# Patient Record
Sex: MALE | Race: WHITE | NOT HISPANIC OR LATINO | Employment: FULL TIME | URBAN - METROPOLITAN AREA
[De-identification: names, ages, dates, MRNs, and addresses within clinical notes are randomized per-mention and may not be internally consistent; named-entity substitution may affect disease eponyms.]

---

## 2017-07-12 ENCOUNTER — HOSPITAL ENCOUNTER (EMERGENCY)
Facility: HOSPITAL | Age: 42
Discharge: HOME | End: 2017-07-12
Attending: EMERGENCY MEDICINE
Payer: COMMERCIAL

## 2017-07-12 VITALS
BODY MASS INDEX: 31.83 KG/M2 | WEIGHT: 210 LBS | HEART RATE: 64 BPM | RESPIRATION RATE: 18 BRPM | TEMPERATURE: 98 F | HEIGHT: 68 IN | OXYGEN SATURATION: 99 % | SYSTOLIC BLOOD PRESSURE: 130 MMHG | DIASTOLIC BLOOD PRESSURE: 78 MMHG

## 2017-07-12 DIAGNOSIS — R07.9 CHEST PAIN: ICD-10-CM

## 2017-07-12 LAB
ALBUMIN SERPL BCP-MCNC: 4.4 G/DL
ALP SERPL-CCNC: 42 U/L
ALT SERPL W/O P-5'-P-CCNC: 44 U/L
ANION GAP SERPL CALC-SCNC: 9 MMOL/L
AST SERPL-CCNC: 36 U/L
BASOPHILS # BLD AUTO: 0.03 K/UL
BASOPHILS NFR BLD: 0.2 %
BILIRUB SERPL-MCNC: 1.1 MG/DL
BNP SERPL-MCNC: <10 PG/ML
BUN SERPL-MCNC: 13 MG/DL
CALCIUM SERPL-MCNC: 10.1 MG/DL
CHLORIDE SERPL-SCNC: 104 MMOL/L
CO2 SERPL-SCNC: 25 MMOL/L
CREAT SERPL-MCNC: 0.8 MG/DL
DIASTOLIC DYSFUNCTION: NO
DIFFERENTIAL METHOD: ABNORMAL
EOSINOPHIL # BLD AUTO: 0.2 K/UL
EOSINOPHIL NFR BLD: 1.2 %
ERYTHROCYTE [DISTWIDTH] IN BLOOD BY AUTOMATED COUNT: 12 %
EST. GFR  (AFRICAN AMERICAN): >60 ML/MIN/1.73 M^2
EST. GFR  (NON AFRICAN AMERICAN): >60 ML/MIN/1.73 M^2
GLUCOSE SERPL-MCNC: 101 MG/DL
HCT VFR BLD AUTO: 45 %
HGB BLD-MCNC: 16.1 G/DL
LYMPHOCYTES # BLD AUTO: 2 K/UL
LYMPHOCYTES NFR BLD: 14.2 %
MCH RBC QN AUTO: 34.8 PG
MCHC RBC AUTO-ENTMCNC: 35.8 %
MCV RBC AUTO: 97 FL
MONOCYTES # BLD AUTO: 1 K/UL
MONOCYTES NFR BLD: 7 %
NEUTROPHILS # BLD AUTO: 10.8 K/UL
NEUTROPHILS NFR BLD: 77.2 %
PLATELET # BLD AUTO: 223 K/UL
PMV BLD AUTO: 11.6 FL
POTASSIUM SERPL-SCNC: 3.7 MMOL/L
PROT SERPL-MCNC: 7.8 G/DL
RBC # BLD AUTO: 4.63 M/UL
RETIRED EF AND QEF - SEE NOTES: 60 (ref 55–65)
SODIUM SERPL-SCNC: 138 MMOL/L
TROPONIN I SERPL DL<=0.01 NG/ML-MCNC: 0.01 NG/ML
TROPONIN I SERPL DL<=0.01 NG/ML-MCNC: 0.02 NG/ML
WBC # BLD AUTO: 13.97 K/UL

## 2017-07-12 PROCEDURE — 85025 COMPLETE CBC W/AUTO DIFF WBC: CPT

## 2017-07-12 PROCEDURE — 93010 ELECTROCARDIOGRAM REPORT: CPT | Mod: ,,, | Performed by: INTERNAL MEDICINE

## 2017-07-12 PROCEDURE — 93005 ELECTROCARDIOGRAM TRACING: CPT

## 2017-07-12 PROCEDURE — 25000003 PHARM REV CODE 250: Performed by: NURSE PRACTITIONER

## 2017-07-12 PROCEDURE — 83880 ASSAY OF NATRIURETIC PEPTIDE: CPT

## 2017-07-12 PROCEDURE — 93351 STRESS TTE COMPLETE: CPT

## 2017-07-12 PROCEDURE — 99284 EMERGENCY DEPT VISIT MOD MDM: CPT

## 2017-07-12 PROCEDURE — 84484 ASSAY OF TROPONIN QUANT: CPT

## 2017-07-12 PROCEDURE — 80053 COMPREHEN METABOLIC PANEL: CPT

## 2017-07-12 RX ORDER — NITROGLYCERIN 0.4 MG/1
0.4 TABLET SUBLINGUAL
Status: COMPLETED | OUTPATIENT
Start: 2017-07-12 | End: 2017-07-12

## 2017-07-12 RX ORDER — ASPIRIN 325 MG
325 TABLET ORAL
Status: COMPLETED | OUTPATIENT
Start: 2017-07-12 | End: 2017-07-12

## 2017-07-12 RX ADMIN — NITROGLYCERIN 0.4 MG: 0.4 TABLET SUBLINGUAL at 12:07

## 2017-07-12 RX ADMIN — ASPIRIN 325 MG ORAL TABLET 325 MG: 325 PILL ORAL at 12:07

## 2017-07-12 NOTE — ED NOTES
APPEARANCE: Alert, oriented and in no acute distress.    PERIPHERAL VASCULAR: peripheral pulses present. Normal cap refill. No edema. Warm to touch.      GASTRO: soft, bowel sounds normal, no tenderness, no abdominal distention.  MUSC: Full ROM. No bony tenderness or soft tissue tenderness. No obvious deformity.  SKIN: Skin is warm and dry, normal skin turgor, mucous membranes moist.  NEURO: 5/5 strength major flexors/extensors bilaterally. Sensory intact to light touch bilaterally. [place] coma scale: eyes open spontaneously-4, oriented & converses-5, obeys commands-6. No neurological abnormalities.   MENTAL STATUS: awake, alert and aware of environment.  EYE: PERRL, both eyes: pupils brisk and reactive to light. Normal size.  ENT: EARS: no obvious drainage. NOSE: no active bleeding.   BREAST: symmetrical. No masses. No tenderness.  GENITALIA: Normal external genitalia.

## 2017-07-12 NOTE — ED PROVIDER NOTES
Encounter Date: 7/12/2017       History     Chief Complaint   Patient presents with    Chest Pain     began today, SOB, dizziness     40yo male presents to the ED for evaluation of CP.  Pt states the pain started while he was driving.  He reports that pain is substernal, and is sharp.  He reports that nothing makes the pain worse or better.  He denies fever/chills, cough, shortness of breath, palpitations, hemoptysis, abdominal pain, vomiting and diarrhea, and rash.  He does report that he felt dizziness, nausea, and diaphoresis initially, but those symptoms have resolved.  He is a smoker.  He denies family history of cardiac disease.  He reports that he had dizziness and sudden onset of nausea and diaphoresis last month for which he was seen at [place].  They diagnosed him with an anxiety attack.  He denies history of anxiety.      The history is provided by the patient.   Chest Pain   The current episode started 1 to 2 hours ago. Chest pain occurs constantly. The chest pain is improving. Associated with: unknown. At its most intense, the chest pain is at 6/10. The chest pain is currently at 4/10. The quality of the pain is described as sharp. The pain does not radiate. Primary symptoms include nausea and dizziness. Pertinent negatives for primary symptoms include no fever, no fatigue, no syncope, no shortness of breath, no cough, no wheezing, no palpitations, no abdominal pain and no vomiting.   He describes the dizziness as lightheadedness. The dizziness began today. The dizziness has been rapidly improving since its onset. Dizziness is a new problem. Dizziness also occurs with nausea and diaphoresis. Dizziness does not occur with vomiting or weakness.   Associated symptoms include diaphoresis.   Pertinent negatives for associated symptoms include no numbness and no weakness. He tried nothing for the symptoms. Risk factors include male gender and smoking/tobacco exposure.   Pertinent negatives for  past medical history include no CAD, no diabetes, no hyperlipidemia, no hypertension and no PE.   Pertinent negatives for family medical history include: no CAD and no hypertension.     Review of patient's allergies indicates:  No Known Allergies  History reviewed. No pertinent past medical history.  Past Surgical History:   Procedure Laterality Date    NOSE SURGERY       History reviewed. No pertinent family history.  Social History   Substance Use Topics    Smoking status: Light Tobacco Smoker    Smokeless tobacco: Never Used    Alcohol use Yes     Review of Systems   Constitutional: Positive for diaphoresis. Negative for appetite change, chills, fatigue and fever.   HENT: Negative for congestion and sore throat.    Respiratory: Negative for cough, shortness of breath and wheezing.    Cardiovascular: Positive for chest pain. Negative for palpitations and syncope.   Gastrointestinal: Positive for nausea. Negative for abdominal pain and vomiting.   Musculoskeletal: Negative for neck pain.   Skin: Negative for rash and wound.   Neurological: Positive for dizziness. Negative for weakness and numbness.   Hematological: Does not bruise/bleed easily.   Psychiatric/Behavioral: Negative for confusion.       Physical Exam     Initial Vitals [07/12/17 1151]   BP Pulse Resp Temp SpO2   137/77 100 20 97.9 °F (36.6 °C) 99 %      MAP       97         Physical Exam    Nursing note and vitals reviewed.  Constitutional: Vital signs are normal. He appears well-developed and well-nourished. He is active and cooperative. He is easily aroused.  Non-toxic appearance. He does not have a sickly appearance. He does not appear ill. No distress.   HENT:   Head: Normocephalic and atraumatic.   Eyes: Conjunctivae are normal.   Neck: Normal range of motion.   Cardiovascular: Normal rate, regular rhythm and normal heart sounds.   Pulses:       Radial pulses are 2+ on the right side, and 2+ on the left side.   Pulmonary/Chest: Effort normal  and breath sounds normal. He exhibits tenderness. He exhibits no mass, no bony tenderness, no crepitus, no edema and no swelling.       Abdominal: Soft. Normal appearance and bowel sounds are normal. He exhibits no distension. There is no tenderness. There is no rigidity, no rebound, no guarding and no CVA tenderness.   Neurological: He is alert, oriented to person, place, and time and easily aroused. GCS eye subscore is 4. GCS verbal subscore is 5. GCS motor subscore is 6.   Skin: Skin is warm, dry and intact. No bruising and no rash noted. No erythema.   Psychiatric: He has a normal mood and affect. His speech is normal and behavior is normal. Judgment and thought content normal. Cognition and memory are normal.         ED Course   Procedures  Labs Reviewed   COMPREHENSIVE METABOLIC PANEL - Abnormal; Notable for the following:        Result Value    Total Bilirubin 1.1 (*)     Alkaline Phosphatase 42 (*)     All other components within normal limits   CBC W/ AUTO DIFFERENTIAL - Abnormal; Notable for the following:     WBC 13.97 (*)     MCH 34.8 (*)     Gran # 10.8 (*)     Gran% 77.2 (*)     Lymph% 14.2 (*)     All other components within normal limits   TROPONIN I   B-TYPE NATRIURETIC PEPTIDE   TROPONIN I     EKG Readings: (Independently Interpreted)   Initial Reading: No STEMI. Rhythm: Normal Sinus Rhythm. Heart Rate: 88. Ectopy: No Ectopy. Conduction: Normal. ST Segments: Normal ST Segments. T Waves: Normal. Clinical Impression: Normal Sinus Rhythm      Imaging Results          X-Ray Chest 1 View (Final result)  Result time 07/12/17 12:40:04    Final result by Dori Irby MD (07/12/17 12:40:04)                 Impression:      No acute abnormality.      Electronically signed by: DORI IRBY  Date:     07/12/17  Time:    12:40              Narrative:    CLINICAL HISTORY:  Chest pain    TECHNIQUE: Single view portable frontal radiograph of the chest    COMPARISON: N/A      FINDINGS:  Support devices:  None    Chest: Cardiac and mediastinal silhouettes are normal.  Lungs are well aerated and clear.  No pleural effusion or pneumothorax.    Upper Abdomen: Normal.    Other: N/A.                                   Medical Decision Making:   Initial Assessment:   41-year-old male with chest pain which started 1-2 hours ago while driving.  No history of cardiac disease.  No family history of cardiac disease per patient.  The patient is a smoker.  Patient appears well, nontoxic.  Vital stable.  Reproducible anterior chest pain to palpation.  Heart rate regular rate and rhythm.  Lungs clear to auscultation.  Radial pulses 2+ bilaterally.  Differential Diagnosis:   STEMI, non-STEMI, arrhythmia, pneumothorax, pneumonia, chest wall pain, anxiety, electrolyte derangement  Clinical Tests:   Lab Tests: Ordered and Reviewed  Radiological Study: Ordered and Reviewed  Medical Tests: Ordered and Reviewed  ED Management:  Labs, EKG, chest x-ray, by mouth aspirin, sublingual nitroglycerin  Pt reports immediate relief of chest pain immediately after nitroglycerin.  The patient will be taken for a stress echo today.      Stress echo negative for ischemic changes.  Second troponin negative.  Pt will be discharged to follow up with [facility].  I reviewed strict return precautions.  Pt verbalized understanding, compliance, and agreement with the treatment plan.      Additional MDM:     ERNESTO Score:   Age over 65:                                    0.00   > or = to 3 CAD risk factors:           0.00  Established CAD:                            0.00  > or = to 2 anginal events in the past 24 hours: 0.00  Use of ASA in past 7 days:              0.00  Elevated Enzymes:                         0.00  ST Depression > or = to 0.05 mV:  0.00  ERNESTO score: 0                ED Course     Clinical Impression:   The encounter diagnosis was Chest pain.                           NOAM Dasilva  07/12/17 2050

## 2017-09-06 ENCOUNTER — CLINICAL SUPPORT (OUTPATIENT)
Dept: URGENT CARE | Facility: CLINIC | Age: 42
End: 2017-09-06

## 2017-09-06 DIAGNOSIS — Z00.00 PHYSICAL EXAM: Primary | ICD-10-CM

## 2017-09-06 PROCEDURE — 99499 UNLISTED E&M SERVICE: CPT | Mod: S$GLB,,, | Performed by: PREVENTIVE MEDICINE

## 2017-09-19 ENCOUNTER — PATIENT MESSAGE (OUTPATIENT)
Dept: INTERNAL MEDICINE | Facility: CLINIC | Age: 42
End: 2017-09-19

## 2017-09-19 ENCOUNTER — OFFICE VISIT (OUTPATIENT)
Dept: INTERNAL MEDICINE | Facility: CLINIC | Age: 42
End: 2017-09-19
Payer: COMMERCIAL

## 2017-09-19 ENCOUNTER — LAB VISIT (OUTPATIENT)
Dept: LAB | Facility: HOSPITAL | Age: 42
End: 2017-09-19
Attending: INTERNAL MEDICINE
Payer: COMMERCIAL

## 2017-09-19 VITALS
OXYGEN SATURATION: 96 % | HEART RATE: 96 BPM | BODY MASS INDEX: 33.62 KG/M2 | SYSTOLIC BLOOD PRESSURE: 136 MMHG | WEIGHT: 221.81 LBS | HEIGHT: 68 IN | DIASTOLIC BLOOD PRESSURE: 90 MMHG

## 2017-09-19 DIAGNOSIS — Z91.89 AT RISK FOR FERTILITY PROBLEMS: Primary | ICD-10-CM

## 2017-09-19 DIAGNOSIS — Z51.81 MEDICATION MONITORING ENCOUNTER: ICD-10-CM

## 2017-09-19 DIAGNOSIS — Z13.220 ENCOUNTER FOR SCREENING FOR LIPID DISORDER: ICD-10-CM

## 2017-09-19 DIAGNOSIS — Z00.00 ANNUAL PHYSICAL EXAM: Primary | ICD-10-CM

## 2017-09-19 DIAGNOSIS — F41.0 PANIC ATTACKS: ICD-10-CM

## 2017-09-19 DIAGNOSIS — F41.9 ANXIETY: ICD-10-CM

## 2017-09-19 LAB
ALBUMIN SERPL BCP-MCNC: 4.2 G/DL
ALP SERPL-CCNC: 48 U/L
ALT SERPL W/O P-5'-P-CCNC: 40 U/L
AMPHET+METHAMPHET UR QL: NEGATIVE
ANION GAP SERPL CALC-SCNC: 10 MMOL/L
AST SERPL-CCNC: 33 U/L
BARBITURATES UR QL SCN>200 NG/ML: NEGATIVE
BENZODIAZ UR QL SCN>200 NG/ML: NEGATIVE
BILIRUB SERPL-MCNC: 1 MG/DL
BUN SERPL-MCNC: 11 MG/DL
BZE UR QL SCN: NEGATIVE
CALCIUM SERPL-MCNC: 9.7 MG/DL
CANNABINOIDS UR QL SCN: NEGATIVE
CHLORIDE SERPL-SCNC: 104 MMOL/L
CHOLEST SERPL-MCNC: 238 MG/DL
CO2 SERPL-SCNC: 26 MMOL/L
CREAT SERPL-MCNC: 0.9 MG/DL
CREAT UR-MCNC: 68 MG/DL
EST. GFR  (AFRICAN AMERICAN): >60 ML/MIN/1.73 M^2
EST. GFR  (NON AFRICAN AMERICAN): >60 ML/MIN/1.73 M^2
ETHANOL UR-MCNC: <10 MG/DL
GLUCOSE SERPL-MCNC: 102 MG/DL
HDLC SERPL-MCNC: 57 MG/DL
METHADONE UR QL SCN>300 NG/ML: NEGATIVE
OPIATES UR QL SCN: NEGATIVE
PCP UR QL SCN>25 NG/ML: NEGATIVE
POTASSIUM SERPL-SCNC: 4 MMOL/L
PROT SERPL-MCNC: 7.8 G/DL
SODIUM SERPL-SCNC: 140 MMOL/L
TOXICOLOGY INFORMATION: NORMAL
TSH SERPL DL<=0.005 MIU/L-ACNC: 1.05 UIU/ML

## 2017-09-19 PROCEDURE — 80307 DRUG TEST PRSMV CHEM ANLYZR: CPT

## 2017-09-19 PROCEDURE — 99999 PR PBB SHADOW E&M-EST. PATIENT-LVL III: CPT | Mod: PBBFAC,,, | Performed by: INTERNAL MEDICINE

## 2017-09-19 PROCEDURE — 36415 COLL VENOUS BLD VENIPUNCTURE: CPT | Mod: PO

## 2017-09-19 PROCEDURE — 99386 PREV VISIT NEW AGE 40-64: CPT | Mod: S$GLB,,, | Performed by: INTERNAL MEDICINE

## 2017-09-19 PROCEDURE — 84443 ASSAY THYROID STIM HORMONE: CPT

## 2017-09-19 PROCEDURE — 80053 COMPREHEN METABOLIC PANEL: CPT

## 2017-09-19 PROCEDURE — 82465 ASSAY BLD/SERUM CHOLESTEROL: CPT

## 2017-09-19 PROCEDURE — 83718 ASSAY OF LIPOPROTEIN: CPT

## 2017-09-19 RX ORDER — TRAZODONE HYDROCHLORIDE 100 MG/1
100 TABLET ORAL NIGHTLY PRN
Qty: 30 TABLET | Refills: 1 | Status: SHIPPED | OUTPATIENT
Start: 2017-09-19 | End: 2017-11-14 | Stop reason: SDUPTHER

## 2017-09-19 RX ORDER — TRAZODONE HYDROCHLORIDE 50 MG/1
50 TABLET ORAL NIGHTLY PRN
Qty: 30 TABLET | Refills: 3 | Status: SHIPPED | OUTPATIENT
Start: 2017-09-19 | End: 2017-09-19 | Stop reason: SDUPTHER

## 2017-09-19 NOTE — PROGRESS NOTES
Portions of this note are generated with voice recognition software. Typographical errors may exist.       Patient Name:GABRIELA PASTOR  Patient MRN:   [id number]    History of Present Illness   ================================================================  GABRIELA PASTOR is a 42 y.o. male here for primary care visit for  Chief Complaint   Patient presents with    Establish Care       History reviewed. No pertinent past medical history.    Past Surgical History:   Procedure Laterality Date    NOSE SURGERY         Review of patient's allergies indicates:  No Known Allergies    No current outpatient prescriptions on file prior to visit.     No current facility-administered medications on file prior to visit.        Family History   Problem Relation Age of Onset    Diabetes Mellitus Neg Hx        Social History     Social History    Marital status:      Spouse name: N/A    Number of children: N/A    Years of education: N/A     Occupational History    Not on file.     Social History Main Topics    Smoking status: Light Tobacco Smoker    Smokeless tobacco: Never Used    Alcohol use Yes    Drug use: No    Sexual activity: Not on file     Other Topics Concern    Not on file     Social History Narrative    No narrative on file       History   Sexual Activity    Sexual activity: Not on file         SUBJECTIVE:    Patient states that over the course of the last 5 months he has gone to the emergency room twice for panic attack.  States that he has had comprehensive cardiovascular evaluation the first started at [facility] and then concluded at [facility].  States that he has not had problems with panic attacks prior to this current situation.  Has never been on pharmacotherapy for anxiety or depression.  Psychosocial conditions do not seem to be out of the ordinary in the patient's opinion.  He has some psychosocial stress but no recent major life events.  States that he in the last  2 weeks has had recurrence of severe panic attack symptoms.  Inadequate coping skills.  He denies alcohol misuse or abuse.  Denies abuse of caffeine.  No family history of thyroidal illness.    Occasional orthopedic problems with his right shoulder.  No major injuries or surgeries in the past.    Patient would like to stop smoking but would like to get control of anxiety first.          Medications Reviewed and Updated    Past medical, family, and social histories were reviewed and updated.    Review of Systems negative unless otherwise noted in history of present illness-  Review of Systems   HENT: Negative for hearing loss.    Eyes: Negative for discharge.   Respiratory: Negative for wheezing.    Cardiovascular: Negative for chest pain and palpitations.   Gastrointestinal: Negative for blood in stool, constipation, diarrhea and vomiting.   Genitourinary: Negative for hematuria and urgency.   Musculoskeletal: Negative for neck pain.   Neurological: Negative for weakness and headaches.   Endo/Heme/Allergies: Negative for polydipsia.       Allergic:  Review of patient's allergies indicates:  No Known Allergies    OBJECTIVE:  BP: (!) 136/90 Pulse: 96    Wt Readings from Last 3 Encounters:   09/19/17 100.6 kg (221 lb 12.5 oz)   07/12/17 95.3 kg (210 lb)    Body mass index is 33.72 kg/m².  Previous Blood Pressure Readings :   BP Readings from Last 3 Encounters:   09/19/17 (!) 136/90   07/12/17 130/78       Physical Exam    GEN: healthy appearing  HEENT: sclera non-icteric, conjunctiva clear  CV: no peripheral edema, RRR, no r/m/g  PULM: breathing non-labored, no w/r/r  ABD: obese , no hepatomegally   PSYCH: appropriate affect  MSK: able to rise from chair without assistance  SKIN: normal skin turgor      Pertinent Labs Reviewed           ASSESSMENT/PLAN:    Annual physical exam    Encounter for screening for lipid disorder  -     Cholesterol, total; Future; Expected date: 09/19/2017  -     HDL CHOLESTEROL; Future;  Expected date: 09/19/2017    Anxiety.Condition not optimally controlled. Detailed counseling on self care measures. Plan to monitor clinically in addition to plan below.   -     Comprehensive metabolic panel; Future; Expected date: 09/19/2017  -     TSH; Future; Expected date: 09/19/2017  -     Discontinue: trazodone (DESYREL) 50 MG tablet; Take 1 tablet (50 mg total) by mouth nightly as needed for Insomnia.  Dispense: 30 tablet; Refill: 3  -     TOXICOLOGY SCREEN, URINE, RANDOM (COMPLIANCE)  -     trazodone (DESYREL) 100 MG tablet; Take 1 tablet (100 mg total) by mouth nightly as needed for Insomnia.  Dispense: 30 tablet; Refill: 1    Medication monitoring encounter  -     TOXICOLOGY SCREEN, URINE, RANDOM (COMPLIANCE)          Future Appointments  Date Time Provider Department Center   10/26/2017 3:20 PM Troy Holt MD [facility] [place]       Troy Holt  9/19/2017  6:26 PM

## 2017-09-19 NOTE — PATIENT INSTRUCTIONS
AFTER CARE INSTRUCTIONS   · The name of your medicine is trazodone.     · You do not become addicted to this medicine.  However your body does become dependent on it after taking it more than 21 days at dose 150 mg or more.  Therefore do not stop taking it abruptly once you have been taking it more than 21 days at dose 150 mg or more.  If you need to address discontinuing the medicine call the office so we can give you special instructions.    · You will start on a low-dose of this medication.  This is to avoid any unwanted side effects or to let yourbody get use to mild side effects until they go away.     · Depression\anxiety symptoms do not typically get better until you have been on the medication for at least 3-4 weeks.  Therefore we will likely need to communicate with you after 3-4 weeks of therapy to determine if you need increased dosage of medication.    · This medicine may give you some stomach upset . Give this some time. If it is minor it should go away on it's own.     · If it does not go away or it bothers you, please call our office. Do NOT stop taking it all of a sudden. Stopping this medicine suddenly can cause your mood symptoms to come back in an unpleasant way.    · This is not generally a life threatening problem but if your mood symptoms worsen and you have persistent thoughts of hurting yourself or others, please call [phone] or [phone]      Never let this medicine bottle . You don't want to risk losing your supply of medicine.           CONTROLLED SUBSTANCE USE AGREEMENT    I understand that I have pain that has not been adequately controlled with other medications and that my function is limited by my pain. I understand that the intent of the medication is to increase my ability to do more, though the medication is unlikely to eliminate the pain.      Patient Responsibilities  I will take the medication only as prescribed.     I will not take any illicit substances,  sedatives, alcohol or other pain medications without the prior approval of my doctor.    I understand that the medication will be prescribed only by Dr. Troy Holt and only according to the agreed-upon schedule.     Prescriptions will be provided only during regularly scheduled appointments. Refills will never be provided by telephone.    I will not seek or accept any medications for pain other than those prescribed by my doctor. Medications for pain includes prescriptions from other doctors, medications borrowed or accepted from family or friends, and any illicit or street drugs.    Medication refills will be provided as electronic prescriptions only to a specific known pharmacy. No refills will be given prior to the next scheduled appointment date. If I do not keep my appointment, I will not receive a refill.     Two (2) appointment cancellations with less than one working days notice or two (2) no-show appointments may constitute grounds for immediate termination of this agreement.    I understand that my doctor is under no obligation to provide these medications to me, and that she or he reserves the right to discontinue these medications at any time.   At my doctors discretion, I agree to cooperate with random drug testing, which may be requested at any time. If I refuse, I understand the medication will be stopped.    I understand that lost or stolen medications will not be refilled under any circumstances. It is my responsibility to protect and secure any medications. This includes keeping the medication out of reach of children. A copy of a police report will be required for any lost or stolen narcotics or narcotic prescriptions.    I understand the my health care provider and his/her staff will treat me with respect. I also understand that it is my responsibility to treat my provider and his/her staff with respect and that if frequent verbal altercations occur that this may result in suspension  of my prescription(s).     I understand that my doctor may require specialist evaluation of my treatment, and I agree to keep appointments when my physician refers me. My doctor will send a report of my care and a copy of this agreement when a referral is made.    In addition to the above agreements, I accept the right of my doctors medical staff to terminate this agreement for any of the following reasons:  · I seek or obtain any pain medication from a source other than my doctor.  · I give, sell or in any way distribute prescribed medications to any other person(s).  · I in any way attempt to forge or alter a prescription.  · My medical condition declines to the point at which, in the judgment of my doctor, continued therapy with this medication presents a danger to my well-being or safety.  · There is evidence that I am no longer receiving a reasonable therapeutic benefit from the medication, or my doctor determines that I am no longer a good candidate to continue the medication.    Urine Testing     Controlled substances have a potential for abuse. Even though the vast majority of patients do not abuse their controlled substance(s), nationally there is a epidemic of prescription drug abuse and so your doctor must periodically order urine drug testing to ensure that he/she is meeting [place] regulations.     Urine drug screening will be requested randomly. You will be asked either in the clinic or be called in from home to complete testing.     Testing requested in clinic must be completed in clinic. Extra time will be given for you to complete the urine specimen before going home. You should not leave the building before completing the urine test. You may also be given additional instructions by staff on how to provide a proper urine specimen. If you are unable to provide a specimen in clinic or follow staff instructions on how to submit a specimen, this may be considered a failed specimen and result in suspending  your controlled substance(s).     Testing requested while you are at home must be completed within 48 hrs of received the telephone call to complete the test. If you anticipate not being able to complete testing in that timeframe you must contact the clinic to discuss reasons for the delay. If you do not contact the clinic this may be considered as a failed specimen and result in suspending your controlled substance.         Collateral Information     I agree to allow my physician to contact family and friends who are in regular contact with me to discuss my use or possible misuse of my narcotic prescription and behaviors that may deviate from this narcotic contract.     I understand that my provider will not share details of my ongoing medical care with such individuals and that the nature of these conversations, if they occur, will be for my provider to verify that the prescribed narcotic is not causing harm and that no details on my personal medical care will be disclosed.       Choice of Pharmacy    I agree to fill my prescriptions only at the pharmacy I list below. If I change pharmacies, I will contact my doctors office and provide them with the name, address and phone number of the new pharmacy. Under no circumstances will I obtain medications from more than one pharmacy at a time. In order to verify appropriate medication use, my doctors office will provide my chosen pharmacy with a copy of this agreement.  I understand that any alteration in my medication prescriptions will require a new written agreement.      By accepting a controlled substance from my provider I understand that I am agreeing to abide by the rules reviewed above and that failure to abide by these agreements will result in the suspending or termination of medication prescriptions and possibly the termination of services from my doctor and his or her practice.

## 2017-09-20 ENCOUNTER — PATIENT MESSAGE (OUTPATIENT)
Dept: INTERNAL MEDICINE | Facility: CLINIC | Age: 42
End: 2017-09-20

## 2017-09-20 RX ORDER — ALPRAZOLAM 0.25 MG/1
TABLET ORAL
Qty: 48 TABLET | Refills: 0 | Status: SHIPPED | OUTPATIENT
Start: 2017-09-20 | End: 2020-02-04

## 2017-09-25 ENCOUNTER — TELEPHONE (OUTPATIENT)
Dept: INTERNAL MEDICINE | Facility: CLINIC | Age: 42
End: 2017-09-25

## 2017-09-25 NOTE — TELEPHONE ENCOUNTER
----- Message from Indiana Chavez sent at 9/25/2017  2:51 PM CDT -----  No. [phone]   Patient returned your call.

## 2017-09-25 NOTE — TELEPHONE ENCOUNTER
Spoke with pt to schedule labs and a  time for a semen collection test. Pt will come in on 9/27/2017 to get labs and  collection materials. Understanding voiced.

## 2017-09-27 ENCOUNTER — LAB VISIT (OUTPATIENT)
Dept: LAB | Facility: HOSPITAL | Age: 42
End: 2017-09-27
Attending: INTERNAL MEDICINE
Payer: COMMERCIAL

## 2017-09-27 DIAGNOSIS — Z91.89 AT RISK FOR FERTILITY PROBLEMS: ICD-10-CM

## 2017-09-27 LAB — TESTOST SERPL-MCNC: 653 NG/DL

## 2017-09-27 PROCEDURE — 36415 COLL VENOUS BLD VENIPUNCTURE: CPT | Mod: PO

## 2017-09-27 PROCEDURE — 84402 ASSAY OF FREE TESTOSTERONE: CPT

## 2017-09-27 PROCEDURE — 84403 ASSAY OF TOTAL TESTOSTERONE: CPT

## 2017-09-29 LAB — TESTOST FREE SERPL-MCNC: 16 PG/ML

## 2017-10-01 ENCOUNTER — HOSPITAL ENCOUNTER (EMERGENCY)
Facility: HOSPITAL | Age: 42
Discharge: HOME | End: 2017-10-01
Attending: EMERGENCY MEDICINE
Payer: COMMERCIAL

## 2017-10-01 VITALS
DIASTOLIC BLOOD PRESSURE: 79 MMHG | TEMPERATURE: 98 F | BODY MASS INDEX: 31.83 KG/M2 | SYSTOLIC BLOOD PRESSURE: 139 MMHG | OXYGEN SATURATION: 96 % | HEART RATE: 87 BPM | WEIGHT: 210 LBS | HEIGHT: 68 IN | RESPIRATION RATE: 18 BRPM

## 2017-10-01 DIAGNOSIS — R06.02 SHORTNESS OF BREATH: ICD-10-CM

## 2017-10-01 DIAGNOSIS — F41.9 ANXIETY: ICD-10-CM

## 2017-10-01 DIAGNOSIS — R07.9 CHEST PAIN, UNSPECIFIED TYPE: Primary | ICD-10-CM

## 2017-10-01 LAB
ALBUMIN SERPL BCP-MCNC: 4.4 G/DL
ALP SERPL-CCNC: 49 U/L
ALT SERPL W/O P-5'-P-CCNC: 54 U/L
ANION GAP SERPL CALC-SCNC: 17 MMOL/L
AST SERPL-CCNC: 48 U/L
BASOPHILS NFR BLD: 0 %
BILIRUB SERPL-MCNC: 0.5 MG/DL
BUN SERPL-MCNC: 11 MG/DL
CALCIUM SERPL-MCNC: 9.6 MG/DL
CHLORIDE SERPL-SCNC: 103 MMOL/L
CO2 SERPL-SCNC: 22 MMOL/L
CREAT SERPL-MCNC: 0.9 MG/DL
D DIMER PPP IA.FEU-MCNC: 0.27 MG/L FEU
DIFFERENTIAL METHOD: ABNORMAL
EOSINOPHIL NFR BLD: 2 %
ERYTHROCYTE [DISTWIDTH] IN BLOOD BY AUTOMATED COUNT: 12.2 %
EST. GFR  (AFRICAN AMERICAN): >60 ML/MIN/1.73 M^2
EST. GFR  (NON AFRICAN AMERICAN): >60 ML/MIN/1.73 M^2
GLUCOSE SERPL-MCNC: 107 MG/DL
HCT VFR BLD AUTO: 47.3 %
HGB BLD-MCNC: 16.7 G/DL
LYMPHOCYTES NFR BLD: 45 %
MCH RBC QN AUTO: 35.2 PG
MCHC RBC AUTO-ENTMCNC: 35.3 G/DL
MCV RBC AUTO: 100 FL
MONOCYTES NFR BLD: 6 %
NEUTROPHILS NFR BLD: 47 %
PLATELET # BLD AUTO: 239 K/UL
PMV BLD AUTO: 11.4 FL
POTASSIUM SERPL-SCNC: 3.5 MMOL/L
PROT SERPL-MCNC: 8.3 G/DL
RBC # BLD AUTO: 4.74 M/UL
SODIUM SERPL-SCNC: 142 MMOL/L
TROPONIN I SERPL DL<=0.01 NG/ML-MCNC: <0.006 NG/ML
WBC # BLD AUTO: 10.73 K/UL

## 2017-10-01 PROCEDURE — 85379 FIBRIN DEGRADATION QUANT: CPT

## 2017-10-01 PROCEDURE — 93005 ELECTROCARDIOGRAM TRACING: CPT

## 2017-10-01 PROCEDURE — 63600175 PHARM REV CODE 636 W HCPCS: Performed by: EMERGENCY MEDICINE

## 2017-10-01 PROCEDURE — 84484 ASSAY OF TROPONIN QUANT: CPT

## 2017-10-01 PROCEDURE — 85007 BL SMEAR W/DIFF WBC COUNT: CPT

## 2017-10-01 PROCEDURE — 93010 ELECTROCARDIOGRAM REPORT: CPT | Mod: ,,, | Performed by: INTERNAL MEDICINE

## 2017-10-01 PROCEDURE — 99284 EMERGENCY DEPT VISIT MOD MDM: CPT | Mod: 25

## 2017-10-01 PROCEDURE — 80053 COMPREHEN METABOLIC PANEL: CPT

## 2017-10-01 PROCEDURE — 96374 THER/PROPH/DIAG INJ IV PUSH: CPT

## 2017-10-01 PROCEDURE — 85027 COMPLETE CBC AUTOMATED: CPT

## 2017-10-01 RX ADMIN — LORAZEPAM 1 MG: 2 INJECTION INTRAMUSCULAR; INTRAVENOUS at 10:10

## 2017-10-02 NOTE — ED NOTES
APPEARANCE: Alert, oriented and in no acute distress.  CARDIAC: Normal rate and rhythm, pt reports midsternal chest heaviness  PERIPHERAL VASCULAR: peripheral pulses present. Normal cap refill. No edema. Warm to touch.  RESPIRATORY: pt reports SOB, tachypnea noted, lung sounds clear and equal bilaterally.  GASTRO: soft, bowel sounds normal, no tenderness, no abdominal distention.  MUSC: Full ROM. No bony tenderness or soft tissue tenderness. No obvious deformity.  SKIN: Skin is warm and dry, normal skin turgor, mucous membranes moist.  NEURO: 5/5 strength major flexors/extensors bilaterally. Sensory intact to light touch bilaterally. [place] coma scale: eyes open spontaneously-4, oriented & converses-5, obeys commands-6. No neurological abnormalities.   MENTAL STATUS: awake, alert and aware of environment, pt reports feeling anxious, +ETOH earlier today  EYE: PERRLA. Bilateral pupil equal, reaction brisk, normal size.   ENT: EARS: no obvious drainage. NOSE: no active bleeding.

## 2017-10-02 NOTE — ED PROVIDER NOTES
Encounter Date: 10/1/2017    SCRIBE #1 NOTE: I, Erica Tejadajanes, am scribing for, and in the presence of, Dr. Hardin.       History     Chief Complaint   Patient presents with    Shortness of Breath     reports SOB that started 30 mins ago while lyiny down, pt reports he had had hx of anxiety and this has happened before     Anxiety     Time seen by provider: 10:06 PM    This is a 42 y.o. male who is a smoker with a PMHx of anxiety who presents with complaint of shortness of breath and chest pain.  The patient reports about 45 minute PTA he began to have constant and worsening shortness of breath. Shortly after this onset, he began to have chest heaviness. The patient has been evaluated for similar symptoms before and diagnosed with anxiety. He states he wanted to avoid taking the Xanax prescription he was given as he has never taken it before and he had been drinking this morning for the [employer] game and was concerned there would be an interaction. He had associated nausea but that is now resolved. He had a stress test in July that was completely normal.       The history is provided by the patient.     Review of patient's allergies indicates:  No Known Allergies  Past Medical History:   Diagnosis Date    Anxiety      Past Surgical History:   Procedure Laterality Date    NOSE SURGERY       Family History   Problem Relation Age of Onset    Diabetes Mellitus Neg Hx      Social History   Substance Use Topics    Smoking status: Heavy Tobacco Smoker     Packs/day: 1.00    Smokeless tobacco: Never Used    Alcohol use Yes     Review of Systems   Constitutional: Negative for chills and fever.   HENT: Negative for facial swelling and nosebleeds.    Eyes: Negative for visual disturbance.   Respiratory: Positive for shortness of breath. Negative for cough.    Cardiovascular: Positive for chest pain. Negative for palpitations.   Gastrointestinal: Positive for nausea. Negative for abdominal distention, abdominal pain,  diarrhea and vomiting.   Genitourinary: Negative for difficulty urinating, dysuria, frequency and hematuria.   Musculoskeletal: Negative for neck pain and neck stiffness.   Skin: Negative for rash.   Neurological: Negative for seizures, syncope and speech difficulty.       Physical Exam     Initial Vitals [10/01/17 2149]   BP Pulse Resp Temp SpO2   139/88 (!) 116 18 98.7 °F (37.1 °C) 98 %      MAP       105         Physical Exam    Nursing note and vitals reviewed.  Constitutional: He appears well-developed and well-nourished. He is not diaphoretic. No distress.   HENT:   Head: Normocephalic and atraumatic.   Eyes: Conjunctivae and EOM are normal. Pupils are equal, round, and reactive to light. No scleral icterus.   Neck: Normal range of motion. Neck supple.   Cardiovascular: Normal rate, regular rhythm and normal heart sounds.   No murmur heard.  Pulmonary/Chest: Breath sounds normal. No respiratory distress. He has no wheezes. He has no rhonchi. He has no rales.   Abdominal: Soft. Bowel sounds are normal. He exhibits no distension. There is no tenderness.   Musculoskeletal: Normal range of motion. He exhibits no edema or tenderness.   No calf tenderness or lower extremity edema.    Neurological: He is alert and oriented to person, place, and time. He has normal strength. No cranial nerve deficit.   Skin: Skin is warm and dry. No rash noted. No erythema.         ED Course   Procedures  Labs Reviewed   CBC W/ AUTO DIFFERENTIAL - Abnormal; Notable for the following:        Result Value     (*)     MCH 35.2 (*)     All other components within normal limits   COMPREHENSIVE METABOLIC PANEL - Abnormal; Notable for the following:     CO2 22 (*)     Alkaline Phosphatase 49 (*)     AST 48 (*)     ALT 54 (*)     Anion Gap 17 (*)     All other components within normal limits   TROPONIN I   D DIMER, QUANTITATIVE     EKG Readings: (Independently Interpreted)   Heart Rate: 96.   Normal sinus rhythm. Normal EKG.        X-Rays:   Independently Interpreted Readings:   Chest X-Ray: No acute process.       Medical Decision Making:   History:   Old Medical Records: I decided to obtain old medical records.  Independently Interpreted Test(s):   I have ordered and independently interpreted X-rays - see prior notes.  I have ordered and independently interpreted EKG Reading(s) - see prior notes  Clinical Tests:   Lab Tests: Ordered and Reviewed  Radiological Study: Ordered and Reviewed  Medical Tests: Ordered and Reviewed  ED Management:  42-year-old male who experienced an episode of chest pain and shortness of breath tonight.  He has a normal EKG.  Troponin and d-dimer are both normal.  In reviewing the patient's chart I see that he had a stress test on just a few months ago which showed no evidence of cardiac ischemia.  He was given 1 mg of Ativan intravenously here in the ED with resolution of his symptoms.  I feel the most likely etiology for his symptoms is anxiety.  He is discharged in stable condition and will follow-up with his primary physician as soon as able.            Scribe Attestation:   Scribe #1: I performed the above scribed service and the documentation accurately describes the services I performed. I attest to the accuracy of the note.    Attending Attestation:           Physician Attestation for Scribe:  Physician Attestation Statement for Scribe #1: I, Dr. Hardin, reviewed documentation, as scribed by Erica Brock  in my presence, and it is both accurate and complete.                 ED Course      Clinical Impression:   The primary encounter diagnosis was Chest pain, unspecified type. Diagnoses of Shortness of breath and Anxiety were also pertinent to this visit.       I, Dr. Campos Hardin, personally performed the services described in this documentation. All medical record entries made by the scribe were at my direction and in my presence. I have reviewed the chart and agree that the record reflects my  personal performance and is accurate and complete. Campos Pizano MD.  11:46 PM 10/01/2017                      Campos Pizano MD  10/01/17 [id number]

## 2017-10-09 ENCOUNTER — TELEPHONE (OUTPATIENT)
Dept: FAMILY MEDICINE | Facility: CLINIC | Age: 42
End: 2017-10-09

## 2017-10-09 DIAGNOSIS — N46.9 MALE FERTILITY PROBLEM: Primary | ICD-10-CM

## 2017-10-10 NOTE — TELEPHONE ENCOUNTER
----- Message from Vladimir Babin sent at 10/9/2017  6:53 PM CDT -----  Contact: Self  Pt calling in returning call about lab work    [phone]

## 2017-10-26 ENCOUNTER — OFFICE VISIT (OUTPATIENT)
Dept: INTERNAL MEDICINE | Facility: CLINIC | Age: 42
End: 2017-10-26
Payer: COMMERCIAL

## 2017-10-26 VITALS
SYSTOLIC BLOOD PRESSURE: 142 MMHG | WEIGHT: 221.13 LBS | DIASTOLIC BLOOD PRESSURE: 80 MMHG | BODY MASS INDEX: 33.51 KG/M2 | HEIGHT: 68 IN | HEART RATE: 96 BPM

## 2017-10-26 DIAGNOSIS — F41.0 PANIC ATTACKS: ICD-10-CM

## 2017-10-26 DIAGNOSIS — F41.9 ANXIETY: Primary | ICD-10-CM

## 2017-10-26 DIAGNOSIS — N46.9 MALE FERTILITY PROBLEM: ICD-10-CM

## 2017-10-26 PROCEDURE — 99214 OFFICE O/P EST MOD 30 MIN: CPT | Mod: S$GLB,,, | Performed by: INTERNAL MEDICINE

## 2017-10-26 PROCEDURE — 99999 PR PBB SHADOW E&M-EST. PATIENT-LVL III: CPT | Mod: PBBFAC,,, | Performed by: INTERNAL MEDICINE

## 2017-10-26 RX ORDER — TRAZODONE HYDROCHLORIDE 50 MG/1
TABLET ORAL
COMMUNITY
Start: 2017-10-16 | End: 2017-10-26

## 2017-10-26 NOTE — PATIENT INSTRUCTIONS
Recommendations for today    We highly recommend that you establish with a counselor to help overcome problems with anxiety.  If you have difficulty scheduling with counselor please contact my office for additional support.    Continue using trazodone. Xanax should only be for emergencies.    Reduce caffeine consumption.    Avoid drinking alcoholic beverages more than 4 standard drinks in one setting.  Exceeding this amount can result in worsening anxiety.

## 2017-10-27 ENCOUNTER — LAB VISIT (OUTPATIENT)
Dept: LAB | Facility: HOSPITAL | Age: 42
End: 2017-10-27
Attending: INTERNAL MEDICINE
Payer: COMMERCIAL

## 2017-10-27 DIAGNOSIS — N46.9 MALE FERTILITY PROBLEM: ICD-10-CM

## 2017-10-27 PROCEDURE — 89320 SEMEN ANAL VOL/COUNT/MOT: CPT

## 2017-10-30 NOTE — PROGRESS NOTES
Portions of this note are generated with voice recognition software. Typographical errors may exist.     SUBJECTIVE:    This is a/an 42 y.o. male here for primary care visit for  Chief Complaint   Patient presents with    Follow-up     Patient states he is tolerating trazodone .  Has used alprazolam about 4 times.  Does not cause excessive sedation.  Denies misuse or abuse of alprazolam or alcohol.  Patient clearly understands that caffeine worsens anxiety and has tried to reduce caffeine consumption.  Patient has never been evaluated by a counselor.  He is amenable to seeking supportive counseling to develop better coping skills against anxiety    states that the medication trazodone helps with sleep but he hasn't noticed remarkable improvement in anxiety symptoms.          Answers for HPI/ROS submitted by the patient on 10/24/2017   activity change: No  unexpected weight change: No  rhinorrhea: No  trouble swallowing: No  visual disturbance: No  chest tightness: No  polyuria: No  difficulty urinating: No  joint swelling: No  arthralgias: No  confusion: No  dysphoric mood: No      Medications Reviewed and Updated    Past medical, family, and social histories were reviewed and updated.    Review of Systems negative unless otherwise noted in history of present illness-  Review of Systems   HENT: Negative for hearing loss.    Eyes: Negative for discharge.   Respiratory: Negative for wheezing.    Cardiovascular: Negative for chest pain and palpitations.   Gastrointestinal: Negative for blood in stool, constipation, diarrhea and vomiting.   Genitourinary: Negative for hematuria and urgency.   Musculoskeletal: Negative for neck pain.   Neurological: Negative for weakness and headaches.   Endo/Heme/Allergies: Negative for polydipsia.         Allergic:  Review of patient's allergies indicates:  No Known Allergies    OBJECTIVE:  BP: (!) 142/80 Pulse: 96    Wt Readings from Last 3 Encounters:   10/26/17 100.3 kg (221 lb 1.9  oz)   10/01/17 95.3 kg (210 lb)   09/19/17 100.6 kg (221 lb 12.5 oz)    Body mass index is 33.62 kg/m².  Previous Blood Pressure Readings :   BP Readings from Last 3 Encounters:   10/26/17 (!) 142/80   10/01/17 139/79   09/19/17 (!) 136/90       Physical Exam    GEN: No apparent distress  HEENT: sclera non-icteric, conjunctiva clear  CV: no peripheral edema  PULM: breathing non-labored  ABD: Obese, protuberant abdomen.  PSYCH: appropriate affect  MSK: able to rise from chair without assistance  SKIN: normal skin turgor    Pertinent Labs Reviewed       ASSESSMENT/PLAN:    Anxiety.Condition not optimally controlled. Detailed counseling on self care measures. Plan to monitor clinically in addition to plan below.   - see AVS     Male fertility problem  -     Semen analysis; Future; Expected date: 11/09/2017    Panic attacks.Condition not optimally controlled. Detailed counseling on self care measures. Plan to monitor clinically in addition to plan below.   - see AVS     Future Appointments  Date Time Provider Department Center   12/28/2017 3:20 PM Troy Holt MD [facility]  10/31/2017  7:59 PM

## 2017-11-10 ENCOUNTER — PATIENT MESSAGE (OUTPATIENT)
Dept: INTERNAL MEDICINE | Facility: CLINIC | Age: 42
End: 2017-11-10

## 2017-11-10 DIAGNOSIS — F41.0 PANIC ATTACKS: ICD-10-CM

## 2017-11-10 LAB
GERM CELLS, SEMEN: ABNORMAL
PH SMN: 8.3 [PH]
PMN'S/100 SPERMATAZOA: 0 %
SPECIMEN VOL SMN: 2 ML
SPERM # SMN: 44 M/ML
SPERM # SMN: 88 M
SPERM MOTILE NFR SMN: 41 %
SPERM NORM MOTILE # SMN: 2.9 M (ref 50–?)
SPERM NORM NFR SMN: 8 %
SPERM PROG NFR SMN: 31 %
SPERM SMN: ABNORMAL
VISC SMN QL: NORMAL
WBC # SMN: 0 M/ML

## 2017-11-10 RX ORDER — ALPRAZOLAM 0.25 MG/1
TABLET ORAL
Qty: 48 TABLET | Refills: 0 | OUTPATIENT
Start: 2017-11-10 | End: 2018-02-08

## 2017-11-14 DIAGNOSIS — F41.9 ANXIETY: ICD-10-CM

## 2017-11-15 RX ORDER — TRAZODONE HYDROCHLORIDE 100 MG/1
100 TABLET ORAL NIGHTLY PRN
Qty: 30 TABLET | Refills: 1 | Status: SHIPPED | OUTPATIENT
Start: 2017-11-15 | End: 2020-02-04

## 2017-12-28 ENCOUNTER — OFFICE VISIT (OUTPATIENT)
Dept: INTERNAL MEDICINE | Facility: CLINIC | Age: 42
End: 2017-12-28
Payer: COMMERCIAL

## 2017-12-28 VITALS
WEIGHT: 217.81 LBS | OXYGEN SATURATION: 98 % | SYSTOLIC BLOOD PRESSURE: 138 MMHG | HEART RATE: 88 BPM | BODY MASS INDEX: 33.01 KG/M2 | HEIGHT: 68 IN | DIASTOLIC BLOOD PRESSURE: 82 MMHG

## 2017-12-28 DIAGNOSIS — J32.9 CHRONIC SINUSITIS, UNSPECIFIED LOCATION: ICD-10-CM

## 2017-12-28 DIAGNOSIS — F41.9 ANXIETY: Primary | ICD-10-CM

## 2017-12-28 DIAGNOSIS — Z31.41 FERTILITY TESTING: ICD-10-CM

## 2017-12-28 DIAGNOSIS — F17.200 SEVERE TOBACCO DEPENDENCE: ICD-10-CM

## 2017-12-28 DIAGNOSIS — F41.0 PANIC ATTACKS: ICD-10-CM

## 2017-12-28 PROCEDURE — 99999 PR PBB SHADOW E&M-EST. PATIENT-LVL IV: CPT | Mod: PBBFAC,,, | Performed by: INTERNAL MEDICINE

## 2017-12-28 PROCEDURE — 99214 OFFICE O/P EST MOD 30 MIN: CPT | Mod: S$GLB,,, | Performed by: INTERNAL MEDICINE

## 2017-12-28 NOTE — PATIENT INSTRUCTIONS
Recommendations for today    We highly recommend that you enroll in the smoking cessation program.  Chantix might not be the best first choice because it can make anxiety worse.  Recommend that you try smoking counseling in addition to nicotine replacement therapy as the first component of your smoking cessation plan.  If this is not going well contact my office and we can consider medication such as Chantix or Wellbutrin.    Avoid taking vitamins and supplements with more than the daily recommended value of vitamins D, K, A, and E    Nicotine replacement therapy    Nicotine replacement therapy is a treatment to help people stop smoking. It uses products that supply low doses of nicotine. These products do not contain many of the toxins found in smoke. The goal of therapy is to cut down on cravings for nicotine and ease the symptoms of nicotine withdrawal.     Facts about using nicotine replacement therapy:    The more cigarettes you smoke, the higher the dose you may need to start.    Adding a counseling program will make you more likely to quit.    LIMIT smoking while using nicotine replacement as much as possible. Failing to do so can cause nicotine to build up to toxic levels.    Nicotine replacement helps prevent weight gain while you are using it. You may still gain weight when you stop all nicotine use.    The dose of nicotine should be slowly decreased when choosing to quit to reduce this possibility      TYPES OF NICOTINE REPLACEMENT THERAPY    Nicotine supplements come in many forms:    · Gum  · Inhalers  · Lozenges  · Nasal spray  · Skin patch    All of these work well if they are used correctly. People are more likely to use the gum and patches correctly than other forms.    Nicotine Patch    · All nicotine patches are placed and used in similar ways:  · A single patch is worn each day. It is replaced after 24 hours.  · Place the patch on different areas above the waist and below the neck each  "day.  · Put the patch on a hairless spot.  · People who wear the patches for 24 hours will have fewer withdrawal symptoms.  · If wearing the patch at night causes odd dreams, try sleeping without the patch.  · People who smoke fewer than 10 cigarettes per day should start with a lower dose patch (for example, 14 mg).    Nicotine Gum or Lozenge    You can buy nicotine gum or lozenges without a prescription. Some people prefer lozenges to the patch, because they can control the nicotine dose.    Tips for using the gum:    If you are just starting to quit, chew 1 to 2 pieces each hour. DO NOT chew more than 20 pieces a day.     Chew the gum slowly until it develops a peppery taste. Then, tuck it between the gum and cheek and store it there. This lets the nicotine be absorbed.    The goal is to stop using the gum by 6 months. Using nicotine gum longterm may be safer than smoking, but research is needed to confirm this.    Wait at least 15 minutes after drinking coffee, tea, soft drinks, and acidic beverages before chewing a piece of gum.    People who smoke more than 25 cigarettes per day have better results with the 4 mg dose than with the 2 mg dose.      Nicotine Inhaler    The nicotine inhaler looks like a plastic cigarette delaney. It requires a prescription in the [place].     Insert nicotine cartridges into the inhaler and "puff" for about 20 minutes. Do this up to 16 times a day.     The inhaler is quickacting. It takes about the same time as the gum to act. It is faster than the 2 to 4 hours it takes for the patch to work.    The inhaler satisfies oral urges.     Most of the nicotine vapor does not go into the airways of the lung. Some people have mouth or throat irritation and cough with the inhaler.    It can help to use the inhaler and patch together when quitting.      Nicotine Nasal Spray    The nasal spray provides a quick dose of nicotine to satisfy a craving you are unable to ignore. Levels of " nicotine peak within 5 to 10 minutes after using the spray.    It may be used along with the patch. The spray can irritate the nose, eyes, and throat.     These side effects often go away in a few days.      SIDE EFFECTS AND RISKS    · All nicotine products may cause side effects. Symptoms are more likely when you use very high doses.   · Reducing the dose can prevent these symptoms. Side effects include:  · Headaches  · Nausea and other digestive problems  · Problems getting to sleep in the first few days, most often with the patch. This problem usually passes.      SPECIAL CONCERNS    Nicotine patches are okay for use by most people with stable heart or blood circulation problems. But, the unhealthy cholesterol levels (lower HDL level) caused by smoking do not get better until the nicotine patch is stopped.    Nicotine replacement may not be completely safe in pregnant women. The unborn children of women who use the patch may have a faster heart rate.    Keep all nicotine products away from children.    The concern is greater for small children. Nicotine is a poison.    Call the doctor or a poison control center right away if a child has been exposed to a nicotine replacement product, even for a short time.

## 2017-12-29 ENCOUNTER — PATIENT MESSAGE (OUTPATIENT)
Dept: INTERNAL MEDICINE | Facility: CLINIC | Age: 42
End: 2017-12-29

## 2017-12-31 NOTE — PROGRESS NOTES
Portions of this note are generated with voice recognition software. Typographical errors may exist.     SUBJECTIVE:    This is a/an 42 y.o. male here for primary care visit for  Chief Complaint   Patient presents with    Anxiety     follow up     Patient states that anxiety symptoms are much improved.  States that he has developed better coping skills.  Patient uses positive relationships and distraction by socializing with trusted individuals.  States that panic attacks have subsided.  Less frequent.  Last benzodiazepine tablet utilized was 2 weeks ago.  Patient denies alcohol misuse.  States that he has been ambivalent about establishing with a therapist because of perceived barriers to establishing care and continuing to prioritize work responsibilities over personal health.  After further discussion understanding the patient is now more motivated to establish with a therapist understanding that it will be easier to seek care in times of crisis if he is already established with a counselor.    Patient voices interest to minimize the need for benzodiazepine therapy in the future.  Continues trazodone therapy.  Voices no significant side effects at current dosage.    Patient continues to voice concern about fertility.  Continues to attempt pregnancy but has not been successful with his current female partner.  Patient wanting to pursue repeat fertility testing but does not ready for specialist consultation at this time.    Answers for HPI/ROS submitted by the patient on 12/26/2017   activity change: No  unexpected weight change: No  rhinorrhea: Yes  trouble swallowing: No  visual disturbance: No  chest tightness: No  polyuria: No  difficulty urinating: No  joint swelling: No  arthralgias: No  confusion: No  dysphoric mood: No        Medications Reviewed and Updated    Past medical, family, and social histories were reviewed and updated.    Review of Systems negative unless otherwise noted in history of present  illness-  Review of Systems   HENT: Negative for hearing loss.    Eyes: Negative for discharge.   Respiratory: Negative for wheezing.    Cardiovascular: Negative for chest pain and palpitations.   Gastrointestinal: Negative for blood in stool, constipation, diarrhea and vomiting.   Genitourinary: Negative for hematuria and urgency.   Musculoskeletal: Negative for neck pain.   Neurological: Negative for weakness and headaches.   Endo/Heme/Allergies: Negative for polydipsia.         Allergic:  Review of patient's allergies indicates:  No Known Allergies    OBJECTIVE:  BP: 138/82 Pulse: 88    Wt Readings from Last 3 Encounters:   12/28/17 98.8 kg (217 lb 13 oz)   10/26/17 100.3 kg (221 lb 1.9 oz)   10/01/17 95.3 kg (210 lb)    Body mass index is 33.12 kg/m².  Previous Blood Pressure Readings :   BP Readings from Last 3 Encounters:   12/28/17 138/82   10/26/17 (!) 142/80   10/01/17 139/79       Physical Exam    GEN: No apparent distress  HEENT: sclera non-icteric, conjunctiva clear  CV: no peripheral edema  PULM: breathing non-labored  ABD: Obese, protuberant abdomen.  PSYCH: appropriate affect  MSK: able to rise from chair without assistance  SKIN: normal skin turgor    Pertinent Labs Reviewed       ASSESSMENT/PLAN:    Anxiety. .Condition improving.  Counseling on self-care measures today.  Continue with current plan.   -     Ambulatory Referral to Psychology    Severe tobacco dependence.Condition not optimally controlled. Detailed counseling on self care measures. Plan to monitor clinically in addition to plan below.   -     Ambulatory referral to Smoking Cessation Program    Panic attacks.Condition improving.  Counseling on self-care measures today.  Continue with current plan.   -     Ambulatory Referral to Psychology    Chronic sinusitis, unspecified location.Condition not optimally controlled.  Patient declines definitive treatment plan.  Plan to readdress at every clinic visit.    Fertility testing.Further  evaluation warranted.  Recommendations as below.  -     Semen analysis; Future; Expected date: 12/28/2017          Future Appointments  Date Time Provider Department Center   1/4/2018 4:00 PM Eliazar Givens LCSW [facility]CL [facility]   1/9/2018 3:30 PM Maryjo Bagley, [facility] S[facility] [place]   3/1/2018 3:20 PM Troy Holt MD [facility]  12/31/2017  12:50 PM

## 2018-01-09 ENCOUNTER — CLINICAL SUPPORT (OUTPATIENT)
Dept: SMOKING CESSATION | Facility: CLINIC | Age: 43
End: 2018-01-09
Payer: COMMERCIAL

## 2018-01-09 DIAGNOSIS — F17.210 HEAVY CIGARETTE SMOKER (20-39 PER DAY): Primary | ICD-10-CM

## 2018-01-09 PROCEDURE — 99999 PR PBB SHADOW E&M-EST. PATIENT-LVL I: CPT | Mod: PBBFAC,,,

## 2018-01-09 PROCEDURE — 99404 PREV MED CNSL INDIV APPRX 60: CPT | Mod: S$GLB,,,

## 2018-01-09 RX ORDER — DM/P-EPHED/ACETAMINOPH/DOXYLAM 30-7.5/3
2 LIQUID (ML) ORAL
Qty: 108 LOZENGE | Refills: 0 | Status: SHIPPED | OUTPATIENT
Start: 2018-01-09 | End: 2018-06-27

## 2018-01-09 RX ORDER — IBUPROFEN 200 MG
1 TABLET ORAL DAILY
Qty: 14 PATCH | Refills: 0 | Status: SHIPPED | OUTPATIENT
Start: 2018-01-09 | End: 2018-06-27 | Stop reason: SDUPTHER

## 2018-01-18 ENCOUNTER — TELEPHONE (OUTPATIENT)
Dept: SMOKING CESSATION | Facility: CLINIC | Age: 43
End: 2018-01-18

## 2018-01-25 ENCOUNTER — TELEPHONE (OUTPATIENT)
Dept: SMOKING CESSATION | Facility: CLINIC | Age: 43
End: 2018-01-25

## 2018-02-06 ENCOUNTER — CLINICAL SUPPORT (OUTPATIENT)
Dept: SMOKING CESSATION | Facility: CLINIC | Age: 43
End: 2018-02-06
Payer: COMMERCIAL

## 2018-02-06 DIAGNOSIS — F17.210 MODERATE CIGARETTE SMOKER (10-19 PER DAY): Primary | ICD-10-CM

## 2018-02-06 PROCEDURE — 99402 PREV MED CNSL INDIV APPRX 30: CPT | Mod: S$GLB,,,

## 2018-02-06 PROCEDURE — 99999 PR PBB SHADOW E&M-EST. PATIENT-LVL I: CPT | Mod: PBBFAC,,,

## 2018-02-06 NOTE — PROGRESS NOTES
Individual Follow-Up Form    2/6/2018    Quit Date:     Clinical Status of Patient: Outpatient    Length of Service: 30 minutes    Continuing Medication: none  Other Medications: none     Target Symptoms: Withdrawal and medication side effects. The following were  rated moderate (3) to severe (4) on TCRS:  · Moderate (3): none  · Severe (4): none    Comments: Patient is smoking 10-12 cpd down from 20 cpd.  Patient will attempt a decrease between now and next Tuesday.  Patient wants to wait until after Mardi [place] to attempt tobacco cessation and use nicotine patches.  Patient will apply tobacco elimination strategies after next Tuesday as well.  The patient denies any abnormal behavioral or mental changes at this time. The patient will continue with individual therapy sessions and medication monitoring by CTTS. Prescribed medication management will be by physician. Completion of TCRS (Tobacco Cessation Rating Scale) learned addiction model, cues/triggers, personal reasons for quitting, medications, goals, quit date        Diagnosis: F17.210    Next Visit: 1 week

## 2018-02-06 NOTE — Clinical Note
Patient is smoking 10-12 cpd down from 20 cpd.  Patient will attempt a decrease between now and next Tuesday.  Patient wants to wait until after [place] to attempt tobacco cessation and use nicotine patches.  Patient will apply tobacco elimination strategies after next Tuesday as well.  The patient denies any abnormal behavioral or mental changes at this time. The patient will continue with individual therapy sessions and medication monitoring by CTTS.

## 2018-02-21 ENCOUNTER — TELEPHONE (OUTPATIENT)
Dept: SMOKING CESSATION | Facility: CLINIC | Age: 43
End: 2018-02-21

## 2018-03-14 ENCOUNTER — TELEPHONE (OUTPATIENT)
Dept: SMOKING CESSATION | Facility: CLINIC | Age: 43
End: 2018-03-14

## 2018-04-11 ENCOUNTER — OFFICE VISIT (OUTPATIENT)
Dept: INTERNAL MEDICINE | Facility: CLINIC | Age: 43
End: 2018-04-11
Payer: COMMERCIAL

## 2018-04-11 ENCOUNTER — LAB VISIT (OUTPATIENT)
Dept: LAB | Facility: HOSPITAL | Age: 43
End: 2018-04-11
Attending: INTERNAL MEDICINE
Payer: COMMERCIAL

## 2018-04-11 VITALS
WEIGHT: 221.81 LBS | SYSTOLIC BLOOD PRESSURE: 126 MMHG | OXYGEN SATURATION: 97 % | HEART RATE: 90 BPM | BODY MASS INDEX: 33.62 KG/M2 | DIASTOLIC BLOOD PRESSURE: 80 MMHG | HEIGHT: 68 IN

## 2018-04-11 DIAGNOSIS — R74.01 TRANSAMINITIS: ICD-10-CM

## 2018-04-11 DIAGNOSIS — F41.0 PANIC ATTACKS: Primary | ICD-10-CM

## 2018-04-11 DIAGNOSIS — F17.200 TOBACCO DEPENDENCE: ICD-10-CM

## 2018-04-11 LAB
ALBUMIN SERPL BCP-MCNC: 4.7 G/DL
ALP SERPL-CCNC: 43 U/L
ALT SERPL W/O P-5'-P-CCNC: 53 U/L
ANION GAP SERPL CALC-SCNC: 9 MMOL/L
AST SERPL-CCNC: 34 U/L
BASOPHILS # BLD AUTO: 0.05 K/UL
BASOPHILS NFR BLD: 0.5 %
BILIRUB SERPL-MCNC: 1 MG/DL
BUN SERPL-MCNC: 11 MG/DL
CALCIUM SERPL-MCNC: 10.1 MG/DL
CHLORIDE SERPL-SCNC: 102 MMOL/L
CO2 SERPL-SCNC: 27 MMOL/L
CREAT SERPL-MCNC: 0.9 MG/DL
DIFFERENTIAL METHOD: ABNORMAL
EOSINOPHIL # BLD AUTO: 0.2 K/UL
EOSINOPHIL NFR BLD: 2 %
ERYTHROCYTE [DISTWIDTH] IN BLOOD BY AUTOMATED COUNT: 11.3 %
EST. GFR  (AFRICAN AMERICAN): >60 ML/MIN/1.73 M^2
EST. GFR  (NON AFRICAN AMERICAN): >60 ML/MIN/1.73 M^2
FERRITIN SERPL-MCNC: 271 NG/ML
GLUCOSE SERPL-MCNC: 91 MG/DL
HCT VFR BLD AUTO: 46.7 %
HGB BLD-MCNC: 16.3 G/DL
IMM GRANULOCYTES # BLD AUTO: 0.03 K/UL
IMM GRANULOCYTES NFR BLD AUTO: 0.3 %
LYMPHOCYTES # BLD AUTO: 2.9 K/UL
LYMPHOCYTES NFR BLD: 27.8 %
MCH RBC QN AUTO: 34.2 PG
MCHC RBC AUTO-ENTMCNC: 34.9 G/DL
MCV RBC AUTO: 98 FL
MONOCYTES # BLD AUTO: 0.9 K/UL
MONOCYTES NFR BLD: 9.1 %
NEUTROPHILS # BLD AUTO: 6.3 K/UL
NEUTROPHILS NFR BLD: 60.3 %
NRBC BLD-RTO: 0 /100 WBC
PLATELET # BLD AUTO: 227 K/UL
PMV BLD AUTO: 12.1 FL
POTASSIUM SERPL-SCNC: 4 MMOL/L
PROT SERPL-MCNC: 8.2 G/DL
RBC # BLD AUTO: 4.76 M/UL
SODIUM SERPL-SCNC: 138 MMOL/L
WBC # BLD AUTO: 10.37 K/UL

## 2018-04-11 PROCEDURE — 99999 PR PBB SHADOW E&M-EST. PATIENT-LVL III: CPT | Mod: PBBFAC,,, | Performed by: INTERNAL MEDICINE

## 2018-04-11 PROCEDURE — 80053 COMPREHEN METABOLIC PANEL: CPT

## 2018-04-11 PROCEDURE — 82728 ASSAY OF FERRITIN: CPT

## 2018-04-11 PROCEDURE — 99214 OFFICE O/P EST MOD 30 MIN: CPT | Mod: S$GLB,,, | Performed by: INTERNAL MEDICINE

## 2018-04-11 PROCEDURE — 36415 COLL VENOUS BLD VENIPUNCTURE: CPT | Mod: PO

## 2018-04-11 PROCEDURE — 85025 COMPLETE CBC W/AUTO DIFF WBC: CPT

## 2018-04-11 NOTE — PROGRESS NOTES
Portions of this note are generated with voice recognition software. Typographical errors may exist.     SUBJECTIVE:    This is a/an 42 y.o. male here for primary care visit for  Chief Complaint   Patient presents with    Anxiety     follow up     Neck Pain     x1 week      Patient states that anxiety levels have been very manageable.  He continues to have specific phobias and for this he takes remaining supply of benzodiazepine medication.  Specific phobias include flying.  Patient took half tablet of the benzodiazepine both ways for his most recent flight.  Denies alcohol misuse or abuse.  States alcohol use is rare in fact.    States that he does not take trazodone more than a few occasions per month.    Patient states that he has started nicotine replacement therapy.  Since starting 21 mg patch she has successfully reduced smoking consumption and is requesting further assistance to taper the dosage of nicotine patch.    Patient states that he has never been evaluated for fatty liver.  Patient is interested to follow-up on transaminitis noted on blood work in October.    Answers for HPI/ROS submitted by the patient on 4/11/2018   activity change: No  unexpected weight change: No  rhinorrhea: No  trouble swallowing: No  visual disturbance: No  chest tightness: No  polyuria: No  difficulty urinating: No  joint swelling: No  arthralgias: No  confusion: No  dysphoric mood: No        Medications Reviewed and Updated    Past medical, family, and social histories were reviewed and updated.    Review of Systems negative unless otherwise noted in history of present illness-  ROS    General ROS: negative  Psychological ROS: negative  Endocrine ROS: Negative  Allergy and Immunology ROS: negative  Cardiovascular ROS: negative  Pulmonary ROS: Negative  Gastrointestinal ROS: negative  Genito-Urinary ROS: negative  Musculoskeletal ROS: negative      Allergic:  Review of patient's allergies indicates:  No Known  Allergies    OBJECTIVE:  BP: 126/80 Pulse: 90    Wt Readings from Last 3 Encounters:   04/11/18 100.6 kg (221 lb 12.5 oz)   12/28/17 98.8 kg (217 lb 13 oz)   10/26/17 100.3 kg (221 lb 1.9 oz)    Body mass index is 33.72 kg/m².  Previous Blood Pressure Readings :   BP Readings from Last 3 Encounters:   04/11/18 126/80   12/28/17 138/82   10/26/17 (!) 142/80       Physical Exam    GEN: No apparent distress  HEENT: sclera non-icteric, conjunctiva clear  CV: no peripheral edema  PULM: breathing non-labored  ABD: Obese, protuberant abdomen.  No hepatomegaly noted.  Supple  PSYCH: appropriate affect  MSK: able to rise from chair without assistance  SKIN: normal skin turgor    Pertinent Labs Reviewed       ASSESSMENT/PLAN:    Panic attacks.Condition stable.  Counseling on self-care measures. Plan to monitor clinically. Continue current medical plan.     Transaminitis.Further evaluation warranted.  Recommendations as below.  -     CBC auto differential; Future; Expected date: 04/11/2018  -     Comprehensive metabolic panel; Future; Expected date: 04/11/2018  -     Ferritin; Future; Expected date: 04/11/2018  -     Hepatitis B surface antigen; Future; Expected date: 04/13/2018  -     Hepatitis C antibody; Future; Expected date: 04/13/2018    Tobacco dependence.Condition improving.  Counseling on self-care measures today.  Continue with current plan.         Future Appointments  Date Time Provider Department Center   7/10/2018 3:00 PM Troy Holt MD [facility]       Troy Holt  4/13/2018  3:13 PM

## 2018-04-11 NOTE — PATIENT INSTRUCTIONS
Recommendations for today    Drinking in moderation is important to help reduce the frequency and severity of panic attacks.  We recommend reducing the strength of nicotine patch to help reduce the effect of nicotine on your mood.

## 2018-04-13 ENCOUNTER — PATIENT MESSAGE (OUTPATIENT)
Dept: INTERNAL MEDICINE | Facility: CLINIC | Age: 43
End: 2018-04-13

## 2018-04-24 ENCOUNTER — TELEPHONE (OUTPATIENT)
Dept: SMOKING CESSATION | Facility: CLINIC | Age: 43
End: 2018-04-24

## 2018-05-01 ENCOUNTER — TELEPHONE (OUTPATIENT)
Dept: SMOKING CESSATION | Facility: CLINIC | Age: 43
End: 2018-05-01

## 2018-06-18 ENCOUNTER — TELEPHONE (OUTPATIENT)
Dept: SMOKING CESSATION | Facility: CLINIC | Age: 43
End: 2018-06-18

## 2018-06-18 NOTE — TELEPHONE ENCOUNTER
3rd attempt left message regarding smoking cessation quit 1 episode will fill in smart form with no answer for 3/6 month phone follow up..

## 2018-06-20 ENCOUNTER — CLINICAL SUPPORT (OUTPATIENT)
Dept: SMOKING CESSATION | Facility: CLINIC | Age: 43
End: 2018-06-20
Payer: COMMERCIAL

## 2018-06-20 DIAGNOSIS — F17.200 NICOTINE DEPENDENCE: Primary | ICD-10-CM

## 2018-06-20 PROCEDURE — 99407 BEHAV CHNG SMOKING > 10 MIN: CPT | Mod: S$GLB,,,

## 2018-06-20 NOTE — PROGRESS NOTES
Spoke with patient today in regard to smoking cessation progress for 3/6 month follow up,  states not tobacco free at this time. Patient has scheduled an appointment for Quit #2. Informed patient of benefit period, future follow ups, and contact information. Will and complete smart form for 3/6 month for Quit attempt #1.

## 2018-06-27 ENCOUNTER — CLINICAL SUPPORT (OUTPATIENT)
Dept: SMOKING CESSATION | Facility: CLINIC | Age: 43
End: 2018-06-27
Payer: COMMERCIAL

## 2018-06-27 DIAGNOSIS — F17.210 MODERATE CIGARETTE SMOKER (10-19 PER DAY): Primary | ICD-10-CM

## 2018-06-27 PROCEDURE — 99404 PREV MED CNSL INDIV APPRX 60: CPT | Mod: S$GLB,,,

## 2018-06-27 PROCEDURE — 99999 PR PBB SHADOW E&M-EST. PATIENT-LVL I: CPT | Mod: PBBFAC,,,

## 2018-06-27 RX ORDER — MICONAZOLE NITRATE 2 %
2 CREAM (GRAM) TOPICAL
Qty: 100 EACH | Refills: 0 | Status: SHIPPED | OUTPATIENT
Start: 2018-06-27 | End: 2020-02-04

## 2018-06-27 RX ORDER — IBUPROFEN 200 MG
1 TABLET ORAL DAILY
Qty: 14 PATCH | Refills: 0 | Status: SHIPPED | OUTPATIENT
Start: 2018-06-27 | End: 2018-07-12 | Stop reason: SDUPTHER

## 2018-06-27 NOTE — Clinical Note
Patient was seen for tobacco cessation intake.  He will begin on 21 mg nicotine patch and 2 mg nicotine gum.  The patient will continue with group therapy sessions and medication monitoring by CTTS. Prescribed medication management will be by physician.

## 2018-07-12 ENCOUNTER — CLINICAL SUPPORT (OUTPATIENT)
Dept: SMOKING CESSATION | Facility: CLINIC | Age: 43
End: 2018-07-12
Payer: COMMERCIAL

## 2018-07-12 DIAGNOSIS — F17.210 LIGHT CIGARETTE SMOKER (1-9 CIGS/DAY): Primary | ICD-10-CM

## 2018-07-12 PROCEDURE — 99403 PREV MED CNSL INDIV APPRX 45: CPT | Mod: S$GLB,,,

## 2018-07-12 PROCEDURE — 99999 PR PBB SHADOW E&M-EST. PATIENT-LVL I: CPT | Mod: PBBFAC,,,

## 2018-07-12 RX ORDER — IBUPROFEN 200 MG
1 TABLET ORAL DAILY
Qty: 14 PATCH | Refills: 0 | Status: SHIPPED | OUTPATIENT
Start: 2018-07-12 | End: 2020-02-04

## 2018-07-12 NOTE — Clinical Note
Patient have several days when he doesn't smoke any tobacco and on his worse days smoke  2-3 cpd. He will attempt complete tobacco cessation this week.  Patient remains on prescribed tobacco cessation medication regimen of 21 mg patch and 2 mg nicotine gum without any negative side effects at this time.

## 2018-07-12 NOTE — PROGRESS NOTES
Individual Follow-Up Form    7/12/2018    Quit Date:     Clinical Status of Patient: Outpatient    Length of Service: 45 min    Continuing Medication: yes  Patches    Other Medications: nicotine gum      Target Symptoms: Withdrawal and medication side effects. The following were  rated moderate (3) to severe (4) on TCRS:  · Moderate (3): Insomnia/Explained as withdrawal/ slight skin irritation   · Severe (4): none    Comments: Patient have several days when he doesn't smoke any tobacco and on his worse days smoke  2-3 cpd. He will attempt complete tobacco cessation this week.  Patient remains on prescribed tobacco cessation medication regimen of 21 mg patch and 2 mg nicotine gum without any negative side effects at this time. completion of TCRS (Tobacco Cessation Rating Scale) reviewed strategies, cues, and triggers. Introduced the negative impact of tobacco on health, the health advantages of discontinuing the use of tobacco, time line improved health changes after a quit, withdrawal issues to expect from nicotine and habit, and ways to achieve the goal of a quit. The patient denies any abnormal behavioral or mental changes at this time. The patient will continue with group therapy sessions and medication monitoring by CTTS. Prescribed medication management will be by physician.     Diagnosis: F17.210    Next Visit: 1 week

## 2018-07-26 ENCOUNTER — CLINICAL SUPPORT (OUTPATIENT)
Dept: SMOKING CESSATION | Facility: CLINIC | Age: 43
End: 2018-07-26
Payer: COMMERCIAL

## 2018-07-26 DIAGNOSIS — F17.210 LIGHT SMOKER: Primary | ICD-10-CM

## 2018-07-26 PROCEDURE — 99402 PREV MED CNSL INDIV APPRX 30: CPT | Mod: S$GLB,,,

## 2018-07-26 PROCEDURE — 99999 PR PBB SHADOW E&M-EST. PATIENT-LVL I: CPT | Mod: PBBFAC,,,

## 2018-07-26 NOTE — PROGRESS NOTES
Individual Follow-Up Form    7/26/2018    Quit Date:     Clinical Status of Patient: Outpatient    Length of Service: 30 minutes    Continuing Medication: yes  Patches    Other Medications: 2 mg nicotine gum     Target Symptoms: Withdrawal and medication side effects. The following were  rated moderate (3) to severe (4) on TCRS:  · Moderate (3): none  · Severe (4): none    Comments: Patient smoked 3 cigarettes this week.  Patient will attempt complete tobacco cessation again this week.  Patient reports not using patches daily in efforts to start weaning from nicotine.  Explained to patient the importance of daily use of 21 mg nicotine patch until tobacco cessation is achieved.  The patient denies any abnormal behavioral or mental changes at this time.   The patient will continue with group therapy sessions and medication monitoring by CTTS. Prescribed medication management will be by physician. completion of TCRS (Tobacco Cessation Rating Scale) reviewed strategies, controlling environment, cues, triggers, new goals set. Introduced high risk situations with preparation interventions, caffeine similarities with withdrawal issues of habit and nicotine, Alcohol, Understanding urges, cravings, stress and relaxation. Open discussion with intervention discussion.        Diagnosis: F17.210    Next Visit: 1 week

## 2018-07-26 NOTE — Clinical Note
Patient smoked 3 cigarettes this week.  Patient will attempt complete tobacco cessation again this week.  Patient reports not using patches daily in efforts to start weaning from nicotine.  Explained to patient the importance of daily use of 21 mg nicotine patch until tobacco cessation is achieved.  The patient denies any abnormal behavioral or mental changes at this time.

## 2019-01-15 ENCOUNTER — TELEPHONE (OUTPATIENT)
Dept: SMOKING CESSATION | Facility: CLINIC | Age: 44
End: 2019-01-15

## 2019-01-31 ENCOUNTER — TELEPHONE (OUTPATIENT)
Dept: SMOKING CESSATION | Facility: CLINIC | Age: 44
End: 2019-01-31

## 2019-06-19 ENCOUNTER — TELEPHONE (OUTPATIENT)
Dept: SMOKING CESSATION | Facility: CLINIC | Age: 44
End: 2019-06-19

## 2019-07-10 ENCOUNTER — TELEPHONE (OUTPATIENT)
Dept: SMOKING CESSATION | Facility: CLINIC | Age: 44
End: 2019-07-10

## 2019-07-17 ENCOUNTER — CLINICAL SUPPORT (OUTPATIENT)
Dept: SMOKING CESSATION | Facility: CLINIC | Age: 44
End: 2019-07-17
Payer: COMMERCIAL

## 2019-07-17 DIAGNOSIS — F17.200 NICOTINE DEPENDENCE: Primary | ICD-10-CM

## 2019-07-17 PROCEDURE — 99407 PR TOBACCO USE CESSATION INTENSIVE >10 MINUTES: ICD-10-PCS | Mod: S$GLB,,,

## 2019-07-17 PROCEDURE — 99407 BEHAV CHNG SMOKING > 10 MIN: CPT | Mod: S$GLB,,,

## 2019-07-17 NOTE — PROGRESS NOTES
Called pt to f/u on his 12 month smoking cessation quit status. Pt stated he is still smoking. Informed him he has benefits available and is able to rejoin. Pt not ready to make appointment, asked to be called back in 2 hrs because he was in a meeting. Will call back. Called pt back, and unable to reach. Left message with name and number.  Informed him of benefit period, phone follow ups, and contact information. Will complete smart form and resolve quit #2 episode.

## 2020-01-29 ENCOUNTER — HOSPITAL ENCOUNTER (EMERGENCY)
Facility: HOSPITAL | Age: 45
Discharge: HOME | End: 2020-01-29
Attending: EMERGENCY MEDICINE
Payer: COMMERCIAL

## 2020-01-29 VITALS
OXYGEN SATURATION: 97 % | HEIGHT: 68 IN | RESPIRATION RATE: 20 BRPM | WEIGHT: 220 LBS | DIASTOLIC BLOOD PRESSURE: 95 MMHG | TEMPERATURE: 98 F | SYSTOLIC BLOOD PRESSURE: 140 MMHG | BODY MASS INDEX: 33.34 KG/M2 | HEART RATE: 109 BPM

## 2020-01-29 DIAGNOSIS — R00.0 TACHYCARDIA: ICD-10-CM

## 2020-01-29 DIAGNOSIS — D72.829 LEUKOCYTOSIS, UNSPECIFIED TYPE: ICD-10-CM

## 2020-01-29 DIAGNOSIS — I47.10 SVT (SUPRAVENTRICULAR TACHYCARDIA): Primary | ICD-10-CM

## 2020-01-29 LAB
ALBUMIN SERPL BCP-MCNC: 4.5 G/DL (ref 3.5–5.2)
ALP SERPL-CCNC: 50 U/L (ref 55–135)
ALT SERPL W/O P-5'-P-CCNC: 43 U/L (ref 10–44)
AMPHET+METHAMPHET UR QL: NEGATIVE
ANION GAP SERPL CALC-SCNC: 13 MMOL/L (ref 8–16)
AST SERPL-CCNC: 31 U/L (ref 10–40)
BACTERIA #/AREA URNS HPF: NORMAL /HPF
BARBITURATES UR QL SCN>200 NG/ML: NEGATIVE
BASOPHILS # BLD AUTO: 0.06 K/UL (ref 0–0.2)
BASOPHILS NFR BLD: 0.3 % (ref 0–1.9)
BENZODIAZ UR QL SCN>200 NG/ML: NEGATIVE
BILIRUB SERPL-MCNC: 0.7 MG/DL (ref 0.1–1)
BILIRUB UR QL STRIP: NEGATIVE
BUN SERPL-MCNC: 10 MG/DL (ref 6–20)
BZE UR QL SCN: NEGATIVE
CALCIUM SERPL-MCNC: 9.4 MG/DL (ref 8.7–10.5)
CANNABINOIDS UR QL SCN: NEGATIVE
CHLORIDE SERPL-SCNC: 102 MMOL/L (ref 95–110)
CLARITY UR: CLEAR
CO2 SERPL-SCNC: 23 MMOL/L (ref 23–29)
COLOR UR: YELLOW
CREAT SERPL-MCNC: 1 MG/DL (ref 0.5–1.4)
CREAT UR-MCNC: 65.9 MG/DL (ref 23–375)
DIFFERENTIAL METHOD: ABNORMAL
EOSINOPHIL # BLD AUTO: 0.7 K/UL (ref 0–0.5)
EOSINOPHIL NFR BLD: 3 % (ref 0–8)
ERYTHROCYTE [DISTWIDTH] IN BLOOD BY AUTOMATED COUNT: 12 % (ref 11.5–14.5)
EST. GFR  (AFRICAN AMERICAN): >60 ML/MIN/1.73 M^2
EST. GFR  (NON AFRICAN AMERICAN): >60 ML/MIN/1.73 M^2
GLUCOSE SERPL-MCNC: 127 MG/DL (ref 70–110)
GLUCOSE UR QL STRIP: NEGATIVE
HCT VFR BLD AUTO: 48.2 % (ref 40–54)
HGB BLD-MCNC: 16.4 G/DL (ref 14–18)
HGB UR QL STRIP: ABNORMAL
INFLUENZA A, MOLECULAR: NEGATIVE
INFLUENZA B, MOLECULAR: NEGATIVE
KETONES UR QL STRIP: NEGATIVE
LACTATE SERPL-SCNC: 2 MMOL/L (ref 0.5–2.2)
LEUKOCYTE ESTERASE UR QL STRIP: ABNORMAL
LYMPHOCYTES # BLD AUTO: 4.3 K/UL (ref 1–4.8)
LYMPHOCYTES NFR BLD: 18.9 % (ref 18–48)
MAGNESIUM SERPL-MCNC: 1.6 MG/DL (ref 1.6–2.6)
MCH RBC QN AUTO: 33.9 PG (ref 27–31)
MCHC RBC AUTO-ENTMCNC: 34 G/DL (ref 32–36)
MCV RBC AUTO: 100 FL (ref 82–98)
METHADONE UR QL SCN>300 NG/ML: NEGATIVE
MICROSCOPIC COMMENT: NORMAL
MONOCYTES # BLD AUTO: 2 K/UL (ref 0.3–1)
MONOCYTES NFR BLD: 8.5 % (ref 4–15)
NEUTROPHILS # BLD AUTO: 15.8 K/UL (ref 1.8–7.7)
NEUTROPHILS NFR BLD: 69.3 % (ref 38–73)
NITRITE UR QL STRIP: NEGATIVE
OPIATES UR QL SCN: NEGATIVE
PCP UR QL SCN>25 NG/ML: NEGATIVE
PH UR STRIP: 8 [PH] (ref 5–8)
PLATELET # BLD AUTO: 264 K/UL (ref 150–350)
PMV BLD AUTO: 11.7 FL (ref 9.2–12.9)
POTASSIUM SERPL-SCNC: 3.3 MMOL/L (ref 3.5–5.1)
PROT SERPL-MCNC: 8 G/DL (ref 6–8.4)
PROT UR QL STRIP: NEGATIVE
RBC # BLD AUTO: 4.84 M/UL (ref 4.6–6.2)
RBC #/AREA URNS HPF: 1 /HPF (ref 0–4)
SODIUM SERPL-SCNC: 138 MMOL/L (ref 136–145)
SP GR UR STRIP: 1.01 (ref 1–1.03)
SPECIMEN SOURCE: NORMAL
SQUAMOUS #/AREA URNS HPF: 0 /HPF
TOXICOLOGY INFORMATION: NORMAL
TROPONIN I SERPL DL<=0.01 NG/ML-MCNC: <0.006 NG/ML (ref 0–0.03)
TSH SERPL DL<=0.005 MIU/L-ACNC: 0.98 UIU/ML (ref 0.4–4)
URN SPEC COLLECT METH UR: ABNORMAL
UROBILINOGEN UR STRIP-ACNC: NEGATIVE EU/DL
WBC # BLD AUTO: 22.87 K/UL (ref 3.9–12.7)
WBC #/AREA URNS HPF: 0 /HPF (ref 0–5)

## 2020-01-29 PROCEDURE — 25000003 PHARM REV CODE 250: Performed by: EMERGENCY MEDICINE

## 2020-01-29 PROCEDURE — 84484 ASSAY OF TROPONIN QUANT: CPT

## 2020-01-29 PROCEDURE — 99291 CRITICAL CARE FIRST HOUR: CPT | Mod: 25

## 2020-01-29 PROCEDURE — 87502 INFLUENZA DNA AMP PROBE: CPT

## 2020-01-29 PROCEDURE — 80307 DRUG TEST PRSMV CHEM ANLYZR: CPT

## 2020-01-29 PROCEDURE — 85025 COMPLETE CBC W/AUTO DIFF WBC: CPT

## 2020-01-29 PROCEDURE — 81003 URINALYSIS AUTO W/O SCOPE: CPT | Mod: 59

## 2020-01-29 PROCEDURE — 81000 URINALYSIS NONAUTO W/SCOPE: CPT | Mod: 59

## 2020-01-29 PROCEDURE — 83735 ASSAY OF MAGNESIUM: CPT

## 2020-01-29 PROCEDURE — 63600175 PHARM REV CODE 636 W HCPCS: Performed by: EMERGENCY MEDICINE

## 2020-01-29 PROCEDURE — 96374 THER/PROPH/DIAG INJ IV PUSH: CPT

## 2020-01-29 PROCEDURE — 80053 COMPREHEN METABOLIC PANEL: CPT

## 2020-01-29 PROCEDURE — 84443 ASSAY THYROID STIM HORMONE: CPT

## 2020-01-29 PROCEDURE — 96375 TX/PRO/DX INJ NEW DRUG ADDON: CPT

## 2020-01-29 PROCEDURE — 83605 ASSAY OF LACTIC ACID: CPT

## 2020-01-29 PROCEDURE — 93010 EKG 12-LEAD: ICD-10-PCS | Mod: ,,, | Performed by: STUDENT IN AN ORGANIZED HEALTH CARE EDUCATION/TRAINING PROGRAM

## 2020-01-29 PROCEDURE — 93010 ELECTROCARDIOGRAM REPORT: CPT | Mod: ,,, | Performed by: STUDENT IN AN ORGANIZED HEALTH CARE EDUCATION/TRAINING PROGRAM

## 2020-01-29 PROCEDURE — 93005 ELECTROCARDIOGRAM TRACING: CPT

## 2020-01-29 RX ORDER — ADENOSINE 3 MG/ML
12 INJECTION, SOLUTION INTRAVENOUS
Status: COMPLETED | OUTPATIENT
Start: 2020-01-29 | End: 2020-01-29

## 2020-01-29 RX ORDER — POTASSIUM CHLORIDE 20 MEQ/1
40 TABLET, EXTENDED RELEASE ORAL
Status: COMPLETED | OUTPATIENT
Start: 2020-01-29 | End: 2020-01-29

## 2020-01-29 RX ORDER — ADENOSINE 3 MG/ML
6 INJECTION, SOLUTION INTRAVENOUS
Status: COMPLETED | OUTPATIENT
Start: 2020-01-29 | End: 2020-01-29

## 2020-01-29 RX ADMIN — LORAZEPAM 2 MG: 2 INJECTION INTRAMUSCULAR; INTRAVENOUS at 09:01

## 2020-01-29 RX ADMIN — ADENOSINE 12 MG: 3 INJECTION, SOLUTION INTRAVENOUS at 09:01

## 2020-01-29 RX ADMIN — ADENOSINE 6 MG: 3 INJECTION, SOLUTION INTRAVENOUS at 09:01

## 2020-01-29 RX ADMIN — SODIUM CHLORIDE 1000 ML: 0.9 INJECTION, SOLUTION INTRAVENOUS at 10:01

## 2020-01-29 RX ADMIN — POTASSIUM CHLORIDE 40 MEQ: 1500 TABLET, EXTENDED RELEASE ORAL at 12:01

## 2020-01-29 NOTE — ED NOTES
Wife at bedside, plan of care updated. Patient resting comfortably no distress noted at present time. Call light within reach.

## 2020-01-29 NOTE — DISCHARGE INSTRUCTIONS
Please follow up with your primary doctor to have repeat labs in 1-2 days.  Today your WBC count was elevated.  If you develop any infectious symptoms such as fever, cough, nausea, vomiting, diarrhea, urinary symptoms please return to ED.  Please follow up with a cardiologist for supraventricular tachycardia.

## 2020-01-29 NOTE — ED NOTES
Patient drove himself to ED for cc of SOB with palpitations. Patient stated that he had one cup of coffee this am and one multivitamin. Patient stated that he does not take any other medications and he feels like he is having a panic attack. Connected patient to monitor, Heart rate was 174 on tele box. MD notified and at bedside. Attempted vagal maneuver with holding breath a bearing down x 3 attempts and patient did not convert. Placed pads on chest and MD ordered adenosine.

## 2020-01-29 NOTE — ED PROVIDER NOTES
Encounter Date: 2020    SCRIBE #1 NOTE: I, Lakisha Catie, am scribing for, and in the presence of,  Twyla Shirley MD. I have scribed the entire note.       History     Chief Complaint   Patient presents with    Shortness of Breath     44 year old male presents to ed cc of shortness of breath patient states sob began 10 mins pta patient states hx of anxiety no new stressors patient awake alert ox4.      Time seen by provider: 9:49 AM    This is a 43 y/o male with PMHx of anxiety who presents with complaint of palpitations, SOB, anxiety. His associated symptoms include CP at base of neck, some lightheadedness that started just prior to arrival. States that symptoms feel exactly like previous anxiety attack.  The patient denies any fever, chills, N/V/D, dysuria, or any other concerning symptoms. He also denies any drug use and drank coffee this morning.  The patient is a former smoker, who smoked between 0.5 and 1 pack of cigarettes per day for 22 years, and he quit in 2018.    The history is provided by the patient and medical records.     Review of patient's allergies indicates:  No Known Allergies  Past Medical History:   Diagnosis Date    Anxiety      Past Surgical History:   Procedure Laterality Date    NOSE SURGERY       Family History   Problem Relation Age of Onset    Diabetes Mellitus Neg Hx      Social History     Tobacco Use    Smoking status: Former Smoker     Packs/day: 1.00     Last attempt to quit: 2018     Years since quittin.5    Smokeless tobacco: Never Used   Substance Use Topics    Alcohol use: Yes    Drug use: No     Review of Systems   Constitutional: Negative for chills and fever.   HENT: Negative for congestion and sore throat.    Eyes: Negative for pain and visual disturbance.   Respiratory: Positive for shortness of breath.    Cardiovascular: Positive for chest pain and palpitations.   Gastrointestinal: Negative for abdominal pain, diarrhea, nausea and vomiting.    Genitourinary: Negative for dysuria and hematuria.   Musculoskeletal: Negative for back pain and neck pain.   Skin: Negative for rash.   Neurological: Positive for light-headedness.   Psychiatric/Behavioral: Negative for confusion.       Physical Exam     Initial Vitals [01/29/20 1015]   BP Pulse Resp Temp SpO2   (!) 141/84 100 19 -- 98 %      MAP       --         Physical Exam    Nursing note and vitals reviewed.  Constitutional: He appears well-developed and well-nourished. He is not diaphoretic. No distress.   Appears anxious   HENT:   Head: Normocephalic and atraumatic.   Mouth/Throat: Oropharynx is clear and moist.   Eyes: Conjunctivae and EOM are normal.   Neck: Normal range of motion. Neck supple.   Cardiovascular: Regular rhythm and normal heart sounds. Exam reveals no gallop and no friction rub.    No murmur heard.  Pulses:       Radial pulses are 2+ on the right side, and 2+ on the left side.   Tachycardic but regular   Pulmonary/Chest: Effort normal and breath sounds normal. No accessory muscle usage. No respiratory distress. He has no wheezes. He has no rhonchi. He has no rales.   Abdominal: Soft. There is no tenderness. There is no rebound and no guarding.   Musculoskeletal: Normal range of motion. He exhibits no edema or tenderness.        Right lower leg: Normal. He exhibits no tenderness and no edema.        Left lower leg: Normal. He exhibits no tenderness and no edema.   Lymphadenopathy:     He has no cervical adenopathy.   Neurological: He is alert and oriented to person, place, and time. He has normal strength.   Skin: Skin is warm and dry. Capillary refill takes less than 2 seconds. No rash noted.         ED Course   Critical Care  Date/Time: 1/29/2020 10:05 AM  Performed by: Twyla Shirley MD  Authorized by: Twyla Shirley MD   Total critical care time (exclusive of procedural time) : 36 minutes  Critical care time was exclusive of separately billable procedures and treating other  patients and teaching time.  Critical care was necessary to treat or prevent imminent or life-threatening deterioration of the following conditions: cardiac failure.  Critical care was time spent personally by me on the following activities: blood draw for specimens, evaluation of patient's response to treatment, obtaining history from patient or surrogate, ordering and review of laboratory studies, pulse oximetry, review of old charts, re-evaluation of patient's condition, ordering and review of radiographic studies, ordering and performing treatments and interventions, examination of patient and development of treatment plan with patient or surrogate.        Labs Reviewed   CBC W/ AUTO DIFFERENTIAL - Abnormal; Notable for the following components:       Result Value    WBC 22.87 (*)     Mean Corpuscular Volume 100 (*)     Mean Corpuscular Hemoglobin 33.9 (*)     Gran # (ANC) 15.8 (*)     Mono # 2.0 (*)     Eos # 0.7 (*)     All other components within normal limits   COMPREHENSIVE METABOLIC PANEL - Abnormal; Notable for the following components:    Potassium 3.3 (*)     Glucose 127 (*)     Alkaline Phosphatase 50 (*)     All other components within normal limits   URINALYSIS, REFLEX TO URINE CULTURE - Abnormal; Notable for the following components:    Occult Blood UA Trace (*)     Leukocytes, UA Trace (*)     All other components within normal limits    Narrative:     Preferred Collection Type->Urine, Clean Catch   INFLUENZA A & B BY MOLECULAR   MAGNESIUM   TROPONIN I   TSH   DRUG SCREEN PANEL, URINE EMERGENCY    Narrative:     Preferred Collection Type->Urine, Clean Catch   LACTIC ACID, PLASMA   URINALYSIS MICROSCOPIC    Narrative:     Preferred Collection Type->Urine, Clean Catch        ECG Results          EKG 12-lead (Final result)  Result time 01/29/20 11:12:36    Final result by Interface, Lab In [facility] (01/29/20 11:12:36)                 Narrative:    Test Reason : R00.0,    Vent. Rate : 187 BPM     Atrial  Rate : 241 BPM     P-R Int : 000 ms          QRS Dur : 082 ms      QT Int : 246 ms       P-R-T Axes : 000 062 -50 degrees     QTc Int : 434 ms    Supraventricular tachycardia  LVH with repolarization abnormality  Abnormal ECG  When compared with ECG of 01-OCT-2017 22:00,  Vent. rate has increased BY  91 BPM  ST now depressed in Inferior leads  ST now depressed in Lateral leads  T wave inversion more evident in Inferior leads  T wave amplitude has increased in Anterior leads  Confirmed by Stephon Oshea MD ([id number]) on 1/29/2020 11:12:25 AM    Referred By: System System           Confirmed By:Stephon Oshea MD                              X-Rays:   Independently Interpreted Readings:   Other Readings:  Reviewed by myself, read by radiology.     Imaging Results          X-Ray Chest AP Portable (Final result)  Result time 01/29/20 11:01:37    Final result by Phan De La Vega MD (01/29/20 11:01:37)                 Impression:      As above.      Electronically signed by: Phan De La Vega  Date:    01/29/2020  Time:    11:01             Narrative:    EXAMINATION:  XR CHEST AP PORTABLE    CLINICAL HISTORY:  tachycardia;    TECHNIQUE:  Single frontal view of the chest was performed.    COMPARISON:  10/01/2017    FINDINGS:  No lobar consolidation, pleural effusion, or pneumothorax.  Cardiomediastinal silhouette is within normal limits.  No acute osseous abnormality.                              Medical Decision Making:   History:   Old Medical Records: I decided to obtain old medical records.  Initial Assessment:   This is a 43 y/o male with PMHx of anxiety who presents with complaint of SOB. His associated symptoms include CP at base of neck, some lightheadedness, and some dizziness.   Differential Diagnosis:   SVT, A. fib, a flutter, electrolyte abnormalities, hyperthyroidism, dehydration, toxicity, drug effects, MI, PE, anxiety.      Independently Interpreted Test(s):   I have ordered and independently interpreted EKG  Reading(s) - see prior notes  Clinical Tests:   Lab Tests: Reviewed and Ordered  Radiological Study: Reviewed and Ordered  Medical Tests: Reviewed and Ordered  ED Management:    On re-evaluation, the patient's status has improved.  After complete ED evaluation, clinical impression is most consistent with SVT.  - EKG with SVT  - troponin negative  - TSH wnl  - UA not consistent with infection  - + leukocytosis, WBCs 22K  - CXR clear  - influenza negative  Leukocytosis concerning but patient without any symptoms and is otherwise healthy 45 y/o. No source of infection identified in ED. Suspect may be secondary to stress?  Recommended f/u with PCP for repeat labs and given return precautions.  Instructed to monitor for infectious sxs.  Low suspicion for PE, patient doesn't have RFs and asymptomatic after chemical conversion.  PCP and cardiology follow-up within 2-3 days was recommended.    After taking into careful account the patient's history, physical exam findings, as well as empirical and objective data obtained throughout ED workup, I feel no emergent medical condition has been identified. No further evaluation or admission was felt to be required, and the patient is stable for discharge from the ED. The patient and any additional family present were updated with test results, overall clinical impression, and recommended further plan of care, including discharge instructions as provided and outpatient follow-up for continued evaluation and management as needed. All questions were answered. The patient expressed understanding and agreed with current plan for discharge and follow-up plan of care. Strict ED return precautions were provided, including return/worsening of current symptoms, new symptoms, or any other concerns.      Additional MDM:     Well's Criteria Score:  -Clinical symptoms of DVT (leg swelling, pain with palpation) = 0.0  -Other diagnosis less likely than pulmonary embolism =            0.0  -Heart  Rate >100 =   1.5  -Immobilization (= or > than 3 days) or surgery in the previous 4 weeks = 0.0  -Previous DVT/PE = 0.0  -Hemoptysis =          0.0  -Malignancy =           0.0  Well's Probability Score =    1.5      Heart Score:    History:          Slightly suspicious.  ECG:             Normal  Age:               Less than 45 years  Risk factors: 1-2 risk factors  Troponin:       Less than or equal to normal limit  Final Score: 1                    ED Course as of Jan 29 1236   Wed Jan 29, 2020   0950 EKG with SVT, rate of 187 bpm.  Normal axis, no STEMI    [LD]   1000 No response after 6 mg IV adenosine    [LD]   1006 Patient converted to sinus tachycardia after 12 mg IV adenosine    [LD]   1007 Patient feeling much better.     [LD]   1008 Repeat EKG with sinus tachycardia, rate of 125 bpm.  Normal axis.  No STEMI    [LD]   1034 BP(!): 141/84 [LD]   1034 Pulse: 100 [LD]   1034 Resp: 19 [LD]   1034 SpO2: 98 % [LD]      ED Course User Index  [LD] Twyla Shirley MD                Clinical Impression:       ICD-10-CM ICD-9-CM   1. SVT (supraventricular tachycardia) I47.1 427.89   2. Tachycardia R00.0 785.0   3. Leukocytosis, unspecified type D72.829 288.60         Disposition:   Disposition: Discharged  Condition: Stable          I, Twyla Shirley,  personally performed the services described in this documentation. All medical record entries made by the scribe were at my direction and in my presence.  I have reviewed the chart and agree that the record reflects my personal performance and is accurate and complete. Twyla Shirley M.D. 12:48 PM01/29/2020               Twyla Shirley MD  01/29/20 1248

## 2020-01-31 ENCOUNTER — PATIENT MESSAGE (OUTPATIENT)
Dept: INTERNAL MEDICINE | Facility: CLINIC | Age: 45
End: 2020-01-31

## 2020-02-04 ENCOUNTER — TELEPHONE (OUTPATIENT)
Dept: INTERNAL MEDICINE | Facility: CLINIC | Age: 45
End: 2020-02-04

## 2020-02-04 ENCOUNTER — OFFICE VISIT (OUTPATIENT)
Dept: INTERNAL MEDICINE | Facility: CLINIC | Age: 45
End: 2020-02-04
Payer: COMMERCIAL

## 2020-02-04 VITALS
SYSTOLIC BLOOD PRESSURE: 122 MMHG | DIASTOLIC BLOOD PRESSURE: 90 MMHG | BODY MASS INDEX: 34.82 KG/M2 | WEIGHT: 229.75 LBS | HEART RATE: 88 BPM | HEIGHT: 68 IN | OXYGEN SATURATION: 98 %

## 2020-02-04 DIAGNOSIS — I10 ESSENTIAL HYPERTENSION: ICD-10-CM

## 2020-02-04 DIAGNOSIS — I47.10 PSVT (PAROXYSMAL SUPRAVENTRICULAR TACHYCARDIA): Primary | ICD-10-CM

## 2020-02-04 PROCEDURE — 3080F DIAST BP >= 90 MM HG: CPT | Mod: CPTII,S$GLB,, | Performed by: INTERNAL MEDICINE

## 2020-02-04 PROCEDURE — 3080F PR MOST RECENT DIASTOLIC BLOOD PRESSURE >= 90 MM HG: ICD-10-PCS | Mod: CPTII,S$GLB,, | Performed by: INTERNAL MEDICINE

## 2020-02-04 PROCEDURE — 99213 OFFICE O/P EST LOW 20 MIN: CPT | Mod: S$GLB,,, | Performed by: INTERNAL MEDICINE

## 2020-02-04 PROCEDURE — 3074F SYST BP LT 130 MM HG: CPT | Mod: CPTII,S$GLB,, | Performed by: INTERNAL MEDICINE

## 2020-02-04 PROCEDURE — 99999 PR PBB SHADOW E&M-EST. PATIENT-LVL IV: CPT | Mod: PBBFAC,,, | Performed by: INTERNAL MEDICINE

## 2020-02-04 PROCEDURE — 99999 PR PBB SHADOW E&M-EST. PATIENT-LVL IV: ICD-10-PCS | Mod: PBBFAC,,, | Performed by: INTERNAL MEDICINE

## 2020-02-04 PROCEDURE — 3008F PR BODY MASS INDEX (BMI) DOCUMENTED: ICD-10-PCS | Mod: CPTII,S$GLB,, | Performed by: INTERNAL MEDICINE

## 2020-02-04 PROCEDURE — 99213 PR OFFICE/OUTPT VISIT, EST, LEVL III, 20-29 MIN: ICD-10-PCS | Mod: S$GLB,,, | Performed by: INTERNAL MEDICINE

## 2020-02-04 PROCEDURE — 3074F PR MOST RECENT SYSTOLIC BLOOD PRESSURE < 130 MM HG: ICD-10-PCS | Mod: CPTII,S$GLB,, | Performed by: INTERNAL MEDICINE

## 2020-02-04 PROCEDURE — 3008F BODY MASS INDEX DOCD: CPT | Mod: CPTII,S$GLB,, | Performed by: INTERNAL MEDICINE

## 2020-02-04 RX ORDER — METOPROLOL SUCCINATE 25 MG/1
25 TABLET, EXTENDED RELEASE ORAL DAILY
Qty: 90 TABLET | Refills: 0 | Status: SHIPPED | OUTPATIENT
Start: 2020-02-04 | End: 2020-04-27

## 2020-02-04 NOTE — PATIENT INSTRUCTIONS
We recommend that you discontinue certain  Things that can cause recurrence of tachycardia.  This includes caffeine, chocolate, cocoa, alcohol, nicotine containing products.      Understanding Supraventricular Tachycardia  Supraventricular tachycardia (SVT) is a type of abnormal heart rhythm that results in fast heartbeats. The heart normally beats 60 to 100 beats per minute while you are at rest and awake. With SVT, the heart beats more than 100 times a minute. It may even beat over 200 times a minute. This is caused by a problem in the electrical system of the heart. It can lessen the amount of blood pumped through the heart.  How the heart beats  A heartbeat is the rhythm of the heart as it contracts to squeeze blood through the body. Its caused by electrical signals in the heart. A beat starts when a special group of cells give off an electrical signal. These cells are in the sinoatrial (SA) node. The SA node is in the upper right chamber of your heart (atrium). The signal from the SA node travels down to the 2 lower chambers of your heart (ventricles). On the way, the signal goes through the atrioventricular (AV) node. This is a special group of cells between the atria and ventricles. From there, the signal travels to your left and right ventricles. As it travels, the signal tells nearby parts of your heart to contract. This causes your heart to pump in a coordinated way.  What is SVT?  When you have SVT, the signal to start your heartbeat doesnt come from the SA node. Instead it comes from another part of the left or right atrium. Or it comes from the AV node. Some area outside the SA node begins to fire quickly, causing a rapid heartbeat of over 100 beats per minute or the electrical signals are caught up in an abnormal looping circuit. This shortens the time your ventricles have to fill. If your heartbeat is fast enough, your heart may be unable to pump enough blood forward to the rest of your body. The  abnormal heartbeat may last for a few seconds to a few hours before your heart returns to its normal rhythm. Some SVT rhythms can last for days or weeks, or even become permanent.    What causes SVT?  Some types of SVT run in families. Some people have heart problems from birth that cause SVT. High blood pressure, heart failure, mitral valve disease, sleep apnea, thyroid problems, and heart attacks can cause SVT. Smoking, excess caffeine or alcohol, and some medicines can increase your risk of having SVT.  Symptoms of SVT  When SVT happens, you may feel no symptoms. Or you may have:  · Fluttering feelings in your chest (palpitations)  · A tight feeling or pain in your chest  · A pulsing feeling in your neck  · Dizziness  · Shortness of breath  · Tiredness  · Fainting  · Nausea  In very rare cases, SVT can cause sudden death.  Diagnosing SVT  Your primary healthcare provider may diagnose you. Or you may see a heart doctor (cardiologist). The doctor will ask about your health history. He or she will also give you a physical exam. You may also have tests. These help show what kind of SVT you have, and what may cause it. They also help check for other problems. The tests may include:  · Electrocardiogram (ECG), to analyze the abnormal rhythm  · Continuous heart monitors such as a holter monitor or an event recorder to watch your heart rhythm over a longer period in an attempt to catch the SVT rhythm  · Blood tests, to look for various causes such as thyroid problems or electrolyte abnormalities  · Chest X-ray, to check for lung problems and look at the size of your heart  · Exercise stress test, to see how well your heart works under stress  · Echocardiography, to check your heart structure and function  · Electrophysiologic study (EPS), an invasive procedure using wires in the heart to check the heart's electrical signals and diagnose the SVT  Date Last Reviewed: 5/1/2016  © 2063-7796 [employer] [address]  [place] [address]. All rights reserved. This information is not intended as a substitute for professional medical care. Always follow your healthcare professional's instructions.

## 2020-02-04 NOTE — PROGRESS NOTES
Portions of this note are generated with voice recognition software. Typographical errors may exist.     SUBJECTIVE:    This is a/an 44 y.o. male here for primary care visit for  Chief Complaint   Patient presents with    Follow-up     ER ,palpitions     Patient states that since discharge from the emergency room he felt that he was having another recurrence of tachycardia.  Episode only lasted briefly so he did not present back to the emergency room.  Patient is already prone to anxiety and states that this has increased his levels of general anxiety. Patient has been able to eliminate many things from his diet since discharge from the emergency room.  He has eliminated caffeine.  No alcohol.  No tobacco or nicotine.   Patient has presented in the past before for palpitations in the setting of intense anxiety however prior episodes did not coincide with EKG abnormalities other than sinus tachycardia.      Answers for HPI/ROS submitted by the patient on 2/1/2020   activity change: No  unexpected weight change: No  rhinorrhea: No  trouble swallowing: No  visual disturbance: No  chest tightness: No  polyuria: No  difficulty urinating: No  joint swelling: No  arthralgias: No  confusion: No  dysphoric mood: No      Medications Reviewed and Updated    Past medical, family, and social histories were reviewed and updated.    Review of Systems negative unless otherwise noted in history of present illness-  Review of Systems   Constitutional: Negative for malaise/fatigue.   HENT: Negative for hearing loss.    Eyes: Negative for discharge.   Respiratory: Negative for wheezing.    Cardiovascular: Positive for palpitations. Negative for chest pain.   Gastrointestinal: Negative for blood in stool, constipation, diarrhea and vomiting.   Genitourinary: Negative for hematuria and urgency.   Musculoskeletal: Negative for neck pain.   Neurological: Negative for weakness and headaches.   Endo/Heme/Allergies: Negative for polydipsia.    Psychiatric/Behavioral: The patient is nervous/anxious.          Allergic:  Review of patient's allergies indicates:  No Known Allergies    OBJECTIVE:  BP: (!) 122/90 Pulse: 88    Wt Readings from Last 3 Encounters:   02/04/20 104.2 kg (229 lb 11.5 oz)   01/29/20 99.8 kg (220 lb)   04/11/18 100.6 kg (221 lb 12.5 oz)    Body mass index is 34.93 kg/m².  Previous Blood Pressure Readings :   BP Readings from Last 3 Encounters:   02/04/20 (!) 122/90   01/29/20 (!) 140/95   04/11/18 126/80       Physical Exam    GEN: No apparent distress  HEENT: sclera non-icteric, conjunctiva clear  CV: no peripheral edema  Regular rate and rhythm.  No murmurs.  No carotid bruits.  PULM: breathing non-labored  ABD: non, protuberant abdomen.  PSYCH: appropriate affect  MSK: able to rise from chair without assistance  SKIN: normal skin turgor    Pertinent Labs Reviewed       ASSESSMENT/PLAN:    PSVT (paroxysmal supraventricular tachycardia).Condition not optimally controlled. Detailed counseling on self care measures. Plan to monitor clinically in addition to plan below or as listed on After Visit Summary.   -     Ambulatory referral/consult to Electrophysiology; Future; Expected date: 02/11/2020  -     metoprolol succinate (TOPROL-XL) 25 MG 24 hr tablet; Take 1 tablet (25 mg total) by mouth once daily.  Dispense: 90 tablet; Refill: 0    Essential hypertension.Condition not optimally controlled. Detailed counseling on self care measures. Plan to monitor clinically in addition to plan below or as listed on After Visit Summary.   -     metoprolol succinate (TOPROL-XL) 25 MG 24 hr tablet; Take 1 tablet (25 mg total) by mouth once daily.  Dispense: 90 tablet; Refill: 0          Future Appointments   Date Time Provider Department Center   2/27/2020 10:00 AM Sloan Lim MD [facility]   3/2/2020  2:00 PM Troy Holt MD [facility]   3/18/2020  3:20 PM Troy Holt MD [facility]        Troy Holt  2/4/2020  10:04 AM

## 2020-02-20 ENCOUNTER — TELEPHONE (OUTPATIENT)
Dept: ELECTROPHYSIOLOGY | Facility: CLINIC | Age: 45
End: 2020-02-20

## 2020-02-24 ENCOUNTER — PATIENT OUTREACH (OUTPATIENT)
Dept: ADMINISTRATIVE | Facility: OTHER | Age: 45
End: 2020-02-24

## 2020-02-27 ENCOUNTER — INITIAL CONSULT (OUTPATIENT)
Dept: CARDIOLOGY | Facility: CLINIC | Age: 45
End: 2020-02-27
Payer: COMMERCIAL

## 2020-02-27 VITALS — SYSTOLIC BLOOD PRESSURE: 124 MMHG | DIASTOLIC BLOOD PRESSURE: 89 MMHG | HEART RATE: 98 BPM

## 2020-02-27 DIAGNOSIS — I47.10 PSVT (PAROXYSMAL SUPRAVENTRICULAR TACHYCARDIA): ICD-10-CM

## 2020-02-27 DIAGNOSIS — I10 ESSENTIAL HYPERTENSION: Primary | ICD-10-CM

## 2020-02-27 DIAGNOSIS — I49.8 OTHER SPECIFIED CARDIAC ARRHYTHMIAS: ICD-10-CM

## 2020-02-27 PROCEDURE — 99245 PR OFFICE CONSULTATION,LEVEL V: ICD-10-PCS | Mod: S$GLB,,, | Performed by: INTERNAL MEDICINE

## 2020-02-27 PROCEDURE — 99245 OFF/OP CONSLTJ NEW/EST HI 55: CPT | Mod: S$GLB,,, | Performed by: INTERNAL MEDICINE

## 2020-02-27 PROCEDURE — 93010 RHYTHM STRIP: ICD-10-PCS | Mod: S$GLB,,, | Performed by: INTERNAL MEDICINE

## 2020-02-27 PROCEDURE — 99999 PR PBB SHADOW E&M-EST. PATIENT-LVL III: CPT | Mod: PBBFAC,,, | Performed by: INTERNAL MEDICINE

## 2020-02-27 PROCEDURE — 93005 ELECTROCARDIOGRAM TRACING: CPT | Mod: S$GLB,,, | Performed by: INTERNAL MEDICINE

## 2020-02-27 PROCEDURE — 99999 PR PBB SHADOW E&M-EST. PATIENT-LVL III: ICD-10-PCS | Mod: PBBFAC,,, | Performed by: INTERNAL MEDICINE

## 2020-02-27 PROCEDURE — 93005 RHYTHM STRIP: ICD-10-PCS | Mod: S$GLB,,, | Performed by: INTERNAL MEDICINE

## 2020-02-27 PROCEDURE — 93010 ELECTROCARDIOGRAM REPORT: CPT | Mod: S$GLB,,, | Performed by: INTERNAL MEDICINE

## 2020-02-27 NOTE — PROGRESS NOTES
Subjective:    Patient ID:  Enio Earl is a 44 y.o. male who presents for evaluation of     HPI   44 y.o. M  panic attacks    1/29/20, developed palpitations radiating to neck. At ER, found in short-RP SVT that terminated with ADO 12.  There was some LH, HOWARD, but no syncope.  No recurrence since.    SVT: short RP, 187 bpm.  NSR: not preexcited    JW 2017 60-65% LVEF. neg ischemia.    My interpretation of today's ECG is NSR    Review of Systems   Constitution: Negative. Negative for malaise/fatigue.   HENT: Negative.  Negative for ear pain and tinnitus.    Eyes: Negative for blurred vision.   Cardiovascular: Negative.  Negative for chest pain, dyspnea on exertion, near-syncope, palpitations and syncope.   Respiratory: Negative.  Negative for shortness of breath.    Endocrine: Negative.  Negative for polyuria.   Hematologic/Lymphatic: Does not bruise/bleed easily.   Skin: Negative.  Negative for rash.   Musculoskeletal: Negative.  Negative for joint pain and muscle weakness.   Gastrointestinal: Negative.  Negative for abdominal pain and change in bowel habit.   Genitourinary: Negative for frequency.   Neurological: Negative.  Negative for dizziness and weakness.   Psychiatric/Behavioral: Negative.  Negative for depression. The patient is not nervous/anxious.    Allergic/Immunologic: Negative for environmental allergies.        Objective:    Physical Exam   Constitutional: He is oriented to person, place, and time. He appears well-developed and well-nourished.   HENT:   Head: Normocephalic and atraumatic.   Eyes: Conjunctivae, EOM and lids are normal. No scleral icterus.   Neck: Normal range of motion. No JVD present. No tracheal deviation present. No thyromegaly present.   Cardiovascular: Normal rate, regular rhythm, normal heart sounds and intact distal pulses.  No extrasystoles are present. PMI is not displaced. Exam reveals no gallop and no friction rub.   No murmur heard.  Pulses:       Radial pulses are 2+  on the right side, and 2+ on the left side.   Pulmonary/Chest: Effort normal and breath sounds normal. No accessory muscle usage. No tachypnea. No respiratory distress. He has no wheezes. He has no rales.   Abdominal: Soft. Bowel sounds are normal. He exhibits no distension. There is no hepatosplenomegaly. There is no tenderness.   Musculoskeletal: Normal range of motion. He exhibits no edema.   Neurological: He is alert and oriented to person, place, and time. He has normal reflexes. He exhibits normal muscle tone.   Skin: Skin is warm and dry. No rash noted.   Psychiatric: He has a normal mood and affect. His behavior is normal.   Nursing note and vitals reviewed.        Assessment:       1. Essential hypertension    2. PSVT (paroxysmal supraventricular tachycardia)         Plan:       Discussed with patient basic cardiac electrophysiology and in general possible mechanisms of SVT, including AVNRT, AVRT, AT, AFL.  I spent about a half hour discussing the nature of EP study and ablation, including possible transseptal puncture. We discused risks and benefits at length. Our discussion included, but was not limited to the risk of death, infection, bleeding, stroke, MI, cardiac perforation, embolism, cardiac tamponade, skin burns, and other organic injury including the possibility for need for surgery or pacemaker implantation.    RAYMOND  Anesthesia for MAC  No BB x 3 days before

## 2020-03-02 DIAGNOSIS — I49.8 OTHER SPECIFIED CARDIAC ARRHYTHMIAS: Primary | ICD-10-CM

## 2020-03-03 ENCOUNTER — HOSPITAL ENCOUNTER (OUTPATIENT)
Dept: CARDIOLOGY | Facility: HOSPITAL | Age: 45
Discharge: HOME | End: 2020-03-03
Attending: INTERNAL MEDICINE
Payer: COMMERCIAL

## 2020-03-03 ENCOUNTER — PATIENT MESSAGE (OUTPATIENT)
Dept: CARDIOLOGY | Facility: CLINIC | Age: 45
End: 2020-03-03

## 2020-03-03 VITALS — WEIGHT: 229 LBS | HEIGHT: 68 IN | BODY MASS INDEX: 34.71 KG/M2

## 2020-03-03 DIAGNOSIS — I47.10 PSVT (PAROXYSMAL SUPRAVENTRICULAR TACHYCARDIA): ICD-10-CM

## 2020-03-03 DIAGNOSIS — I10 ESSENTIAL HYPERTENSION: ICD-10-CM

## 2020-03-03 LAB
AORTIC ROOT ANNULUS: 3.09 CM
AORTIC VALVE CUSP SEPERATION: 1.98 CM
AV INDEX (PROSTH): 0.88
AV MEAN GRADIENT: 4 MMHG
AV PEAK GRADIENT: 7 MMHG
AV VELOCITY RATIO: 0.86
BSA FOR ECHO PROCEDURE: 2.23 M2
CV ECHO LV RWT: 0.37 CM
DOP CALC AO PEAK VEL: 1.33 M/S
DOP CALC AO VTI: 28.46 CM
DOP CALC LVOT PEAK VEL: 1.15 M/S
DOP CALCLVOT PEAK VEL VTI: 25.01 CM
E WAVE DECELERATION TIME: 142.09 MSEC
E/A RATIO: 1.25
E/E' RATIO: 7.06 M/S
ECHO LV POSTERIOR WALL: 0.91 CM (ref 0.6–1.1)
FRACTIONAL SHORTENING: 42 % (ref 28–44)
INTERVENTRICULAR SEPTUM: 1.14 CM (ref 0.6–1.1)
IVRT: 0.07 MSEC
LA MAJOR: 4.97 CM
LA MINOR: 4.85 CM
LA WIDTH: 3.41 CM
LEFT ATRIUM SIZE: 4.23 CM
LEFT ATRIUM VOLUME INDEX: 27.8 ML/M2
LEFT ATRIUM VOLUME: 60.19 CM3
LEFT INTERNAL DIMENSION IN SYSTOLE: 2.87 CM (ref 2.1–4)
LEFT VENTRICLE DIASTOLIC VOLUME INDEX: 53.74 ML/M2
LEFT VENTRICLE DIASTOLIC VOLUME: 116.34 ML
LEFT VENTRICLE MASS INDEX: 86 G/M2
LEFT VENTRICLE SYSTOLIC VOLUME INDEX: 14.5 ML/M2
LEFT VENTRICLE SYSTOLIC VOLUME: 31.37 ML
LEFT VENTRICULAR INTERNAL DIMENSION IN DIASTOLE: 4.97 CM (ref 3.5–6)
LEFT VENTRICULAR MASS: 186.29 G
LV LATERAL E/E' RATIO: 7.5 M/S
LV SEPTAL E/E' RATIO: 6.67 M/S
MV PEAK A VEL: 0.48 M/S
MV PEAK E VEL: 0.6 M/S
PISA TR MAX VEL: 2 M/S
PULM VEIN S/D RATIO: 1.66
PV PEAK D VEL: 0.35 M/S
PV PEAK S VEL: 0.58 M/S
RA MAJOR: 5.1 CM
RA PRESSURE: 3 MMHG
RA WIDTH: 3.76 CM
RIGHT VENTRICULAR END-DIASTOLIC DIMENSION: 2.98 CM
STJ: 2.83 CM
TDI LATERAL: 0.08 M/S
TDI SEPTAL: 0.09 M/S
TDI: 0.09 M/S
TR MAX PG: 16 MMHG
TV REST PULMONARY ARTERY PRESSURE: 19 MMHG

## 2020-03-03 PROCEDURE — 93306 TTE W/DOPPLER COMPLETE: CPT

## 2020-03-03 PROCEDURE — 93306 ECHO (CUPID ONLY): ICD-10-PCS | Mod: 26,,, | Performed by: INTERNAL MEDICINE

## 2020-03-03 PROCEDURE — 93306 TTE W/DOPPLER COMPLETE: CPT | Mod: 26,,, | Performed by: INTERNAL MEDICINE

## 2020-03-04 ENCOUNTER — TELEPHONE (OUTPATIENT)
Dept: ELECTROPHYSIOLOGY | Facility: CLINIC | Age: 45
End: 2020-03-04

## 2020-03-04 NOTE — TELEPHONE ENCOUNTER
----- Message from Zoraida Romero sent at 3/4/2020 11:12 AM CST -----  Contact: pt called   Pt calling to schedule SVT ablation with Dr. Lim. Please call pt @ [phone]. Thank you.

## 2020-03-04 NOTE — TELEPHONE ENCOUNTER
----- Message from Adelita Niño MA sent at 3/4/2020 10:41 AM CST -----  Contact: pt      ----- Message -----  From: Aby Rubio  Sent: 3/4/2020  10:14 AM CST  To: Raphael BLANDON Staff    Pt would like to be called back regarding scheduling an ablation    Pt can be reached at  [phone]

## 2020-03-05 DIAGNOSIS — I49.8 OTHER SPECIFIED CARDIAC ARRHYTHMIAS: ICD-10-CM

## 2020-03-05 DIAGNOSIS — I47.10 PSVT (PAROXYSMAL SUPRAVENTRICULAR TACHYCARDIA): Primary | ICD-10-CM

## 2020-03-05 DIAGNOSIS — I47.10 SVT (SUPRAVENTRICULAR TACHYCARDIA): ICD-10-CM

## 2020-03-05 DIAGNOSIS — I10 ESSENTIAL HYPERTENSION: ICD-10-CM

## 2020-03-19 ENCOUNTER — TELEPHONE (OUTPATIENT)
Dept: INTERNAL MEDICINE | Facility: CLINIC | Age: 45
End: 2020-03-19

## 2020-03-19 ENCOUNTER — PATIENT MESSAGE (OUTPATIENT)
Dept: INTERNAL MEDICINE | Facility: CLINIC | Age: 45
End: 2020-03-19

## 2020-03-19 ENCOUNTER — OFFICE VISIT (OUTPATIENT)
Dept: INTERNAL MEDICINE | Facility: CLINIC | Age: 45
End: 2020-03-19
Payer: COMMERCIAL

## 2020-03-19 ENCOUNTER — PATIENT MESSAGE (OUTPATIENT)
Dept: ADMINISTRATIVE | Facility: OTHER | Age: 45
End: 2020-03-19

## 2020-03-19 VITALS
BODY MASS INDEX: 35.59 KG/M2 | DIASTOLIC BLOOD PRESSURE: 74 MMHG | HEART RATE: 99 BPM | SYSTOLIC BLOOD PRESSURE: 116 MMHG | HEIGHT: 68 IN | WEIGHT: 234.81 LBS | TEMPERATURE: 98 F | OXYGEN SATURATION: 95 %

## 2020-03-19 DIAGNOSIS — I47.10 PSVT (PAROXYSMAL SUPRAVENTRICULAR TACHYCARDIA): Primary | ICD-10-CM

## 2020-03-19 DIAGNOSIS — F17.200 TOBACCO DEPENDENCE: ICD-10-CM

## 2020-03-19 DIAGNOSIS — I10 ESSENTIAL HYPERTENSION: ICD-10-CM

## 2020-03-19 PROCEDURE — 3074F PR MOST RECENT SYSTOLIC BLOOD PRESSURE < 130 MM HG: ICD-10-PCS | Mod: CPTII,S$GLB,, | Performed by: INTERNAL MEDICINE

## 2020-03-19 PROCEDURE — 3008F PR BODY MASS INDEX (BMI) DOCUMENTED: ICD-10-PCS | Mod: CPTII,S$GLB,, | Performed by: INTERNAL MEDICINE

## 2020-03-19 PROCEDURE — 99999 PR PBB SHADOW E&M-EST. PATIENT-LVL IV: CPT | Mod: PBBFAC,,, | Performed by: INTERNAL MEDICINE

## 2020-03-19 PROCEDURE — 3078F DIAST BP <80 MM HG: CPT | Mod: CPTII,S$GLB,, | Performed by: INTERNAL MEDICINE

## 2020-03-19 PROCEDURE — 3078F PR MOST RECENT DIASTOLIC BLOOD PRESSURE < 80 MM HG: ICD-10-PCS | Mod: CPTII,S$GLB,, | Performed by: INTERNAL MEDICINE

## 2020-03-19 PROCEDURE — 99999 PR PBB SHADOW E&M-EST. PATIENT-LVL IV: ICD-10-PCS | Mod: PBBFAC,,, | Performed by: INTERNAL MEDICINE

## 2020-03-19 PROCEDURE — 3074F SYST BP LT 130 MM HG: CPT | Mod: CPTII,S$GLB,, | Performed by: INTERNAL MEDICINE

## 2020-03-19 PROCEDURE — 3008F BODY MASS INDEX DOCD: CPT | Mod: CPTII,S$GLB,, | Performed by: INTERNAL MEDICINE

## 2020-03-19 PROCEDURE — 99213 OFFICE O/P EST LOW 20 MIN: CPT | Mod: S$GLB,,, | Performed by: INTERNAL MEDICINE

## 2020-03-19 PROCEDURE — 99213 PR OFFICE/OUTPT VISIT, EST, LEVL III, 20-29 MIN: ICD-10-PCS | Mod: S$GLB,,, | Performed by: INTERNAL MEDICINE

## 2020-03-19 NOTE — PATIENT INSTRUCTIONS
Digital Medicine Enrollment   Fill out the enrollment questionnaire today on my [facility].  Then contact the program directly at the following number so that you can make arrangements to  the equipment for digital medicine.      Picking Up Your Equipment   You will be given the choice to  the equipment at a location with an [facility]. This is preferred so the program staff can set up the equipment and teach you how to use the equipment correctly. If you cannot go to one of these locations you can ask for the device to be delivered and you can attempt to set up the equipment at home. If you chose this option and have difficulty setting it up you can also bring the equipment with you to the next visit with Dr. Holt and his staff can help you get up and running.     [facility] - [facility]    Located within [facility] [address] [place] [id number]  Open Monday - Friday, 8 a.m. - 12:30 p.m.; 1 p.m. - 5 p.m.   [phone]    [facility] - [facility]  Located within the [facility]  [address] [place] [address]  Open Monday - Friday, 8 a.m. - 12:30 p.m.; 1 p.m. - 5 p.m.   [phone]    [email or web address]    For More Info Regarding Times / Locations for Mobile [facility] Visit Website Below     [email or web address]

## 2020-03-21 ENCOUNTER — PATIENT MESSAGE (OUTPATIENT)
Dept: ADMINISTRATIVE | Facility: OTHER | Age: 45
End: 2020-03-21

## 2020-03-22 NOTE — PROGRESS NOTES
Portions of this note are generated with voice recognition software. Typographical errors may exist.     SUBJECTIVE:    This is a/an 44 y.o. male here for primary care visit for  Chief Complaint   Patient presents with    Hypertension     1 mo     Patient continues to smoke cigarettes.  States that he has general problems with anxiety.  Recurring problems with insomnia.  Feeling on edge most of the time.  He is reluctant to pursue pharmacologic therapy.  States that he will pursue relaxation techniques 1st and if they fail he will pursue pharmacologic therapy.  States that he is taking metoprolol but not checking blood pressure at home.  He understands that blood pressure numbers on recent visits have been uncontrolled and that this can result in further problems with cardiac disease.  Denies any unusual episodes of tachycardia at rest in recent times.      Answers for HPI/ROS submitted by the patient on 3/16/2020   activity change: No  unexpected weight change: No  rhinorrhea: No  trouble swallowing: No  visual disturbance: No  chest tightness: No  polyuria: No  difficulty urinating: No  joint swelling: No  arthralgias: No  confusion: No  dysphoric mood: No      Medications Reviewed and Updated    Past medical, family, and social histories were reviewed and updated.    Review of Systems negative unless otherwise noted in history of present illness-  ROS    General ROS: negative  Psychological ROS: negative  ENT ROS: negative  Endocrine ROS: Negative  Allergy and Immunology ROS: negative  Cardiovascular ROS: negative  Pulmonary ROS: Negative  Gastrointestinal ROS: negative  Genito-Urinary ROS: negative  Musculoskeletal ROS: negative  Neurological ROS: negative  Dermatological ROS: negative        Allergic:  Review of patient's allergies indicates:  No Known Allergies    OBJECTIVE:  BP: 116/74 Pulse: 99 Temp: 98.3 °F (36.8 °C)  Wt Readings from Last 3 Encounters:   03/19/20 106.5 kg (234 lb 12.6 oz)   03/03/20 103.9  kg (229 lb)   02/04/20 104.2 kg (229 lb 11.5 oz)    Body mass index is 35.7 kg/m².  Previous Blood Pressure Readings :   BP Readings from Last 3 Encounters:   03/19/20 116/74   02/27/20 124/89   02/04/20 (!) 122/90       Physical Exam    GEN: No apparent distress  HEENT: sclera non-icteric, conjunctiva clear  CV: no peripheral edema  PULM: breathing non-labored  ABD: Obese, protuberant abdomen.  PSYCH: appropriate affect  MSK: able to rise from chair without assistance  SKIN: normal skin turgor    Pertinent Labs Reviewed       ASSESSMENT/PLAN:    PSVT (paroxysmal supraventricular tachycardia).Condition stable.  Counseling given today on self-care measures. Plan to monitor clinically. Continue current medical plan.    - ablation upcoming    Essential hypertension.Condition not optimally controlled. Detailed counseling on self care measures. Plan to monitor clinically in addition to plan below or as listed on After Visit Summary.   -     Hypertension Digital Medicine (HDMP) Enrollment Order  -     Hypertension Digital Medicine (HDMP): Assign Onboarding Questionnaires    Tobacco dependence / Anxiety .Condition not optimally controlled.  Patient not ready for definitive treatment plan at this time.  Plan to readdress at next clinic visit.        Future Appointments   Date Time Provider Department Center   4/7/2020  3:00 PM APPOINTMENT [facility]   4/14/2020  5:30 AM [address] [facility]   6/22/2020  3:00 PM Troy Holt MD [facility]       Troy Holt  3/22/2020  3:43 PM

## 2020-03-23 ENCOUNTER — TELEPHONE (OUTPATIENT)
Dept: ELECTROPHYSIOLOGY | Facility: CLINIC | Age: 45
End: 2020-03-23

## 2020-03-23 NOTE — TELEPHONE ENCOUNTER
Called pt re: upcoming procedure (RFA SVT).    In light of COVID concerns, will postpone the upcoming procedure. I encouraged patient to contact me/us as needed, and/or seek other medical care as needed.    AK/DC: Postpone. Pt understands.

## 2020-04-18 ENCOUNTER — PATIENT MESSAGE (OUTPATIENT)
Dept: INTERNAL MEDICINE | Facility: CLINIC | Age: 45
End: 2020-04-18

## 2020-04-27 DIAGNOSIS — I10 ESSENTIAL HYPERTENSION: ICD-10-CM

## 2020-04-27 DIAGNOSIS — I47.10 PSVT (PAROXYSMAL SUPRAVENTRICULAR TACHYCARDIA): ICD-10-CM

## 2020-04-27 RX ORDER — METOPROLOL SUCCINATE 25 MG/1
TABLET, EXTENDED RELEASE ORAL
Qty: 90 TABLET | Refills: 0 | Status: SHIPPED | OUTPATIENT
Start: 2020-04-27 | End: 2020-05-10 | Stop reason: SDUPTHER

## 2020-05-15 ENCOUNTER — TELEPHONE (OUTPATIENT)
Dept: ELECTROPHYSIOLOGY | Facility: CLINIC | Age: 45
End: 2020-05-15

## 2020-05-15 NOTE — TELEPHONE ENCOUNTER
----- Message from Natalia Mello MA sent at 5/15/2020 10:56 AM CDT -----  Contact: Self      ----- Message -----  From: Doris Oconnor  Sent: 5/15/2020  10:39 AM CDT  To: Raphael BLANDON Staff    Mae- Pt returning your call regarding alis. Thanks     [phone]

## 2020-05-15 NOTE — TELEPHONE ENCOUNTER
Scheduled pt procedure for 6/8/2020 with Dr. Lim. Instructed pt to hold Metoprolol 3 days prior. Reviewed pre op instructions with pt.

## 2020-05-19 ENCOUNTER — PATIENT MESSAGE (OUTPATIENT)
Dept: ELECTROPHYSIOLOGY | Facility: CLINIC | Age: 45
End: 2020-05-19

## 2020-05-19 DIAGNOSIS — I49.8 OTHER SPECIFIED CARDIAC ARRHYTHMIAS: ICD-10-CM

## 2020-05-19 DIAGNOSIS — I47.10 SVT (SUPRAVENTRICULAR TACHYCARDIA): Primary | ICD-10-CM

## 2020-06-02 ENCOUNTER — TELEPHONE (OUTPATIENT)
Dept: ELECTROPHYSIOLOGY | Facility: CLINIC | Age: 45
End: 2020-06-02

## 2020-06-02 NOTE — TELEPHONE ENCOUNTER
----- Message from Adelita Niño MA sent at 6/2/2020  9:34 AM CDT -----  Contact: Self  Upcoming procedure  ----- Message -----  From: Doris Oconnor  Sent: 6/1/2020   8:22 AM CDT  To: Raphael BLANDON Staff    Pt states was not able to find a date to schedule for his COVID-19 for 6/9/20, if can schedule for Pt. Thanks    [phone]

## 2020-06-04 ENCOUNTER — TELEPHONE (OUTPATIENT)
Dept: ELECTROPHYSIOLOGY | Facility: CLINIC | Age: 45
End: 2020-06-04

## 2020-06-04 NOTE — TELEPHONE ENCOUNTER
LVM with pt concerning upcoming procedure with Dr. Lim and callback number given. Instructed pt on arrival time, procedure instructions. Will continue to try and contact pt to confirm case.

## 2020-06-04 NOTE — TELEPHONE ENCOUNTER
Spoke to pt's wife    CONFIRMED procedure arrival time of 5:30 AM     Reiterated instructions including:  -Directions to check in desk  -NPO after midnight night prior to procedure  -High importance of HOLDING Metoprolol. Last dose today.   -Pre-procedure LABS on arrival. Confimed COVID screening to be done at [facility]  -Confirmed no recent fever, bleeding, infection or skin rash in the past 30 days     Pt verbalizes understanding of above and appreciates call.

## 2020-06-05 ENCOUNTER — PATIENT OUTREACH (OUTPATIENT)
Dept: ADMINISTRATIVE | Facility: OTHER | Age: 45
End: 2020-06-05

## 2020-06-06 ENCOUNTER — CLINICAL SUPPORT (OUTPATIENT)
Dept: URGENT CARE | Facility: CLINIC | Age: 45
End: 2020-06-06
Payer: COMMERCIAL

## 2020-06-06 VITALS — TEMPERATURE: 99 F

## 2020-06-06 DIAGNOSIS — I47.10 SVT (SUPRAVENTRICULAR TACHYCARDIA): ICD-10-CM

## 2020-06-06 DIAGNOSIS — I49.8 OTHER SPECIFIED CARDIAC ARRHYTHMIAS: ICD-10-CM

## 2020-06-06 PROCEDURE — U0003 INFECTIOUS AGENT DETECTION BY NUCLEIC ACID (DNA OR RNA); SEVERE ACUTE RESPIRATORY SYNDROME CORONAVIRUS 2 (SARS-COV-2) (CORONAVIRUS DISEASE [COVID-19]), AMPLIFIED PROBE TECHNIQUE, MAKING USE OF HIGH THROUGHPUT TECHNOLOGIES AS DESCRIBED BY CMS-2020-01-R: HCPCS

## 2020-06-07 ENCOUNTER — ANESTHESIA EVENT (OUTPATIENT)
Dept: MEDSURG UNIT | Facility: HOSPITAL | Age: 45
End: 2020-06-07
Payer: COMMERCIAL

## 2020-06-07 LAB — SARS-COV-2 RNA RESP QL NAA+PROBE: NOT DETECTED

## 2020-06-08 ENCOUNTER — HOSPITAL ENCOUNTER (OUTPATIENT)
Facility: HOSPITAL | Age: 45
Discharge: HOME | End: 2020-06-08
Attending: INTERNAL MEDICINE | Admitting: INTERNAL MEDICINE
Payer: COMMERCIAL

## 2020-06-08 ENCOUNTER — ANESTHESIA (OUTPATIENT)
Dept: MEDSURG UNIT | Facility: HOSPITAL | Age: 45
End: 2020-06-08
Payer: COMMERCIAL

## 2020-06-08 VITALS
HEIGHT: 68 IN | SYSTOLIC BLOOD PRESSURE: 108 MMHG | HEART RATE: 68 BPM | WEIGHT: 220 LBS | RESPIRATION RATE: 18 BRPM | BODY MASS INDEX: 33.34 KG/M2 | OXYGEN SATURATION: 96 % | DIASTOLIC BLOOD PRESSURE: 64 MMHG | TEMPERATURE: 98 F

## 2020-06-08 DIAGNOSIS — I10 ESSENTIAL HYPERTENSION: ICD-10-CM

## 2020-06-08 DIAGNOSIS — I47.10 SVT (SUPRAVENTRICULAR TACHYCARDIA): ICD-10-CM

## 2020-06-08 DIAGNOSIS — I47.10 PSVT (PAROXYSMAL SUPRAVENTRICULAR TACHYCARDIA): Primary | ICD-10-CM

## 2020-06-08 DIAGNOSIS — I49.8 OTHER SPECIFIED CARDIAC ARRHYTHMIAS: ICD-10-CM

## 2020-06-08 LAB
ANION GAP SERPL CALC-SCNC: 8 MMOL/L (ref 8–16)
APTT BLDCRRT: 26.2 SEC (ref 21–32)
BASOPHILS # BLD AUTO: 0.04 K/UL (ref 0–0.2)
BASOPHILS NFR BLD: 0.4 % (ref 0–1.9)
BUN SERPL-MCNC: 9 MG/DL (ref 6–20)
CALCIUM SERPL-MCNC: 9 MG/DL (ref 8.7–10.5)
CHLORIDE SERPL-SCNC: 107 MMOL/L (ref 95–110)
CO2 SERPL-SCNC: 24 MMOL/L (ref 23–29)
CREAT SERPL-MCNC: 0.8 MG/DL (ref 0.5–1.4)
DIFFERENTIAL METHOD: ABNORMAL
EOSINOPHIL # BLD AUTO: 0.3 K/UL (ref 0–0.5)
EOSINOPHIL NFR BLD: 3.5 % (ref 0–8)
ERYTHROCYTE [DISTWIDTH] IN BLOOD BY AUTOMATED COUNT: 11.7 % (ref 11.5–14.5)
EST. GFR  (AFRICAN AMERICAN): >60 ML/MIN/1.73 M^2
EST. GFR  (NON AFRICAN AMERICAN): >60 ML/MIN/1.73 M^2
GLUCOSE SERPL-MCNC: 115 MG/DL (ref 70–110)
HCT VFR BLD AUTO: 47.2 % (ref 40–54)
HGB BLD-MCNC: 16.1 G/DL (ref 14–18)
IMM GRANULOCYTES # BLD AUTO: 0.05 K/UL (ref 0–0.04)
IMM GRANULOCYTES NFR BLD AUTO: 0.6 % (ref 0–0.5)
INR PPP: 0.9 (ref 0.8–1.2)
LYMPHOCYTES # BLD AUTO: 2.5 K/UL (ref 1–4.8)
LYMPHOCYTES NFR BLD: 27.3 % (ref 18–48)
MCH RBC QN AUTO: 34.5 PG (ref 27–31)
MCHC RBC AUTO-ENTMCNC: 34.1 G/DL (ref 32–36)
MCV RBC AUTO: 101 FL (ref 82–98)
MONOCYTES # BLD AUTO: 0.7 K/UL (ref 0.3–1)
MONOCYTES NFR BLD: 7.7 % (ref 4–15)
NEUTROPHILS # BLD AUTO: 5.5 K/UL (ref 1.8–7.7)
NEUTROPHILS NFR BLD: 60.5 % (ref 38–73)
NRBC BLD-RTO: 0 /100 WBC
PLATELET # BLD AUTO: 192 K/UL (ref 150–350)
PMV BLD AUTO: 11.2 FL (ref 9.2–12.9)
POTASSIUM SERPL-SCNC: 3.8 MMOL/L (ref 3.5–5.1)
PROTHROMBIN TIME: 9.8 SEC (ref 9–12.5)
RBC # BLD AUTO: 4.67 M/UL (ref 4.6–6.2)
SODIUM SERPL-SCNC: 139 MMOL/L (ref 136–145)
WBC # BLD AUTO: 9.07 K/UL (ref 3.9–12.7)

## 2020-06-08 PROCEDURE — 93010 EKG 12-LEAD: ICD-10-PCS | Mod: ,,, | Performed by: INTERNAL MEDICINE

## 2020-06-08 PROCEDURE — 93010 ELECTROCARDIOGRAM REPORT: CPT | Mod: ,,, | Performed by: INTERNAL MEDICINE

## 2020-06-08 PROCEDURE — 85610 PROTHROMBIN TIME: CPT

## 2020-06-08 PROCEDURE — 80048 BASIC METABOLIC PNL TOTAL CA: CPT

## 2020-06-08 PROCEDURE — 85025 COMPLETE CBC W/AUTO DIFF WBC: CPT

## 2020-06-08 PROCEDURE — 93621: ICD-10-PCS | Mod: 26,,, | Performed by: INTERNAL MEDICINE

## 2020-06-08 PROCEDURE — 99220 PR INITIAL OBSERVATION CARE,LEVL III: CPT | Mod: ,,, | Performed by: INTERNAL MEDICINE

## 2020-06-08 PROCEDURE — 93653 COMPRE EP EVAL TX SVT: CPT | Performed by: INTERNAL MEDICINE

## 2020-06-08 PROCEDURE — 93623 PR STIM/PACING HEART POST IV DRUG INFU: ICD-10-PCS | Mod: 26,,, | Performed by: INTERNAL MEDICINE

## 2020-06-08 PROCEDURE — 93613 INTRACARDIAC EPHYS 3D MAPG: CPT | Performed by: INTERNAL MEDICINE

## 2020-06-08 PROCEDURE — 93653 PR ELECTROPHYS EVAL, COMPREHEN, W/SUPRAVENT TACHYCARD TRMT: ICD-10-PCS | Mod: ,,, | Performed by: INTERNAL MEDICINE

## 2020-06-08 PROCEDURE — D9220A PRA ANESTHESIA: ICD-10-PCS | Mod: ANES,,, | Performed by: ANESTHESIOLOGY

## 2020-06-08 PROCEDURE — 93623 PRGRMD STIMJ&PACG IV RX NFS: CPT | Performed by: INTERNAL MEDICINE

## 2020-06-08 PROCEDURE — 63600175 PHARM REV CODE 636 W HCPCS: Performed by: INTERNAL MEDICINE

## 2020-06-08 PROCEDURE — 93621 COMP EP EVL L PAC&REC C SINS: CPT | Mod: 26,,, | Performed by: INTERNAL MEDICINE

## 2020-06-08 PROCEDURE — C1894 INTRO/SHEATH, NON-LASER: HCPCS | Performed by: INTERNAL MEDICINE

## 2020-06-08 PROCEDURE — 93613 PR INTRACARD ELECTROPHYS 3-DIMENS MAPPING: ICD-10-PCS | Mod: ,,, | Performed by: INTERNAL MEDICINE

## 2020-06-08 PROCEDURE — 93623 PRGRMD STIMJ&PACG IV RX NFS: CPT | Mod: 26,,, | Performed by: INTERNAL MEDICINE

## 2020-06-08 PROCEDURE — 93613 INTRACARDIAC EPHYS 3D MAPG: CPT | Mod: ,,, | Performed by: INTERNAL MEDICINE

## 2020-06-08 PROCEDURE — 93005 ELECTROCARDIOGRAM TRACING: CPT

## 2020-06-08 PROCEDURE — D9220A PRA ANESTHESIA: ICD-10-PCS | Mod: CRNA,,, | Performed by: NURSE ANESTHETIST, CERTIFIED REGISTERED

## 2020-06-08 PROCEDURE — 25000003 PHARM REV CODE 250: Performed by: INTERNAL MEDICINE

## 2020-06-08 PROCEDURE — 37000009 HC ANESTHESIA EA ADD 15 MINS: Performed by: INTERNAL MEDICINE

## 2020-06-08 PROCEDURE — D9220A PRA ANESTHESIA: Mod: ANES,,, | Performed by: ANESTHESIOLOGY

## 2020-06-08 PROCEDURE — 63600175 PHARM REV CODE 636 W HCPCS: Performed by: NURSE ANESTHETIST, CERTIFIED REGISTERED

## 2020-06-08 PROCEDURE — C1733 CATH, EP, OTHR THAN COOL-TIP: HCPCS | Performed by: INTERNAL MEDICINE

## 2020-06-08 PROCEDURE — 25000003 PHARM REV CODE 250: Performed by: NURSE ANESTHETIST, CERTIFIED REGISTERED

## 2020-06-08 PROCEDURE — 85730 THROMBOPLASTIN TIME PARTIAL: CPT

## 2020-06-08 PROCEDURE — D9220A PRA ANESTHESIA: Mod: CRNA,,, | Performed by: NURSE ANESTHETIST, CERTIFIED REGISTERED

## 2020-06-08 PROCEDURE — C1730 CATH, EP, 19 OR FEW ELECT: HCPCS | Performed by: INTERNAL MEDICINE

## 2020-06-08 PROCEDURE — 93653 COMPRE EP EVAL TX SVT: CPT | Mod: ,,, | Performed by: INTERNAL MEDICINE

## 2020-06-08 PROCEDURE — 93621 COMP EP EVL L PAC&REC C SINS: CPT | Performed by: INTERNAL MEDICINE

## 2020-06-08 PROCEDURE — 25000003 PHARM REV CODE 250: Performed by: STUDENT IN AN ORGANIZED HEALTH CARE EDUCATION/TRAINING PROGRAM

## 2020-06-08 PROCEDURE — 27201423 OPTIME MED/SURG SUP & DEVICES STERILE SUPPLY: Performed by: INTERNAL MEDICINE

## 2020-06-08 PROCEDURE — 27201037 HC PRESSURE MONITORING SET UP

## 2020-06-08 PROCEDURE — 99220 PR INITIAL OBSERVATION CARE,LEVL III: ICD-10-PCS | Mod: ,,, | Performed by: INTERNAL MEDICINE

## 2020-06-08 PROCEDURE — 37000008 HC ANESTHESIA 1ST 15 MINUTES: Performed by: INTERNAL MEDICINE

## 2020-06-08 RX ORDER — LIDOCAINE HYDROCHLORIDE 20 MG/ML
INJECTION, SOLUTION INFILTRATION; PERINEURAL
Status: DISCONTINUED | OUTPATIENT
Start: 2020-06-08 | End: 2020-06-08 | Stop reason: HOSPADM

## 2020-06-08 RX ORDER — KETAMINE HCL IN 0.9 % NACL 50 MG/5 ML
SYRINGE (ML) INTRAVENOUS
Status: DISCONTINUED | OUTPATIENT
Start: 2020-06-08 | End: 2020-06-08

## 2020-06-08 RX ORDER — ACETAMINOPHEN 325 MG/1
650 TABLET ORAL EVERY 4 HOURS PRN
Status: DISCONTINUED | OUTPATIENT
Start: 2020-06-08 | End: 2020-06-08 | Stop reason: HOSPADM

## 2020-06-08 RX ORDER — HEPARIN SODIUM 200 [USP'U]/100ML
INJECTION, SOLUTION INTRAVENOUS
Status: DISCONTINUED | OUTPATIENT
Start: 2020-06-08 | End: 2020-06-08 | Stop reason: HOSPADM

## 2020-06-08 RX ORDER — FENTANYL CITRATE 50 UG/ML
INJECTION, SOLUTION INTRAMUSCULAR; INTRAVENOUS
Status: DISCONTINUED | OUTPATIENT
Start: 2020-06-08 | End: 2020-06-08

## 2020-06-08 RX ORDER — SODIUM CHLORIDE 9 MG/ML
INJECTION, SOLUTION INTRAVENOUS CONTINUOUS PRN
Status: DISCONTINUED | OUTPATIENT
Start: 2020-06-08 | End: 2020-06-08

## 2020-06-08 RX ORDER — GLYCOPYRROLATE 0.2 MG/ML
INJECTION INTRAMUSCULAR; INTRAVENOUS
Status: DISCONTINUED | OUTPATIENT
Start: 2020-06-08 | End: 2020-06-08

## 2020-06-08 RX ORDER — LIDOCAINE HYDROCHLORIDE 20 MG/ML
INJECTION INTRAVENOUS
Status: DISCONTINUED | OUTPATIENT
Start: 2020-06-08 | End: 2020-06-08

## 2020-06-08 RX ORDER — ASPIRIN 81 MG/1
81 TABLET ORAL DAILY
Refills: 0 | COMMUNITY
Start: 2020-06-08 | End: 2020-10-22

## 2020-06-08 RX ORDER — PROPOFOL 10 MG/ML
VIAL (ML) INTRAVENOUS
Status: DISCONTINUED | OUTPATIENT
Start: 2020-06-08 | End: 2020-06-08

## 2020-06-08 RX ORDER — PROPOFOL 10 MG/ML
VIAL (ML) INTRAVENOUS CONTINUOUS PRN
Status: DISCONTINUED | OUTPATIENT
Start: 2020-06-08 | End: 2020-06-08

## 2020-06-08 RX ORDER — FENTANYL CITRATE 50 UG/ML
25 INJECTION, SOLUTION INTRAMUSCULAR; INTRAVENOUS EVERY 5 MIN PRN
Status: DISCONTINUED | OUTPATIENT
Start: 2020-06-08 | End: 2020-06-08 | Stop reason: HOSPADM

## 2020-06-08 RX ORDER — ONDANSETRON 2 MG/ML
4 INJECTION INTRAMUSCULAR; INTRAVENOUS DAILY PRN
Status: DISCONTINUED | OUTPATIENT
Start: 2020-06-08 | End: 2020-06-08 | Stop reason: HOSPADM

## 2020-06-08 RX ORDER — SODIUM CHLORIDE 0.9 % (FLUSH) 0.9 %
10 SYRINGE (ML) INJECTION
Status: DISCONTINUED | OUTPATIENT
Start: 2020-06-08 | End: 2020-06-08 | Stop reason: HOSPADM

## 2020-06-08 RX ADMIN — PROPOFOL 70 MG: 10 INJECTION, EMULSION INTRAVENOUS at 07:06

## 2020-06-08 RX ADMIN — GLYCOPYRROLATE 0.1 MG: 0.2 INJECTION, SOLUTION INTRAMUSCULAR; INTRAVENOUS at 07:06

## 2020-06-08 RX ADMIN — ISOPROTERENOL HYDROCHLORIDE 1 MCG/MIN: 0.2 INJECTION, SOLUTION INTRAMUSCULAR; INTRAVENOUS at 08:06

## 2020-06-08 RX ADMIN — SODIUM CHLORIDE: 0.9 INJECTION, SOLUTION INTRAVENOUS at 07:06

## 2020-06-08 RX ADMIN — PROPOFOL 200 MCG/KG/MIN: 10 INJECTION, EMULSION INTRAVENOUS at 07:06

## 2020-06-08 RX ADMIN — Medication 25 MG: at 08:06

## 2020-06-08 RX ADMIN — FENTANYL CITRATE 50 MCG: 50 INJECTION, SOLUTION INTRAMUSCULAR; INTRAVENOUS at 10:06

## 2020-06-08 RX ADMIN — ACETAMINOPHEN 650 MG: 325 TABLET ORAL at 11:06

## 2020-06-08 RX ADMIN — LIDOCAINE HYDROCHLORIDE 10 MG: 20 INJECTION, SOLUTION INTRAVENOUS at 07:06

## 2020-06-08 NOTE — NURSING TRANSFER
Nursing Transfer Note      6/8/2020     Transfer To: [facility]    Transfer via stretcher    Transfer with cardiac monitoring    Transported by geoffrey chu    Medicines sent: none    Chart send with patient: Yes    Notified: reported to jonah chu    Patient reassessed at: see epic (date, time)    Upon arrival to floor: to room no complaints no distress noted.

## 2020-06-08 NOTE — Clinical Note
Manual pressure applied to the left femoral vein sheath insertion site. Until hemostasis achieved; then applied gauze and tegaderms

## 2020-06-08 NOTE — H&P
"Electrophysiology Pre Procedure H&P    HPI:   Enio Earl is a 44 y.o. male with anxiety and SVT who [place] to [facility] [place] outpatient elective EPS +/- RFA for SVT. Last dose of metoprolol 20. No fever or chills. No chest pain or pressure. No syncope or presyncope.     20:  "44 y.o. M  panic attacks  20, developed palpitations radiating to neck. At ER, found in short-RP SVT that terminated with ADO 12.  There was some LH, HOWARD, but no syncope.  No recurrence since.  SVT: short RP, 187 bpm.  NSR: not preexcited  JW 2017 60-65% LVEF. neg ischemia."  Past Medical History:   Diagnosis Date    Anxiety     Supraventricular tachycardia         Social History     Socioeconomic History    Marital status:      Spouse name: Not on file    Number of children: Not on file    Years of education: Not on file    Highest education level: Not on file   Occupational History    Not on file   Social Needs    Financial resource strain: Not hard at all    Food insecurity:     Worry: Never true     Inability: Never true    Transportation needs:     Medical: No     Non-medical: No   Tobacco Use    Smoking status: Current Every Day Smoker     Packs/day: 0.50     Types: Cigarettes     Last attempt to quit: 2018     Years since quittin.8    Smokeless tobacco: Never Used   Substance and Sexual Activity    Alcohol use: Yes     Frequency: 2-3 times a week     Drinks per session: 1 or 2     Binge frequency: Less than monthly    Drug use: No    Sexual activity: Not on file   Lifestyle    Physical activity:     Days per week: 0 days     Minutes per session: 0 min    Stress: To some extent   Relationships    Social connections:     Talks on phone: More than three times a week     Gets together: Three times a week     Attends Druze service: More than 4 times per year     Active member of club or organization: No     Attends meetings of clubs or organizations: Never     Relationship status:  " "  Other Topics Concern    Not on file   Social History Narrative    Not on file        Family History   Problem Relation Age of Onset    Diabetes Mellitus Neg Hx         No current facility-administered medications for this encounter.         Constitutional: (-)fevers, (-)chills, (-)night sweats  HEENT: (-) headaches (-) lightheadedness (-) blurry vision  Cardiovascular: (-)chest pain (-)paroxysmal nocturnal dyspnea (-)orthopnea  Respiratory: (-)shortness of breath, (-)dyspnea on exertion (-)hemoptysis  Gastrointestinal: (-)abdominal pain (-)nausea (-)vomiting (-)tarry stools  Musculoskeletal: (-)arthralgias (-)limited range of motion  Neurologic: (-)parasthesias (-)mood disorder (-)anxiety  Endo: (-)polyuria (-)polydipsia (-)heat/cold intolerance  Skin: (-)rash     Vitals:    06/08/20 0555   TempSrc: Oral   Weight: 99.8 kg (220 lb)   Height: 5' 8" (1.727 m)     Physical Exam:   Gen: no acute distress, pleasant patient answering questions appropriately  HEENT: extra-ocular muscles intact, normocephalic-atraumatic  Neck: no jugular venous distension  CVS: regular rate and rhythm  CHEST: non labored respirations  ABD: Soft  EXT: No Edema  NEURO: awake, alert, and oriented   Skin: No rash     Review of Labs:   Lab Results   Component Value Date    INR 0.9 06/08/2020     Lab Results   Component Value Date    WBC 9.07 06/08/2020    HGB 16.1 06/08/2020    HCT 47.2 06/08/2020     (H) 06/08/2020     06/08/2020     CMP  Sodium   Date Value Ref Range Status   01/29/2020 138 136 - 145 mmol/L Final     Potassium   Date Value Ref Range Status   01/29/2020 3.3 (L) 3.5 - 5.1 mmol/L Final     Chloride   Date Value Ref Range Status   01/29/2020 102 95 - 110 mmol/L Final     CO2   Date Value Ref Range Status   01/29/2020 23 23 - 29 mmol/L Final     Glucose   Date Value Ref Range Status   01/29/2020 127 (H) 70 - 110 mg/dL Final     BUN, Bld   Date Value Ref Range Status   01/29/2020 10 6 - 20 mg/dL Final "     Creatinine   Date Value Ref Range Status   01/29/2020 1.0 0.5 - 1.4 mg/dL Final     Calcium   Date Value Ref Range Status   01/29/2020 9.4 8.7 - 10.5 mg/dL Final     Total Protein   Date Value Ref Range Status   01/29/2020 8.0 6.0 - 8.4 g/dL Final     Albumin   Date Value Ref Range Status   01/29/2020 4.5 3.5 - 5.2 g/dL Final     Total Bilirubin   Date Value Ref Range Status   01/29/2020 0.7 0.1 - 1.0 mg/dL Final     Comment:     For infants and newborns, interpretation of results should be based  on gestational age, weight and in agreement with clinical  observations.  Premature Infant recommended reference ranges:  Up to 24 hours.............<8.0 mg/dL  Up to 48 hours............<12.0 mg/dL  3-5 days..................<15.0 mg/dL  6-29 days.................<15.0 mg/dL       Alkaline Phosphatase   Date Value Ref Range Status   01/29/2020 50 (L) 55 - 135 U/L Final     AST   Date Value Ref Range Status   01/29/2020 31 10 - 40 U/L Final     Comment:     Specimen slightly hemolyzed     ALT   Date Value Ref Range Status   01/29/2020 43 10 - 44 U/L Final     Anion Gap   Date Value Ref Range Status   01/29/2020 13 8 - 16 mmol/L Final     eGFR if    Date Value Ref Range Status   01/29/2020 >60 >60 mL/min/1.73 m^2 Final     eGFR if non    Date Value Ref Range Status   01/29/2020 >60 >60 mL/min/1.73 m^2 Final     Comment:     Calculation used to obtain the estimated glomerular filtration  rate (eGFR) is the CKD-EPI equation.        Most recent ECG  SR    Assessment/Plan:  Enio Earl is a 44 y.o. male with anxiety and SVT who presents to [facility] for outpatient elective EPS +/- RFA for SVT.   We discussed the alternatives, benefits and risks of the procedure including pain, infection, bleeding, injury to veins and arteries including aneurysms and fistulas, injury to heart valves, puncture of the heart leading to pericardial effusion or tamponade requiring tube drainage, heart attack, stroke  and death.  Consent signed.

## 2020-06-08 NOTE — PLAN OF CARE
Patient discharged per MD orders. Instructions given on medications, wound care, activity, signs of infection, when to call MD, and follow up appointments. Pt verbalized understanding.  Patient AAOx4, VSS, no c/o pain or discomfort at this time. Bilateral groins with gauze/transparent dressings c/d/i. No active bleeding. No hematoma noted. Telemetry and PIV removed. Patient declined escort with wheelchair and ambulated off unit accompanied by his wife.

## 2020-06-08 NOTE — ANESTHESIA POSTPROCEDURE EVALUATION
Anesthesia Post Evaluation    Patient: Enio Earl    Procedure(s) Performed: Procedure(s) (LRB):  Ablation, SVT, Accessory Pathway (N/A)    Final Anesthesia Type: general    Patient location during evaluation: PACU  Patient participation: Yes- Able to Participate  Level of consciousness: awake and alert  Post-procedure vital signs: reviewed and stable  Pain management: adequate  Airway patency: patent    PONV status at discharge: No PONV  Anesthetic complications: no      Cardiovascular status: hemodynamically stable  Respiratory status: spontaneous ventilation  Follow-up not needed.          Vitals Value Taken Time   /60 6/8/2020 11:32 AM   Temp 36.9 °C (98.4 °F) 6/8/2020 11:15 AM   Pulse 69 6/8/2020 11:42 AM   Resp 17 6/8/2020 11:42 AM   SpO2 95 % 6/8/2020 11:42 AM   Vitals shown include unvalidated device data.      Event Time     Out of Recovery 11:41:00          Pain/Nacho Score: Pain Rating Prior to Med Admin: 3 (6/8/2020 11:25 AM)  Pain Rating Post Med Admin: 3 (6/8/2020 11:30 AM)  Nacho Score: 10 (6/8/2020 11:30 AM)

## 2020-06-08 NOTE — Clinical Note
Manual pressure applied to the right femoral vein sheath insertion site. Until hemostasis achieved; then applied gauze and tegaderms

## 2020-06-08 NOTE — ANESTHESIA PREPROCEDURE EVALUATION
2020  Enio Earl is a 44 y.o., male.  Pre-operative evaluation for Procedure(s) (LRB):  Ablation, SVT, Accessory Pathway (N/A)    Enio Earl is a 44 y.o. male     Patient Active Problem List   Diagnosis    PSVT (paroxysmal supraventricular tachycardia)    Essential hypertension       Review of patient's allergies indicates:  No Known Allergies    No current facility-administered medications on file prior to encounter.      No current outpatient medications on file prior to encounter.       Past Surgical History:   Procedure Laterality Date    NOSE SURGERY         Social History     Socioeconomic History    Marital status:      Spouse name: Not on file    Number of children: Not on file    Years of education: Not on file    Highest education level: Not on file   Occupational History    Not on file   Social Needs    Financial resource strain: Not hard at all    Food insecurity:     Worry: Never true     Inability: Never true    Transportation needs:     Medical: No     Non-medical: No   Tobacco Use    Smoking status: Former Smoker     Packs/day: 1.00     Last attempt to quit: 2018     Years since quittin.8    Smokeless tobacco: Never Used   Substance and Sexual Activity    Alcohol use: Yes     Frequency: 2-3 times a week     Drinks per session: 1 or 2     Binge frequency: Less than monthly    Drug use: No    Sexual activity: Not on file   Lifestyle    Physical activity:     Days per week: 0 days     Minutes per session: 0 min    Stress: To some extent   Relationships    Social connections:     Talks on phone: More than three times a week     Gets together: Three times a week     Attends Yazidi service: More than 4 times per year     Active member of club or organization: No     Attends meetings of clubs or organizations: Never     Relationship status:     Other Topics Concern    Not on file   Social History Narrative    Not on file         CBC: No results for input(s): WBC, RBC, HGB, HCT, PLT, MCV, MCH, MCHC in the last 72 hours.    CMP: No results for input(s): NA, K, CL, CO2, BUN, CREATININE, GLU, MG, PHOS, CALCIUM, ALBUMIN, PROT, ALKPHOS, ALT, AST, BILITOT in the last 72 hours.    INR  No results for input(s): PT, INR, PROTIME, APTT in the last 72 hours.        Diagnostic Studies:      EKD Echo:  TTE 3/3/2020:  · Normal left ventricular systolic function. The estimated ejection fraction is 55-60%.  · Normal LV diastolic function.  · The estimated PA systolic pressure is 19 mmHg.  · No wall motion abnormalities.  · Normal right ventricular systolic function.  · Normal central venous pressure (3 mmHg).    Anesthesia Evaluation    I have reviewed the Patient Summary Reports.    I have reviewed the Nursing Notes. I have reviewed the NPO Status.   I have reviewed the Medications.     Review of Systems  Anesthesia Hx:  No problems with previous Anesthesia  Denies Family Hx of Anesthesia complications.   Denies Personal Hx of Anesthesia complications.   Cardiovascular:   Hypertension Dysrhythmias    Pulmonary:  Pulmonary Normal    Renal/:  Renal/ Normal     Hepatic/GI:  Hepatic/GI Normal    Neurological:  Neurology Normal    Endocrine:  Endocrine Normal        Physical Exam  General:  Well nourished    Airway/Jaw/Neck:  Airway Findings: Mouth Opening: Normal Tongue: Normal  General Airway Assessment: Adult, Good  Mallampati: II  TM Distance: Normal, at least 6 cm  Jaw/Neck Findings:  Neck ROM: Normal ROM       Chest/Lungs:  Chest/Lungs Findings: Clear to auscultation     Heart/Vascular:  Heart Findings: Rate: Normal  Rhythm: Regular Rhythm        Mental Status:  Mental Status Findings:  Alert and Oriented, Cooperative         Anesthesia Plan  Type of Anesthesia, risks & benefits discussed:  Anesthesia Type:  general, MAC  Patient's Preference:    Intra-op Monitoring Plan: standard ASA monitors  Intra-op Monitoring Plan Comments:   Post Op Pain Control Plan: per primary service following discharge from PACU  Post Op Pain Control Plan Comments:   Induction:   IV  Beta Blocker:         Informed Consent: Patient understands risks and agrees with Anesthesia plan.  Questions answered. Anesthesia consent signed with patient.  ASA Score: 2     Day of Surgery Review of History & Physical:    H&P update referred to the surgeon.         Ready For Surgery From Anesthesia Perspective.

## 2020-06-08 NOTE — BRIEF OP NOTE
: Sloan Lim MD    Post-operative Diagnosis: AVNRT    Procedure Performed: RFA to the region of the slow pathway for treatment of AVNRT.     Description of Procedure: The patient was brought to the EP lab in the fasting state. Prepped and draped in sterile fashion. Safety timeout was performed. Sedation administered by anesthesia staff. Ultrasound guided venous access of the bilateral femoral veins was performed. HIS, and RV catheters placed via left femoral vein access. CS and HRA via right femoral vein access. Diagnostic EP study confirmed dual av nalini physiology. HRA exchanged for ablation catheter via SR-0 sheath. RFA to the slow pathway for treatment of AVNRT. Non inducible at end of study.     EBL: <10 mL    Specimens Removed: None  Complications: no immediate

## 2020-06-08 NOTE — PLAN OF CARE
Post procedure protocol completed. Pt ambulated in hallway without difficulty. Bilateral groins with gauze/tranparent dressings c/d/i. No active bleeding. No hematoma noted. Nurse call bell within reach. Pt denies any complaints at this time. Will continue to monitor

## 2020-06-08 NOTE — PROGRESS NOTES
Patient admitted to recovery see [facility] for complete assessment pacu bcg's maintained safety measures verified patient waking from sedation at this time ekg done and placed in patient's chart.called patient's wife and updated on patient location.

## 2020-06-08 NOTE — TRANSFER OF CARE
"Anesthesia Transfer of Care Note    Patient: Enio Earl    Procedure(s) Performed: Procedure(s) (LRB):  Ablation, SVT, Accessory Pathway (N/A)    Patient location: PACU    Anesthesia Type: general    Transport from OR: Transported from OR on 6-10 L/min O2 by face mask with adequate spontaneous ventilation    Post pain: adequate analgesia    Post assessment: no apparent anesthetic complications and tolerated procedure well    Post vital signs: stable    Level of consciousness: awake and responds to stimulation    Nausea/Vomiting: no nausea/vomiting    Complications: none    Transfer of care protocol was followed      Last vitals:   Visit Vitals  /75 (BP Location: Left arm, Patient Position: Lying)   Pulse 73   Temp (P) 37 °C (98.6 °F) (Temporal)   Resp (P) 18   Ht 5' 8" (1.727 m)   Wt 99.8 kg (220 lb)   SpO2 97%   BMI 33.45 kg/m²     "

## 2020-06-08 NOTE — DISCHARGE SUMMARY
[facility]  Cardiac Electrophysiology  Discharge Summary      Patient Name: Enio Earl  MRN: [id number]  Admission Date: 6/8/2020  Hospital Length of Stay: 0 days  Discharge Date and Time: 6/8/2020  2:45 PM  Attending Physician: Sloan Lim MD   Discharging Provider: Sergio Felton MD  Primary Care Physician: Troy Holt MD    HPI:   Enio Earl is a 44 y.o. male with anxiety and SVT who presented to [facility] for outpatient elective EPS +/- RFA for SVT.  Procedure(s) (LRB):  Ablation, SVT, Accessory Pathway (N/A)     Indwelling Lines/Drains at time of discharge:  Lines/Drains/Airways     None                 Hospital Course:  Slow pathway ablation for AVNRT.     Significant Diagnostic Studies: Labs:   BMP:   Recent Labs   Lab 06/08/20  0538   *      K 3.8      CO2 24   BUN 9   CREATININE 0.8   CALCIUM 9.0   , CMP   Recent Labs   Lab 06/08/20  0538      K 3.8      CO2 24   *   BUN 9   CREATININE 0.8   CALCIUM 9.0   ANIONGAP 8   ESTGFRAFRICA >60.0   EGFRNONAA >60.0   , CBC   Recent Labs   Lab 06/08/20  0538   WBC 9.07   HGB 16.1   HCT 47.2       and INR   Lab Results   Component Value Date    INR 0.9 06/08/2020       Pending Diagnostic Studies:     Procedure Component Value Units Date/Time    EKG 12-lead [[id number]]     Order Status:  Sent Lab Status:  No result           Final Active Diagnoses:    Diagnosis Date Noted POA    SVT (supraventricular tachycardia) [I47.1] 06/08/2020 Yes      Problems Resolved During this Admission:     No new Assessment & Plan notes have been filed under this hospital service since the last note was generated.  Service: Arrhythmia      Discharged Condition: good    Disposition: Home or Self Care    Follow Up:  Follow-up Information     Sloan Lim MD In 4 months.    Specialties:  Electrophysiology, Cardiology  Contact information:  [facility]  [facility] [id number] [phone]                  Patient Instructions:   No discharge procedures on file.  Medications:  Reconciled Home Medications:      Medication List      CONTINUE taking these medications    metoprolol succinate 25 MG 24 hr tablet  Commonly known as:  TOPROL-XL  Take 1 tablet (25 mg total) by mouth once daily.            Time spent on the discharge of patient: 30 minutes    Sergio Felton MD  Cardiac Electrophysiology  [facility]

## 2020-06-08 NOTE — PLAN OF CARE
Received report from LEVI Garcia. Patient s/p   RFA, AAOx4. VSS, no c/o pain or discomfort at this time, resp even and unlabored. bilateral groins gauze/tegaderm dressings are CDI. Pulses 2+.  No active bleeding. No hematoma noted. Post procedure protocol reviewed with patient. Understanding verbalized. Nurse call bell within reach. Will continue to monitor per post procedure protocol.

## 2020-06-11 ENCOUNTER — PATIENT OUTREACH (OUTPATIENT)
Dept: ADMINISTRATIVE | Facility: OTHER | Age: 45
End: 2020-06-11

## 2020-06-17 ENCOUNTER — PATIENT OUTREACH (OUTPATIENT)
Dept: ADMINISTRATIVE | Facility: OTHER | Age: 45
End: 2020-06-17

## 2020-06-18 ENCOUNTER — OFFICE VISIT (OUTPATIENT)
Dept: PODIATRY | Facility: CLINIC | Age: 45
End: 2020-06-18
Attending: INTERNAL MEDICINE
Payer: COMMERCIAL

## 2020-06-18 VITALS
BODY MASS INDEX: 33.45 KG/M2 | HEIGHT: 68 IN | DIASTOLIC BLOOD PRESSURE: 97 MMHG | HEART RATE: 85 BPM | SYSTOLIC BLOOD PRESSURE: 143 MMHG

## 2020-06-18 DIAGNOSIS — B35.3 TINEA PEDIS OF BOTH FEET: Primary | ICD-10-CM

## 2020-06-18 DIAGNOSIS — R23.4 FISSURE IN SKIN OF FOOT: ICD-10-CM

## 2020-06-18 PROCEDURE — 3077F SYST BP >= 140 MM HG: CPT | Mod: CPTII,S$GLB,, | Performed by: PODIATRIST

## 2020-06-18 PROCEDURE — 3080F DIAST BP >= 90 MM HG: CPT | Mod: CPTII,S$GLB,, | Performed by: PODIATRIST

## 2020-06-18 PROCEDURE — 99999 PR PBB SHADOW E&M-EST. PATIENT-LVL III: ICD-10-PCS | Mod: PBBFAC,,, | Performed by: PODIATRIST

## 2020-06-18 PROCEDURE — 3008F BODY MASS INDEX DOCD: CPT | Mod: CPTII,S$GLB,, | Performed by: PODIATRIST

## 2020-06-18 PROCEDURE — 99203 PR OFFICE/OUTPT VISIT, NEW, LEVL III, 30-44 MIN: ICD-10-PCS | Mod: S$GLB,,, | Performed by: PODIATRIST

## 2020-06-18 PROCEDURE — 3080F PR MOST RECENT DIASTOLIC BLOOD PRESSURE >= 90 MM HG: ICD-10-PCS | Mod: CPTII,S$GLB,, | Performed by: PODIATRIST

## 2020-06-18 PROCEDURE — 3077F PR MOST RECENT SYSTOLIC BLOOD PRESSURE >= 140 MM HG: ICD-10-PCS | Mod: CPTII,S$GLB,, | Performed by: PODIATRIST

## 2020-06-18 PROCEDURE — 3008F PR BODY MASS INDEX (BMI) DOCUMENTED: ICD-10-PCS | Mod: CPTII,S$GLB,, | Performed by: PODIATRIST

## 2020-06-18 PROCEDURE — 99203 OFFICE O/P NEW LOW 30 MIN: CPT | Mod: S$GLB,,, | Performed by: PODIATRIST

## 2020-06-18 PROCEDURE — 99999 PR PBB SHADOW E&M-EST. PATIENT-LVL III: CPT | Mod: PBBFAC,,, | Performed by: PODIATRIST

## 2020-06-18 NOTE — LETTER
June 18, 2020      Troy Holt MD  [address]  [place] [address]           [place] [facility]  [address]  [place] [address]  Phone: [phone]  Fax: [phone]          Patient: Enio Earl   MR Number: [id number]   YOB: 1975   Date of Visit: 6/18/2020       Dear Dr. Troy Holt:    Thank you for referring Enio Earl to me for evaluation. Attached you will find relevant portions of my assessment and plan of care.    If you have questions, please do not hesitate to call me. I look forward to following Enio Earl along with you.    Sincerely,    Cooper Jon, DPNICOLE    Enclosure  CC:  No Recipients    If you would like to receive this communication electronically, please contact [email or web address] or [phone] to request more information on [facility] Link access.    For providers and/or their staff who would like to refer a patient to [facility], please contact us through our one-stop-shop provider referral line, [facility], at [phone].    If you feel you have received this communication in error or would no longer like to receive these types of communications, please e-mail [email or web address]

## 2020-06-18 NOTE — PROGRESS NOTES
"Subjective:      Patient ID: Enio Earl is a 44 y.o. male.    Chief Complaint: Foot Problem (cracks on bottom of feet )    Complains of dry cracked skin on bottoms of both feet specially the heels for the past couple years.  Pain waxes and wanes and there is intermittent itching.  He does wear flip-flops this is a gets home.  He notes that his feet sweat a lot as well.  He did use a topical over-the-counter antifungal spray which did not help.    Vitals:    20 1414   BP: (!) 143/97   Pulse: 85   Height: 5' 8" (1.727 m)   PainSc:   7      Past Medical History:   Diagnosis Date    Anxiety     Supraventricular tachycardia        Past Surgical History:   Procedure Laterality Date    NOSE SURGERY         Family History   Problem Relation Age of Onset    Diabetes Mellitus Neg Hx        Social History     Socioeconomic History    Marital status:      Spouse name: Not on file    Number of children: Not on file    Years of education: Not on file    Highest education level: Not on file   Occupational History    Not on file   Social Needs    Financial resource strain: Not hard at all    Food insecurity     Worry: Never true     Inability: Never true    Transportation needs     Medical: No     Non-medical: No   Tobacco Use    Smoking status: Current Every Day Smoker     Packs/day: 0.50     Types: Cigarettes     Last attempt to quit: 2018     Years since quittin.9    Smokeless tobacco: Never Used   Substance and Sexual Activity    Alcohol use: Yes     Frequency: 2-3 times a week     Drinks per session: 1 or 2     Binge frequency: Less than monthly    Drug use: No    Sexual activity: Not on file   Lifestyle    Physical activity     Days per week: 0 days     Minutes per session: 0 min    Stress: To some extent   Relationships    Social connections     Talks on phone: More than three times a week     Gets together: Three times a week     Attends Christianity service: More than 4 times " per year     Active member of club or organization: No     Attends meetings of clubs or organizations: Never     Relationship status:    Other Topics Concern    Not on file   Social History Narrative    Not on file       Current Outpatient Medications   Medication Sig Dispense Refill    aspirin (ECOTRIN) 81 MG EC tablet Take 1 tablet (81 mg total) by mouth once daily.  0     No current facility-administered medications for this visit.        Review of patient's allergies indicates:  No Known Allergies      Review of Systems   Constitution: Negative for chills, fever and malaise/fatigue.   HENT: Negative for congestion and hearing loss.    Cardiovascular: Negative for chest pain, claudication and leg swelling.   Respiratory: Negative for cough and shortness of breath.    Skin: Positive for color change and itching.   Musculoskeletal: Negative for back pain, joint pain, muscle cramps and muscle weakness.   Gastrointestinal: Negative for nausea and vomiting.   Neurological: Negative for numbness, paresthesias and weakness.   Psychiatric/Behavioral: Negative for altered mental status.           Objective:      Physical Exam  Constitutional:       Appearance: Normal appearance.   Cardiovascular:      Pulses:           Dorsalis pedis pulses are 2+ on the right side and 2+ on the left side.        Posterior tibial pulses are 2+ on the right side and 2+ on the left side.   Musculoskeletal:      Comments: No pain with ROM or MMT bilateral lower extremity.    Rectus appearing foot type bilateral.   Skin:     General: Skin is warm and dry.      Capillary Refill: Capillary refill takes less than 2 seconds.      Findings: No ecchymosis or erythema.      Comments: Raised hyperkeratotic skin with fissuring to the plantar heel bilateral, plantar 1 MTP left foot.  Note papular rash plantar medial right arch.  Also note scaling along the medial longitudinal arch bilateral foot.  Referred to attached media picture.    Neurological:      Mental Status: He is alert.                       Assessment:       Encounter Diagnoses   Name Primary?    Tinea pedis of both feet Yes    Fissure in skin of foot          Plan:       Enio was seen today for foot problem.    Diagnoses and all orders for this visit:    Tinea pedis of both feet    Fissure in skin of foot      I counseled the patient on his conditions, their implications and medical management.    Discussed proper foot hygiene in detail the patient.  We also discussed activity and behavior modifications in order to avoid barefoot walking and flip-flops.  Recommend wearing socks at all times.    Recommended compound id gel with antifungal and antibacterial combination to help treat the tinea pedis applied 1-2 times per day.    RTC 6 weeks or p.r.n. as discussed.    .

## 2020-06-22 ENCOUNTER — OFFICE VISIT (OUTPATIENT)
Dept: INTERNAL MEDICINE | Facility: CLINIC | Age: 45
End: 2020-06-22
Payer: COMMERCIAL

## 2020-06-22 VITALS
TEMPERATURE: 97 F | BODY MASS INDEX: 34.52 KG/M2 | DIASTOLIC BLOOD PRESSURE: 80 MMHG | SYSTOLIC BLOOD PRESSURE: 130 MMHG | WEIGHT: 227.75 LBS | HEART RATE: 90 BPM | OXYGEN SATURATION: 98 % | HEIGHT: 68 IN

## 2020-06-22 DIAGNOSIS — I10 ESSENTIAL HYPERTENSION: ICD-10-CM

## 2020-06-22 DIAGNOSIS — M77.12 LATERAL EPICONDYLITIS OF LEFT ELBOW: ICD-10-CM

## 2020-06-22 DIAGNOSIS — I47.10 PSVT (PAROXYSMAL SUPRAVENTRICULAR TACHYCARDIA): Primary | ICD-10-CM

## 2020-06-22 PROCEDURE — 99999 PR PBB SHADOW E&M-EST. PATIENT-LVL IV: ICD-10-PCS | Mod: PBBFAC,,, | Performed by: INTERNAL MEDICINE

## 2020-06-22 PROCEDURE — 99214 PR OFFICE/OUTPT VISIT, EST, LEVL IV, 30-39 MIN: ICD-10-PCS | Mod: S$GLB,,, | Performed by: INTERNAL MEDICINE

## 2020-06-22 PROCEDURE — 3075F SYST BP GE 130 - 139MM HG: CPT | Mod: CPTII,S$GLB,, | Performed by: INTERNAL MEDICINE

## 2020-06-22 PROCEDURE — 3079F PR MOST RECENT DIASTOLIC BLOOD PRESSURE 80-89 MM HG: ICD-10-PCS | Mod: CPTII,S$GLB,, | Performed by: INTERNAL MEDICINE

## 2020-06-22 PROCEDURE — 99999 PR PBB SHADOW E&M-EST. PATIENT-LVL IV: CPT | Mod: PBBFAC,,, | Performed by: INTERNAL MEDICINE

## 2020-06-22 PROCEDURE — 3079F DIAST BP 80-89 MM HG: CPT | Mod: CPTII,S$GLB,, | Performed by: INTERNAL MEDICINE

## 2020-06-22 PROCEDURE — 3075F PR MOST RECENT SYSTOLIC BLOOD PRESS GE 130-139MM HG: ICD-10-PCS | Mod: CPTII,S$GLB,, | Performed by: INTERNAL MEDICINE

## 2020-06-22 PROCEDURE — 3008F PR BODY MASS INDEX (BMI) DOCUMENTED: ICD-10-PCS | Mod: CPTII,S$GLB,, | Performed by: INTERNAL MEDICINE

## 2020-06-22 PROCEDURE — 99214 OFFICE O/P EST MOD 30 MIN: CPT | Mod: S$GLB,,, | Performed by: INTERNAL MEDICINE

## 2020-06-22 PROCEDURE — 3008F BODY MASS INDEX DOCD: CPT | Mod: CPTII,S$GLB,, | Performed by: INTERNAL MEDICINE

## 2020-06-22 RX ORDER — METOPROLOL SUCCINATE 25 MG/1
25 TABLET, EXTENDED RELEASE ORAL DAILY
Qty: 90 TABLET | Refills: 0 | Status: SHIPPED | OUTPATIENT
Start: 2020-06-22 | End: 2020-09-14 | Stop reason: SDUPTHER

## 2020-06-22 NOTE — PROGRESS NOTES
Portions of this note are generated with voice recognition software. Typographical errors may exist.     SUBJECTIVE:    This is a/an 44 y.o. male here for primary care visit for  Chief Complaint   Patient presents with    Hypertension     3 month      Patient states he was in his usual state of health after recent ablation until Saturday morning when he started to feel racing heart at rest.  Lasted for a few hours and then seemed to resolve and then recurred again later in the day and then spontaneously resolved.  During this time he felt orthostatic like he was going to lose his balance and fall over.  He did not lose consciousness.  The only thing that was different was he had at the average with some caffeine.  He only had a few oz but once the up palpitations started he stopped drinking the beverage.  No alcohol utilization.  Not associated with strenuous activity.    Sunday he did not have recurrence of the palpitations but comes today wondering what to do next.  He has not continued on metoprolol and no longer has access to the medicine.  He threw it away.    Patient states that he has had problems with left epicondylitis going on for more than a month.  He does some repetitive activities and lifting at work and does not know what he is doing to continue to re-injure the elbow.      Medications Reviewed and Updated    Past medical, family, and social histories were reviewed and updated.    Review of Systems negative unless otherwise noted in history of present illness-  ROS    General ROS: negative  Psychological ROS: negative  ENT ROS: negative  Endocrine ROS: Negative  Allergy and Immunology ROS: negative  Cardiovascular ROS: negative  Pulmonary ROS: Negative  Gastrointestinal ROS: negative  Genito-Urinary ROS: negative  Musculoskeletal ROS: negative  Neurological ROS: negative  Dermatological ROS: negative        Allergic:  Review of patient's allergies indicates:  No Known Allergies    OBJECTIVE:  BP:  130/80 Pulse: 90 Temp: 97.3 °F (36.3 °C)  Wt Readings from Last 3 Encounters:   06/22/20 103.3 kg (227 lb 11.8 oz)   06/08/20 99.8 kg (220 lb)   03/19/20 106.5 kg (234 lb 12.6 oz)    Body mass index is 34.63 kg/m².  Previous Blood Pressure Readings :   BP Readings from Last 3 Encounters:   06/22/20 130/80   06/18/20 (!) 143/97   06/08/20 108/64       Physical Exam    GEN: No apparent distress  HEENT: sclera non-icteric, conjunctiva clear  CV: no peripheral edema regular rate and rhythm.  No murmurs.  PULM: breathing non-labored  ABD: Obese, protuberant abdomen.  PSYCH: appropriate affect  MSK: able to rise from chair without assistance  SKIN: normal skin turgor    Pertinent Labs Reviewed       ASSESSMENT/PLAN:    PSVT (paroxysmal supraventricular tachycardia).Condition not optimally controlled. Detailed counseling on self care measures. Plan to monitor clinically in addition to plan below or as listed on After Visit Summary.   -     metoprolol succinate (TOPROL-XL) 25 MG 24 hr tablet; Take 1 tablet (25 mg total) by mouth once daily.  Dispense: 90 tablet; Refill: 0  - .patient advised if symptoms change or intensify to seek care in the nearest [facility] center     Essential hypertension.Condition stable.  Counseling given today on self-care measures. Plan to monitor clinically. Continue current medical plan.   -     metoprolol succinate (TOPROL-XL) 25 MG 24 hr tablet; Take 1 tablet (25 mg total) by mouth once daily.  Dispense: 90 tablet; Refill: 0    Lateral epicondylitis of left elbow.Condition not optimally controlled. Detailed counseling on self care measures. Plan to monitor clinically in addition to plan below or as listed on After Visit Summary.   -     Ambulatory referral/consult to Physical/Occupational Therapy; Future; Expected date: 06/29/2020          Future Appointments   Date Time Provider Department Center   7/6/2020  1:30 PM Rajani Sotelo OT [facility] [place]   8/3/2020  2:45 PM  Cooper Jon DPM [facility] CIND[facility] [place]   9/3/2020  3:00 PM Bhupinder Bowen DO [facility]   10/22/2020  8:20 AM Sloan Lim MD [facility] NAILA [place]       Troy Holt  6/25/2020  3:08 PM    Answers for HPI/ROS submitted by the patient on 6/15/2020   activity change: No  unexpected weight change: No  rhinorrhea: No  trouble swallowing: No  visual disturbance: No  chest tightness: No  polyuria: No  difficulty urinating: No  joint swelling: No  arthralgias: No  confusion: No  dysphoric mood: No

## 2020-06-23 ENCOUNTER — TELEPHONE (OUTPATIENT)
Dept: CARDIOLOGY | Facility: HOSPITAL | Age: 45
End: 2020-06-23

## 2020-06-23 ENCOUNTER — TELEPHONE (OUTPATIENT)
Dept: ELECTROPHYSIOLOGY | Facility: CLINIC | Age: 45
End: 2020-06-23

## 2020-06-23 DIAGNOSIS — I47.10 SVT (SUPRAVENTRICULAR TACHYCARDIA): Primary | ICD-10-CM

## 2020-06-23 NOTE — TELEPHONE ENCOUNTER
Called and spoke with Mr Earl. Episode of palpitations described as racing, fast heart beat on Saturday 6/20/20. Had increased caffeine intake prior to onset. Spontaneous resolution upon walking outside to get fresh air.     No objective vital signs during episode.     Continue metoprolol.   Order event monitor.   Follow up in clinic.

## 2020-07-06 ENCOUNTER — CLINICAL SUPPORT (OUTPATIENT)
Dept: REHABILITATION | Facility: HOSPITAL | Age: 45
End: 2020-07-06
Attending: INTERNAL MEDICINE
Payer: COMMERCIAL

## 2020-07-06 DIAGNOSIS — M25.522 LEFT ELBOW PAIN: ICD-10-CM

## 2020-07-06 DIAGNOSIS — M77.12 LATERAL EPICONDYLITIS OF LEFT ELBOW: ICD-10-CM

## 2020-07-06 PROCEDURE — 97165 OT EVAL LOW COMPLEX 30 MIN: CPT | Mod: PN

## 2020-07-06 NOTE — PATIENT INSTRUCTIONS
[facility] [place]  [phone]    Lateral Epicondylitis Conservative Management  Joint Protection:   Use larger joints and muscles when possible (resting objects in crook of elbow)   Stop activities before the point of discomfort   Avoid activities that put strain on painful or stiff joints   Avoid a tight or prolonged grasp on objects   Avoid any lifting or gripping with elbow in extension   If you must lift, lift with elbows bent at side, object close to you, and hands turned palm up. elbow extension  Balance Rest and Activity:   Take frequent, short breaks to stretch   Avoid staying in one position for a long time   Alternate heavy and light activities   Eliminate unnecessary activities  Reduce the effort:   Avoid excessive loads. Dont be afraid to ask for help. Use carts and tabletops to make tasks easier.   Keep items nearby (such as keyboard)  Helpful Tips: slide objects; use your palms instead of fingers, keeps heavy items close to your body, use larger handles, pens and pencils, look for lightweight options, use wheels or carts to roll objects            [facility]  OCCUPATIONAL THERAPY  HOME EXERCISE PROGRAM         Therapist: ZULEIMA Stubbs

## 2020-07-06 NOTE — PLAN OF CARE
[facility] Occupational Therapy  Initial Evaluation     Date: 7/6/2020  Name: Enio Earl  Clinic Number: [id number]    Therapy Diagnosis:   Encounter Diagnoses   Name Primary?    Lateral epicondylitis of left elbow     Left elbow pain      Physician: Troy Holt*    Physician Orders: eval and treat  Medical Diagnosis: Lateral epicondylitis of left elbow [M77.12]  Surgical Procedure and Date: n/a/ Date of Injury/Onset: ~1 month  Evaluation Date: 7/6/2020  Insurance Authorization Period Expiration: 12/31/2020  Plan of Care Certification Period: 8/21/2020  Date of Return to MD:  Not scheduled    Visit # / Visits authorized: 1 / 1    FOTO: initial eval    Time In: 1:30 pm  Time Out: 2:15 pm  Total treatment time: 45 minutes  Total Timed [id number] minutes    Precautions:  Standard    Subjective     Involved Side: left  Dominant Side: Right  Date of Onset: ~1 month  Mechanism of Injury: insidious onset  History of Current Condition: Pt presents today with deficits due to L elbow lateral epicondylitis.   Imaging: no imaging available of elbow  Previous Therapy: pt had therapy in the past on R elbow for fx    Past Medical History/Physical Systems Review:   Enio Earl  has a past medical history of Anxiety and Supraventricular tachycardia.    Enio Earl  has a past surgical history that includes Nose surgery.    Enio has a current medication list which includes the following prescription(s): aspirin and metoprolol succinate.    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      Current FunctionalStatus   Home/Living environment : lives with their family      Limitation of Functional Status as follows:   ADLs/IADLs:     - Feeding: I    - Bathing: I    - Dressing/Grooming: I    - Driving: occasional pain    -pain with gripping and lifting at work, opening jars or bottles     Leisure: does not report limitation        Objective     Observation/Appearance: tenderness noted upon palpation at L lateral epicondyle    Edema. None noted      Elbow and Wrist ROM. Measured in degrees.   7/6/2020 7/6/2020    Left Right   Elbow Ext/Flex WNL pain at end ranges WNL   Supination/Pronation WNL, pain with sup WNL   Wrist Ext/Flex WNL WNL   Wrist RD/UD WNL WNL        Strength (Dynamometer) and Pinch Strength (Pinch Gauge)  Measured in pounds.   7/6/2020 7/6/2020    Left Right   Rung II, elbow flexed 120 *pain 121    Rung II, elbow extended 98 *pain 120     Sensation: denies numbness and tingling, sensation intact    Manual Muscle Test   7/6/2020 7/6/2020    Left Right   Wrist Extension  5/5 5/5   Wrist Flexion 5/5 5/5   Radial Deviation 5/5 5/5   Ulnar Deviation 5/5 5/5   Supination 5/5 *with pain 5/5   Pronation 5/5 5/5   Elbow Extension 5/5 *with pain 5/5   Elbow Flexion 5/5 5/5     Special Tests  Tennis Elbow Test + on L       CMS Impairment/Limitation/Restriction for FOTO Elbow Survey    Therapist reviewed FOTO scores for Enio BLANDON Mady on 7/6/2020.   FOTO documents entered into [facility] - see Media section.    Limitation Score: 37%         Treatment     Treatment Time In: 2:00 pm  Treatment Time Out: 2:15 pm  Total Treatment time separate from Evaluation time:15 min (no charge)    Enio received the following supervised modalities after being cleared for contradictions for 5 minutes:   -Patient received MH x 5 min to L elbow to increase blood flow, circulation and tissue  elasticity prior to therex      Enio received the following manual therapy techniques for 5 minutes:   -MT: provided deep STM, including CFM, including use of IASTM tools to L elbow, forearm, and upper arm.      Enio received therapeutic exercises for 5 minutes including:  -  ASTYM stretchs 1-3 30 sec holds x 3 repetitions         Home Exercise Program/Education:  Issued HEP (see patient instructions in EMR) and educated on modality use for pain management . Exercises were reviewed and Enio was able to demonstrate them prior to the end of the session.   Pt received a written copy of exercises to perform at home. Enio demonstrated good  understanding of the education provided.  Pt was advised to perform these exercises free of pain, and to stop performing them if pain occurs.    Patient/Family Education: role of OT, goals for OT, scheduling/cancellations - pt verbalized understanding. Discussed insurance limitations with patient.    Additional Education provided: reviewed compression or counterforce brace wear. Pt reports he has been wearing a compression sleeve and a strap over extensor bundle. Discussed activity modifications and to not do forceful or repetitive gripping. Also discussed modalities such as ice for pain and inflammation management, to use prn.    Assessment     Enio Earl is a 44 y.o. male referred to outpatient occupational therapy and presents with a medical diagnosis of L elbow lateral epicondylitis, resulting in pain, stiffness, decreased strength, and limited functional use of L arm, and demonstrates limitations as described in the chart below. Following medical record review it is determined that pt will benefit from occupational therapy services in order to maximize pain free and/or functional use of left UE. The following goals were discussed with the patient and patient is in agreement with them as to be addressed in the treatment plan. The patient's rehab potential is  Good.     Anticipated barriers to occupational therapy: none  Pt has no cultural, educational or language barriers to learning provided.    Profile and History Assessment of Occupational Performance Level of Clinical Decision Making Complexity Score   Occupational Profile:   Enio Earl is a 44 y.o. male who lives with their family and is currently employed as a manager for [employer]. Enio Earl has difficulty with  driving/transportation management, phone/computer use, housework/household chores and job duties  affecting his/her daily functional abilities. His/her main goal for therapy is to have no pain.     Comorbidities:   none    Medical and Therapy History Review:   Brief               Performance Deficits    Physical:  Muscle Power/Strength  Muscle Endurance   Strength  Pain    Cognitive:  No Deficits    Psychosocial:    No Deficits     Clinical Decision Making:  moderate    Assessment Process:  Detailed Assessments    Modification/Need for Assistance:  Not Necessary    Intervention Selection:  Several Treatment Options       low  Based on PMHX, co morbidities , data from assessments and functional level of assistance required with task and clinical presentation directly impacting function.         Goals:   The following goals were discussed with the patient and patient is in agreement with them as to be addressed in the treatment plan.   Long Term Goals (LTGs); to be met by discharge.  LTG #1: Pt will report a pain level of 1 out of 10 with job duties and grasping objects   LTG #2: Pt will demo improved FOTO score by at least 15 points.   LTG #3: Pt will return to prior level of function for ADLs and household management.   LTG #4: Pt will demonstrate improved L  strength with elbow extended by at least 5# with no pain for performing daily tasks    Short Term Goals (STGs); to be met within 4 weeks (8/6/2020).  STG #1a: Pt will report 3 out of 10 pain level with ADLs and job duties.  STG  #2: Pt will demonstrate independence with issued HEP.         Plan   Certification Period/Plan of care expiration: 7/6/2020 to 8/21/2020.    Outpatient Occupational Therapy 2 times weekly for 6 weeks to include the following interventions: Paraffin, Fluidotherapy, Manual therapy/joint mobilizations, Modalities for pain management, US 3 mhz, Therapeutic exercises/activities., Strengthening, Electrical Modalities, Joint Protection and Energy Conservation. Recommend ASTYM treatment. Pt requested to come 1x/week at this time.      ZULEIMA Stubbs  Date: 7/6/2020      I certify the need for these services furnished under the plan of treatment and while under my care.     ____________________________________________________________________  Physician/Referring Practitioner   Date of Signature

## 2020-07-16 ENCOUNTER — CLINICAL SUPPORT (OUTPATIENT)
Dept: REHABILITATION | Facility: HOSPITAL | Age: 45
End: 2020-07-16
Payer: COMMERCIAL

## 2020-07-16 DIAGNOSIS — M25.522 LEFT ELBOW PAIN: ICD-10-CM

## 2020-07-16 PROCEDURE — 97110 THERAPEUTIC EXERCISES: CPT | Mod: PN

## 2020-07-16 PROCEDURE — 97140 MANUAL THERAPY 1/> REGIONS: CPT | Mod: PN

## 2020-07-16 NOTE — PROGRESS NOTES
"  Occupational Therapy Treatment Note     Date: 7/16/2020  Name: Enio Earl  Clinic Number: [id number]    Therapy Diagnosis:   Encounter Diagnosis   Name Primary?    Left elbow pain      Physician: Troy Holt*    Physician Orders: eval and treat  Medical Diagnosis: Lateral epicondylitis of left elbow [M77.12]  Surgical Procedure and Date: n/a/ Date of Injury/Onset: ~1 month  Evaluation Date: 7/6/2020  Insurance Authorization Period Expiration: 1/8/2021  Plan of Care Certification Period: 8/21/2020  Date of Return to MD:  Not scheduled     Visit # / Visits authorized: 1 / 24     FOTO: initial eval     Time In: 1:25 pm  Time Out: 2:15 pm  Total treatment time: 50 minutes  Total Timed minutes 40 minutes   MT1 TE2  Precautions:  Standard     Subjective     Pt reports: "It's really tender right on the bone."  he was compliant with home exercise program given last session.   Response to previous treatment: increased pain  Functional change: able to progress with exercises    Pain: 7/10  Location: left elbow      Objective     Enio received the following supervised modalities after being cleared for contradictions for 5 minutes:   -moist hot pack to Left elbow for pain management and tissue extensibility  Enio received the following manual therapy techniques for 10 minutes:   -ASTYM to Left elbow, wrist and hand    Enio received therapeutic exercises for 30 minutes including:  -  Exercises    ASTYM stretches 1-3 3/30"   Nirschl's 2/10   Red flex bar eccentric ex's 10x             CP x 5 minutes after for pain mgmt  Home Exercises and Education Provided     Education provided:   - Progress towards goals     Written Home Exercises Provided: Patient instructed to cont prior HEP.  Exercises were reviewed and Enio was able to demonstrate them prior to the end of the session.  Enio demonstrated good  understanding of the HEP provided.   .   See EMR under Patient Instructions for exercises " provided prior visit.        Assessment   Pt participated well today. Signs and symptoms of fibrotic tissue noted during ASTYM however pt responded well to tx. Pt with a high pain report today however able to perform all ex's with little complaint. Pt motivated.     Enio is progressing well towards his goals and there are no updates to goals at this time. Pt prognosis is Good.     Pt will continue to benefit from skilled outpatient occupational therapy to address the deficits listed in the problem list on initial evaluation provide pt/family education and to maximize pt's level of independence in the home and community environment.     Anticipated barriers to occupational therapy: none    Pt's spiritual, cultural and educational needs considered and pt agreeable to plan of care and goals.    Goals:  The following goals were discussed with the patient and patient is in agreement with them as to be addressed in the treatment plan.   Long Term Goals (LTGs); to be met by discharge.  LTG #1: Pt will report a pain level of 1 out of 10 with job duties and grasping objects -Not met, progressing       LTG #2: Pt will demo improved FOTO score by at least 15 points. -Not met, progressing  LTG #3: Pt will return to prior level of function for ADLs and household management. -Not met, progressing  LTG #4: Pt will demonstrate improved L  strength with elbow extended by at least 5# with no pain for performing daily tasks -Not met, progressing     Short Term Goals (STGs); to be met within 4 weeks (8/6/2020).  STG #1a: Pt will report 3 out of 10 pain level with ADLs and job duties. -Not met, progressing  STG #2: Pt will demonstrate independence with issued HEP. -Not met, progressing    Plan   Continue with OT POC  Updates/Grading for next session: progress with ROM and strengthening       ZULEIMA Razo

## 2020-07-22 NOTE — PROGRESS NOTES
"  Occupational Therapy Treatment Note     Date: 7/23/2020  Name: Enio Earl  Clinic Number: [id number]    Therapy Diagnosis:   Encounter Diagnosis   Name Primary?    Left elbow pain      Physician: Troy Holt*    Physician Orders: eval and treat  Medical Diagnosis: Lateral epicondylitis of left elbow [M77.12]  Surgical Procedure and Date: n/a/ Date of Injury/Onset: ~1 month  Evaluation Date: 7/6/2020  Insurance Authorization Period Expiration: 1/8/2021  Plan of Care Certification Period: 8/21/2020  Date of Return to MD:  Not scheduled     Visit # / Visits authorized: 3 / 24     FOTO: initial eval     Time In: 1:25 pm  Time Out: 2:15 pm  Total treatment time: 50 minutes  Total Timed minutes 40 minutes   MT1 TE2  Precautions:  Standard     Subjective     Pt reports: "My elbow is still throbbing a lot."  he was compliant with home exercise program given last session.   Response to previous treatment: increased pain  Functional change: able to progress with exercises    Pain: 7-8/10  Location: left elbow      Objective     Enio received the following supervised modalities after being cleared for contradictions for 5 minutes:   -moist hot pack to Left elbow for pain management and tissue extensibility  Enio received the following manual therapy techniques for 10 minutes:   -ASTYM to Left elbow, wrist and hand    Enio received therapeutic exercises for 30 minutes including:  -  Exercises    ASTYM stretches 1-3 3/30"   Nirschl's 2/10   Red flex bar eccentric ex's 10x           Kinesiotaping: I strip to inhibit wrist extensors and space correction over lateral epicondyle. Patient instructed on purpose, wear, care, precautions to monitor and removal of KT. Patient verbalized understanding of all instructions provided.     CP x 5 minutes after for pain mgmt  Home Exercises and Education Provided     Education provided:   - Progress towards goals     Written Home Exercises Provided: Patient " instructed to cont prior HEP.  Exercises were reviewed and Enio was able to demonstrate them prior to the end of the session.  Enio demonstrated good  understanding of the HEP provided.   .   See EMR under Patient Instructions for exercises provided prior visit.        Assessment   Pt participated well today. Signs and symptoms of fibrotic tissue noted during ASTYM however pt responded well to tx. Pt continues with a high pain report. Reported improvement after ASTYM and KT application. Pt motivated.     Enio is progressing well towards his goals and there are no updates to goals at this time. Pt prognosis is Good.     Pt will continue to benefit from skilled outpatient occupational therapy to address the deficits listed in the problem list on initial evaluation provide pt/family education and to maximize pt's level of independence in the home and community environment.     Anticipated barriers to occupational therapy: none    Pt's spiritual, cultural and educational needs considered and pt agreeable to plan of care and goals.    Goals:  The following goals were discussed with the patient and patient is in agreement with them as to be addressed in the treatment plan.   Long Term Goals (LTGs); to be met by discharge.  LTG #1: Pt will report a pain level of 1 out of 10 with job duties and grasping objects -Not met, progressing       LTG #2: Pt will demo improved FOTO score by at least 15 points. -Not met, progressing  LTG #3: Pt will return to prior level of function for ADLs and household management. -Not met, progressing  LTG #4: Pt will demonstrate improved L  strength with elbow extended by at least 5# with no pain for performing daily tasks -Not met, progressing     Short Term Goals (STGs); to be met within 4 weeks (8/6/2020).  STG #1a: Pt will report 3 out of 10 pain level with ADLs and job duties. -Not met, progressing  STG #2: Pt will demonstrate independence with issued HEP. -Not met,  progressing    Plan   Continue with OT POC  Updates/Grading for next session: progress with ROM and strengthening       ZULEIMA Razo

## 2020-07-23 ENCOUNTER — CLINICAL SUPPORT (OUTPATIENT)
Dept: REHABILITATION | Facility: HOSPITAL | Age: 45
End: 2020-07-23
Payer: COMMERCIAL

## 2020-07-23 DIAGNOSIS — M25.522 LEFT ELBOW PAIN: ICD-10-CM

## 2020-07-23 PROCEDURE — 97110 THERAPEUTIC EXERCISES: CPT | Mod: PN

## 2020-07-23 PROCEDURE — 97140 MANUAL THERAPY 1/> REGIONS: CPT | Mod: PN

## 2020-07-30 ENCOUNTER — CLINICAL SUPPORT (OUTPATIENT)
Dept: REHABILITATION | Facility: HOSPITAL | Age: 45
End: 2020-07-30
Payer: COMMERCIAL

## 2020-07-30 DIAGNOSIS — M25.522 LEFT ELBOW PAIN: ICD-10-CM

## 2020-07-30 PROCEDURE — 97110 THERAPEUTIC EXERCISES: CPT | Mod: PN

## 2020-07-30 PROCEDURE — 97140 MANUAL THERAPY 1/> REGIONS: CPT | Mod: PN

## 2020-07-30 NOTE — PROGRESS NOTES
"  Occupational Therapy Treatment Note     Date: 7/30/2020  Name: Enio Earl  Clinic Number: [id number]    Therapy Diagnosis:   Encounter Diagnosis   Name Primary?    Left elbow pain      Physician: Troy Holt*    Physician Orders: eval and treat  Medical Diagnosis: Lateral epicondylitis of left elbow [M77.12]  Surgical Procedure and Date: n/a/ Date of Injury/Onset: ~1 month  Evaluation Date: 7/6/2020  Insurance Authorization Period Expiration: 1/8/2021  Plan of Care Certification Period: 8/21/2020  Date of Return to MD:  Not scheduled     Visit # / Visits authorized: 4 / 24     FOTO: initial eval     Time In: 1:30 pm  Time Out: 2:15 pm  Total treatment time: 45 minutes  Total Timed minutes 40 minutes   MT1 TE2  Precautions:  Standard     Subjective     Pt reports: "It hurts. The pain wakes me up at night sometimes."  he was compliant with home exercise program given last session.   Response to previous treatment: increased pain  Functional change: able to progress with exercises    Pain: 8-9/10  Location: left elbow      Objective     Enio received the following supervised modalities after being cleared for contradictions for 5 minutes:   -moist hot pack to Left elbow for pain management and tissue extensibility  Enio received the following manual therapy techniques for 10 minutes:   -ASTYM to Left elbow, wrist and hand    Enio received therapeutic exercises for 30 minutes including:  -  Exercises    ASTYM stretches 1-3 3/30"   Nirschl's 2/10   Red flex bar eccentric ex's 10x           Kinesiotaping: I strip to inhibit wrist extensors and space correction over lateral epicondyle. Patient instructed on purpose, wear, care, precautions to monitor and removal of KT. Patient verbalized understanding of all instructions provided.     Pt declined cold pack today    Home Exercises and Education Provided     Education provided: reviewed brace wear, ice, stretching, and activity modifications  - " Progress towards goals     Written Home Exercises Provided: Patient instructed to cont prior HEP.  Exercises were reviewed and Enio was able to demonstrate them prior to the end of the session.  Enio demonstrated good  understanding of the HEP provided.   .   See EMR under Patient Instructions for exercises provided prior visit.        Assessment   Pt participated well today. Signs and symptoms of fibrotic tissue still noted during IASTYM however pt responded well to tx. Pt continues with a high pain report. Discussed scheduling an appt with an orthopedic doctor. Pt in agreement. Reported improvement after STM and KT application. Pt motivated.     Enio is progressing well towards his goals and there are no updates to goals at this time. Pt prognosis is Good.     Pt will continue to benefit from skilled outpatient occupational therapy to address the deficits listed in the problem list on initial evaluation provide pt/family education and to maximize pt's level of independence in the home and community environment.     Anticipated barriers to occupational therapy: none    Pt's spiritual, cultural and educational needs considered and pt agreeable to plan of care and goals.    Goals:  The following goals were discussed with the patient and patient is in agreement with them as to be addressed in the treatment plan.   Long Term Goals (LTGs); to be met by discharge.  LTG #1: Pt will report a pain level of 1 out of 10 with job duties and grasping objects -Not met, progressing       LTG #2: Pt will demo improved FOTO score by at least 15 points. -Not met, progressing  LTG #3: Pt will return to prior level of function for ADLs and household management. -Not met, progressing  LTG #4: Pt will demonstrate improved L  strength with elbow extended by at least 5# with no pain for performing daily tasks -Not met, progressing     Short Term Goals (STGs); to be met within 4 weeks (8/6/2020).  STG #1a: Pt will report 3 out  of 10 pain level with ADLs and job duties. -Not met, progressing  STG #2: Pt will demonstrate independence with issued HEP. -Not met, progressing    Plan   Continue with OT POC  Updates/Grading for next session: progress with ROM and strengthening       Rajani Caballero, OT

## 2020-08-06 ENCOUNTER — TELEPHONE (OUTPATIENT)
Dept: REHABILITATION | Facility: HOSPITAL | Age: 45
End: 2020-08-06

## 2020-08-10 ENCOUNTER — PATIENT OUTREACH (OUTPATIENT)
Dept: ADMINISTRATIVE | Facility: OTHER | Age: 45
End: 2020-08-10

## 2020-08-10 NOTE — PROGRESS NOTES
Requested updates within Care Everywhere.  Patient's chart was reviewed for overdue ALEXYS topics.  Immunizations reconciled.    Orders placed:  Tasked appts:  Labs Linked:

## 2020-08-11 ENCOUNTER — HOSPITAL ENCOUNTER (OUTPATIENT)
Dept: RADIOLOGY | Facility: HOSPITAL | Age: 45
Discharge: HOME | End: 2020-08-11
Attending: ORTHOPAEDIC SURGERY
Payer: COMMERCIAL

## 2020-08-11 ENCOUNTER — OFFICE VISIT (OUTPATIENT)
Dept: ORTHOPEDICS | Facility: CLINIC | Age: 45
End: 2020-08-11
Payer: COMMERCIAL

## 2020-08-11 VITALS — HEIGHT: 68 IN | BODY MASS INDEX: 34.52 KG/M2 | WEIGHT: 227.75 LBS

## 2020-08-11 DIAGNOSIS — M77.12 LATERAL EPICONDYLITIS OF LEFT ELBOW: ICD-10-CM

## 2020-08-11 DIAGNOSIS — M25.522 LEFT ELBOW PAIN: Primary | ICD-10-CM

## 2020-08-11 DIAGNOSIS — M25.522 LEFT ELBOW PAIN: ICD-10-CM

## 2020-08-11 PROCEDURE — 20551 NJX 1 TENDON ORIGIN/INSJ: CPT | Mod: LT,S$GLB,, | Performed by: ORTHOPAEDIC SURGERY

## 2020-08-11 PROCEDURE — 99203 OFFICE O/P NEW LOW 30 MIN: CPT | Mod: 25,S$GLB,, | Performed by: ORTHOPAEDIC SURGERY

## 2020-08-11 PROCEDURE — 73070 X-RAY EXAM OF ELBOW: CPT | Mod: TC,PN,LT

## 2020-08-11 PROCEDURE — 99203 PR OFFICE/OUTPT VISIT, NEW, LEVL III, 30-44 MIN: ICD-10-PCS | Mod: 25,S$GLB,, | Performed by: ORTHOPAEDIC SURGERY

## 2020-08-11 PROCEDURE — 73070 XR ELBOW 2 VIEWS LEFT: ICD-10-PCS | Mod: 26,LT,, | Performed by: RADIOLOGY

## 2020-08-11 PROCEDURE — 3008F BODY MASS INDEX DOCD: CPT | Mod: CPTII,S$GLB,, | Performed by: ORTHOPAEDIC SURGERY

## 2020-08-11 PROCEDURE — 99999 PR PBB SHADOW E&M-EST. PATIENT-LVL III: ICD-10-PCS | Mod: PBBFAC,,, | Performed by: ORTHOPAEDIC SURGERY

## 2020-08-11 PROCEDURE — 99999 PR PBB SHADOW E&M-EST. PATIENT-LVL III: CPT | Mod: PBBFAC,,, | Performed by: ORTHOPAEDIC SURGERY

## 2020-08-11 PROCEDURE — 20551 PR INJECT TENDON ORIGIN/INSERT: ICD-10-PCS | Mod: LT,S$GLB,, | Performed by: ORTHOPAEDIC SURGERY

## 2020-08-11 PROCEDURE — 73070 X-RAY EXAM OF ELBOW: CPT | Mod: 26,LT,, | Performed by: RADIOLOGY

## 2020-08-11 PROCEDURE — 3008F PR BODY MASS INDEX (BMI) DOCUMENTED: ICD-10-PCS | Mod: CPTII,S$GLB,, | Performed by: ORTHOPAEDIC SURGERY

## 2020-08-11 RX ORDER — TRIAMCINOLONE ACETONIDE 40 MG/ML
20 INJECTION, SUSPENSION INTRA-ARTICULAR; INTRAMUSCULAR
Status: COMPLETED | OUTPATIENT
Start: 2020-08-11 | End: 2020-08-11

## 2020-08-11 RX ORDER — CELECOXIB 200 MG/1
200 CAPSULE ORAL DAILY
Qty: 30 CAPSULE | Refills: 2 | Status: SHIPPED | OUTPATIENT
Start: 2020-08-11 | End: 2020-09-10

## 2020-08-11 RX ADMIN — TRIAMCINOLONE ACETONIDE 20 MG: 40 INJECTION, SUSPENSION INTRA-ARTICULAR; INTRAMUSCULAR at 11:08

## 2020-08-11 NOTE — PROGRESS NOTES
"Subjective:      Patient ID: Enio Earl is a 44 y.o. male.    Chief Complaint: Consult, Elbow Pain (left - ongoing - 3 mths ), Arm Pain (left ), and Wrist Pain (left )      HPI  Enio Earl is a  44 y.o. male presenting today for left elbow pain.  There was not a history of trauma.  Onset of symptoms began several months ago  Symptoms worse with lifting  No numbness or tingling reported  He has tried physical therapy without much improvement  .      Review of patient's allergies indicates:  No Known Allergies      Current Outpatient Medications   Medication Sig Dispense Refill    metoprolol succinate (TOPROL-XL) 25 MG 24 hr tablet Take 1 tablet (25 mg total) by mouth once daily. 90 tablet 0    aspirin (ECOTRIN) 81 MG EC tablet Take 1 tablet (81 mg total) by mouth once daily.  0     No current facility-administered medications for this visit.        Past Medical History:   Diagnosis Date    Anxiety     Supraventricular tachycardia        Past Surgical History:   Procedure Laterality Date    NOSE SURGERY         Review of Systems:  ROS    OBJECTIVE:     PHYSICAL EXAM:  Height: 5' 8" (172.7 cm) Weight: 103.3 kg (227 lb 11.8 oz)  Vitals:    08/11/20 1048   Weight: 103.3 kg (227 lb 11.8 oz)   Height: 5' 8" (1.727 m)   PainSc:   6   PainLoc: Generalized     Well developed, well nourished male in no acute distress  Alert and oriented x 3  HEENT- Normal exam  Lungs- Clear to auscultation  Heart- Regular rate and rhythm  Abdomen- Soft nontender  Extremity exam- examination left elbow demonstrates tenderness laterally over the lateral epicondylar area  Mild swelling  Full range of motion  No instability  No clicking  There is pain with resisted wrist extension of the left wrist and forearm      RADIOGRAPHS:  AP lateral x-ray left elbow demonstrate no significant abnormalities  Comments: I have personally reviewed the imaging and I agree with the above radiologist's report.    ASSESSMENT/PLAN:     IMPRESSION:  " Lateral epicondylitis left elbow    PLAN:  I explained once a day  Follow-up 4-6 weeks the nature of the problem to the patient recommended i strap  And Celebrex word njection  After pause for time-out identified the left elbow injec a counterforce elbow shaheen laterally with combination Kenalog 20 mg 0.5 cc xylocaine sterile technique  Tolerated the procedure well strengthening exercises without complication  Also recommended stretching       - We talked at length about the anatomy and pathophysiology of   Encounter Diagnoses   Name Primary?    Left elbow pain Yes    Lateral epicondylitis of left elbow            Disclaimer: This note has been generated using voice-recognition software. There may be typographical errors that have been missed during proof-reading.

## 2020-09-14 DIAGNOSIS — I47.10 PSVT (PAROXYSMAL SUPRAVENTRICULAR TACHYCARDIA): ICD-10-CM

## 2020-09-14 DIAGNOSIS — I10 ESSENTIAL HYPERTENSION: ICD-10-CM

## 2020-09-14 RX ORDER — METOPROLOL SUCCINATE 25 MG/1
25 TABLET, EXTENDED RELEASE ORAL DAILY
Qty: 90 TABLET | Refills: 0 | Status: SHIPPED | OUTPATIENT
Start: 2020-09-14 | End: 2020-10-22

## 2020-09-30 ENCOUNTER — CLINICAL SUPPORT (OUTPATIENT)
Dept: OTHER | Facility: CLINIC | Age: 45
End: 2020-09-30
Payer: COMMERCIAL

## 2020-09-30 DIAGNOSIS — Z00.8 ENCOUNTER FOR OTHER GENERAL EXAMINATION: ICD-10-CM

## 2020-10-02 VITALS — HEIGHT: 68 IN | BODY MASS INDEX: 34.63 KG/M2

## 2020-10-02 LAB
GLUCOSE SERPL-MCNC: 101 MG/DL (ref 60–140)
HDLC SERPL-MCNC: 16 MG/DL
POC CHOLESTEROL, LDL (DOCK): 214 MG/DL
POC CHOLESTEROL, TOTAL: 253 MG/DL
TRIGL SERPL-MCNC: 116 MG/DL

## 2020-10-13 ENCOUNTER — TELEPHONE (OUTPATIENT)
Dept: OTHER | Facility: CLINIC | Age: 45
End: 2020-10-13

## 2020-10-14 DIAGNOSIS — I47.10 SVT (SUPRAVENTRICULAR TACHYCARDIA): Primary | ICD-10-CM

## 2020-10-22 ENCOUNTER — OFFICE VISIT (OUTPATIENT)
Dept: CARDIOLOGY | Facility: CLINIC | Age: 45
End: 2020-10-22
Payer: COMMERCIAL

## 2020-10-22 VITALS
HEIGHT: 68 IN | HEART RATE: 104 BPM | WEIGHT: 188.5 LBS | SYSTOLIC BLOOD PRESSURE: 120 MMHG | DIASTOLIC BLOOD PRESSURE: 70 MMHG | BODY MASS INDEX: 28.57 KG/M2

## 2020-10-22 DIAGNOSIS — I10 ESSENTIAL HYPERTENSION: Primary | ICD-10-CM

## 2020-10-22 DIAGNOSIS — I47.10 SVT (SUPRAVENTRICULAR TACHYCARDIA): ICD-10-CM

## 2020-10-22 PROCEDURE — 3074F SYST BP LT 130 MM HG: CPT | Mod: CPTII,S$GLB,, | Performed by: INTERNAL MEDICINE

## 2020-10-22 PROCEDURE — 3078F DIAST BP <80 MM HG: CPT | Mod: CPTII,S$GLB,, | Performed by: INTERNAL MEDICINE

## 2020-10-22 PROCEDURE — 99999 PR PBB SHADOW E&M-EST. PATIENT-LVL III: ICD-10-PCS | Mod: PBBFAC,,, | Performed by: INTERNAL MEDICINE

## 2020-10-22 PROCEDURE — 3078F PR MOST RECENT DIASTOLIC BLOOD PRESSURE < 80 MM HG: ICD-10-PCS | Mod: CPTII,S$GLB,, | Performed by: INTERNAL MEDICINE

## 2020-10-22 PROCEDURE — 3008F BODY MASS INDEX DOCD: CPT | Mod: CPTII,S$GLB,, | Performed by: INTERNAL MEDICINE

## 2020-10-22 PROCEDURE — 99214 PR OFFICE/OUTPT VISIT, EST, LEVL IV, 30-39 MIN: ICD-10-PCS | Mod: 25,S$GLB,, | Performed by: INTERNAL MEDICINE

## 2020-10-22 PROCEDURE — 99214 OFFICE O/P EST MOD 30 MIN: CPT | Mod: 25,S$GLB,, | Performed by: INTERNAL MEDICINE

## 2020-10-22 PROCEDURE — 3074F PR MOST RECENT SYSTOLIC BLOOD PRESSURE < 130 MM HG: ICD-10-PCS | Mod: CPTII,S$GLB,, | Performed by: INTERNAL MEDICINE

## 2020-10-22 PROCEDURE — 93000 RHYTHM STRIP: ICD-10-PCS | Mod: S$GLB,,, | Performed by: INTERNAL MEDICINE

## 2020-10-22 PROCEDURE — 93000 ELECTROCARDIOGRAM COMPLETE: CPT | Mod: S$GLB,,, | Performed by: INTERNAL MEDICINE

## 2020-10-22 PROCEDURE — 99999 PR PBB SHADOW E&M-EST. PATIENT-LVL III: CPT | Mod: PBBFAC,,, | Performed by: INTERNAL MEDICINE

## 2020-10-22 PROCEDURE — 3008F PR BODY MASS INDEX (BMI) DOCUMENTED: ICD-10-PCS | Mod: CPTII,S$GLB,, | Performed by: INTERNAL MEDICINE

## 2020-10-22 NOTE — PROGRESS NOTES
Subjective:    Patient ID:  Enio Earl is a 45 y.o. male who presents for evaluation of     HPI   45 y.o. M  panic attacks  PSVT, s/p RFA of slow pathway    1/29/20, developed palpitations radiating to neck. At ER, found in short-RP SVT that terminated with ADO 12.  There was some LH, HOWARD, but no syncope.  No recurrence since.    SVT: short RP, 187 bpm.  NSR: not preexcited    In 6/2020, I ablated AVNRT. Since, feeling well.    JW 2017 60-65% LVEF. neg ischemia.    My interpretation of today's ECG is  bpm    Review of Systems   Constitution: Negative. Negative for malaise/fatigue.   HENT: Negative.  Negative for ear pain and tinnitus.    Eyes: Negative for blurred vision.   Cardiovascular: Negative.  Negative for chest pain, dyspnea on exertion, near-syncope, palpitations and syncope.   Respiratory: Negative.  Negative for shortness of breath.    Endocrine: Negative.  Negative for polyuria.   Hematologic/Lymphatic: Does not bruise/bleed easily.   Skin: Negative.  Negative for rash.   Musculoskeletal: Negative.  Negative for joint pain and muscle weakness.   Gastrointestinal: Negative.  Negative for abdominal pain and change in bowel habit.   Genitourinary: Negative for frequency.   Neurological: Negative.  Negative for dizziness and weakness.   Psychiatric/Behavioral: Negative.  Negative for depression. The patient is not nervous/anxious.    Allergic/Immunologic: Negative for environmental allergies.        Objective:    Physical Exam   Constitutional: He is oriented to person, place, and time. He appears well-developed and well-nourished.   HENT:   Head: Normocephalic and atraumatic.   Eyes: Conjunctivae, EOM and lids are normal. No scleral icterus.   Neck: Normal range of motion. No JVD present. No tracheal deviation present. No thyromegaly present.   Cardiovascular: Mildly fast rate, regular rhythm, normal heart sounds and intact distal pulses.  No extrasystoles are present.   Pulses:       Radial  pulses are 2+ on the right side, and 2+ on the left side.   Pulmonary/Chest: Effort normal and breath sounds normal. No accessory muscle usage. No tachypnea. No respiratory distress. He has no wheezes. He has no rales.   Abdominal: Soft. Bowel sounds are normal. He exhibits no distension. There is no hepatosplenomegaly. There is no tenderness.   Musculoskeletal: Normal range of motion. He exhibits no edema.   Neurological: He is alert and oriented to person, place, and time. He has normal reflexes. He exhibits normal muscle tone.   Skin: Skin is warm and dry. No rash noted.   Psychiatric: He has a normal mood and affect. His behavior is normal.   Nursing note and vitals reviewed.        Assessment:       1. Essential hypertension    2. SVT (supraventricular tachycardia)         Plan:       Doing well s/p RFA of AVNRT.  d/c meds -- but follow vitals. If SBP climbs to >130 consistently, consider rx (e.g., verapamil).    f/u 1 year, or earlier prn... and at that time if he's well, consider d/c from EP clinic.

## 2020-11-03 ENCOUNTER — OFFICE VISIT (OUTPATIENT)
Dept: FAMILY MEDICINE | Facility: CLINIC | Age: 45
End: 2020-11-03
Payer: COMMERCIAL

## 2020-11-03 ENCOUNTER — LAB VISIT (OUTPATIENT)
Dept: LAB | Facility: HOSPITAL | Age: 45
End: 2020-11-03
Attending: FAMILY MEDICINE
Payer: COMMERCIAL

## 2020-11-03 VITALS
HEART RATE: 96 BPM | BODY MASS INDEX: 34.11 KG/M2 | WEIGHT: 225.06 LBS | OXYGEN SATURATION: 97 % | SYSTOLIC BLOOD PRESSURE: 132 MMHG | HEIGHT: 68 IN | TEMPERATURE: 97 F | DIASTOLIC BLOOD PRESSURE: 98 MMHG

## 2020-11-03 DIAGNOSIS — M19.90 ARTHRITIS: ICD-10-CM

## 2020-11-03 DIAGNOSIS — Z76.89 ENCOUNTER TO ESTABLISH CARE WITH NEW DOCTOR: ICD-10-CM

## 2020-11-03 DIAGNOSIS — I47.10 PSVT (PAROXYSMAL SUPRAVENTRICULAR TACHYCARDIA): ICD-10-CM

## 2020-11-03 DIAGNOSIS — Z23 NEED FOR VACCINATION AGAINST STREPTOCOCCUS PNEUMONIAE: ICD-10-CM

## 2020-11-03 DIAGNOSIS — Z11.59 NEED FOR HEPATITIS C SCREENING TEST: ICD-10-CM

## 2020-11-03 DIAGNOSIS — F17.200 TOBACCO DEPENDENCE: ICD-10-CM

## 2020-11-03 DIAGNOSIS — Z00.00 VISIT FOR WELL MAN HEALTH CHECK: ICD-10-CM

## 2020-11-03 DIAGNOSIS — I10 ESSENTIAL HYPERTENSION: ICD-10-CM

## 2020-11-03 DIAGNOSIS — E78.49 OTHER HYPERLIPIDEMIA: ICD-10-CM

## 2020-11-03 DIAGNOSIS — Z00.00 VISIT FOR WELL MAN HEALTH CHECK: Primary | ICD-10-CM

## 2020-11-03 LAB
ALBUMIN SERPL BCP-MCNC: 4.5 G/DL (ref 3.5–5.2)
ALP SERPL-CCNC: 51 U/L (ref 55–135)
ALT SERPL W/O P-5'-P-CCNC: 49 U/L (ref 10–44)
ANION GAP SERPL CALC-SCNC: 12 MMOL/L (ref 8–16)
AST SERPL-CCNC: 37 U/L (ref 10–40)
BASOPHILS # BLD AUTO: 0.07 K/UL (ref 0–0.2)
BASOPHILS NFR BLD: 0.6 % (ref 0–1.9)
BILIRUB SERPL-MCNC: 0.8 MG/DL (ref 0.1–1)
BUN SERPL-MCNC: 12 MG/DL (ref 6–20)
CALCIUM SERPL-MCNC: 9.9 MG/DL (ref 8.7–10.5)
CHLORIDE SERPL-SCNC: 102 MMOL/L (ref 95–110)
CHOLEST SERPL-MCNC: 248 MG/DL (ref 120–199)
CHOLEST/HDLC SERPL: 4.9 {RATIO} (ref 2–5)
CO2 SERPL-SCNC: 26 MMOL/L (ref 23–29)
CREAT SERPL-MCNC: 0.8 MG/DL (ref 0.5–1.4)
DIFFERENTIAL METHOD: ABNORMAL
EOSINOPHIL # BLD AUTO: 0.3 K/UL (ref 0–0.5)
EOSINOPHIL NFR BLD: 2.1 % (ref 0–8)
ERYTHROCYTE [DISTWIDTH] IN BLOOD BY AUTOMATED COUNT: 11.7 % (ref 11.5–14.5)
EST. GFR  (AFRICAN AMERICAN): >60 ML/MIN/1.73 M^2
EST. GFR  (NON AFRICAN AMERICAN): >60 ML/MIN/1.73 M^2
GLUCOSE SERPL-MCNC: 94 MG/DL (ref 70–110)
HCT VFR BLD AUTO: 50.7 % (ref 40–54)
HDLC SERPL-MCNC: 51 MG/DL (ref 40–75)
HDLC SERPL: 20.6 % (ref 20–50)
HGB BLD-MCNC: 16.9 G/DL (ref 14–18)
IMM GRANULOCYTES # BLD AUTO: 0.06 K/UL (ref 0–0.04)
IMM GRANULOCYTES NFR BLD AUTO: 0.5 % (ref 0–0.5)
LDLC SERPL CALC-MCNC: 151.4 MG/DL (ref 63–159)
LYMPHOCYTES # BLD AUTO: 3 K/UL (ref 1–4.8)
LYMPHOCYTES NFR BLD: 24.9 % (ref 18–48)
MCH RBC QN AUTO: 35.4 PG (ref 27–31)
MCHC RBC AUTO-ENTMCNC: 33.3 G/DL (ref 32–36)
MCV RBC AUTO: 106 FL (ref 82–98)
MONOCYTES # BLD AUTO: 1 K/UL (ref 0.3–1)
MONOCYTES NFR BLD: 8.3 % (ref 4–15)
NEUTROPHILS # BLD AUTO: 7.7 K/UL (ref 1.8–7.7)
NEUTROPHILS NFR BLD: 63.6 % (ref 38–73)
NONHDLC SERPL-MCNC: 197 MG/DL
NRBC BLD-RTO: 0 /100 WBC
PLATELET # BLD AUTO: 242 K/UL (ref 150–350)
PMV BLD AUTO: 12.2 FL (ref 9.2–12.9)
POTASSIUM SERPL-SCNC: 4.2 MMOL/L (ref 3.5–5.1)
PROT SERPL-MCNC: 8.1 G/DL (ref 6–8.4)
RBC # BLD AUTO: 4.78 M/UL (ref 4.6–6.2)
SODIUM SERPL-SCNC: 140 MMOL/L (ref 136–145)
TRIGL SERPL-MCNC: 228 MG/DL (ref 30–150)
TSH SERPL DL<=0.005 MIU/L-ACNC: 0.87 UIU/ML (ref 0.4–4)
WBC # BLD AUTO: 12.08 K/UL (ref 3.9–12.7)

## 2020-11-03 PROCEDURE — 83036 HEMOGLOBIN GLYCOSYLATED A1C: CPT

## 2020-11-03 PROCEDURE — 90732 PNEUMOCOCCAL POLYSACCHARIDE VACCINE 23-VALENT =>2YO SQ IM: ICD-10-PCS | Mod: S$GLB,,, | Performed by: FAMILY MEDICINE

## 2020-11-03 PROCEDURE — 90471 IMMUNIZATION ADMIN: CPT | Mod: S$GLB,,, | Performed by: FAMILY MEDICINE

## 2020-11-03 PROCEDURE — 99396 PREV VISIT EST AGE 40-64: CPT | Mod: 25,S$GLB,, | Performed by: FAMILY MEDICINE

## 2020-11-03 PROCEDURE — 80053 COMPREHEN METABOLIC PANEL: CPT

## 2020-11-03 PROCEDURE — 85025 COMPLETE CBC W/AUTO DIFF WBC: CPT

## 2020-11-03 PROCEDURE — 84443 ASSAY THYROID STIM HORMONE: CPT

## 2020-11-03 PROCEDURE — 3008F PR BODY MASS INDEX (BMI) DOCUMENTED: ICD-10-PCS | Mod: CPTII,S$GLB,, | Performed by: FAMILY MEDICINE

## 2020-11-03 PROCEDURE — 99999 PR PBB SHADOW E&M-EST. PATIENT-LVL IV: CPT | Mod: PBBFAC,,, | Performed by: FAMILY MEDICINE

## 2020-11-03 PROCEDURE — 90471 PNEUMOCOCCAL POLYSACCHARIDE VACCINE 23-VALENT =>2YO SQ IM: ICD-10-PCS | Mod: S$GLB,,, | Performed by: FAMILY MEDICINE

## 2020-11-03 PROCEDURE — 3080F PR MOST RECENT DIASTOLIC BLOOD PRESSURE >= 90 MM HG: ICD-10-PCS | Mod: CPTII,S$GLB,, | Performed by: FAMILY MEDICINE

## 2020-11-03 PROCEDURE — 99396 PR PREVENTIVE VISIT,EST,40-64: ICD-10-PCS | Mod: 25,S$GLB,, | Performed by: FAMILY MEDICINE

## 2020-11-03 PROCEDURE — 86803 HEPATITIS C AB TEST: CPT

## 2020-11-03 PROCEDURE — 82306 VITAMIN D 25 HYDROXY: CPT

## 2020-11-03 PROCEDURE — 3008F BODY MASS INDEX DOCD: CPT | Mod: CPTII,S$GLB,, | Performed by: FAMILY MEDICINE

## 2020-11-03 PROCEDURE — 80061 LIPID PANEL: CPT

## 2020-11-03 PROCEDURE — 90732 PPSV23 VACC 2 YRS+ SUBQ/IM: CPT | Mod: S$GLB,,, | Performed by: FAMILY MEDICINE

## 2020-11-03 PROCEDURE — 36415 COLL VENOUS BLD VENIPUNCTURE: CPT | Mod: PO

## 2020-11-03 PROCEDURE — 3080F DIAST BP >= 90 MM HG: CPT | Mod: CPTII,S$GLB,, | Performed by: FAMILY MEDICINE

## 2020-11-03 PROCEDURE — 3075F PR MOST RECENT SYSTOLIC BLOOD PRESS GE 130-139MM HG: ICD-10-PCS | Mod: CPTII,S$GLB,, | Performed by: FAMILY MEDICINE

## 2020-11-03 PROCEDURE — 99999 PR PBB SHADOW E&M-EST. PATIENT-LVL IV: ICD-10-PCS | Mod: PBBFAC,,, | Performed by: FAMILY MEDICINE

## 2020-11-03 PROCEDURE — 3075F SYST BP GE 130 - 139MM HG: CPT | Mod: CPTII,S$GLB,, | Performed by: FAMILY MEDICINE

## 2020-11-03 RX ORDER — DICLOFENAC SODIUM 10 MG/G
2 GEL TOPICAL DAILY
Qty: 100 G | Refills: 11 | Status: SHIPPED | OUTPATIENT
Start: 2020-11-03 | End: 2021-05-13 | Stop reason: CLARIF

## 2020-11-03 NOTE — PROGRESS NOTES
"Subjective:       Patient ID: Enio Earl is a 45 y.o. male.    Chief Complaint: Establish Care      Enio Earl is a 45 y.o. male who presents today to establish care.     Diet: eats mostly home cooked meals. No soda. Mostly water. Drinking "loaded teas" recently. Not sweet tea.   Exercise: he walks. He does this about 3 days/week.     Labs: ordered and reviewed    HR: went into the 200's. Was in SVT. Seeing EP. Had ablation.     Smoking, ready to quit    c-scope: at 52    PMHx: reviewed in EMR and updated  Meds: reviewed in EMR and updated  Shx: reviewed in EMR and updated  FMHx: no family history of colon cancer, breast cancer, ovarian cancer  Social: he lives with his wife and two kids (10 and 1.5). 2 dogs at home. 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 No smokers at home.       Review of Systems   Constitutional: Negative for activity change and unexpected weight change.   HENT: Negative for hearing loss, rhinorrhea and trouble swallowing.    Eyes: Negative for discharge and visual disturbance.   Respiratory: Negative for chest tightness and wheezing.    Cardiovascular: Negative for chest pain and palpitations.   Gastrointestinal: Negative for blood in stool, constipation, diarrhea and vomiting.   Endocrine: Negative for polydipsia and polyuria.   Genitourinary: Negative for difficulty urinating, hematuria and urgency.   Musculoskeletal: Negative for arthralgias, joint swelling and neck pain.   Neurological: Negative for weakness and headaches.   Psychiatric/Behavioral: Negative for confusion and dysphoric mood.         Health Maintenance Due   Topic Date Due    Hepatitis C Screening  1975    TETANUS VACCINE  09/06/1995     Immunization History   Administered Date(s) Administered    Influenza 10/17/2019    Influenza - Quadrivalent - PF *Preferred* (6 months and older) 11/17/2018    Influenza - Trivalent - MDCK - PF 11/22/2013    Influenza Split 11/05/2014    Pneumococcal Polysaccharide - 23 Valent " 11/03/2020         Objective:     Vitals:    11/03/20 1348   BP: (!) 132/98   Pulse: 96   Temp: 97.3 °F (36.3 °C)        Physical Exam  Vitals signs and nursing note reviewed.   Constitutional:       Appearance: He is obese.   HENT:      Head: Normocephalic and atraumatic.      Nose: No congestion.   Eyes:      Conjunctiva/sclera: Conjunctivae normal.   Cardiovascular:      Rate and Rhythm: Normal rate and regular rhythm.   Pulmonary:      Effort: Pulmonary effort is normal.      Breath sounds: Normal breath sounds.   Abdominal:      Palpations: Abdomen is soft.      Tenderness: There is no abdominal tenderness.   Skin:     General: Skin is warm.   Neurological:      Mental Status: He is alert.   Psychiatric:         Mood and Affect: Mood normal.         Behavior: Behavior normal.         Thought Content: Thought content normal.         Judgment: Judgment normal.         Assessment:       1. Visit for [facility] health check    2. Encounter to establish care with new doctor    3. Essential hypertension    4. PSVT (paroxysmal supraventricular tachycardia)    5. Need for hepatitis C screening test    6. Tobacco dependence    7. Need for vaccination against Streptococcus pneumoniae    8. Arthritis        Plan:       ?Avoid tobacco  ?Be physically active  ?Maintain a healthy weight  ?Eat a diet rich in fruits, vegetables, and whole grains, and low in saturated/trans fat  ?Limit alcohol consumption  ?Protect against sexually transmitted infections  ?Avoid excess sun    Messaged EP, recs pending.   BP high on check x 2 (132/98 and 140/92)  Monitor BP at home  Was on metoprolol   EP had noted to maybe start verapamil? Dose pending their recommendations  Patient doesn't want to restart meds at this time  F/u in 6 weeks, recheck BP at that time  Weight loss  Dash diet  Will likely need statin  Quit smoking!    Visit for [facility] health check  -     CBC Auto Differential; Future; Expected date: 11/03/2020  -     Comprehensive  Metabolic Panel; Future; Expected date: 11/03/2020  -     Hemoglobin A1C; Future; Expected date: 11/03/2020  -     Lipid Panel; Future; Expected date: 11/03/2020  -     TSH; Future; Expected date: 11/03/2020  -     Vitamin D; Future; Expected date: 11/03/2020  -     Hepatitis C Antibody; Future; Expected date: 11/03/2020    Encounter to establish care with new doctor    Essential hypertension    PSVT (paroxysmal supraventricular tachycardia)    Need for hepatitis C screening test  -     Hepatitis C Antibody; Future; Expected date: 11/03/2020    Tobacco dependence  -     Ambulatory referral/consult to Smoking Cessation Program; Future; Expected date: 11/10/2020    Need for vaccination against Streptococcus pneumoniae  -     (In Office Administered) Pneumococcal Polysaccharide Vaccine (23 Valent) (SQ/IM)    Arthritis  -     diclofenac sodium (VOLTAREN) 1 % Gel; Apply 2 g topically once daily.  Dispense: 100 g; Refill: 11      Warning signs discussed, patient to call with any further issues or worsening of symptoms.     Addendum: EP recommends verapamil 120 mg 24 hour tabs or capsules qam

## 2020-11-03 NOTE — PATIENT INSTRUCTIONS

## 2020-11-04 PROBLEM — E78.49 OTHER HYPERLIPIDEMIA: Status: ACTIVE | Noted: 2020-11-04

## 2020-11-04 LAB
25(OH)D3+25(OH)D2 SERPL-MCNC: 34 NG/ML (ref 30–96)
ESTIMATED AVG GLUCOSE: 103 MG/DL (ref 68–131)
HBA1C MFR BLD HPLC: 5.2 % (ref 4–5.6)
HCV AB SERPL QL IA: NEGATIVE

## 2020-11-04 RX ORDER — ATORVASTATIN CALCIUM 10 MG/1
10 TABLET, FILM COATED ORAL DAILY
Qty: 90 TABLET | Refills: 3 | Status: SHIPPED | OUTPATIENT
Start: 2020-11-04 | End: 2021-03-09 | Stop reason: SDUPTHER

## 2020-11-17 ENCOUNTER — CLINICAL SUPPORT (OUTPATIENT)
Dept: SMOKING CESSATION | Facility: CLINIC | Age: 45
End: 2020-11-17
Payer: COMMERCIAL

## 2020-11-17 VITALS
WEIGHT: 225 LBS | OXYGEN SATURATION: 95 % | HEIGHT: 68 IN | HEART RATE: 100 BPM | DIASTOLIC BLOOD PRESSURE: 84 MMHG | BODY MASS INDEX: 34.1 KG/M2 | SYSTOLIC BLOOD PRESSURE: 145 MMHG

## 2020-11-17 DIAGNOSIS — F17.210 MODERATE CIGARETTE SMOKER (10-19 PER DAY): Primary | ICD-10-CM

## 2020-11-17 PROCEDURE — 99404 PR PREVENT COUNSEL,INDIV,60 MIN: ICD-10-PCS | Mod: S$GLB,,,

## 2020-11-17 PROCEDURE — 99999 PR PBB SHADOW E&M-EST. PATIENT-LVL II: ICD-10-PCS | Mod: PBBFAC,,,

## 2020-11-17 PROCEDURE — 99999 PR PBB SHADOW E&M-EST. PATIENT-LVL II: CPT | Mod: PBBFAC,,,

## 2020-11-17 PROCEDURE — 99404 PREV MED CNSL INDIV APPRX 60: CPT | Mod: S$GLB,,,

## 2020-11-17 RX ORDER — IBUPROFEN 200 MG
1 TABLET ORAL DAILY
Qty: 28 PATCH | Refills: 0 | Status: SHIPPED | OUTPATIENT
Start: 2020-11-17 | End: 2021-03-09

## 2020-11-17 RX ORDER — DM/P-EPHED/ACETAMINOPH/DOXYLAM 30-7.5/3
2 LIQUID (ML) ORAL
Qty: 144 LOZENGE | Refills: 0 | Status: SHIPPED | OUTPATIENT
Start: 2020-11-17 | End: 2021-03-09

## 2020-11-17 NOTE — PROGRESS NOTES
FTND of 8 indicates a high level of tobacco dependency. CESD of 6 is preceived as no mental distress or depression at this time.  Smokes 20-25 cigarettes per day. Starting on 21 mg patch and 2 mg lozenges prn. CO 37 ppm, last smoked 1.5 hours prior to meeting.

## 2020-11-17 NOTE — Clinical Note
Your patient just enrolled into the smoking cessation program. FTND of 8 indicates a high level of tobacco dependency. CESD of 6 is preceived as no mental distress or depression at this time.  Smokes 20-25 cigarettes per day. Starting on 21 mg patch and 2 mg lozenges prn. CO 37 ppm, last smoked 1.5 hours prior to meeting.

## 2020-11-18 ENCOUNTER — TELEPHONE (OUTPATIENT)
Dept: OTHER | Facility: CLINIC | Age: 45
End: 2020-11-18

## 2020-12-07 ENCOUNTER — OFFICE VISIT (OUTPATIENT)
Dept: FAMILY MEDICINE | Facility: CLINIC | Age: 45
End: 2020-12-07
Payer: COMMERCIAL

## 2020-12-07 DIAGNOSIS — L30.1 DYSHIDROTIC ECZEMA: Primary | ICD-10-CM

## 2020-12-07 PROCEDURE — 99214 PR OFFICE/OUTPT VISIT, EST, LEVL IV, 30-39 MIN: ICD-10-PCS | Mod: 95,,, | Performed by: FAMILY MEDICINE

## 2020-12-07 PROCEDURE — 99214 OFFICE O/P EST MOD 30 MIN: CPT | Mod: 95,,, | Performed by: FAMILY MEDICINE

## 2020-12-07 RX ORDER — TRIAMCINOLONE ACETONIDE 5 MG/G
CREAM TOPICAL 2 TIMES DAILY
Qty: 45 G | Refills: 3 | Status: SHIPPED | OUTPATIENT
Start: 2020-12-07 | End: 2021-03-09

## 2020-12-07 NOTE — PROGRESS NOTES
The patient location is: home  The chief complaint leading to consultation is:  No chief complaint on file.    Visit type: Virtual visit with synchronous audio and video  Total time spent with patient:  10 min  Each patient to whom he or she provides medical services by telemedicine is:  (1) informed of the relationship between the physician and patient and the respective role of any other health care provider with respect to management of the patient; and (2) notified that he or she may decline to receive medical services by telemedicine and may withdraw from such care at any time.    (Portions of this note were dictated using voice recognition software and may contain dictation related errors in spelling/grammar/syntax not found on text review)         HPI: 45 y.o. male complains of dry cracked scaly itchy and painful hands usually when the weather changes.  Symptoms are bilateral and involving all the fingers.  No other skin involvement.  No new skin products.  No systemic symptoms.  No specific contact concern.  Was using some Desitin ointment over the last day or so and putting gloves on after to keep hands moisturized.  Typically followed by Dr. Bowen  For primary care    Past Medical History:   Diagnosis Date    Anxiety     Supraventricular tachycardia        Past Surgical History:   Procedure Laterality Date    CARDIAC ELECTROPHYSIOLOGY MAPPING AND ABLATION  2020    for PSVT by Dr. Lim     NOSE SURGERY         Family History   Problem Relation Age of Onset    Stroke Maternal Grandmother     No Known Problems Mother     Emphysema Father     No Known Problems Brother     Diabetes Mellitus Neg Hx     Cancer Neg Hx     Heart disease Neg Hx        Social History     Tobacco Use    Smoking status: Heavy Tobacco Smoker     Packs/day: 0.50     Years: 10.00     Pack years: 5.00     Types: Cigarettes     Last attempt to quit: 2018     Years since quittin.3    Smokeless tobacco: Never Used    Substance Use Topics    Alcohol use: Yes     Alcohol/week: 9.0 standard drinks     Types: 2 Cans of beer, 4 Shots of liquor, 3 Standard drinks or equivalent per week     Frequency: 2-3 times a week     Drinks per session: 1 or 2     Binge frequency: Less than monthly    Drug use: Never       Lab Results   Component Value Date    WBC 12.08 11/03/2020    HGB 16.9 11/03/2020    HCT 50.7 11/03/2020     (H) 11/03/2020     11/03/2020    CHOL 248 (H) 11/03/2020    TRIG 228 (H) 11/03/2020    HDL 51 11/03/2020    ALT 49 (H) 11/03/2020    AST 37 11/03/2020    BILITOT 0.8 11/03/2020    ALKPHOS 51 (L) 11/03/2020     11/03/2020    K 4.2 11/03/2020     11/03/2020    CREATININE 0.8 11/03/2020    ESTGFRAFRICA >60.0 11/03/2020    EGFRNONAA >60.0 11/03/2020    CALCIUM 9.9 11/03/2020    ALBUMIN 4.5 11/03/2020    BUN 12 11/03/2020    CO2 26 11/03/2020    TSH 0.869 11/03/2020    INR 0.9 06/08/2020    HGBA1C 5.2 11/03/2020    LDLCALC 151.4 11/03/2020    GLU 94 11/03/2020             Answers for HPI/ROS submitted by the patient on 12/7/2020   activity change: No  unexpected weight change: No  neck pain: No  hearing loss: No  rhinorrhea: No  trouble swallowing: No  eye discharge: No  visual disturbance: No  chest tightness: No  wheezing: No  chest pain: No  palpitations: No  blood in stool: No  constipation: No  vomiting: No  diarrhea: No  polydipsia: No  polyuria: No  difficulty urinating: No  urgency: No  hematuria: No  joint swelling: No  arthralgias: No  headaches: No  weakness: No  confusion: No  dysphoric mood: No         PE (elements able to be captured on virtual encounter)  APPEARANCE: Well nourished, well developed, in no acute distress.    HEAD: Normocephalic, atraumatic.  EYES:  .   Conjunctivae noninjected.  RESPIRATORY:  No increased work of breathing or objective visual signs of dyspnea  PSYCHIATRIC:  Normal mood and affect, alert and oriented, appropriate answers to questions  SKIN:   Visualization of hands on video do show dryness, scaling, cracking, scabs on all fingers of both hands.  No overt signs of superimposed infection    IMPRESSION  1. Dyshidrotic eczema            PLAN  Discussed management of acute symptoms with triamcinolone 0.5% cream applied to both hands twice a day for the next 1-2 weeks.  Can continue maintenance treatment with Desitin ointment for right now.  Discussed 1 symptoms improved can use Cetaphil or CeraVe for maintenance.  Notify for any signs of superimposed infection.

## 2020-12-16 ENCOUNTER — OFFICE VISIT (OUTPATIENT)
Dept: FAMILY MEDICINE | Facility: CLINIC | Age: 45
End: 2020-12-16
Payer: COMMERCIAL

## 2020-12-16 VITALS
OXYGEN SATURATION: 98 % | HEART RATE: 100 BPM | HEIGHT: 68 IN | SYSTOLIC BLOOD PRESSURE: 146 MMHG | DIASTOLIC BLOOD PRESSURE: 86 MMHG | WEIGHT: 228.63 LBS | TEMPERATURE: 97 F | BODY MASS INDEX: 34.65 KG/M2

## 2020-12-16 DIAGNOSIS — I47.10 PSVT (PAROXYSMAL SUPRAVENTRICULAR TACHYCARDIA): ICD-10-CM

## 2020-12-16 DIAGNOSIS — F41.8 SITUATIONAL ANXIETY: ICD-10-CM

## 2020-12-16 DIAGNOSIS — L30.1 DYSHIDROTIC ECZEMA: ICD-10-CM

## 2020-12-16 DIAGNOSIS — M77.12 LATERAL EPICONDYLITIS OF LEFT ELBOW: ICD-10-CM

## 2020-12-16 DIAGNOSIS — I10 ESSENTIAL HYPERTENSION: Primary | ICD-10-CM

## 2020-12-16 DIAGNOSIS — I47.10 SVT (SUPRAVENTRICULAR TACHYCARDIA): ICD-10-CM

## 2020-12-16 PROCEDURE — 3079F PR MOST RECENT DIASTOLIC BLOOD PRESSURE 80-89 MM HG: ICD-10-PCS | Mod: CPTII,S$GLB,, | Performed by: FAMILY MEDICINE

## 2020-12-16 PROCEDURE — 3008F PR BODY MASS INDEX (BMI) DOCUMENTED: ICD-10-PCS | Mod: CPTII,S$GLB,, | Performed by: FAMILY MEDICINE

## 2020-12-16 PROCEDURE — 1125F PR PAIN SEVERITY QUANTIFIED, PAIN PRESENT: ICD-10-PCS | Mod: S$GLB,,, | Performed by: FAMILY MEDICINE

## 2020-12-16 PROCEDURE — 3077F PR MOST RECENT SYSTOLIC BLOOD PRESSURE >= 140 MM HG: ICD-10-PCS | Mod: CPTII,S$GLB,, | Performed by: FAMILY MEDICINE

## 2020-12-16 PROCEDURE — 1125F AMNT PAIN NOTED PAIN PRSNT: CPT | Mod: S$GLB,,, | Performed by: FAMILY MEDICINE

## 2020-12-16 PROCEDURE — 3008F BODY MASS INDEX DOCD: CPT | Mod: CPTII,S$GLB,, | Performed by: FAMILY MEDICINE

## 2020-12-16 PROCEDURE — 99999 PR PBB SHADOW E&M-EST. PATIENT-LVL IV: CPT | Mod: PBBFAC,,, | Performed by: FAMILY MEDICINE

## 2020-12-16 PROCEDURE — 99214 PR OFFICE/OUTPT VISIT, EST, LEVL IV, 30-39 MIN: ICD-10-PCS | Mod: S$GLB,,, | Performed by: FAMILY MEDICINE

## 2020-12-16 PROCEDURE — 99999 PR PBB SHADOW E&M-EST. PATIENT-LVL IV: ICD-10-PCS | Mod: PBBFAC,,, | Performed by: FAMILY MEDICINE

## 2020-12-16 PROCEDURE — 3079F DIAST BP 80-89 MM HG: CPT | Mod: CPTII,S$GLB,, | Performed by: FAMILY MEDICINE

## 2020-12-16 PROCEDURE — 3077F SYST BP >= 140 MM HG: CPT | Mod: CPTII,S$GLB,, | Performed by: FAMILY MEDICINE

## 2020-12-16 PROCEDURE — 99214 OFFICE O/P EST MOD 30 MIN: CPT | Mod: S$GLB,,, | Performed by: FAMILY MEDICINE

## 2020-12-16 RX ORDER — CLOBETASOL PROPIONATE 0.5 MG/G
OINTMENT TOPICAL 2 TIMES DAILY
Qty: 45 G | Refills: 0 | Status: SHIPPED | OUTPATIENT
Start: 2020-12-16 | End: 2021-02-26

## 2020-12-16 RX ORDER — SERTRALINE HYDROCHLORIDE 25 MG/1
25 TABLET, FILM COATED ORAL DAILY
Qty: 90 TABLET | Refills: 0 | Status: SHIPPED | OUTPATIENT
Start: 2020-12-16 | End: 2021-03-09

## 2020-12-16 RX ORDER — VERAPAMIL HYDROCHLORIDE 120 MG/1
120 TABLET, FILM COATED, EXTENDED RELEASE ORAL NIGHTLY
Qty: 90 TABLET | Refills: 1 | Status: SHIPPED | OUTPATIENT
Start: 2020-12-16 | End: 2021-03-09 | Stop reason: SDUPTHER

## 2020-12-16 RX ORDER — MELOXICAM 7.5 MG/1
7.5 TABLET ORAL DAILY
Qty: 30 TABLET | Refills: 0 | Status: SHIPPED | OUTPATIENT
Start: 2020-12-16 | End: 2021-01-08

## 2020-12-16 NOTE — PROGRESS NOTES
"Subjective:       Patient ID: Enio Earl is a 45 y.o. male.    Chief Complaint: HTN, anxiety    Enio is a 45 y.o. male who presents today for f/u    DLD: placed on statin. No side effects    HTN: tried diet control. Not controlled. He had declined medication at the last visit. Pressures continue to be high. EP recommend verapamil over BB, will start on 120 mg, not immediate release.     He has been washing his hands a lot. He has been using kenalog cream and rash started again.     Is having lateral epicondylitis again. Started 4 days ago. He has not tried voltaren gel or elbow brace.     He is having anxiety. He wakes up at night. Multiple times. Has ruminating thoughts. No thoughts of self arm. Just mostly anxiety. Work, life, "day to day shit" are triggers.     Review of Systems   Constitutional: Negative for chills and fever.   Gastrointestinal: Negative for constipation, diarrhea, nausea and vomiting.   Skin: Positive for rash.   Psychiatric/Behavioral: Positive for dysphoric mood and sleep disturbance. Negative for hallucinations, self-injury and suicidal ideas. The patient is nervous/anxious. The patient is not hyperactive.            Objective:     Vitals:    12/16/20 1451   BP: (!) 146/86   Pulse: 100   Temp: 97.3 °F (36.3 °C)        Physical Exam  Vitals signs and nursing note reviewed.   Constitutional:       Appearance: He is obese.   HENT:      Head: Normocephalic and atraumatic.   Eyes:      Conjunctiva/sclera: Conjunctivae normal.   Cardiovascular:      Rate and Rhythm: Normal rate and regular rhythm.   Pulmonary:      Effort: Pulmonary effort is normal.      Breath sounds: Normal breath sounds.   Abdominal:      General: There is no distension.      Palpations: Abdomen is soft.      Tenderness: There is no abdominal tenderness.   Musculoskeletal:         General: Tenderness present. No swelling, deformity or signs of injury.      Comments: TTP of the left lateral epicondyle without erythema " or edema   Skin:     Findings: Rash present.      Comments: See pictures below   Neurological:      Mental Status: He is alert.   Psychiatric:         Mood and Affect: Mood normal.         Behavior: Behavior normal.         Thought Content: Thought content normal.         Judgment: Judgment normal.                     Assessment:       1. Essential hypertension    2. PSVT (paroxysmal supraventricular tachycardia)    3. SVT (supraventricular tachycardia)    4. Dyshidrotic eczema    5. Lateral epicondylitis of left elbow        Plan:       Kenalog, minimally helping  Would use clobetasol x 10-14 days. Do not use for longer then that  Can then go down to kenalog for 3-7 days  Then down to hydrocortisone OTC as needed  Sleep with Vaseline at night time with gloves over the hands    Can try mobic once daily x 7 days and voltaren gel BID as well as icing and elbow brace. If still hurting next Wednesday, contact me and we can set up f/u with Dr. Adam    Start zoloft 25 mg today.      EP recommends verapamil 120 mg 24 hour tabs qam for BP and SVT    Monitor BP and f/u in 8 weeks.         Essential hypertension  -     verapamiL (CALAN-SR) 120 MG CR tablet; Take 1 tablet (120 mg total) by mouth every evening.  Dispense: 90 tablet; Refill: 1    PSVT (paroxysmal supraventricular tachycardia)  -     verapamiL (CALAN-SR) 120 MG CR tablet; Take 1 tablet (120 mg total) by mouth every evening.  Dispense: 90 tablet; Refill: 1    SVT (supraventricular tachycardia)  -     verapamiL (CALAN-SR) 120 MG CR tablet; Take 1 tablet (120 mg total) by mouth every evening.  Dispense: 90 tablet; Refill: 1    Dyshidrotic eczema  -     clobetasol 0.05% (TEMOVATE) 0.05 % Oint; Apply topically 2 (two) times daily.  Dispense: 45 g; Refill: 0    Lateral epicondylitis of left elbow  -     meloxicam (MOBIC) 7.5 MG tablet; Take 1 tablet (7.5 mg total) by mouth once daily.  Dispense: 30 tablet; Refill: 0        Warning signs discussed, patient to call  with any further issues or worsening of symptoms.

## 2020-12-16 NOTE — PATIENT INSTRUCTIONS
Kenalog, minimally helping  Would use clobetasol x 10-14 days. No more then that at a time.   Can then go down to kenalog for 3-7 days  Then down to hydrocortisone OTC as needed  Sleep with Vaseline at night time with gloves over the hands    Can try mobic once daily x 7 days and voltaren gel BID as well as icing and elbow brace. If still hurting next Wednesday, contact me and we can set up f/u with Dr. Adam    EP recommends verapamil 120 mg 24 hour tabs qam for BP and SVT    Monitor BP and f/u in 8 weeks.     No TV in bedroom  Sleep hygiene   Exercise 3 x/week  Make plans at least once a week with a friend  Journal - 3 good things  Every bad day is a good day to journal  Families problems are not your own  Consider Counseling visit in the future  SSRI  Start zoloft 25 mg today.      Books to Read:  The Feeling Good Handbook by Javan Reyna  What you can change and what you can't by Juan Carlos Singletary

## 2021-01-06 ENCOUNTER — PATIENT MESSAGE (OUTPATIENT)
Dept: FAMILY MEDICINE | Facility: CLINIC | Age: 46
End: 2021-01-06

## 2021-01-07 ENCOUNTER — TELEPHONE (OUTPATIENT)
Dept: ORTHOPEDICS | Facility: CLINIC | Age: 46
End: 2021-01-07

## 2021-01-07 ENCOUNTER — TELEPHONE (OUTPATIENT)
Dept: ADMINISTRATIVE | Facility: OTHER | Age: 46
End: 2021-01-07

## 2021-01-13 ENCOUNTER — PATIENT OUTREACH (OUTPATIENT)
Dept: ADMINISTRATIVE | Facility: OTHER | Age: 46
End: 2021-01-13

## 2021-01-14 ENCOUNTER — OFFICE VISIT (OUTPATIENT)
Dept: ORTHOPEDICS | Facility: CLINIC | Age: 46
End: 2021-01-14
Payer: COMMERCIAL

## 2021-01-14 VITALS — BODY MASS INDEX: 34.56 KG/M2 | HEIGHT: 68 IN | WEIGHT: 228 LBS

## 2021-01-14 DIAGNOSIS — M77.12 LATERAL EPICONDYLITIS OF LEFT ELBOW: Primary | ICD-10-CM

## 2021-01-14 PROCEDURE — 20551 PR INJECT TENDON ORIGIN/INSERT: ICD-10-PCS | Mod: LT,S$GLB,, | Performed by: ORTHOPAEDIC SURGERY

## 2021-01-14 PROCEDURE — 1125F PR PAIN SEVERITY QUANTIFIED, PAIN PRESENT: ICD-10-PCS | Mod: S$GLB,,, | Performed by: ORTHOPAEDIC SURGERY

## 2021-01-14 PROCEDURE — 99213 OFFICE O/P EST LOW 20 MIN: CPT | Mod: 25,S$GLB,, | Performed by: ORTHOPAEDIC SURGERY

## 2021-01-14 PROCEDURE — 3008F BODY MASS INDEX DOCD: CPT | Mod: CPTII,S$GLB,, | Performed by: ORTHOPAEDIC SURGERY

## 2021-01-14 PROCEDURE — 99999 PR PBB SHADOW E&M-EST. PATIENT-LVL III: CPT | Mod: PBBFAC,,, | Performed by: ORTHOPAEDIC SURGERY

## 2021-01-14 PROCEDURE — 20551 NJX 1 TENDON ORIGIN/INSJ: CPT | Mod: LT,S$GLB,, | Performed by: ORTHOPAEDIC SURGERY

## 2021-01-14 PROCEDURE — 99999 PR PBB SHADOW E&M-EST. PATIENT-LVL III: ICD-10-PCS | Mod: PBBFAC,,, | Performed by: ORTHOPAEDIC SURGERY

## 2021-01-14 PROCEDURE — 99213 PR OFFICE/OUTPT VISIT, EST, LEVL III, 20-29 MIN: ICD-10-PCS | Mod: 25,S$GLB,, | Performed by: ORTHOPAEDIC SURGERY

## 2021-01-14 PROCEDURE — 3008F PR BODY MASS INDEX (BMI) DOCUMENTED: ICD-10-PCS | Mod: CPTII,S$GLB,, | Performed by: ORTHOPAEDIC SURGERY

## 2021-01-14 PROCEDURE — 1125F AMNT PAIN NOTED PAIN PRSNT: CPT | Mod: S$GLB,,, | Performed by: ORTHOPAEDIC SURGERY

## 2021-01-14 RX ORDER — TRIAMCINOLONE ACETONIDE 40 MG/ML
20 INJECTION, SUSPENSION INTRA-ARTICULAR; INTRAMUSCULAR
Status: COMPLETED | OUTPATIENT
Start: 2021-01-14 | End: 2021-01-14

## 2021-01-14 RX ADMIN — TRIAMCINOLONE ACETONIDE 20 MG: 40 INJECTION, SUSPENSION INTRA-ARTICULAR; INTRAMUSCULAR at 03:01

## 2021-03-09 ENCOUNTER — OFFICE VISIT (OUTPATIENT)
Dept: FAMILY MEDICINE | Facility: CLINIC | Age: 46
End: 2021-03-09
Payer: COMMERCIAL

## 2021-03-09 VITALS
BODY MASS INDEX: 34.35 KG/M2 | HEART RATE: 80 BPM | DIASTOLIC BLOOD PRESSURE: 88 MMHG | TEMPERATURE: 97 F | SYSTOLIC BLOOD PRESSURE: 135 MMHG | HEIGHT: 68 IN | WEIGHT: 226.63 LBS | OXYGEN SATURATION: 98 %

## 2021-03-09 DIAGNOSIS — I47.10 SVT (SUPRAVENTRICULAR TACHYCARDIA): ICD-10-CM

## 2021-03-09 DIAGNOSIS — F17.200 TOBACCO DEPENDENCE: ICD-10-CM

## 2021-03-09 DIAGNOSIS — Z00.00 VISIT FOR WELL MAN HEALTH CHECK: ICD-10-CM

## 2021-03-09 DIAGNOSIS — E78.49 OTHER HYPERLIPIDEMIA: ICD-10-CM

## 2021-03-09 DIAGNOSIS — F41.8 SITUATIONAL ANXIETY: ICD-10-CM

## 2021-03-09 DIAGNOSIS — I47.10 PSVT (PAROXYSMAL SUPRAVENTRICULAR TACHYCARDIA): ICD-10-CM

## 2021-03-09 DIAGNOSIS — L30.1 DYSHIDROTIC ECZEMA: ICD-10-CM

## 2021-03-09 DIAGNOSIS — I10 ESSENTIAL HYPERTENSION: ICD-10-CM

## 2021-03-09 PROCEDURE — 99214 OFFICE O/P EST MOD 30 MIN: CPT | Mod: S$GLB,,, | Performed by: FAMILY MEDICINE

## 2021-03-09 PROCEDURE — 99999 PR PBB SHADOW E&M-EST. PATIENT-LVL V: CPT | Mod: PBBFAC,,, | Performed by: FAMILY MEDICINE

## 2021-03-09 PROCEDURE — 1126F PR PAIN SEVERITY QUANTIFIED, NO PAIN PRESENT: ICD-10-PCS | Mod: S$GLB,,, | Performed by: FAMILY MEDICINE

## 2021-03-09 PROCEDURE — 3075F SYST BP GE 130 - 139MM HG: CPT | Mod: CPTII,S$GLB,, | Performed by: FAMILY MEDICINE

## 2021-03-09 PROCEDURE — 1126F AMNT PAIN NOTED NONE PRSNT: CPT | Mod: S$GLB,,, | Performed by: FAMILY MEDICINE

## 2021-03-09 PROCEDURE — 99999 PR PBB SHADOW E&M-EST. PATIENT-LVL V: ICD-10-PCS | Mod: PBBFAC,,, | Performed by: FAMILY MEDICINE

## 2021-03-09 PROCEDURE — 3079F PR MOST RECENT DIASTOLIC BLOOD PRESSURE 80-89 MM HG: ICD-10-PCS | Mod: CPTII,S$GLB,, | Performed by: FAMILY MEDICINE

## 2021-03-09 PROCEDURE — 3075F PR MOST RECENT SYSTOLIC BLOOD PRESS GE 130-139MM HG: ICD-10-PCS | Mod: CPTII,S$GLB,, | Performed by: FAMILY MEDICINE

## 2021-03-09 PROCEDURE — 3008F BODY MASS INDEX DOCD: CPT | Mod: CPTII,S$GLB,, | Performed by: FAMILY MEDICINE

## 2021-03-09 PROCEDURE — 3008F PR BODY MASS INDEX (BMI) DOCUMENTED: ICD-10-PCS | Mod: CPTII,S$GLB,, | Performed by: FAMILY MEDICINE

## 2021-03-09 PROCEDURE — 3079F DIAST BP 80-89 MM HG: CPT | Mod: CPTII,S$GLB,, | Performed by: FAMILY MEDICINE

## 2021-03-09 PROCEDURE — 99214 PR OFFICE/OUTPT VISIT, EST, LEVL IV, 30-39 MIN: ICD-10-PCS | Mod: S$GLB,,, | Performed by: FAMILY MEDICINE

## 2021-03-09 RX ORDER — ATORVASTATIN CALCIUM 10 MG/1
10 TABLET, FILM COATED ORAL DAILY
Qty: 90 TABLET | Refills: 3 | Status: SHIPPED | OUTPATIENT
Start: 2021-03-09 | End: 2021-04-27 | Stop reason: SDUPTHER

## 2021-03-09 RX ORDER — CLOBETASOL PROPIONATE 0.5 MG/G
OINTMENT TOPICAL
Qty: 45 G | Refills: 0 | Status: SHIPPED | OUTPATIENT
Start: 2021-03-09 | End: 2021-05-13 | Stop reason: CLARIF

## 2021-03-09 RX ORDER — SERTRALINE HYDROCHLORIDE 25 MG/1
25 TABLET, FILM COATED ORAL DAILY
Qty: 90 TABLET | Refills: 0 | Status: SHIPPED | OUTPATIENT
Start: 2021-03-09 | End: 2021-06-10 | Stop reason: SDUPTHER

## 2021-03-09 RX ORDER — VERAPAMIL HYDROCHLORIDE 120 MG/1
120 TABLET, FILM COATED, EXTENDED RELEASE ORAL NIGHTLY
Qty: 90 TABLET | Refills: 1 | Status: SHIPPED | OUTPATIENT
Start: 2021-03-09 | End: 2021-07-21 | Stop reason: SDUPTHER

## 2021-03-09 RX ORDER — LOSARTAN POTASSIUM 50 MG/1
50 TABLET ORAL DAILY
Qty: 90 TABLET | Refills: 1 | Status: SHIPPED | OUTPATIENT
Start: 2021-03-09 | End: 2021-07-21 | Stop reason: SDUPTHER

## 2021-03-23 ENCOUNTER — CLINICAL SUPPORT (OUTPATIENT)
Dept: SMOKING CESSATION | Facility: CLINIC | Age: 46
End: 2021-03-23
Payer: COMMERCIAL

## 2021-03-23 VITALS — WEIGHT: 226 LBS | HEIGHT: 68 IN | BODY MASS INDEX: 34.25 KG/M2

## 2021-03-23 DIAGNOSIS — F17.210 MODERATE CIGARETTE SMOKER (10-19 PER DAY): Primary | ICD-10-CM

## 2021-03-23 PROCEDURE — 99404 PR PREVENT COUNSEL,INDIV,60 MIN: ICD-10-PCS | Mod: S$GLB,,,

## 2021-03-23 PROCEDURE — 99999 PR PBB SHADOW E&M-EST. PATIENT-LVL II: ICD-10-PCS | Mod: PBBFAC,,,

## 2021-03-23 PROCEDURE — 99999 PR PBB SHADOW E&M-EST. PATIENT-LVL II: CPT | Mod: PBBFAC,,,

## 2021-03-23 PROCEDURE — 99404 PREV MED CNSL INDIV APPRX 60: CPT | Mod: S$GLB,,,

## 2021-03-23 RX ORDER — MICONAZOLE NITRATE 2 %
2 CREAM (GRAM) TOPICAL
Qty: 100 EACH | Refills: 0 | Status: SHIPPED | OUTPATIENT
Start: 2021-03-23 | End: 2021-07-21

## 2021-03-29 ENCOUNTER — TELEPHONE (OUTPATIENT)
Dept: ORTHOPEDICS | Facility: CLINIC | Age: 46
End: 2021-03-29

## 2021-03-30 ENCOUNTER — PATIENT OUTREACH (OUTPATIENT)
Dept: ADMINISTRATIVE | Facility: OTHER | Age: 46
End: 2021-03-30

## 2021-04-01 ENCOUNTER — OFFICE VISIT (OUTPATIENT)
Dept: ORTHOPEDICS | Facility: CLINIC | Age: 46
End: 2021-04-01
Payer: COMMERCIAL

## 2021-04-01 ENCOUNTER — TELEPHONE (OUTPATIENT)
Dept: ORTHOPEDICS | Facility: CLINIC | Age: 46
End: 2021-04-01

## 2021-04-01 VITALS — HEIGHT: 68 IN | WEIGHT: 226 LBS | BODY MASS INDEX: 34.25 KG/M2

## 2021-04-01 DIAGNOSIS — M77.12 LATERAL EPICONDYLITIS OF LEFT ELBOW: Primary | ICD-10-CM

## 2021-04-01 DIAGNOSIS — Z41.9 ELECTIVE SURGERY: ICD-10-CM

## 2021-04-01 PROCEDURE — 20551 NJX 1 TENDON ORIGIN/INSJ: CPT | Mod: LT,S$GLB,, | Performed by: PHYSICIAN ASSISTANT

## 2021-04-01 PROCEDURE — 99999 PR PBB SHADOW E&M-EST. PATIENT-LVL III: CPT | Mod: PBBFAC,,, | Performed by: PHYSICIAN ASSISTANT

## 2021-04-01 PROCEDURE — 1125F PR PAIN SEVERITY QUANTIFIED, PAIN PRESENT: ICD-10-PCS | Mod: S$GLB,,, | Performed by: PHYSICIAN ASSISTANT

## 2021-04-01 PROCEDURE — 99999 PR PBB SHADOW E&M-EST. PATIENT-LVL III: ICD-10-PCS | Mod: PBBFAC,,, | Performed by: PHYSICIAN ASSISTANT

## 2021-04-01 PROCEDURE — 99213 PR OFFICE/OUTPT VISIT, EST, LEVL III, 20-29 MIN: ICD-10-PCS | Mod: 25,S$GLB,, | Performed by: PHYSICIAN ASSISTANT

## 2021-04-01 PROCEDURE — 3008F PR BODY MASS INDEX (BMI) DOCUMENTED: ICD-10-PCS | Mod: CPTII,S$GLB,, | Performed by: PHYSICIAN ASSISTANT

## 2021-04-01 PROCEDURE — 20551 TENDON ORIGIN: ICD-10-PCS | Mod: LT,S$GLB,, | Performed by: PHYSICIAN ASSISTANT

## 2021-04-01 PROCEDURE — 99213 OFFICE O/P EST LOW 20 MIN: CPT | Mod: 25,S$GLB,, | Performed by: PHYSICIAN ASSISTANT

## 2021-04-01 PROCEDURE — 3008F BODY MASS INDEX DOCD: CPT | Mod: CPTII,S$GLB,, | Performed by: PHYSICIAN ASSISTANT

## 2021-04-01 PROCEDURE — 1125F AMNT PAIN NOTED PAIN PRSNT: CPT | Mod: S$GLB,,, | Performed by: PHYSICIAN ASSISTANT

## 2021-04-01 RX ORDER — TRIAMCINOLONE ACETONIDE 40 MG/ML
40 INJECTION, SUSPENSION INTRA-ARTICULAR; INTRAMUSCULAR
Status: COMPLETED | OUTPATIENT
Start: 2021-04-01 | End: 2021-04-01

## 2021-04-01 RX ADMIN — TRIAMCINOLONE ACETONIDE 40 MG: 40 INJECTION, SUSPENSION INTRA-ARTICULAR; INTRAMUSCULAR at 10:04

## 2021-04-15 DIAGNOSIS — M77.12 LATERAL EPICONDYLITIS OF LEFT ELBOW: Primary | ICD-10-CM

## 2021-04-15 RX ORDER — MUPIROCIN 20 MG/G
OINTMENT TOPICAL
Status: CANCELLED | OUTPATIENT
Start: 2021-04-15

## 2021-04-15 RX ORDER — SODIUM CHLORIDE 9 MG/ML
INJECTION, SOLUTION INTRAVENOUS CONTINUOUS
Status: CANCELLED | OUTPATIENT
Start: 2021-04-15

## 2021-04-16 ENCOUNTER — ANESTHESIA EVENT (OUTPATIENT)
Dept: SURGERY | Facility: HOSPITAL | Age: 46
End: 2021-04-16
Payer: COMMERCIAL

## 2021-04-16 ENCOUNTER — PATIENT MESSAGE (OUTPATIENT)
Dept: RESEARCH | Facility: HOSPITAL | Age: 46
End: 2021-04-16

## 2021-04-21 ENCOUNTER — TELEPHONE (OUTPATIENT)
Dept: SMOKING CESSATION | Facility: CLINIC | Age: 46
End: 2021-04-21

## 2021-04-21 ENCOUNTER — PATIENT MESSAGE (OUTPATIENT)
Dept: DERMATOLOGY | Facility: CLINIC | Age: 46
End: 2021-04-21

## 2021-04-23 ENCOUNTER — OFFICE VISIT (OUTPATIENT)
Dept: DERMATOLOGY | Facility: CLINIC | Age: 46
End: 2021-04-23
Payer: COMMERCIAL

## 2021-04-23 DIAGNOSIS — L72.0 MILIA: ICD-10-CM

## 2021-04-23 DIAGNOSIS — L30.9 HAND ECZEMA: Primary | ICD-10-CM

## 2021-04-23 DIAGNOSIS — L57.0 ACTINIC KERATOSIS: ICD-10-CM

## 2021-04-23 DIAGNOSIS — L03.011 CHRONIC PARONYCHIA OF RIGHT THUMB: ICD-10-CM

## 2021-04-23 DIAGNOSIS — L70.0 COMEDONAL ACNE: ICD-10-CM

## 2021-04-23 DIAGNOSIS — L30.1 DYSHIDROTIC ECZEMA: ICD-10-CM

## 2021-04-23 PROCEDURE — 99204 OFFICE O/P NEW MOD 45 MIN: CPT | Mod: S$GLB,,, | Performed by: DERMATOLOGY

## 2021-04-23 PROCEDURE — 1126F AMNT PAIN NOTED NONE PRSNT: CPT | Mod: S$GLB,,, | Performed by: DERMATOLOGY

## 2021-04-23 PROCEDURE — 1126F PR PAIN SEVERITY QUANTIFIED, NO PAIN PRESENT: ICD-10-PCS | Mod: S$GLB,,, | Performed by: DERMATOLOGY

## 2021-04-23 PROCEDURE — 99204 PR OFFICE/OUTPT VISIT, NEW, LEVL IV, 45-59 MIN: ICD-10-PCS | Mod: S$GLB,,, | Performed by: DERMATOLOGY

## 2021-04-23 RX ORDER — FLUOCINONIDE 0.5 MG/G
CREAM TOPICAL
Qty: 60 G | Refills: 3 | Status: SHIPPED | OUTPATIENT
Start: 2021-04-23 | End: 2021-07-21 | Stop reason: SDUPTHER

## 2021-04-23 RX ORDER — TRETINOIN 0.5 MG/G
CREAM TOPICAL
Qty: 20 G | Refills: 5 | Status: SHIPPED | OUTPATIENT
Start: 2021-04-23 | End: 2023-02-15

## 2021-04-23 RX ORDER — FLUCONAZOLE 200 MG/1
TABLET ORAL
Qty: 4 TABLET | Refills: 0 | Status: SHIPPED | OUTPATIENT
Start: 2021-04-23 | End: 2021-07-21

## 2021-04-26 ENCOUNTER — PATIENT MESSAGE (OUTPATIENT)
Dept: FAMILY MEDICINE | Facility: CLINIC | Age: 46
End: 2021-04-26

## 2021-04-27 DIAGNOSIS — E78.49 OTHER HYPERLIPIDEMIA: ICD-10-CM

## 2021-04-27 RX ORDER — ATORVASTATIN CALCIUM 10 MG/1
10 TABLET, FILM COATED ORAL DAILY
Qty: 90 TABLET | Refills: 3 | Status: SHIPPED | OUTPATIENT
Start: 2021-04-27 | End: 2021-07-21 | Stop reason: SDUPTHER

## 2021-05-11 ENCOUNTER — PATIENT MESSAGE (OUTPATIENT)
Dept: SURGERY | Facility: HOSPITAL | Age: 46
End: 2021-05-11

## 2021-05-13 ENCOUNTER — CLINICAL SUPPORT (OUTPATIENT)
Dept: LAB | Facility: HOSPITAL | Age: 46
End: 2021-05-13
Attending: ORTHOPAEDIC SURGERY
Payer: COMMERCIAL

## 2021-05-13 ENCOUNTER — HOSPITAL ENCOUNTER (OUTPATIENT)
Dept: PREADMISSION TESTING | Facility: HOSPITAL | Age: 46
Discharge: HOME | End: 2021-05-13
Attending: ORTHOPAEDIC SURGERY
Payer: COMMERCIAL

## 2021-05-13 VITALS
DIASTOLIC BLOOD PRESSURE: 92 MMHG | HEART RATE: 81 BPM | HEIGHT: 68 IN | SYSTOLIC BLOOD PRESSURE: 142 MMHG | OXYGEN SATURATION: 97 % | RESPIRATION RATE: 16 BRPM | BODY MASS INDEX: 34.1 KG/M2 | WEIGHT: 225 LBS

## 2021-05-13 DIAGNOSIS — Z01.818 PREOP EXAMINATION: ICD-10-CM

## 2021-05-13 PROCEDURE — 93005 ELECTROCARDIOGRAM TRACING: CPT

## 2021-05-13 PROCEDURE — 93010 ELECTROCARDIOGRAM REPORT: CPT | Mod: ,,, | Performed by: INTERNAL MEDICINE

## 2021-05-13 PROCEDURE — 93010 EKG 12-LEAD: ICD-10-PCS | Mod: ,,, | Performed by: INTERNAL MEDICINE

## 2021-05-13 RX ORDER — LIDOCAINE HYDROCHLORIDE 10 MG/ML
1 INJECTION, SOLUTION EPIDURAL; INFILTRATION; INTRACAUDAL; PERINEURAL ONCE
Status: CANCELLED | OUTPATIENT
Start: 2021-05-13 | End: 2021-05-13

## 2021-05-13 RX ORDER — SODIUM CHLORIDE, SODIUM LACTATE, POTASSIUM CHLORIDE, CALCIUM CHLORIDE 600; 310; 30; 20 MG/100ML; MG/100ML; MG/100ML; MG/100ML
INJECTION, SOLUTION INTRAVENOUS CONTINUOUS
Status: CANCELLED | OUTPATIENT
Start: 2021-05-13

## 2021-05-15 ENCOUNTER — LAB VISIT (OUTPATIENT)
Dept: INTERNAL MEDICINE | Facility: CLINIC | Age: 46
End: 2021-05-15
Payer: COMMERCIAL

## 2021-05-15 DIAGNOSIS — Z41.9 ELECTIVE SURGERY: ICD-10-CM

## 2021-05-15 PROCEDURE — U0003 INFECTIOUS AGENT DETECTION BY NUCLEIC ACID (DNA OR RNA); SEVERE ACUTE RESPIRATORY SYNDROME CORONAVIRUS 2 (SARS-COV-2) (CORONAVIRUS DISEASE [COVID-19]), AMPLIFIED PROBE TECHNIQUE, MAKING USE OF HIGH THROUGHPUT TECHNOLOGIES AS DESCRIBED BY CMS-2020-01-R: HCPCS | Performed by: ORTHOPAEDIC SURGERY

## 2021-05-15 PROCEDURE — U0005 INFEC AGEN DETEC AMPLI PROBE: HCPCS | Performed by: ORTHOPAEDIC SURGERY

## 2021-05-16 LAB — SARS-COV-2 RNA RESP QL NAA+PROBE: NOT DETECTED

## 2021-05-18 ENCOUNTER — HOSPITAL ENCOUNTER (OUTPATIENT)
Facility: HOSPITAL | Age: 46
Discharge: HOME | End: 2021-05-18
Attending: ORTHOPAEDIC SURGERY | Admitting: ORTHOPAEDIC SURGERY
Payer: COMMERCIAL

## 2021-05-18 ENCOUNTER — ANESTHESIA (OUTPATIENT)
Dept: SURGERY | Facility: HOSPITAL | Age: 46
End: 2021-05-18
Payer: COMMERCIAL

## 2021-05-18 VITALS
WEIGHT: 220 LBS | OXYGEN SATURATION: 95 % | RESPIRATION RATE: 18 BRPM | HEIGHT: 68 IN | BODY MASS INDEX: 33.34 KG/M2 | HEART RATE: 78 BPM | TEMPERATURE: 98 F | DIASTOLIC BLOOD PRESSURE: 76 MMHG | SYSTOLIC BLOOD PRESSURE: 145 MMHG

## 2021-05-18 DIAGNOSIS — Z01.818 PREOP EXAMINATION: Primary | ICD-10-CM

## 2021-05-18 DIAGNOSIS — M77.12 LATERAL EPICONDYLITIS OF LEFT ELBOW: ICD-10-CM

## 2021-05-18 PROCEDURE — 27200704 HC ULTRASOUND NDL/ECHOSTIM: Performed by: STUDENT IN AN ORGANIZED HEALTH CARE EDUCATION/TRAINING PROGRAM

## 2021-05-18 PROCEDURE — 36000706: Performed by: ORTHOPAEDIC SURGERY

## 2021-05-18 PROCEDURE — 63600175 PHARM REV CODE 636 W HCPCS: Performed by: PHYSICIAN ASSISTANT

## 2021-05-18 PROCEDURE — 63600175 PHARM REV CODE 636 W HCPCS: Performed by: STUDENT IN AN ORGANIZED HEALTH CARE EDUCATION/TRAINING PROGRAM

## 2021-05-18 PROCEDURE — 63600175 PHARM REV CODE 636 W HCPCS: Performed by: NURSE PRACTITIONER

## 2021-05-18 PROCEDURE — 76942 ECHO GUIDE FOR BIOPSY: CPT | Performed by: STUDENT IN AN ORGANIZED HEALTH CARE EDUCATION/TRAINING PROGRAM

## 2021-05-18 PROCEDURE — 24358 PR TENOTOMY ELBOW LATERAL/MEDIAL DEBRIDE OPEN: ICD-10-PCS | Mod: LT,,, | Performed by: ORTHOPAEDIC SURGERY

## 2021-05-18 PROCEDURE — 37000008 HC ANESTHESIA 1ST 15 MINUTES: Performed by: ORTHOPAEDIC SURGERY

## 2021-05-18 PROCEDURE — 24358 REPAIR ELBOW W/DEB OPEN: CPT | Mod: LT,,, | Performed by: ORTHOPAEDIC SURGERY

## 2021-05-18 PROCEDURE — 37000009 HC ANESTHESIA EA ADD 15 MINS: Performed by: ORTHOPAEDIC SURGERY

## 2021-05-18 PROCEDURE — 36000707: Performed by: ORTHOPAEDIC SURGERY

## 2021-05-18 PROCEDURE — 64415 NJX AA&/STRD BRCH PLXS IMG: CPT | Performed by: STUDENT IN AN ORGANIZED HEALTH CARE EDUCATION/TRAINING PROGRAM

## 2021-05-18 PROCEDURE — 71000015 HC POSTOP RECOV 1ST HR: Performed by: ORTHOPAEDIC SURGERY

## 2021-05-18 RX ORDER — ACETAMINOPHEN 325 MG/1
650 TABLET ORAL EVERY 4 HOURS PRN
Status: DISCONTINUED | OUTPATIENT
Start: 2021-05-18 | End: 2021-05-18 | Stop reason: HOSPADM

## 2021-05-18 RX ORDER — ROPIVACAINE HYDROCHLORIDE 5 MG/ML
INJECTION, SOLUTION EPIDURAL; INFILTRATION; PERINEURAL
Status: DISCONTINUED | OUTPATIENT
Start: 2021-05-18 | End: 2021-05-18

## 2021-05-18 RX ORDER — ONDANSETRON 8 MG/1
8 TABLET, ORALLY DISINTEGRATING ORAL EVERY 8 HOURS PRN
Status: DISCONTINUED | OUTPATIENT
Start: 2021-05-18 | End: 2021-05-18 | Stop reason: HOSPADM

## 2021-05-18 RX ORDER — SODIUM CHLORIDE, SODIUM LACTATE, POTASSIUM CHLORIDE, CALCIUM CHLORIDE 600; 310; 30; 20 MG/100ML; MG/100ML; MG/100ML; MG/100ML
INJECTION, SOLUTION INTRAVENOUS CONTINUOUS
Status: DISCONTINUED | OUTPATIENT
Start: 2021-05-18 | End: 2021-05-18 | Stop reason: HOSPADM

## 2021-05-18 RX ORDER — SODIUM CHLORIDE 9 MG/ML
INJECTION, SOLUTION INTRAVENOUS CONTINUOUS
Status: DISCONTINUED | OUTPATIENT
Start: 2021-05-18 | End: 2021-05-18 | Stop reason: HOSPADM

## 2021-05-18 RX ORDER — CEFAZOLIN SODIUM 2 G/50ML
2 SOLUTION INTRAVENOUS
Status: COMPLETED | OUTPATIENT
Start: 2021-05-18 | End: 2021-05-18

## 2021-05-18 RX ORDER — KETOROLAC TROMETHAMINE 30 MG/ML
30 INJECTION, SOLUTION INTRAMUSCULAR; INTRAVENOUS ONCE
Status: DISCONTINUED | OUTPATIENT
Start: 2021-05-18 | End: 2021-05-18 | Stop reason: HOSPADM

## 2021-05-18 RX ORDER — PROPOFOL 10 MG/ML
VIAL (ML) INTRAVENOUS CONTINUOUS PRN
Status: DISCONTINUED | OUTPATIENT
Start: 2021-05-18 | End: 2021-05-18

## 2021-05-18 RX ORDER — HYDROCODONE BITARTRATE AND ACETAMINOPHEN 7.5; 325 MG/1; MG/1
1 TABLET ORAL EVERY 4 HOURS PRN
Qty: 20 TABLET | Refills: 0 | Status: SHIPPED | OUTPATIENT
Start: 2021-05-18 | End: 2021-07-21

## 2021-05-18 RX ORDER — LIDOCAINE HYDROCHLORIDE 10 MG/ML
1 INJECTION, SOLUTION EPIDURAL; INFILTRATION; INTRACAUDAL; PERINEURAL ONCE
Status: DISCONTINUED | OUTPATIENT
Start: 2021-05-18 | End: 2021-05-18 | Stop reason: HOSPADM

## 2021-05-18 RX ORDER — PROPOFOL 10 MG/ML
VIAL (ML) INTRAVENOUS
Status: DISCONTINUED | OUTPATIENT
Start: 2021-05-18 | End: 2021-05-18

## 2021-05-18 RX ORDER — OXYCODONE HYDROCHLORIDE 5 MG/1
10 TABLET ORAL EVERY 4 HOURS PRN
Status: DISCONTINUED | OUTPATIENT
Start: 2021-05-18 | End: 2021-05-18 | Stop reason: HOSPADM

## 2021-05-18 RX ORDER — SODIUM CHLORIDE, SODIUM LACTATE, POTASSIUM CHLORIDE, CALCIUM CHLORIDE 600; 310; 30; 20 MG/100ML; MG/100ML; MG/100ML; MG/100ML
INJECTION, SOLUTION INTRAVENOUS CONTINUOUS PRN
Status: DISCONTINUED | OUTPATIENT
Start: 2021-05-18 | End: 2021-05-18

## 2021-05-18 RX ORDER — MIDAZOLAM HYDROCHLORIDE 1 MG/ML
INJECTION INTRAMUSCULAR; INTRAVENOUS
Status: DISCONTINUED | OUTPATIENT
Start: 2021-05-18 | End: 2021-05-18

## 2021-05-18 RX ADMIN — SODIUM CHLORIDE, SODIUM LACTATE, POTASSIUM CHLORIDE, AND CALCIUM CHLORIDE: .6; .31; .03; .02 INJECTION, SOLUTION INTRAVENOUS at 08:05

## 2021-05-18 RX ADMIN — CEFAZOLIN SODIUM 2 G: 2 SOLUTION INTRAVENOUS at 09:05

## 2021-05-18 RX ADMIN — ROPIVACAINE HYDROCHLORIDE 25 ML: 5 INJECTION, SOLUTION EPIDURAL; INFILTRATION; PERINEURAL at 09:05

## 2021-05-18 RX ADMIN — PROPOFOL 100 MCG/KG/MIN: 10 INJECTION, EMULSION INTRAVENOUS at 09:05

## 2021-05-18 RX ADMIN — PROPOFOL 20 MG: 10 INJECTION, EMULSION INTRAVENOUS at 09:05

## 2021-05-18 RX ADMIN — SODIUM CHLORIDE, SODIUM LACTATE, POTASSIUM CHLORIDE, AND CALCIUM CHLORIDE: .6; .31; .03; .02 INJECTION, SOLUTION INTRAVENOUS at 09:05

## 2021-05-18 RX ADMIN — MIDAZOLAM HYDROCHLORIDE 5 MG: 1 INJECTION, SOLUTION INTRAMUSCULAR; INTRAVENOUS at 09:05

## 2021-05-21 ENCOUNTER — PATIENT MESSAGE (OUTPATIENT)
Dept: ORTHOPEDICS | Facility: CLINIC | Age: 46
End: 2021-05-21

## 2021-05-25 ENCOUNTER — PATIENT MESSAGE (OUTPATIENT)
Dept: ORTHOPEDICS | Facility: CLINIC | Age: 46
End: 2021-05-25

## 2021-05-27 ENCOUNTER — PATIENT OUTREACH (OUTPATIENT)
Dept: ADMINISTRATIVE | Facility: OTHER | Age: 46
End: 2021-05-27

## 2021-05-27 ENCOUNTER — PATIENT MESSAGE (OUTPATIENT)
Dept: DERMATOLOGY | Facility: CLINIC | Age: 46
End: 2021-05-27

## 2021-06-02 ENCOUNTER — OFFICE VISIT (OUTPATIENT)
Dept: ORTHOPEDICS | Facility: CLINIC | Age: 46
End: 2021-06-02
Payer: COMMERCIAL

## 2021-06-02 VITALS
DIASTOLIC BLOOD PRESSURE: 84 MMHG | BODY MASS INDEX: 33.34 KG/M2 | WEIGHT: 220 LBS | SYSTOLIC BLOOD PRESSURE: 141 MMHG | HEIGHT: 68 IN | HEART RATE: 86 BPM

## 2021-06-02 DIAGNOSIS — M77.12 LATERAL EPICONDYLITIS OF LEFT ELBOW: Primary | ICD-10-CM

## 2021-06-02 PROCEDURE — 3008F BODY MASS INDEX DOCD: CPT | Mod: CPTII,S$GLB,, | Performed by: PHYSICIAN ASSISTANT

## 2021-06-02 PROCEDURE — 1125F PR PAIN SEVERITY QUANTIFIED, PAIN PRESENT: ICD-10-PCS | Mod: S$GLB,,, | Performed by: PHYSICIAN ASSISTANT

## 2021-06-02 PROCEDURE — 3008F PR BODY MASS INDEX (BMI) DOCUMENTED: ICD-10-PCS | Mod: CPTII,S$GLB,, | Performed by: PHYSICIAN ASSISTANT

## 2021-06-02 PROCEDURE — 99024 PR POST-OP FOLLOW-UP VISIT: ICD-10-PCS | Mod: S$GLB,,, | Performed by: PHYSICIAN ASSISTANT

## 2021-06-02 PROCEDURE — 1125F AMNT PAIN NOTED PAIN PRSNT: CPT | Mod: S$GLB,,, | Performed by: PHYSICIAN ASSISTANT

## 2021-06-02 PROCEDURE — 99999 PR PBB SHADOW E&M-EST. PATIENT-LVL III: ICD-10-PCS | Mod: PBBFAC,,, | Performed by: PHYSICIAN ASSISTANT

## 2021-06-02 PROCEDURE — 99024 POSTOP FOLLOW-UP VISIT: CPT | Mod: S$GLB,,, | Performed by: PHYSICIAN ASSISTANT

## 2021-06-02 PROCEDURE — 99999 PR PBB SHADOW E&M-EST. PATIENT-LVL III: CPT | Mod: PBBFAC,,, | Performed by: PHYSICIAN ASSISTANT

## 2021-06-10 ENCOUNTER — TELEPHONE (OUTPATIENT)
Dept: SMOKING CESSATION | Facility: CLINIC | Age: 46
End: 2021-06-10

## 2021-06-22 ENCOUNTER — CLINICAL SUPPORT (OUTPATIENT)
Dept: SMOKING CESSATION | Facility: CLINIC | Age: 46
End: 2021-06-22
Payer: COMMERCIAL

## 2021-06-22 DIAGNOSIS — F17.200 NICOTINE DEPENDENCE: Primary | ICD-10-CM

## 2021-06-22 PROCEDURE — 99407 PR TOBACCO USE CESSATION INTENSIVE >10 MINUTES: ICD-10-PCS | Mod: S$GLB,,,

## 2021-06-22 PROCEDURE — 99407 BEHAV CHNG SMOKING > 10 MIN: CPT | Mod: S$GLB,,,

## 2021-06-26 ENCOUNTER — PATIENT MESSAGE (OUTPATIENT)
Dept: ORTHOPEDICS | Facility: CLINIC | Age: 46
End: 2021-06-26

## 2021-06-30 ENCOUNTER — OFFICE VISIT (OUTPATIENT)
Dept: ORTHOPEDICS | Facility: CLINIC | Age: 46
End: 2021-06-30
Payer: COMMERCIAL

## 2021-06-30 VITALS
HEIGHT: 68 IN | DIASTOLIC BLOOD PRESSURE: 86 MMHG | BODY MASS INDEX: 33.34 KG/M2 | SYSTOLIC BLOOD PRESSURE: 144 MMHG | WEIGHT: 220 LBS | HEART RATE: 104 BPM

## 2021-06-30 DIAGNOSIS — M77.12 LATERAL EPICONDYLITIS OF LEFT ELBOW: Primary | ICD-10-CM

## 2021-06-30 PROCEDURE — 99024 PR POST-OP FOLLOW-UP VISIT: ICD-10-PCS | Mod: S$GLB,,, | Performed by: PHYSICIAN ASSISTANT

## 2021-06-30 PROCEDURE — 99024 POSTOP FOLLOW-UP VISIT: CPT | Mod: S$GLB,,, | Performed by: PHYSICIAN ASSISTANT

## 2021-06-30 PROCEDURE — 99999 PR PBB SHADOW E&M-EST. PATIENT-LVL III: CPT | Mod: PBBFAC,,, | Performed by: PHYSICIAN ASSISTANT

## 2021-06-30 PROCEDURE — 3008F BODY MASS INDEX DOCD: CPT | Mod: CPTII,S$GLB,, | Performed by: PHYSICIAN ASSISTANT

## 2021-06-30 PROCEDURE — 1125F PR PAIN SEVERITY QUANTIFIED, PAIN PRESENT: ICD-10-PCS | Mod: CPTII,S$GLB,, | Performed by: PHYSICIAN ASSISTANT

## 2021-06-30 PROCEDURE — 1125F AMNT PAIN NOTED PAIN PRSNT: CPT | Mod: CPTII,S$GLB,, | Performed by: PHYSICIAN ASSISTANT

## 2021-06-30 PROCEDURE — 3008F PR BODY MASS INDEX (BMI) DOCUMENTED: ICD-10-PCS | Mod: CPTII,S$GLB,, | Performed by: PHYSICIAN ASSISTANT

## 2021-06-30 PROCEDURE — 99999 PR PBB SHADOW E&M-EST. PATIENT-LVL III: ICD-10-PCS | Mod: PBBFAC,,, | Performed by: PHYSICIAN ASSISTANT

## 2021-07-06 ENCOUNTER — CLINICAL SUPPORT (OUTPATIENT)
Dept: REHABILITATION | Facility: HOSPITAL | Age: 46
End: 2021-07-06
Payer: COMMERCIAL

## 2021-07-06 DIAGNOSIS — M62.81 MUSCLE WEAKNESS: ICD-10-CM

## 2021-07-06 DIAGNOSIS — M25.60 STIFFNESS IN JOINT: ICD-10-CM

## 2021-07-06 DIAGNOSIS — M25.522 ARTHRALGIA OF LEFT ELBOW: ICD-10-CM

## 2021-07-06 DIAGNOSIS — M77.12 LATERAL EPICONDYLITIS OF LEFT ELBOW: ICD-10-CM

## 2021-07-06 PROCEDURE — 97165 OT EVAL LOW COMPLEX 30 MIN: CPT | Mod: PN,97

## 2021-07-13 ENCOUNTER — CLINICAL SUPPORT (OUTPATIENT)
Dept: OTHER | Facility: CLINIC | Age: 46
End: 2021-07-13
Payer: COMMERCIAL

## 2021-07-13 DIAGNOSIS — Z00.8 ENCOUNTER FOR OTHER GENERAL EXAMINATION: ICD-10-CM

## 2021-07-14 VITALS — HEIGHT: 69 IN | BODY MASS INDEX: 32.49 KG/M2

## 2021-07-14 LAB
GLUCOSE SERPL-MCNC: 108 MG/DL (ref 60–140)
HDLC SERPL-MCNC: 57 MG/DL
POC CHOLESTEROL, LDL (DOCK): 79 MG/DL
POC CHOLESTEROL, TOTAL: 155 MG/DL
TRIGL SERPL-MCNC: 89 MG/DL

## 2021-07-21 ENCOUNTER — OFFICE VISIT (OUTPATIENT)
Dept: FAMILY MEDICINE | Facility: CLINIC | Age: 46
End: 2021-07-21
Payer: COMMERCIAL

## 2021-07-21 VITALS
OXYGEN SATURATION: 96 % | WEIGHT: 229.25 LBS | SYSTOLIC BLOOD PRESSURE: 135 MMHG | DIASTOLIC BLOOD PRESSURE: 70 MMHG | BODY MASS INDEX: 33.96 KG/M2 | HEIGHT: 69 IN | HEART RATE: 103 BPM

## 2021-07-21 DIAGNOSIS — I10 ESSENTIAL HYPERTENSION: ICD-10-CM

## 2021-07-21 DIAGNOSIS — I47.10 PSVT (PAROXYSMAL SUPRAVENTRICULAR TACHYCARDIA): ICD-10-CM

## 2021-07-21 DIAGNOSIS — M77.12 LATERAL EPICONDYLITIS OF LEFT ELBOW: Primary | ICD-10-CM

## 2021-07-21 DIAGNOSIS — L30.9 HAND ECZEMA: ICD-10-CM

## 2021-07-21 DIAGNOSIS — F17.200 TOBACCO DEPENDENCE: ICD-10-CM

## 2021-07-21 DIAGNOSIS — F41.8 SITUATIONAL ANXIETY: ICD-10-CM

## 2021-07-21 DIAGNOSIS — I47.10 SVT (SUPRAVENTRICULAR TACHYCARDIA): ICD-10-CM

## 2021-07-21 DIAGNOSIS — E78.49 OTHER HYPERLIPIDEMIA: ICD-10-CM

## 2021-07-21 PROBLEM — M25.522 ARTHRALGIA OF LEFT ELBOW: Status: RESOLVED | Noted: 2021-07-06 | Resolved: 2021-07-21

## 2021-07-21 PROBLEM — F41.9 ANXIETY DISORDER, UNSPECIFIED: Status: ACTIVE | Noted: 2021-06-26

## 2021-07-21 PROBLEM — M62.81 MUSCLE WEAKNESS: Status: RESOLVED | Noted: 2021-07-06 | Resolved: 2021-07-21

## 2021-07-21 PROCEDURE — 3008F PR BODY MASS INDEX (BMI) DOCUMENTED: ICD-10-PCS | Mod: CPTII,S$GLB,, | Performed by: FAMILY MEDICINE

## 2021-07-21 PROCEDURE — 3075F SYST BP GE 130 - 139MM HG: CPT | Mod: CPTII,S$GLB,, | Performed by: FAMILY MEDICINE

## 2021-07-21 PROCEDURE — 3078F PR MOST RECENT DIASTOLIC BLOOD PRESSURE < 80 MM HG: ICD-10-PCS | Mod: CPTII,S$GLB,, | Performed by: FAMILY MEDICINE

## 2021-07-21 PROCEDURE — 3078F DIAST BP <80 MM HG: CPT | Mod: CPTII,S$GLB,, | Performed by: FAMILY MEDICINE

## 2021-07-21 PROCEDURE — 99999 PR PBB SHADOW E&M-EST. PATIENT-LVL IV: CPT | Mod: PBBFAC,,, | Performed by: FAMILY MEDICINE

## 2021-07-21 PROCEDURE — 1126F PR PAIN SEVERITY QUANTIFIED, NO PAIN PRESENT: ICD-10-PCS | Mod: CPTII,S$GLB,, | Performed by: FAMILY MEDICINE

## 2021-07-21 PROCEDURE — 3008F BODY MASS INDEX DOCD: CPT | Mod: CPTII,S$GLB,, | Performed by: FAMILY MEDICINE

## 2021-07-21 PROCEDURE — 99213 PR OFFICE/OUTPT VISIT, EST, LEVL III, 20-29 MIN: ICD-10-PCS | Mod: S$GLB,,, | Performed by: FAMILY MEDICINE

## 2021-07-21 PROCEDURE — 99999 PR PBB SHADOW E&M-EST. PATIENT-LVL IV: ICD-10-PCS | Mod: PBBFAC,,, | Performed by: FAMILY MEDICINE

## 2021-07-21 PROCEDURE — 99213 OFFICE O/P EST LOW 20 MIN: CPT | Mod: S$GLB,,, | Performed by: FAMILY MEDICINE

## 2021-07-21 PROCEDURE — 1126F AMNT PAIN NOTED NONE PRSNT: CPT | Mod: CPTII,S$GLB,, | Performed by: FAMILY MEDICINE

## 2021-07-21 PROCEDURE — 3075F PR MOST RECENT SYSTOLIC BLOOD PRESS GE 130-139MM HG: ICD-10-PCS | Mod: CPTII,S$GLB,, | Performed by: FAMILY MEDICINE

## 2021-07-21 RX ORDER — ATORVASTATIN CALCIUM 10 MG/1
10 TABLET, FILM COATED ORAL DAILY
Qty: 90 TABLET | Refills: 3 | Status: SHIPPED | OUTPATIENT
Start: 2021-07-21 | End: 2022-03-21 | Stop reason: SDUPTHER

## 2021-07-21 RX ORDER — FLUOCINONIDE 0.5 MG/G
CREAM TOPICAL
Qty: 60 G | Refills: 3
Start: 2021-07-21 | End: 2023-02-15

## 2021-07-21 RX ORDER — LOSARTAN POTASSIUM 100 MG/1
100 TABLET ORAL DAILY
Qty: 90 TABLET | Refills: 1 | Status: SHIPPED | OUTPATIENT
Start: 2021-07-21 | End: 2022-01-19

## 2021-07-21 RX ORDER — VERAPAMIL HYDROCHLORIDE 120 MG/1
120 TABLET, FILM COATED, EXTENDED RELEASE ORAL NIGHTLY
Qty: 90 TABLET | Refills: 1 | Status: SHIPPED | OUTPATIENT
Start: 2021-07-21 | End: 2022-03-15

## 2021-07-21 RX ORDER — SERTRALINE HYDROCHLORIDE 25 MG/1
25 TABLET, FILM COATED ORAL DAILY
Qty: 90 TABLET | Refills: 0 | Status: SHIPPED | OUTPATIENT
Start: 2021-07-21 | End: 2021-11-23

## 2021-07-23 ENCOUNTER — CLINICAL SUPPORT (OUTPATIENT)
Dept: REHABILITATION | Facility: HOSPITAL | Age: 46
End: 2021-07-23
Payer: COMMERCIAL

## 2021-07-23 DIAGNOSIS — M25.60 STIFFNESS IN JOINT: ICD-10-CM

## 2021-07-23 PROCEDURE — 97110 THERAPEUTIC EXERCISES: CPT | Mod: PN

## 2021-07-23 PROCEDURE — 97140 MANUAL THERAPY 1/> REGIONS: CPT | Mod: PN

## 2021-07-27 ENCOUNTER — PATIENT OUTREACH (OUTPATIENT)
Dept: ADMINISTRATIVE | Facility: OTHER | Age: 46
End: 2021-07-27

## 2021-07-27 ENCOUNTER — PATIENT MESSAGE (OUTPATIENT)
Dept: DERMATOLOGY | Facility: CLINIC | Age: 46
End: 2021-07-27

## 2021-07-28 ENCOUNTER — CLINICAL SUPPORT (OUTPATIENT)
Dept: REHABILITATION | Facility: HOSPITAL | Age: 46
End: 2021-07-28
Payer: COMMERCIAL

## 2021-07-28 DIAGNOSIS — M25.60 STIFFNESS IN JOINT: ICD-10-CM

## 2021-07-28 PROCEDURE — 97140 MANUAL THERAPY 1/> REGIONS: CPT | Mod: PN

## 2021-07-28 PROCEDURE — 97110 THERAPEUTIC EXERCISES: CPT | Mod: PN,97

## 2021-07-30 ENCOUNTER — IMMUNIZATION (OUTPATIENT)
Dept: INTERNAL MEDICINE | Facility: CLINIC | Age: 46
End: 2021-07-30
Payer: COMMERCIAL

## 2021-07-30 DIAGNOSIS — Z23 NEED FOR VACCINATION: Primary | ICD-10-CM

## 2021-07-30 PROCEDURE — 0011A COVID-19, MRNA, LNP-S, PF, 100 MCG/0.5 ML DOSE VACCINE: ICD-10-PCS | Mod: CV19,S$GLB,, | Performed by: INTERNAL MEDICINE

## 2021-07-30 PROCEDURE — 91301 COVID-19, MRNA, LNP-S, PF, 100 MCG/0.5 ML DOSE VACCINE: ICD-10-PCS | Mod: S$GLB,,, | Performed by: INTERNAL MEDICINE

## 2021-07-30 PROCEDURE — 91301 COVID-19, MRNA, LNP-S, PF, 100 MCG/0.5 ML DOSE VACCINE: CPT | Mod: S$GLB,,, | Performed by: INTERNAL MEDICINE

## 2021-07-30 PROCEDURE — 0011A COVID-19, MRNA, LNP-S, PF, 100 MCG/0.5 ML DOSE VACCINE: CPT | Mod: CV19,S$GLB,, | Performed by: INTERNAL MEDICINE

## 2021-08-11 ENCOUNTER — OFFICE VISIT (OUTPATIENT)
Dept: ORTHOPEDICS | Facility: CLINIC | Age: 46
End: 2021-08-11
Payer: COMMERCIAL

## 2021-08-11 DIAGNOSIS — M77.12 LATERAL EPICONDYLITIS OF LEFT ELBOW: Primary | ICD-10-CM

## 2021-08-11 PROCEDURE — 1160F PR REVIEW ALL MEDS BY PRESCRIBER/CLIN PHARMACIST DOCUMENTED: ICD-10-PCS | Mod: CPTII,S$GLB,, | Performed by: PHYSICIAN ASSISTANT

## 2021-08-11 PROCEDURE — 99999 PR PBB SHADOW E&M-EST. PATIENT-LVL II: CPT | Mod: PBBFAC,,, | Performed by: PHYSICIAN ASSISTANT

## 2021-08-11 PROCEDURE — 99024 POSTOP FOLLOW-UP VISIT: CPT | Mod: S$GLB,,, | Performed by: PHYSICIAN ASSISTANT

## 2021-08-11 PROCEDURE — 99999 PR PBB SHADOW E&M-EST. PATIENT-LVL II: ICD-10-PCS | Mod: PBBFAC,,, | Performed by: PHYSICIAN ASSISTANT

## 2021-08-11 PROCEDURE — 1160F RVW MEDS BY RX/DR IN RCRD: CPT | Mod: CPTII,S$GLB,, | Performed by: PHYSICIAN ASSISTANT

## 2021-08-11 PROCEDURE — 1126F AMNT PAIN NOTED NONE PRSNT: CPT | Mod: CPTII,S$GLB,, | Performed by: PHYSICIAN ASSISTANT

## 2021-08-11 PROCEDURE — 1159F PR MEDICATION LIST DOCUMENTED IN MEDICAL RECORD: ICD-10-PCS | Mod: CPTII,S$GLB,, | Performed by: PHYSICIAN ASSISTANT

## 2021-08-11 PROCEDURE — 1159F MED LIST DOCD IN RCRD: CPT | Mod: CPTII,S$GLB,, | Performed by: PHYSICIAN ASSISTANT

## 2021-08-11 PROCEDURE — 1126F PR PAIN SEVERITY QUANTIFIED, NO PAIN PRESENT: ICD-10-PCS | Mod: CPTII,S$GLB,, | Performed by: PHYSICIAN ASSISTANT

## 2021-08-11 PROCEDURE — 99024 PR POST-OP FOLLOW-UP VISIT: ICD-10-PCS | Mod: S$GLB,,, | Performed by: PHYSICIAN ASSISTANT

## 2021-08-22 ENCOUNTER — HOSPITAL ENCOUNTER (EMERGENCY)
Facility: HOSPITAL | Age: 46
Discharge: HOME | End: 2021-08-22
Attending: EMERGENCY MEDICINE
Payer: COMMERCIAL

## 2021-08-22 VITALS
BODY MASS INDEX: 33.23 KG/M2 | RESPIRATION RATE: 20 BRPM | OXYGEN SATURATION: 98 % | WEIGHT: 225 LBS | DIASTOLIC BLOOD PRESSURE: 54 MMHG | SYSTOLIC BLOOD PRESSURE: 119 MMHG | TEMPERATURE: 98 F | HEART RATE: 63 BPM

## 2021-08-22 DIAGNOSIS — R07.9 CHEST PAIN: ICD-10-CM

## 2021-08-22 LAB
ALBUMIN SERPL BCP-MCNC: 4.3 G/DL (ref 3.5–5.2)
ALP SERPL-CCNC: 45 U/L (ref 55–135)
ALT SERPL W/O P-5'-P-CCNC: 40 U/L (ref 10–44)
ANION GAP SERPL CALC-SCNC: 14 MMOL/L (ref 8–16)
AST SERPL-CCNC: 36 U/L (ref 10–40)
BASOPHILS # BLD AUTO: 0.08 K/UL (ref 0–0.2)
BASOPHILS NFR BLD: 0.5 % (ref 0–1.9)
BILIRUB SERPL-MCNC: 0.8 MG/DL (ref 0.1–1)
BNP SERPL-MCNC: <10 PG/ML (ref 0–99)
BUN SERPL-MCNC: 12 MG/DL (ref 6–20)
CALCIUM SERPL-MCNC: 9.2 MG/DL (ref 8.7–10.5)
CHLORIDE SERPL-SCNC: 104 MMOL/L (ref 95–110)
CO2 SERPL-SCNC: 23 MMOL/L (ref 23–29)
CREAT SERPL-MCNC: 0.9 MG/DL (ref 0.5–1.4)
DIFFERENTIAL METHOD: ABNORMAL
EOSINOPHIL # BLD AUTO: 0.2 K/UL (ref 0–0.5)
EOSINOPHIL NFR BLD: 1.1 % (ref 0–8)
ERYTHROCYTE [DISTWIDTH] IN BLOOD BY AUTOMATED COUNT: 11.7 % (ref 11.5–14.5)
EST. GFR  (AFRICAN AMERICAN): >60 ML/MIN/1.73 M^2
EST. GFR  (NON AFRICAN AMERICAN): >60 ML/MIN/1.73 M^2
GLUCOSE SERPL-MCNC: 88 MG/DL (ref 70–110)
HCT VFR BLD AUTO: 42.3 % (ref 40–54)
HGB BLD-MCNC: 14.8 G/DL (ref 14–18)
IMM GRANULOCYTES # BLD AUTO: 0.07 K/UL (ref 0–0.04)
IMM GRANULOCYTES NFR BLD AUTO: 0.4 % (ref 0–0.5)
LYMPHOCYTES # BLD AUTO: 4 K/UL (ref 1–4.8)
LYMPHOCYTES NFR BLD: 25.2 % (ref 18–48)
MCH RBC QN AUTO: 34.8 PG (ref 27–31)
MCHC RBC AUTO-ENTMCNC: 35 G/DL (ref 32–36)
MCV RBC AUTO: 100 FL (ref 82–98)
MONOCYTES # BLD AUTO: 0.7 K/UL (ref 0.3–1)
MONOCYTES NFR BLD: 4.3 % (ref 4–15)
NEUTROPHILS # BLD AUTO: 10.9 K/UL (ref 1.8–7.7)
NEUTROPHILS NFR BLD: 68.5 % (ref 38–73)
NRBC BLD-RTO: 0 /100 WBC
PLATELET # BLD AUTO: 272 K/UL (ref 150–450)
PMV BLD AUTO: 10.9 FL (ref 9.2–12.9)
POTASSIUM SERPL-SCNC: 3.6 MMOL/L (ref 3.5–5.1)
PROT SERPL-MCNC: 7.5 G/DL (ref 6–8.4)
RBC # BLD AUTO: 4.25 M/UL (ref 4.6–6.2)
SODIUM SERPL-SCNC: 141 MMOL/L (ref 136–145)
TROPONIN I SERPL DL<=0.01 NG/ML-MCNC: 0.01 NG/ML (ref 0–0.03)
TROPONIN I SERPL DL<=0.01 NG/ML-MCNC: <0.006 NG/ML (ref 0–0.03)
WBC # BLD AUTO: 15.88 K/UL (ref 3.9–12.7)

## 2021-08-22 PROCEDURE — 93010 ELECTROCARDIOGRAM REPORT: CPT | Mod: ,,, | Performed by: INTERNAL MEDICINE

## 2021-08-22 PROCEDURE — 93005 ELECTROCARDIOGRAM TRACING: CPT

## 2021-08-22 PROCEDURE — 93010 EKG 12-LEAD: ICD-10-PCS | Mod: ,,, | Performed by: INTERNAL MEDICINE

## 2021-08-22 PROCEDURE — 80053 COMPREHEN METABOLIC PANEL: CPT | Performed by: PHYSICIAN ASSISTANT

## 2021-08-22 PROCEDURE — 85025 COMPLETE CBC W/AUTO DIFF WBC: CPT | Performed by: PHYSICIAN ASSISTANT

## 2021-08-22 PROCEDURE — 25000003 PHARM REV CODE 250: Performed by: PHYSICIAN ASSISTANT

## 2021-08-22 PROCEDURE — 99285 EMERGENCY DEPT VISIT HI MDM: CPT | Mod: 25

## 2021-08-22 PROCEDURE — 83880 ASSAY OF NATRIURETIC PEPTIDE: CPT | Performed by: PHYSICIAN ASSISTANT

## 2021-08-22 PROCEDURE — 84484 ASSAY OF TROPONIN QUANT: CPT | Performed by: PHYSICIAN ASSISTANT

## 2021-08-22 RX ORDER — ASPIRIN 325 MG
325 TABLET ORAL
Status: COMPLETED | OUTPATIENT
Start: 2021-08-22 | End: 2021-08-22

## 2021-08-22 RX ADMIN — ASPIRIN 325 MG ORAL TABLET 325 MG: 325 PILL ORAL at 04:08

## 2021-08-27 ENCOUNTER — PATIENT OUTREACH (OUTPATIENT)
Dept: ADMINISTRATIVE | Facility: OTHER | Age: 46
End: 2021-08-27

## 2021-09-10 ENCOUNTER — PATIENT MESSAGE (OUTPATIENT)
Dept: OTOLARYNGOLOGY | Facility: CLINIC | Age: 46
End: 2021-09-10

## 2021-09-10 DIAGNOSIS — Z01.818 PRE-PROCEDURAL EXAMINATION: ICD-10-CM

## 2021-09-13 ENCOUNTER — LAB VISIT (OUTPATIENT)
Dept: PRIMARY CARE CLINIC | Facility: OTHER | Age: 46
End: 2021-09-13
Payer: COMMERCIAL

## 2021-09-13 DIAGNOSIS — Z20.822 ENCOUNTER FOR LABORATORY TESTING FOR COVID-19 VIRUS: ICD-10-CM

## 2021-09-13 PROCEDURE — U0003 INFECTIOUS AGENT DETECTION BY NUCLEIC ACID (DNA OR RNA); SEVERE ACUTE RESPIRATORY SYNDROME CORONAVIRUS 2 (SARS-COV-2) (CORONAVIRUS DISEASE [COVID-19]), AMPLIFIED PROBE TECHNIQUE, MAKING USE OF HIGH THROUGHPUT TECHNOLOGIES AS DESCRIBED BY CMS-2020-01-R: HCPCS | Performed by: INTERNAL MEDICINE

## 2021-09-14 LAB
SARS-COV-2 RNA RESP QL NAA+PROBE: NOT DETECTED
SARS-COV-2- CYCLE NUMBER: NORMAL

## 2021-09-16 ENCOUNTER — OFFICE VISIT (OUTPATIENT)
Dept: OTOLARYNGOLOGY | Facility: CLINIC | Age: 46
End: 2021-09-16
Payer: COMMERCIAL

## 2021-09-16 VITALS
HEIGHT: 69 IN | DIASTOLIC BLOOD PRESSURE: 90 MMHG | SYSTOLIC BLOOD PRESSURE: 148 MMHG | HEART RATE: 115 BPM | WEIGHT: 225.06 LBS | BODY MASS INDEX: 33.34 KG/M2

## 2021-09-16 DIAGNOSIS — R51.9 LEFT FACIAL PRESSURE AND PAIN: Primary | Chronic | ICD-10-CM

## 2021-09-16 DIAGNOSIS — M95.0 NASAL VALVE COLLAPSE: ICD-10-CM

## 2021-09-16 DIAGNOSIS — J30.9 ALLERGIC RHINITIS, UNSPECIFIED SEASONALITY, UNSPECIFIED TRIGGER: Chronic | ICD-10-CM

## 2021-09-16 DIAGNOSIS — R06.83 SNORING: Chronic | ICD-10-CM

## 2021-09-16 DIAGNOSIS — Z72.0 TOBACCO ABUSE: ICD-10-CM

## 2021-09-16 PROCEDURE — 99999 PR PBB SHADOW E&M-EST. PATIENT-LVL IV: ICD-10-PCS | Mod: PBBFAC,,, | Performed by: OTOLARYNGOLOGY

## 2021-09-16 PROCEDURE — 3077F PR MOST RECENT SYSTOLIC BLOOD PRESSURE >= 140 MM HG: ICD-10-PCS | Mod: CPTII,S$GLB,, | Performed by: OTOLARYNGOLOGY

## 2021-09-16 PROCEDURE — 1160F PR REVIEW ALL MEDS BY PRESCRIBER/CLIN PHARMACIST DOCUMENTED: ICD-10-PCS | Mod: CPTII,S$GLB,, | Performed by: OTOLARYNGOLOGY

## 2021-09-16 PROCEDURE — 1159F MED LIST DOCD IN RCRD: CPT | Mod: CPTII,S$GLB,, | Performed by: OTOLARYNGOLOGY

## 2021-09-16 PROCEDURE — 3077F SYST BP >= 140 MM HG: CPT | Mod: CPTII,S$GLB,, | Performed by: OTOLARYNGOLOGY

## 2021-09-16 PROCEDURE — 1160F RVW MEDS BY RX/DR IN RCRD: CPT | Mod: CPTII,S$GLB,, | Performed by: OTOLARYNGOLOGY

## 2021-09-16 PROCEDURE — 99204 PR OFFICE/OUTPT VISIT, NEW, LEVL IV, 45-59 MIN: ICD-10-PCS | Mod: S$GLB,,, | Performed by: OTOLARYNGOLOGY

## 2021-09-16 PROCEDURE — 4010F PR ACE/ARB THEARPY RXD/TAKEN: ICD-10-PCS | Mod: CPTII,S$GLB,, | Performed by: OTOLARYNGOLOGY

## 2021-09-16 PROCEDURE — 99204 OFFICE O/P NEW MOD 45 MIN: CPT | Mod: S$GLB,,, | Performed by: OTOLARYNGOLOGY

## 2021-09-16 PROCEDURE — 3008F BODY MASS INDEX DOCD: CPT | Mod: CPTII,S$GLB,, | Performed by: OTOLARYNGOLOGY

## 2021-09-16 PROCEDURE — 99999 PR PBB SHADOW E&M-EST. PATIENT-LVL IV: CPT | Mod: PBBFAC,,, | Performed by: OTOLARYNGOLOGY

## 2021-09-16 PROCEDURE — 3080F PR MOST RECENT DIASTOLIC BLOOD PRESSURE >= 90 MM HG: ICD-10-PCS | Mod: CPTII,S$GLB,, | Performed by: OTOLARYNGOLOGY

## 2021-09-16 PROCEDURE — 4010F ACE/ARB THERAPY RXD/TAKEN: CPT | Mod: CPTII,S$GLB,, | Performed by: OTOLARYNGOLOGY

## 2021-09-16 PROCEDURE — 3080F DIAST BP >= 90 MM HG: CPT | Mod: CPTII,S$GLB,, | Performed by: OTOLARYNGOLOGY

## 2021-09-16 PROCEDURE — 3008F PR BODY MASS INDEX (BMI) DOCUMENTED: ICD-10-PCS | Mod: CPTII,S$GLB,, | Performed by: OTOLARYNGOLOGY

## 2021-09-16 PROCEDURE — 1159F PR MEDICATION LIST DOCUMENTED IN MEDICAL RECORD: ICD-10-PCS | Mod: CPTII,S$GLB,, | Performed by: OTOLARYNGOLOGY

## 2021-09-16 RX ORDER — FLUTICASONE PROPIONATE 50 MCG
1 SPRAY, SUSPENSION (ML) NASAL 2 TIMES DAILY
Qty: 11.1 ML | Refills: 11 | Status: SHIPPED | OUTPATIENT
Start: 2021-09-16 | End: 2022-03-21

## 2021-09-21 ENCOUNTER — PATIENT MESSAGE (OUTPATIENT)
Dept: DERMATOLOGY | Facility: CLINIC | Age: 46
End: 2021-09-21

## 2021-11-01 ENCOUNTER — PATIENT OUTREACH (OUTPATIENT)
Dept: ADMINISTRATIVE | Facility: OTHER | Age: 46
End: 2021-11-01
Payer: COMMERCIAL

## 2021-11-02 ENCOUNTER — OFFICE VISIT (OUTPATIENT)
Dept: DERMATOLOGY | Facility: CLINIC | Age: 46
End: 2021-11-02
Payer: COMMERCIAL

## 2021-11-02 DIAGNOSIS — L30.9 HAND ECZEMA: Primary | ICD-10-CM

## 2021-11-02 DIAGNOSIS — L57.0 ACTINIC KERATOSIS: ICD-10-CM

## 2021-11-02 PROCEDURE — 99214 PR OFFICE/OUTPT VISIT, EST, LEVL IV, 30-39 MIN: ICD-10-PCS | Mod: S$GLB,,, | Performed by: DERMATOLOGY

## 2021-11-02 PROCEDURE — 1159F MED LIST DOCD IN RCRD: CPT | Mod: CPTII,S$GLB,, | Performed by: DERMATOLOGY

## 2021-11-02 PROCEDURE — 1159F PR MEDICATION LIST DOCUMENTED IN MEDICAL RECORD: ICD-10-PCS | Mod: CPTII,S$GLB,, | Performed by: DERMATOLOGY

## 2021-11-02 PROCEDURE — 4010F ACE/ARB THERAPY RXD/TAKEN: CPT | Mod: CPTII,S$GLB,, | Performed by: DERMATOLOGY

## 2021-11-02 PROCEDURE — 1160F PR REVIEW ALL MEDS BY PRESCRIBER/CLIN PHARMACIST DOCUMENTED: ICD-10-PCS | Mod: CPTII,S$GLB,, | Performed by: DERMATOLOGY

## 2021-11-02 PROCEDURE — 99214 OFFICE O/P EST MOD 30 MIN: CPT | Mod: S$GLB,,, | Performed by: DERMATOLOGY

## 2021-11-02 PROCEDURE — 1160F RVW MEDS BY RX/DR IN RCRD: CPT | Mod: CPTII,S$GLB,, | Performed by: DERMATOLOGY

## 2021-11-02 PROCEDURE — 4010F PR ACE/ARB THEARPY RXD/TAKEN: ICD-10-PCS | Mod: CPTII,S$GLB,, | Performed by: DERMATOLOGY

## 2021-11-02 RX ORDER — CLOBETASOL PROPIONATE 0.5 MG/G
OINTMENT TOPICAL
Qty: 60 G | Refills: 5 | Status: SHIPPED | OUTPATIENT
Start: 2021-11-02 | End: 2022-02-22

## 2021-11-09 ENCOUNTER — OFFICE VISIT (OUTPATIENT)
Dept: OTOLARYNGOLOGY | Facility: CLINIC | Age: 46
End: 2021-11-09
Payer: COMMERCIAL

## 2021-11-09 VITALS
WEIGHT: 223.44 LBS | BODY MASS INDEX: 33.09 KG/M2 | DIASTOLIC BLOOD PRESSURE: 88 MMHG | TEMPERATURE: 98 F | SYSTOLIC BLOOD PRESSURE: 143 MMHG | HEIGHT: 69 IN | HEART RATE: 87 BPM

## 2021-11-09 DIAGNOSIS — R51.9 LEFT FACIAL PRESSURE AND PAIN: ICD-10-CM

## 2021-11-09 DIAGNOSIS — R06.83 SNORING: Chronic | ICD-10-CM

## 2021-11-09 DIAGNOSIS — J30.9 ALLERGIC RHINITIS, UNSPECIFIED SEASONALITY, UNSPECIFIED TRIGGER: Primary | Chronic | ICD-10-CM

## 2021-11-09 DIAGNOSIS — M95.0 NASAL VALVE COLLAPSE: ICD-10-CM

## 2021-11-09 PROCEDURE — 1159F MED LIST DOCD IN RCRD: CPT | Mod: CPTII,S$GLB,, | Performed by: OTOLARYNGOLOGY

## 2021-11-09 PROCEDURE — 3077F PR MOST RECENT SYSTOLIC BLOOD PRESSURE >= 140 MM HG: ICD-10-PCS | Mod: CPTII,S$GLB,, | Performed by: OTOLARYNGOLOGY

## 2021-11-09 PROCEDURE — 3008F PR BODY MASS INDEX (BMI) DOCUMENTED: ICD-10-PCS | Mod: CPTII,S$GLB,, | Performed by: OTOLARYNGOLOGY

## 2021-11-09 PROCEDURE — 3077F SYST BP >= 140 MM HG: CPT | Mod: CPTII,S$GLB,, | Performed by: OTOLARYNGOLOGY

## 2021-11-09 PROCEDURE — 99999 PR PBB SHADOW E&M-EST. PATIENT-LVL IV: ICD-10-PCS | Mod: PBBFAC,,, | Performed by: OTOLARYNGOLOGY

## 2021-11-09 PROCEDURE — 3008F BODY MASS INDEX DOCD: CPT | Mod: CPTII,S$GLB,, | Performed by: OTOLARYNGOLOGY

## 2021-11-09 PROCEDURE — 99999 PR PBB SHADOW E&M-EST. PATIENT-LVL IV: CPT | Mod: PBBFAC,,, | Performed by: OTOLARYNGOLOGY

## 2021-11-09 PROCEDURE — 3079F PR MOST RECENT DIASTOLIC BLOOD PRESSURE 80-89 MM HG: ICD-10-PCS | Mod: CPTII,S$GLB,, | Performed by: OTOLARYNGOLOGY

## 2021-11-09 PROCEDURE — 4010F ACE/ARB THERAPY RXD/TAKEN: CPT | Mod: CPTII,S$GLB,, | Performed by: OTOLARYNGOLOGY

## 2021-11-09 PROCEDURE — 3079F DIAST BP 80-89 MM HG: CPT | Mod: CPTII,S$GLB,, | Performed by: OTOLARYNGOLOGY

## 2021-11-09 PROCEDURE — 99213 PR OFFICE/OUTPT VISIT, EST, LEVL III, 20-29 MIN: ICD-10-PCS | Mod: S$GLB,,, | Performed by: OTOLARYNGOLOGY

## 2021-11-09 PROCEDURE — 1160F RVW MEDS BY RX/DR IN RCRD: CPT | Mod: CPTII,S$GLB,, | Performed by: OTOLARYNGOLOGY

## 2021-11-09 PROCEDURE — 4010F PR ACE/ARB THEARPY RXD/TAKEN: ICD-10-PCS | Mod: CPTII,S$GLB,, | Performed by: OTOLARYNGOLOGY

## 2021-11-09 PROCEDURE — 1159F PR MEDICATION LIST DOCUMENTED IN MEDICAL RECORD: ICD-10-PCS | Mod: CPTII,S$GLB,, | Performed by: OTOLARYNGOLOGY

## 2021-11-09 PROCEDURE — 99213 OFFICE O/P EST LOW 20 MIN: CPT | Mod: S$GLB,,, | Performed by: OTOLARYNGOLOGY

## 2021-11-09 PROCEDURE — 1160F PR REVIEW ALL MEDS BY PRESCRIBER/CLIN PHARMACIST DOCUMENTED: ICD-10-PCS | Mod: CPTII,S$GLB,, | Performed by: OTOLARYNGOLOGY

## 2021-11-15 ENCOUNTER — OFFICE VISIT (OUTPATIENT)
Dept: SLEEP MEDICINE | Facility: CLINIC | Age: 46
End: 2021-11-15
Payer: COMMERCIAL

## 2021-11-15 VITALS
WEIGHT: 223 LBS | BODY MASS INDEX: 33.03 KG/M2 | SYSTOLIC BLOOD PRESSURE: 149 MMHG | DIASTOLIC BLOOD PRESSURE: 70 MMHG | HEIGHT: 69 IN

## 2021-11-15 DIAGNOSIS — G47.30 SLEEP APNEA, UNSPECIFIED TYPE: Primary | ICD-10-CM

## 2021-11-15 DIAGNOSIS — R06.83 SNORING: Chronic | ICD-10-CM

## 2021-11-15 PROCEDURE — 3008F BODY MASS INDEX DOCD: CPT | Mod: CPTII,S$GLB,, | Performed by: PSYCHIATRY & NEUROLOGY

## 2021-11-15 PROCEDURE — 3078F PR MOST RECENT DIASTOLIC BLOOD PRESSURE < 80 MM HG: ICD-10-PCS | Mod: CPTII,S$GLB,, | Performed by: PSYCHIATRY & NEUROLOGY

## 2021-11-15 PROCEDURE — 1159F MED LIST DOCD IN RCRD: CPT | Mod: CPTII,S$GLB,, | Performed by: PSYCHIATRY & NEUROLOGY

## 2021-11-15 PROCEDURE — 99999 PR PBB SHADOW E&M-EST. PATIENT-LVL IV: CPT | Mod: PBBFAC,,, | Performed by: PSYCHIATRY & NEUROLOGY

## 2021-11-15 PROCEDURE — 99999 PR PBB SHADOW E&M-EST. PATIENT-LVL IV: ICD-10-PCS | Mod: PBBFAC,,, | Performed by: PSYCHIATRY & NEUROLOGY

## 2021-11-15 PROCEDURE — 99203 OFFICE O/P NEW LOW 30 MIN: CPT | Mod: S$GLB,,, | Performed by: PSYCHIATRY & NEUROLOGY

## 2021-11-15 PROCEDURE — 1160F RVW MEDS BY RX/DR IN RCRD: CPT | Mod: CPTII,S$GLB,, | Performed by: PSYCHIATRY & NEUROLOGY

## 2021-11-15 PROCEDURE — 1160F PR REVIEW ALL MEDS BY PRESCRIBER/CLIN PHARMACIST DOCUMENTED: ICD-10-PCS | Mod: CPTII,S$GLB,, | Performed by: PSYCHIATRY & NEUROLOGY

## 2021-11-15 PROCEDURE — 4010F PR ACE/ARB THEARPY RXD/TAKEN: ICD-10-PCS | Mod: CPTII,S$GLB,, | Performed by: PSYCHIATRY & NEUROLOGY

## 2021-11-15 PROCEDURE — 4010F ACE/ARB THERAPY RXD/TAKEN: CPT | Mod: CPTII,S$GLB,, | Performed by: PSYCHIATRY & NEUROLOGY

## 2021-11-15 PROCEDURE — 1159F PR MEDICATION LIST DOCUMENTED IN MEDICAL RECORD: ICD-10-PCS | Mod: CPTII,S$GLB,, | Performed by: PSYCHIATRY & NEUROLOGY

## 2021-11-15 PROCEDURE — 3078F DIAST BP <80 MM HG: CPT | Mod: CPTII,S$GLB,, | Performed by: PSYCHIATRY & NEUROLOGY

## 2021-11-15 PROCEDURE — 3008F PR BODY MASS INDEX (BMI) DOCUMENTED: ICD-10-PCS | Mod: CPTII,S$GLB,, | Performed by: PSYCHIATRY & NEUROLOGY

## 2021-11-15 PROCEDURE — 99203 PR OFFICE/OUTPT VISIT, NEW, LEVL III, 30-44 MIN: ICD-10-PCS | Mod: S$GLB,,, | Performed by: PSYCHIATRY & NEUROLOGY

## 2021-11-15 PROCEDURE — 3077F PR MOST RECENT SYSTOLIC BLOOD PRESSURE >= 140 MM HG: ICD-10-PCS | Mod: CPTII,S$GLB,, | Performed by: PSYCHIATRY & NEUROLOGY

## 2021-11-15 PROCEDURE — 3077F SYST BP >= 140 MM HG: CPT | Mod: CPTII,S$GLB,, | Performed by: PSYCHIATRY & NEUROLOGY

## 2021-11-17 ENCOUNTER — TELEPHONE (OUTPATIENT)
Dept: SLEEP MEDICINE | Facility: OTHER | Age: 46
End: 2021-11-17
Payer: COMMERCIAL

## 2021-11-26 ENCOUNTER — HOSPITAL ENCOUNTER (OUTPATIENT)
Dept: SLEEP MEDICINE | Facility: OTHER | Age: 46
Discharge: HOME | End: 2021-11-26
Attending: PSYCHIATRY & NEUROLOGY
Payer: COMMERCIAL

## 2021-11-26 DIAGNOSIS — G47.33 OSA (OBSTRUCTIVE SLEEP APNEA): ICD-10-CM

## 2021-11-26 DIAGNOSIS — G47.30 SLEEP APNEA, UNSPECIFIED TYPE: ICD-10-CM

## 2021-11-26 PROCEDURE — 95806 PR SLEEP STUDY, UNATTENDED, SIMUL RECORD HR/O2 SAT/RESP FLOW/RESP EFFT: ICD-10-PCS | Mod: 26,,, | Performed by: PSYCHIATRY & NEUROLOGY

## 2021-11-26 PROCEDURE — 95800 SLP STDY UNATTENDED: CPT

## 2021-11-26 PROCEDURE — 95806 SLEEP STUDY UNATT&RESP EFFT: CPT | Mod: 26,,, | Performed by: PSYCHIATRY & NEUROLOGY

## 2021-12-05 ENCOUNTER — PATIENT MESSAGE (OUTPATIENT)
Dept: SLEEP MEDICINE | Facility: CLINIC | Age: 46
End: 2021-12-05
Payer: COMMERCIAL

## 2021-12-05 ENCOUNTER — TELEPHONE (OUTPATIENT)
Dept: SLEEP MEDICINE | Facility: CLINIC | Age: 46
End: 2021-12-05
Payer: COMMERCIAL

## 2021-12-05 DIAGNOSIS — G47.33 OSA (OBSTRUCTIVE SLEEP APNEA): Primary | ICD-10-CM

## 2021-12-15 ENCOUNTER — OFFICE VISIT (OUTPATIENT)
Dept: URGENT CARE | Facility: CLINIC | Age: 46
End: 2021-12-15
Payer: COMMERCIAL

## 2021-12-15 VITALS
HEIGHT: 69 IN | WEIGHT: 223 LBS | SYSTOLIC BLOOD PRESSURE: 130 MMHG | HEART RATE: 77 BPM | TEMPERATURE: 98 F | DIASTOLIC BLOOD PRESSURE: 80 MMHG | OXYGEN SATURATION: 98 % | BODY MASS INDEX: 33.03 KG/M2 | RESPIRATION RATE: 18 BRPM

## 2021-12-15 DIAGNOSIS — Z20.822 EXPOSURE TO COVID-19 VIRUS: Primary | ICD-10-CM

## 2021-12-15 LAB
CTP QC/QA: YES
SARS-COV-2 RDRP RESP QL NAA+PROBE: NEGATIVE

## 2021-12-15 PROCEDURE — 99213 PR OFFICE/OUTPT VISIT, EST, LEVL III, 20-29 MIN: ICD-10-PCS | Mod: S$GLB,,, | Performed by: FAMILY MEDICINE

## 2021-12-15 PROCEDURE — U0002: ICD-10-PCS | Mod: QW,S$GLB,, | Performed by: FAMILY MEDICINE

## 2021-12-15 PROCEDURE — 4010F PR ACE/ARB THEARPY RXD/TAKEN: ICD-10-PCS | Mod: CPTII,S$GLB,, | Performed by: FAMILY MEDICINE

## 2021-12-15 PROCEDURE — 4010F ACE/ARB THERAPY RXD/TAKEN: CPT | Mod: CPTII,S$GLB,, | Performed by: FAMILY MEDICINE

## 2021-12-15 PROCEDURE — U0002 COVID-19 LAB TEST NON-CDC: HCPCS | Mod: QW,S$GLB,, | Performed by: FAMILY MEDICINE

## 2021-12-15 PROCEDURE — 99213 OFFICE O/P EST LOW 20 MIN: CPT | Mod: S$GLB,,, | Performed by: FAMILY MEDICINE

## 2021-12-27 ENCOUNTER — NURSE TRIAGE (OUTPATIENT)
Dept: ADMINISTRATIVE | Facility: CLINIC | Age: 46
End: 2021-12-27
Payer: COMMERCIAL

## 2021-12-27 ENCOUNTER — PATIENT MESSAGE (OUTPATIENT)
Dept: SLEEP MEDICINE | Facility: CLINIC | Age: 46
End: 2021-12-27
Payer: COMMERCIAL

## 2021-12-27 ENCOUNTER — PATIENT MESSAGE (OUTPATIENT)
Dept: FAMILY MEDICINE | Facility: CLINIC | Age: 46
End: 2021-12-27
Payer: COMMERCIAL

## 2021-12-27 ENCOUNTER — LAB VISIT (OUTPATIENT)
Dept: FAMILY MEDICINE | Facility: CLINIC | Age: 46
End: 2021-12-27
Payer: COMMERCIAL

## 2021-12-27 DIAGNOSIS — Z20.822 ENCOUNTER FOR LABORATORY TESTING FOR COVID-19 VIRUS: ICD-10-CM

## 2021-12-27 PROCEDURE — U0003 INFECTIOUS AGENT DETECTION BY NUCLEIC ACID (DNA OR RNA); SEVERE ACUTE RESPIRATORY SYNDROME CORONAVIRUS 2 (SARS-COV-2) (CORONAVIRUS DISEASE [COVID-19]), AMPLIFIED PROBE TECHNIQUE, MAKING USE OF HIGH THROUGHPUT TECHNOLOGIES AS DESCRIBED BY CMS-2020-01-R: HCPCS | Performed by: FAMILY MEDICINE

## 2021-12-27 PROCEDURE — U0005 INFEC AGEN DETEC AMPLI PROBE: HCPCS | Performed by: FAMILY MEDICINE

## 2021-12-28 LAB
SARS-COV-2 RNA RESP QL NAA+PROBE: NOT DETECTED
SARS-COV-2- CYCLE NUMBER: NORMAL

## 2022-01-03 ENCOUNTER — PATIENT MESSAGE (OUTPATIENT)
Dept: FAMILY MEDICINE | Facility: CLINIC | Age: 47
End: 2022-01-03
Payer: COMMERCIAL

## 2022-01-13 ENCOUNTER — PATIENT OUTREACH (OUTPATIENT)
Dept: ADMINISTRATIVE | Facility: OTHER | Age: 47
End: 2022-01-13
Payer: COMMERCIAL

## 2022-01-13 DIAGNOSIS — I10 ESSENTIAL HYPERTENSION: ICD-10-CM

## 2022-01-14 NOTE — TELEPHONE ENCOUNTER
No new care gaps identified.  Powered by [employer]. Reference number: [id number].   1/13/2022 8:19:08 PM CST

## 2022-01-17 ENCOUNTER — PATIENT MESSAGE (OUTPATIENT)
Dept: DERMATOLOGY | Facility: CLINIC | Age: 47
End: 2022-01-17
Payer: COMMERCIAL

## 2022-01-19 RX ORDER — LOSARTAN POTASSIUM 100 MG/1
TABLET ORAL
Qty: 90 TABLET | Refills: 0 | Status: SHIPPED | OUTPATIENT
Start: 2022-01-19 | End: 2022-03-21 | Stop reason: SDUPTHER

## 2022-01-19 NOTE — TELEPHONE ENCOUNTER
Refill Routing Note   Medication(s) are not appropriate for processing by [facility] for the following reason(s):      - Patient has been seen in the Emergency Room/Department since the last PCP visit    [facility] action(s):  Defer       Medication Therapy Plan: Recent ED visit for chest pain (8/22/21); Defer to PCP  --->Care Gap information included in message below if applicable.   Medication reconciliation completed: No   Automatic Epic Generated Protocol Data:        Requested Prescriptions   Pending Prescriptions Disp Refills    losartan (COZAAR) 100 MG tablet [Pharmacy Med Name: LOSARTAN POTASSIUM 100 MG TAB] 90 tablet 2     Sig: TAKE 1 TABLET BY MOUTH EVERY DAY       Cardiovascular:  Angiotensin Receptor Blockers Passed - 1/13/2022  8:18 PM        Passed - Patient is at least 18 years old        Passed - Last BP in normal range within 360 days     BP Readings from Last 1 Encounters:   12/15/21 130/80               Passed - Valid encounter within last 15 months     Recent Visits  Date Type Provider Dept   07/21/21 Office Visit Bhupinder Bowen DO [place] [facility]   03/09/21 Office Visit Bhupinder Bowen DO [place] [facility]   12/16/20 Office Visit Bhupinder Bowen DO [place] [facility]   11/03/20 Office Visit Bhupindertara Bowen DO [facility]   Showing recent visits within past 720 days and meeting all other requirements  Future Appointments  No visits were found meeting these conditions.  Showing future appointments within next 150 days and meeting all other requirements      Future Appointments              In 5 days Cristi Adam Jr., MD [place] [facility]    In 1 month Ariane Vance MD [facility]atology, [facility]    In 2 months Bhupinder Bowen DO [facility], [place]                Passed - Cr is 1.39 or below and within 360 days     Lab Results   Component Value Date    CREATININE 0.9 08/22/2021    CREATININE 0.8  05/13/2021    CREATININE 0.8 11/03/2020              Passed - K is 5.2 or below and within 360 days     Potassium   Date Value Ref Range Status   08/22/2021 3.6 3.5 - 5.1 mmol/L Final   05/13/2021 3.5 3.5 - 5.1 mmol/L Final   11/03/2020 4.2 3.5 - 5.1 mmol/L Final              Passed - eGFR within 360 days     Lab Results   Component Value Date    EGFRNONAA >60 08/22/2021    EGFRNONAA >60 05/13/2021    EGFRNONAA >60.0 11/03/2020                      Appointments  past 12m or future 3m with PCP    Date Provider   Last Visit   7/21/2021 Bhupinder Bowen DO   Next Visit   3/21/2022 Bhupinder Bowen DO   ED visits in past 90 days: 0        Note composed:12:09 PM 01/19/2022

## 2022-01-20 ENCOUNTER — TELEPHONE (OUTPATIENT)
Dept: SMOKING CESSATION | Facility: CLINIC | Age: 47
End: 2022-01-20
Payer: COMMERCIAL

## 2022-01-21 ENCOUNTER — TELEPHONE (OUTPATIENT)
Dept: ORTHOPEDICS | Facility: CLINIC | Age: 47
End: 2022-01-21
Payer: COMMERCIAL

## 2022-01-21 DIAGNOSIS — M25.519 SHOULDER PAIN, UNSPECIFIED CHRONICITY, UNSPECIFIED LATERALITY: Primary | ICD-10-CM

## 2022-01-24 ENCOUNTER — TELEPHONE (OUTPATIENT)
Dept: SMOKING CESSATION | Facility: CLINIC | Age: 47
End: 2022-01-24
Payer: COMMERCIAL

## 2022-01-27 ENCOUNTER — PATIENT MESSAGE (OUTPATIENT)
Dept: DERMATOLOGY | Facility: CLINIC | Age: 47
End: 2022-01-27
Payer: COMMERCIAL

## 2022-02-09 ENCOUNTER — PATIENT MESSAGE (OUTPATIENT)
Dept: DERMATOLOGY | Facility: CLINIC | Age: 47
End: 2022-02-09
Payer: COMMERCIAL

## 2022-02-13 DIAGNOSIS — F41.8 SITUATIONAL ANXIETY: ICD-10-CM

## 2022-02-13 RX ORDER — SERTRALINE HYDROCHLORIDE 25 MG/1
TABLET, FILM COATED ORAL
Qty: 90 TABLET | Refills: 0 | Status: SHIPPED | OUTPATIENT
Start: 2022-02-13 | End: 2022-03-21 | Stop reason: SDUPTHER

## 2022-02-13 NOTE — TELEPHONE ENCOUNTER
No new care gaps identified.  Powered by [employer]. Reference number: [id number].   2/13/2022 11:28:29 AM CST

## 2022-02-21 ENCOUNTER — PATIENT OUTREACH (OUTPATIENT)
Dept: ADMINISTRATIVE | Facility: OTHER | Age: 47
End: 2022-02-21
Payer: COMMERCIAL

## 2022-02-22 ENCOUNTER — OFFICE VISIT (OUTPATIENT)
Dept: DERMATOLOGY | Facility: CLINIC | Age: 47
End: 2022-02-22
Payer: COMMERCIAL

## 2022-02-22 DIAGNOSIS — L30.9 HAND ECZEMA: ICD-10-CM

## 2022-02-22 DIAGNOSIS — L57.0 ACTINIC KERATOSIS: Primary | ICD-10-CM

## 2022-02-22 PROCEDURE — 4010F ACE/ARB THERAPY RXD/TAKEN: CPT | Mod: CPTII,S$GLB,, | Performed by: DERMATOLOGY

## 2022-02-22 PROCEDURE — 1159F MED LIST DOCD IN RCRD: CPT | Mod: CPTII,S$GLB,, | Performed by: DERMATOLOGY

## 2022-02-22 PROCEDURE — 4010F PR ACE/ARB THEARPY RXD/TAKEN: ICD-10-PCS | Mod: CPTII,S$GLB,, | Performed by: DERMATOLOGY

## 2022-02-22 PROCEDURE — 1159F PR MEDICATION LIST DOCUMENTED IN MEDICAL RECORD: ICD-10-PCS | Mod: CPTII,S$GLB,, | Performed by: DERMATOLOGY

## 2022-02-22 PROCEDURE — 17000 DESTRUCT PREMALG LESION: CPT | Mod: S$GLB,,, | Performed by: DERMATOLOGY

## 2022-02-22 PROCEDURE — 17000 PR DESTRUCTION(LASER SURGERY,CRYOSURGERY,CHEMOSURGERY),PREMALIGNANT LESIONS,FIRST LESION: ICD-10-PCS | Mod: S$GLB,,, | Performed by: DERMATOLOGY

## 2022-02-22 PROCEDURE — 99214 OFFICE O/P EST MOD 30 MIN: CPT | Mod: 25,S$GLB,, | Performed by: DERMATOLOGY

## 2022-02-22 PROCEDURE — 1160F PR REVIEW ALL MEDS BY PRESCRIBER/CLIN PHARMACIST DOCUMENTED: ICD-10-PCS | Mod: CPTII,S$GLB,, | Performed by: DERMATOLOGY

## 2022-02-22 PROCEDURE — 99214 PR OFFICE/OUTPT VISIT, EST, LEVL IV, 30-39 MIN: ICD-10-PCS | Mod: 25,S$GLB,, | Performed by: DERMATOLOGY

## 2022-02-22 PROCEDURE — 1160F RVW MEDS BY RX/DR IN RCRD: CPT | Mod: CPTII,S$GLB,, | Performed by: DERMATOLOGY

## 2022-02-22 RX ORDER — CLOBETASOL PROPIONATE 0.5 MG/G
CREAM TOPICAL
Qty: 60 G | Refills: 2 | Status: SHIPPED | OUTPATIENT
Start: 2022-02-22 | End: 2023-02-15

## 2022-02-22 NOTE — PATIENT INSTRUCTIONS
Recommended a urea-based cream to calluses at bedtime. Examples: Clear 40 urea gel, Flexitol Heel Balm, Eucerin Roughness Relief Spot Treatment

## 2022-02-22 NOTE — PROGRESS NOTES
Patient Information  Name: Enio Earl  : 1975  MRN: [id number]     Referring Physician:  No ref. provider found   Primary Care Physician:  Bhupinder Bowen DO   Date of Visit: 2022      Subjective:     History of Present lllness:    Enio Earl is a 46 y.o. male who presents with a chief complaint of spot and hands.    Diagnosis: Hand eczema  Location: hands  Signs/Symptoms: dry, cracking  Symptom course: improving  Current treatment: Lidex ointment, hydrocolloid bandage; states that he will do better once the weather is warmer    spot  Location: under right eye  Duration: years  Signs/Symptoms: scaly spot  Relieving factors/Prior treatments: none    Patient was last seen: 2021.  Prior notes by myself reviewed.   Clinical documentation obtained by nursing staff reviewed.    Review of Systems   Skin: Positive for rash and dry skin.       Objective:   Physical Exam   Constitutional: He appears well-developed and well-nourished. No distress.   Neurological: He is alert and oriented to person, place, and time. He is not disoriented.   Psychiatric: He has a normal mood and affect.   Skin:   Areas Examined (abnormalities noted in diagram):   Head / Face Inspection Performed  RUE Inspected  LUE Inspection Performed                Diagram Legend     Erythematous scaling macule/papule c/w actinic keratosis       Vascular papule c/w angioma      Pigmented verrucoid papule/plaque c/w seborrheic keratosis      Yellow umbilicated papule c/w sebaceous hyperplasia      Irregularly shaped tan macule c/w lentigo     1-2 mm smooth white papules consistent with Milia      Movable subcutaneous cyst with punctum c/w epidermal inclusion cyst      Subcutaneous movable cyst c/w pilar cyst      Firm pink to brown papule c/w dermatofibroma      Pedunculated fleshy papule(s) c/w skin tag(s)      Evenly pigmented macule c/w junctional nevus     Mildly variegated pigmented, slightly irregular-bordered macule c/w  mildly atypical nevus      Flesh colored to evenly pigmented papule c/w intradermal nevus       Pink pearly papule/plaque c/w basal cell carcinoma      Erythematous hyperkeratotic cursted plaque c/w SCC      Surgical scar with no sign of skin cancer recurrence      Open and closed comedones      Inflammatory papules and pustules      Verrucoid papule consistent consistent with wart     Erythematous eczematous patches and plaques     Dystrophic onycholytic nail with subungual debris c/w onychomycosis     Umbilicated papule    Erythematous-base heme-crusted tan verrucoid plaque consistent with inflamed seborrheic keratosis     Erythematous Silvery Scaling Plaque c/w Psoriasis     See annotation    No images are attached to the encounter or orders placed in the encounter.      [] Data reviewed  [] Prior external notes reviewed  [] Independent review of test  [] Management discussed with another provider  [] Independent historian    Assessment / Plan:        Actinic keratosis  Cryosurgery procedure note:  Risk, benefits, and alternatives of cryosurgery are discussed with the patient, including but not limited to the risks of hypopigmentation, hyperpigmentation, scar, infection, recurrence of lesion(s), development of new lesion(s), and need for additional treatment of the lesion(s). Verbal consent obtained from patient. Liquid nitrogen cryosurgery applied to 1 lesion(s) to produce a freeze injury. Counseled patient that blisters may form, and instructed patient on wound care with gentle cleansing and use of Vaseline ointment to keep moist until healed. Handout was provided, and patient was instructed to return to clinic in 1-2 months if lesions do not completely resolve.    Hand eczema  - chronic problem with exacerbation/progression  Will increase strength of topical steroid and add in urea cream to area of callus to prevent fissures (examples in AVS). Use Vaseline over fissures prior to urea to prevent burning  sensation.  -     clobetasoL (TEMOVATE) 0.05 % cream; Compound clobetasol 0.1% / niacinamide 2% ointment. Apply to affected areas of hands BID prn eczema.  Dispense: 60 g; Refill: 2  -  Frequent hand moisturization every hour and after every hand washing. Use an alcohol-based hand  with aloe vera, and minimize hand washing unless visibly dirty.  -  Washing hands with Dove Sensitive Skin Beauty Bar or other fragrance-free cleanser and moisturize with OTC CeraVe healing ointment or Neutrogena Norwegian Formula Hand Cream.   -  Can apply the prescribed topical steroid followed by white cotton gloves or a warm, damp towel for severe flares.     Follow up in about 2 months (around 4/22/2022) for follow up if symptoms worsening or not improving.      Ariane Vance MD, FAAD  [facility]

## 2022-03-02 ENCOUNTER — PATIENT MESSAGE (OUTPATIENT)
Dept: PODIATRY | Facility: CLINIC | Age: 47
End: 2022-03-02
Payer: COMMERCIAL

## 2022-03-10 ENCOUNTER — OFFICE VISIT (OUTPATIENT)
Dept: ORTHOPEDICS | Facility: CLINIC | Age: 47
End: 2022-03-10
Payer: COMMERCIAL

## 2022-03-10 DIAGNOSIS — M77.01 MEDIAL EPICONDYLITIS OF RIGHT ELBOW: ICD-10-CM

## 2022-03-10 PROCEDURE — 1159F MED LIST DOCD IN RCRD: CPT | Mod: CPTII,S$GLB,, | Performed by: ORTHOPAEDIC SURGERY

## 2022-03-10 PROCEDURE — 4010F ACE/ARB THERAPY RXD/TAKEN: CPT | Mod: CPTII,S$GLB,, | Performed by: ORTHOPAEDIC SURGERY

## 2022-03-10 PROCEDURE — 99999 PR PBB SHADOW E&M-EST. PATIENT-LVL III: CPT | Mod: PBBFAC,,, | Performed by: ORTHOPAEDIC SURGERY

## 2022-03-10 PROCEDURE — 4010F PR ACE/ARB THEARPY RXD/TAKEN: ICD-10-PCS | Mod: CPTII,S$GLB,, | Performed by: ORTHOPAEDIC SURGERY

## 2022-03-10 PROCEDURE — 1159F PR MEDICATION LIST DOCUMENTED IN MEDICAL RECORD: ICD-10-PCS | Mod: CPTII,S$GLB,, | Performed by: ORTHOPAEDIC SURGERY

## 2022-03-10 PROCEDURE — 20551 PR INJECT TENDON ORIGIN/INSERT: ICD-10-PCS | Mod: RT,S$GLB,, | Performed by: ORTHOPAEDIC SURGERY

## 2022-03-10 PROCEDURE — 99999 PR PBB SHADOW E&M-EST. PATIENT-LVL III: ICD-10-PCS | Mod: PBBFAC,,, | Performed by: ORTHOPAEDIC SURGERY

## 2022-03-10 PROCEDURE — 99213 PR OFFICE/OUTPT VISIT, EST, LEVL III, 20-29 MIN: ICD-10-PCS | Mod: 25,S$GLB,, | Performed by: ORTHOPAEDIC SURGERY

## 2022-03-10 PROCEDURE — 20551 NJX 1 TENDON ORIGIN/INSJ: CPT | Mod: RT,S$GLB,, | Performed by: ORTHOPAEDIC SURGERY

## 2022-03-10 PROCEDURE — 99213 OFFICE O/P EST LOW 20 MIN: CPT | Mod: 25,S$GLB,, | Performed by: ORTHOPAEDIC SURGERY

## 2022-03-10 RX ORDER — TRIAMCINOLONE ACETONIDE 40 MG/ML
20 INJECTION, SUSPENSION INTRA-ARTICULAR; INTRAMUSCULAR
Status: COMPLETED | OUTPATIENT
Start: 2022-03-10 | End: 2022-03-10

## 2022-03-10 RX ORDER — NAPROXEN 375 MG/1
375 TABLET ORAL 2 TIMES DAILY
Qty: 60 TABLET | Refills: 1 | Status: SHIPPED | OUTPATIENT
Start: 2022-03-10 | End: 2022-05-24

## 2022-03-10 RX ADMIN — TRIAMCINOLONE ACETONIDE 20 MG: 40 INJECTION, SUSPENSION INTRA-ARTICULAR; INTRAMUSCULAR at 11:03

## 2022-03-10 NOTE — PROGRESS NOTES
Subjective:      Patient ID: Enio Earl is a 46 y.o. male.    Chief Complaint: Pain of the Right Elbow      HPI  Enio Earl is a  46 y.o. male presenting today for right elbow pain.  There was not a history of trauma.  Onset of symptoms began several months ago  He does do quite a bit of lifting and had previous symptoms in the opposite left elbow which a ventrally required surgery  No numbness or tingling reported.      Review of patient's allergies indicates:  No Known Allergies      Current Outpatient Medications   Medication Sig Dispense Refill    atorvastatin (LIPITOR) 10 MG tablet Take 1 tablet (10 mg total) by mouth once daily. 90 tablet 3    clobetasoL (TEMOVATE) 0.05 % cream Compound clobetasol 0.1% / niacinamide 2% ointment. Apply to affected areas of hands BID prn eczema. 60 g 2    fluocinonide 0.05% (LIDEX) 0.05 % cream AAA of hands bid prn rash 60 g 3    losartan (COZAAR) 100 MG tablet TAKE 1 TABLET BY MOUTH EVERY DAY 90 tablet 0    sertraline (ZOLOFT) 25 MG tablet TAKE 1 TABLET BY MOUTH EVERY DAY 90 tablet 0    verapamiL (CALAN-SR) 120 MG CR tablet Take 1 tablet (120 mg total) by mouth every evening. 90 tablet 1    fluticasone propionate (FLONASE) 50 mcg/actuation nasal spray 1 spray (50 mcg total) by Each Nostril route 2 (two) times a day. 11.1 mL 11    naproxen (EC-NAPROSYN) 375 MG TbEC EC tablet Take 1 tablet (375 mg total) by mouth 2 (two) times daily. 60 tablet 1    tretinoin (RETIN-A) 0.05 % cream Apply a pea-sized amount to face qhs. 20 g 5     No current facility-administered medications for this visit.       Past Medical History:   Diagnosis Date    Anxiety     Hypertension     Supraventricular tachycardia        Past Surgical History:   Procedure Laterality Date    CARDIAC ELECTROPHYSIOLOGY MAPPING AND ABLATION  07/2020    for PSVT by Dr. Lim     LATERAL EPICONDYLE RELEASE Left 5/18/2021    Procedure: RELEASE, ELBOW, LATERAL EPICONDYLE;  Surgeon: Cristi Adam  MD Abner;  [facility];  Service: Orthopedics;  Laterality: Left;    NOSE SURGERY      sepltoplasty       Review of Systems:  ROS    OBJECTIVE:     PHYSICAL EXAM:       Vitals:    03/10/22 1115   PainSc:   6   PainLoc: Elbow     Well developed, well nourished male in no acute distress  Alert and oriented x 3  HEENT- Normal exam  Lungs- Clear to auscultation  Heart- Regular rate and rhythm  Abdomen- Soft nontender  Extremity exam- examination right elbow there is tenderness medially over the medial epicondylar area  No swelling  Full range of motion no instability Tinel sign negative  Distally  strength intact sensation intact right hand    RADIOGRAPHS:  None  Comments: I have personally reviewed the imaging and I agree with the above radiologist's report.    ASSESSMENT/PLAN:     IMPRESSION:  Medial epicondylitis right elbow    PLAN:  I explained the nature of the problem to the patient recommended injection today  After pause for time-out identified the right elbow injected medially with combination Kenalog 20 mg 0.5 cc xylocaine sterile technique  Tolerated the procedure well without complication  Recommended some stretching strengthening exercises and a tennis elbow counterforce brace  Naprosyn by mouth  Avoid heavy lifting   Follow-up 4-6 weeks       - We talked at length about the anatomy and pathophysiology of No diagnosis found.        Disclaimer: This note has been generated using voice-recognition software. There may be typographical errors that have been missed during proof-reading.

## 2022-03-11 DIAGNOSIS — I47.10 PSVT (PAROXYSMAL SUPRAVENTRICULAR TACHYCARDIA): ICD-10-CM

## 2022-03-11 DIAGNOSIS — I47.10 SVT (SUPRAVENTRICULAR TACHYCARDIA): ICD-10-CM

## 2022-03-11 DIAGNOSIS — I10 ESSENTIAL HYPERTENSION: ICD-10-CM

## 2022-03-11 NOTE — TELEPHONE ENCOUNTER
Unable to retrieve patient chart and identify care gaps.  Powered by [employer]. Reference number: [id number].   3/11/2022 12:03:37 AM CST

## 2022-03-15 RX ORDER — VERAPAMIL HYDROCHLORIDE 120 MG/1
120 TABLET, FILM COATED, EXTENDED RELEASE ORAL NIGHTLY
Qty: 90 TABLET | Refills: 0 | Status: SHIPPED | OUTPATIENT
Start: 2022-03-15 | End: 2022-03-21 | Stop reason: SDUPTHER

## 2022-03-15 NOTE — TELEPHONE ENCOUNTER
This Rx Request does not qualify for assessment with the [facility]   Please review protocol details and the Care Due Message for extra clinical information    Reasons Rx Request may be deferred:  1. Labs/Vitals overdue  2. Labs/Vitals abnormal  3. Patient has been seen in the ED/Hospital since the last PCP visit  4. Medication is non-delegated  5. Medication associated diagnosis code is outside of scope  6. Provider is a non-participating provider  7. Visit criteria not met (Patient needs to be seen at least every 15 months by PCP)    Note composed:12:37 PM 03/15/2022

## 2022-03-21 ENCOUNTER — LAB VISIT (OUTPATIENT)
Dept: LAB | Facility: HOSPITAL | Age: 47
End: 2022-03-21
Attending: FAMILY MEDICINE
Payer: COMMERCIAL

## 2022-03-21 ENCOUNTER — OFFICE VISIT (OUTPATIENT)
Dept: FAMILY MEDICINE | Facility: CLINIC | Age: 47
End: 2022-03-21
Payer: COMMERCIAL

## 2022-03-21 ENCOUNTER — TELEPHONE (OUTPATIENT)
Dept: FAMILY MEDICINE | Facility: CLINIC | Age: 47
End: 2022-03-21
Payer: COMMERCIAL

## 2022-03-21 VITALS
SYSTOLIC BLOOD PRESSURE: 133 MMHG | WEIGHT: 214.06 LBS | HEART RATE: 90 BPM | BODY MASS INDEX: 31.71 KG/M2 | OXYGEN SATURATION: 98 % | HEIGHT: 69 IN | DIASTOLIC BLOOD PRESSURE: 78 MMHG | TEMPERATURE: 99 F

## 2022-03-21 DIAGNOSIS — Z00.00 VISIT FOR WELL MAN HEALTH CHECK: Primary | ICD-10-CM

## 2022-03-21 DIAGNOSIS — F17.200 TOBACCO DEPENDENCE: ICD-10-CM

## 2022-03-21 DIAGNOSIS — Z00.00 VISIT FOR WELL MAN HEALTH CHECK: ICD-10-CM

## 2022-03-21 DIAGNOSIS — I47.10 PSVT (PAROXYSMAL SUPRAVENTRICULAR TACHYCARDIA): ICD-10-CM

## 2022-03-21 DIAGNOSIS — Z12.11 COLON CANCER SCREENING: ICD-10-CM

## 2022-03-21 DIAGNOSIS — I47.10 SVT (SUPRAVENTRICULAR TACHYCARDIA): ICD-10-CM

## 2022-03-21 DIAGNOSIS — I10 ESSENTIAL HYPERTENSION: ICD-10-CM

## 2022-03-21 DIAGNOSIS — E78.49 OTHER HYPERLIPIDEMIA: ICD-10-CM

## 2022-03-21 DIAGNOSIS — F41.8 SITUATIONAL ANXIETY: ICD-10-CM

## 2022-03-21 PROBLEM — M25.60 STIFFNESS IN JOINT: Status: RESOLVED | Noted: 2021-07-06 | Resolved: 2022-03-21

## 2022-03-21 LAB
25(OH)D3+25(OH)D2 SERPL-MCNC: 44 NG/ML (ref 30–96)
ALBUMIN SERPL BCP-MCNC: 4.4 G/DL (ref 3.5–5.2)
ALP SERPL-CCNC: 45 U/L (ref 55–135)
ALT SERPL W/O P-5'-P-CCNC: 38 U/L (ref 10–44)
ANION GAP SERPL CALC-SCNC: 12 MMOL/L (ref 8–16)
AST SERPL-CCNC: 35 U/L (ref 10–40)
BILIRUB SERPL-MCNC: 1 MG/DL (ref 0.1–1)
BUN SERPL-MCNC: 13 MG/DL (ref 6–20)
CALCIUM SERPL-MCNC: 9.9 MG/DL (ref 8.7–10.5)
CHLORIDE SERPL-SCNC: 100 MMOL/L (ref 95–110)
CHOLEST SERPL-MCNC: 143 MG/DL (ref 120–199)
CHOLEST/HDLC SERPL: 2.1 {RATIO} (ref 2–5)
CO2 SERPL-SCNC: 25 MMOL/L (ref 23–29)
CREAT SERPL-MCNC: 0.8 MG/DL (ref 0.5–1.4)
EST. GFR  (AFRICAN AMERICAN): >60 ML/MIN/1.73 M^2
EST. GFR  (NON AFRICAN AMERICAN): >60 ML/MIN/1.73 M^2
ESTIMATED AVG GLUCOSE: 100 MG/DL (ref 68–131)
GLUCOSE SERPL-MCNC: 83 MG/DL (ref 70–110)
HBA1C MFR BLD: 5.1 % (ref 4–5.6)
HDLC SERPL-MCNC: 67 MG/DL (ref 40–75)
HDLC SERPL: 46.9 % (ref 20–50)
LDLC SERPL CALC-MCNC: 63.8 MG/DL (ref 63–159)
NONHDLC SERPL-MCNC: 76 MG/DL
POTASSIUM SERPL-SCNC: 3.9 MMOL/L (ref 3.5–5.1)
PROT SERPL-MCNC: 7.7 G/DL (ref 6–8.4)
SODIUM SERPL-SCNC: 137 MMOL/L (ref 136–145)
TRIGL SERPL-MCNC: 61 MG/DL (ref 30–150)
TSH SERPL DL<=0.005 MIU/L-ACNC: 1.81 UIU/ML (ref 0.4–4)

## 2022-03-21 PROCEDURE — 99396 PR PREVENTIVE VISIT,EST,40-64: ICD-10-PCS | Mod: S$GLB,,, | Performed by: FAMILY MEDICINE

## 2022-03-21 PROCEDURE — 84443 ASSAY THYROID STIM HORMONE: CPT | Performed by: FAMILY MEDICINE

## 2022-03-21 PROCEDURE — 3075F SYST BP GE 130 - 139MM HG: CPT | Mod: CPTII,S$GLB,, | Performed by: FAMILY MEDICINE

## 2022-03-21 PROCEDURE — 1159F PR MEDICATION LIST DOCUMENTED IN MEDICAL RECORD: ICD-10-PCS | Mod: CPTII,S$GLB,, | Performed by: FAMILY MEDICINE

## 2022-03-21 PROCEDURE — 4010F ACE/ARB THERAPY RXD/TAKEN: CPT | Mod: CPTII,S$GLB,, | Performed by: FAMILY MEDICINE

## 2022-03-21 PROCEDURE — 3078F DIAST BP <80 MM HG: CPT | Mod: CPTII,S$GLB,, | Performed by: FAMILY MEDICINE

## 2022-03-21 PROCEDURE — 99396 PREV VISIT EST AGE 40-64: CPT | Mod: S$GLB,,, | Performed by: FAMILY MEDICINE

## 2022-03-21 PROCEDURE — 99999 PR PBB SHADOW E&M-EST. PATIENT-LVL V: ICD-10-PCS | Mod: PBBFAC,,, | Performed by: FAMILY MEDICINE

## 2022-03-21 PROCEDURE — 36415 COLL VENOUS BLD VENIPUNCTURE: CPT | Mod: PO | Performed by: FAMILY MEDICINE

## 2022-03-21 PROCEDURE — 80061 LIPID PANEL: CPT | Performed by: FAMILY MEDICINE

## 2022-03-21 PROCEDURE — 1160F PR REVIEW ALL MEDS BY PRESCRIBER/CLIN PHARMACIST DOCUMENTED: ICD-10-PCS | Mod: CPTII,S$GLB,, | Performed by: FAMILY MEDICINE

## 2022-03-21 PROCEDURE — 82306 VITAMIN D 25 HYDROXY: CPT | Performed by: FAMILY MEDICINE

## 2022-03-21 PROCEDURE — 80053 COMPREHEN METABOLIC PANEL: CPT | Performed by: FAMILY MEDICINE

## 2022-03-21 PROCEDURE — 4010F PR ACE/ARB THEARPY RXD/TAKEN: ICD-10-PCS | Mod: CPTII,S$GLB,, | Performed by: FAMILY MEDICINE

## 2022-03-21 PROCEDURE — 3078F PR MOST RECENT DIASTOLIC BLOOD PRESSURE < 80 MM HG: ICD-10-PCS | Mod: CPTII,S$GLB,, | Performed by: FAMILY MEDICINE

## 2022-03-21 PROCEDURE — 3075F PR MOST RECENT SYSTOLIC BLOOD PRESS GE 130-139MM HG: ICD-10-PCS | Mod: CPTII,S$GLB,, | Performed by: FAMILY MEDICINE

## 2022-03-21 PROCEDURE — 85025 COMPLETE CBC W/AUTO DIFF WBC: CPT | Performed by: FAMILY MEDICINE

## 2022-03-21 PROCEDURE — 1159F MED LIST DOCD IN RCRD: CPT | Mod: CPTII,S$GLB,, | Performed by: FAMILY MEDICINE

## 2022-03-21 PROCEDURE — 99999 PR PBB SHADOW E&M-EST. PATIENT-LVL V: CPT | Mod: PBBFAC,,, | Performed by: FAMILY MEDICINE

## 2022-03-21 PROCEDURE — 83036 HEMOGLOBIN GLYCOSYLATED A1C: CPT | Performed by: FAMILY MEDICINE

## 2022-03-21 PROCEDURE — 1160F RVW MEDS BY RX/DR IN RCRD: CPT | Mod: CPTII,S$GLB,, | Performed by: FAMILY MEDICINE

## 2022-03-21 PROCEDURE — 3008F PR BODY MASS INDEX (BMI) DOCUMENTED: ICD-10-PCS | Mod: CPTII,S$GLB,, | Performed by: FAMILY MEDICINE

## 2022-03-21 PROCEDURE — 3008F BODY MASS INDEX DOCD: CPT | Mod: CPTII,S$GLB,, | Performed by: FAMILY MEDICINE

## 2022-03-21 RX ORDER — LOSARTAN POTASSIUM 100 MG/1
100 TABLET ORAL DAILY
Qty: 90 TABLET | Refills: 1 | Status: SHIPPED | OUTPATIENT
Start: 2022-03-21 | End: 2022-12-07

## 2022-03-21 RX ORDER — ATORVASTATIN CALCIUM 10 MG/1
10 TABLET, FILM COATED ORAL DAILY
Qty: 90 TABLET | Refills: 1 | Status: SHIPPED | OUTPATIENT
Start: 2022-03-21 | End: 2023-02-15 | Stop reason: SDUPTHER

## 2022-03-21 RX ORDER — SERTRALINE HYDROCHLORIDE 25 MG/1
25 TABLET, FILM COATED ORAL DAILY
Qty: 90 TABLET | Refills: 1 | Status: SHIPPED | OUTPATIENT
Start: 2022-03-21 | End: 2022-09-05

## 2022-03-21 RX ORDER — VARENICLINE TARTRATE 1 MG/1
TABLET, FILM COATED ORAL
Qty: 56 TABLET | Refills: 0 | Status: SHIPPED | OUTPATIENT
Start: 2022-03-21 | End: 2022-04-11 | Stop reason: ALTCHOICE

## 2022-03-21 RX ORDER — VERAPAMIL HYDROCHLORIDE 120 MG/1
120 TABLET, FILM COATED, EXTENDED RELEASE ORAL NIGHTLY
Qty: 90 TABLET | Refills: 1 | Status: SHIPPED | OUTPATIENT
Start: 2022-03-21 | End: 2022-12-07

## 2022-03-21 NOTE — TELEPHONE ENCOUNTER
Patient requesting sooner appointment, Patient canceled appointment today due to a work meeting. I rescheduled appointment today for 2:40 pm. Patient  voiced understanding.

## 2022-03-21 NOTE — TELEPHONE ENCOUNTER
----- Message from Evonne Murillo sent at 3/21/2022 10:33 AM CDT -----  Type:  Patient Returning Call    Who Called:Pt   Would the patient rather a call back or a response via [facility]? Call   Best Call Back Number: [phone]  Additional Information:    Pt would like to retrieve appt cancelled today for 3pm  Pt would like a call

## 2022-03-21 NOTE — PATIENT INSTRUCTIONS
Call around May to schedule screening colonoscopy    We want to make sure that you do miss out on screening for colon cancer. If you are having any issues with scheduling your colonoscopy please do not hesitate to reach out to our staff who can directly help you with getting this scheduled.  The number is [phone]       Continue atorvastatin   Continue zoloft 25 mg  Continue verapamil 120 mg SR  Continue losartan at 100 mg    Check BP at home, at least 2-3x/week  Update me with BP in 3-4 weeks  Should be 130/80 or less at home.     Will avoid amlodipine and metoprolol for now     Rash: improving, but not resolved.     Quit smoking! Sent chantix. Smoking cessation. Consider wellbutrin?     F/u 6 months. HTN. Labs today.

## 2022-03-21 NOTE — PROGRESS NOTES
Subjective:       Patient ID: Enio Earl is a 46 y.o. male.    Chief Complaint: Annual Exam      Enio Earl is a 46 y.o. male who presents today for an annual exam    Diet/Exercise: more activity at work, due to being short staffed. Has lost weight!    Labs: ordered    C-scope: ordered    SVT/HTN:  EP recommended verapamil 120 mg 24 hour tabs qam for BP and SVT. BP still high. Added losartan, increased to 100 mg. Will avoid amlodipine and metoprolol for now.   Anxiety: on zoloft. Mood has improved.   DLD: on atorvastatin  Rash: seeing dermatology. Improving, but still present. In general, about 80% better.     PMHx: reviewed in EMR and updated  Meds: reviewed in EMR and updated  Shx: reviewed in EMR and updated  FMHx: reviewed in EMR and updated  Social: he lives with his wife and two kids (12 and 3). 3 dogs at home. 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 No smokers at home.       Review of Systems   Constitutional: Negative for chills and fever.   Respiratory: Negative for chest tightness and shortness of breath.    Cardiovascular: Negative for chest pain.   Gastrointestinal: Negative for diarrhea, nausea and vomiting.   Skin: Positive for rash.   Neurological: Negative for dizziness and headaches.         Health Maintenance Due   Topic Date Due    TETANUS VACCINE  Never done    Influenza Vaccine (1) 09/01/2021    COVID-19 Vaccine (3 - Booster for Moderna series) 02/02/2022     Immunization History   Administered Date(s) Administered    COVID-19, MRNA, LN-S, PF (MODERNA FULL 0.5 ML DOSE) 07/30/2021, 09/02/2021    Influenza 10/17/2019    Influenza - Quadrivalent - PF *Preferred* (6 months and older) 11/17/2018, 11/17/2018    Influenza - Trivalent - MDCK - PF 11/22/2013    Influenza Split 11/05/2014    Pneumococcal Polysaccharide - 23 Valent 11/03/2020         Objective:     Vitals:    03/21/22 1444   BP: 133/78   Pulse: 90   Temp: 98.5 °F (36.9 °C)        Physical Exam  Vitals and nursing note reviewed.    Constitutional:       General: He is not in acute distress.     Appearance: He is obese. He is not ill-appearing, toxic-appearing or diaphoretic.   Cardiovascular:      Rate and Rhythm: Normal rate and regular rhythm.   Pulmonary:      Effort: Pulmonary effort is normal.      Breath sounds: Normal breath sounds.   Abdominal:      Palpations: Abdomen is soft.   Skin:     Findings: Rash present.   Neurological:      General: No focal deficit present.      Mental Status: He is alert.   Psychiatric:         Mood and Affect: Mood normal.         Behavior: Behavior normal.         Thought Content: Thought content normal.         Judgment: Judgment normal.                     Assessment:       1. Visit for [facility] health check    2. Essential hypertension    3. Other hyperlipidemia    4. PSVT (paroxysmal supraventricular tachycardia)    5. SVT (supraventricular tachycardia)    6. Situational anxiety    7. Tobacco dependence    8. Colon cancer screening        Plan:       Continue atorvastatin   Continue zoloft 25 mg  Continue verapamil 120 mg SR  Continue losartan at 100 mg    Check BP at home, at least 2-3x/week  Update me with BP in 3-4 weeks  Should be 130/80 or less at home.     Will avoid amlodipine and metoprolol for now     Rash: improving, but not resolved.     Quit smoking! Sent chantix. Smoking cessation. Consider wellbutrin?     F/u 6 months. HTN. Labs today.        Visit for [facility] health check  -     CBC Auto Differential; Future; Expected date: 03/21/2022  -     Comprehensive Metabolic Panel; Future; Expected date: 03/21/2022  -     Hemoglobin A1C; Future; Expected date: 03/21/2022  -     Lipid Panel; Future; Expected date: 03/21/2022  -     TSH; Future; Expected date: 03/21/2022  -     Vitamin D; Future; Expected date: 03/21/2022  -     Case Request Endoscopy: COLONOSCOPY    Essential hypertension  -     losartan (COZAAR) 100 MG tablet; Take 1 tablet (100 mg total) by mouth once daily.  Dispense: 90  tablet; Refill: 1  -     verapamiL (CALAN-SR) 120 MG CR tablet; Take 1 tablet (120 mg total) by mouth every evening.  Dispense: 90 tablet; Refill: 1    Other hyperlipidemia  -     atorvastatin (LIPITOR) 10 MG tablet; Take 1 tablet (10 mg total) by mouth once daily.  Dispense: 90 tablet; Refill: 1    PSVT (paroxysmal supraventricular tachycardia)  -     verapamiL (CALAN-SR) 120 MG CR tablet; Take 1 tablet (120 mg total) by mouth every evening.  Dispense: 90 tablet; Refill: 1    SVT (supraventricular tachycardia)  -     verapamiL (CALAN-SR) 120 MG CR tablet; Take 1 tablet (120 mg total) by mouth every evening.  Dispense: 90 tablet; Refill: 1    Situational anxiety  -     sertraline (ZOLOFT) 25 MG tablet; Take 1 tablet (25 mg total) by mouth once daily.  Dispense: 90 tablet; Refill: 1    Tobacco dependence  -     Ambulatory referral/consult to Smoking Cessation Program; Future; Expected date: 03/28/2022  -     varenicline (CHANTIX) 1 mg Tab; Take  0.5mg ( 1/2 tablet ) tablet by mouth once daily X3 days,then increase to  0.5mg ( 1/2 tablet ) twice daily X4 days,then increase to 1mg ( 1 tablet )  twice daily  Dispense: 56 tablet; Refill: 0    Colon cancer screening  -     Case Request Endoscopy: COLONOSCOPY

## 2022-03-22 ENCOUNTER — TELEPHONE (OUTPATIENT)
Dept: FAMILY MEDICINE | Facility: CLINIC | Age: 47
End: 2022-03-22
Payer: COMMERCIAL

## 2022-03-22 DIAGNOSIS — D72.829 LEUKOCYTOSIS, UNSPECIFIED TYPE: Primary | ICD-10-CM

## 2022-03-22 LAB
BASOPHILS # BLD AUTO: 0.05 K/UL (ref 0–0.2)
BASOPHILS NFR BLD: 0.3 % (ref 0–1.9)
DIFFERENTIAL METHOD: ABNORMAL
EOSINOPHIL # BLD AUTO: 0.1 K/UL (ref 0–0.5)
EOSINOPHIL NFR BLD: 0.4 % (ref 0–8)
ERYTHROCYTE [DISTWIDTH] IN BLOOD BY AUTOMATED COUNT: 12 % (ref 11.5–14.5)
HCT VFR BLD AUTO: 43.2 % (ref 40–54)
HGB BLD-MCNC: 14.6 G/DL (ref 14–18)
IMM GRANULOCYTES # BLD AUTO: 0.06 K/UL (ref 0–0.04)
IMM GRANULOCYTES NFR BLD AUTO: 0.4 % (ref 0–0.5)
LYMPHOCYTES # BLD AUTO: 2.7 K/UL (ref 1–4.8)
LYMPHOCYTES NFR BLD: 18.5 % (ref 18–48)
MCH RBC QN AUTO: 34.8 PG (ref 27–31)
MCHC RBC AUTO-ENTMCNC: 33.8 G/DL (ref 32–36)
MCV RBC AUTO: 103 FL (ref 82–98)
MONOCYTES # BLD AUTO: 1.2 K/UL (ref 0.3–1)
MONOCYTES NFR BLD: 8.1 % (ref 4–15)
NEUTROPHILS # BLD AUTO: 10.6 K/UL (ref 1.8–7.7)
NEUTROPHILS NFR BLD: 72.3 % (ref 38–73)
NRBC BLD-RTO: 0 /100 WBC
PLATELET # BLD AUTO: 228 K/UL (ref 150–450)
PMV BLD AUTO: 12 FL (ref 9.2–12.9)
RBC # BLD AUTO: 4.19 M/UL (ref 4.6–6.2)
WBC # BLD AUTO: 14.72 K/UL (ref 3.9–12.7)

## 2022-04-05 ENCOUNTER — OFFICE VISIT (OUTPATIENT)
Dept: ORTHOPEDICS | Facility: CLINIC | Age: 47
End: 2022-04-05
Payer: COMMERCIAL

## 2022-04-05 ENCOUNTER — HOSPITAL ENCOUNTER (OUTPATIENT)
Dept: RADIOLOGY | Facility: HOSPITAL | Age: 47
Discharge: HOME | End: 2022-04-05
Attending: PHYSICIAN ASSISTANT
Payer: COMMERCIAL

## 2022-04-05 ENCOUNTER — TELEPHONE (OUTPATIENT)
Dept: ORTHOPEDICS | Facility: CLINIC | Age: 47
End: 2022-04-05
Payer: COMMERCIAL

## 2022-04-05 VITALS
WEIGHT: 216.25 LBS | BODY MASS INDEX: 32.03 KG/M2 | HEIGHT: 69 IN | SYSTOLIC BLOOD PRESSURE: 144 MMHG | HEART RATE: 95 BPM | DIASTOLIC BLOOD PRESSURE: 90 MMHG

## 2022-04-05 DIAGNOSIS — M25.519 SHOULDER PAIN, UNSPECIFIED CHRONICITY, UNSPECIFIED LATERALITY: ICD-10-CM

## 2022-04-05 DIAGNOSIS — M25.519 SHOULDER PAIN, UNSPECIFIED CHRONICITY, UNSPECIFIED LATERALITY: Primary | ICD-10-CM

## 2022-04-05 DIAGNOSIS — M75.41 ROTATOR CUFF IMPINGEMENT SYNDROME OF RIGHT SHOULDER: Primary | ICD-10-CM

## 2022-04-05 PROCEDURE — 3044F HG A1C LEVEL LT 7.0%: CPT | Mod: CPTII,S$GLB,, | Performed by: PHYSICIAN ASSISTANT

## 2022-04-05 PROCEDURE — 99999 PR PBB SHADOW E&M-EST. PATIENT-LVL III: ICD-10-PCS | Mod: PBBFAC,,, | Performed by: PHYSICIAN ASSISTANT

## 2022-04-05 PROCEDURE — 1160F PR REVIEW ALL MEDS BY PRESCRIBER/CLIN PHARMACIST DOCUMENTED: ICD-10-PCS | Mod: CPTII,S$GLB,, | Performed by: PHYSICIAN ASSISTANT

## 2022-04-05 PROCEDURE — 20610 DRAIN/INJ JOINT/BURSA W/O US: CPT | Mod: RT,S$GLB,, | Performed by: PHYSICIAN ASSISTANT

## 2022-04-05 PROCEDURE — 99999 PR PBB SHADOW E&M-EST. PATIENT-LVL III: CPT | Mod: PBBFAC,,, | Performed by: PHYSICIAN ASSISTANT

## 2022-04-05 PROCEDURE — 4010F ACE/ARB THERAPY RXD/TAKEN: CPT | Mod: CPTII,S$GLB,, | Performed by: PHYSICIAN ASSISTANT

## 2022-04-05 PROCEDURE — 73030 X-RAY EXAM OF SHOULDER: CPT | Mod: TC,FY,RT

## 2022-04-05 PROCEDURE — 3008F PR BODY MASS INDEX (BMI) DOCUMENTED: ICD-10-PCS | Mod: CPTII,S$GLB,, | Performed by: PHYSICIAN ASSISTANT

## 2022-04-05 PROCEDURE — 73030 X-RAY EXAM OF SHOULDER: CPT | Mod: 26,RT,, | Performed by: RADIOLOGY

## 2022-04-05 PROCEDURE — 3080F DIAST BP >= 90 MM HG: CPT | Mod: CPTII,S$GLB,, | Performed by: PHYSICIAN ASSISTANT

## 2022-04-05 PROCEDURE — 1160F RVW MEDS BY RX/DR IN RCRD: CPT | Mod: CPTII,S$GLB,, | Performed by: PHYSICIAN ASSISTANT

## 2022-04-05 PROCEDURE — 3077F SYST BP >= 140 MM HG: CPT | Mod: CPTII,S$GLB,, | Performed by: PHYSICIAN ASSISTANT

## 2022-04-05 PROCEDURE — 1159F PR MEDICATION LIST DOCUMENTED IN MEDICAL RECORD: ICD-10-PCS | Mod: CPTII,S$GLB,, | Performed by: PHYSICIAN ASSISTANT

## 2022-04-05 PROCEDURE — 3008F BODY MASS INDEX DOCD: CPT | Mod: CPTII,S$GLB,, | Performed by: PHYSICIAN ASSISTANT

## 2022-04-05 PROCEDURE — 73030 XR SHOULDER COMPLETE 2 OR MORE VIEWS RIGHT: ICD-10-PCS | Mod: 26,RT,, | Performed by: RADIOLOGY

## 2022-04-05 PROCEDURE — 20610 LARGE JOINT ASPIRATION/INJECTION: R SUBACROMIAL BURSA: ICD-10-PCS | Mod: RT,S$GLB,, | Performed by: PHYSICIAN ASSISTANT

## 2022-04-05 PROCEDURE — 3077F PR MOST RECENT SYSTOLIC BLOOD PRESSURE >= 140 MM HG: ICD-10-PCS | Mod: CPTII,S$GLB,, | Performed by: PHYSICIAN ASSISTANT

## 2022-04-05 PROCEDURE — 4010F PR ACE/ARB THEARPY RXD/TAKEN: ICD-10-PCS | Mod: CPTII,S$GLB,, | Performed by: PHYSICIAN ASSISTANT

## 2022-04-05 PROCEDURE — 3044F PR MOST RECENT HEMOGLOBIN A1C LEVEL <7.0%: ICD-10-PCS | Mod: CPTII,S$GLB,, | Performed by: PHYSICIAN ASSISTANT

## 2022-04-05 PROCEDURE — 99213 OFFICE O/P EST LOW 20 MIN: CPT | Mod: 25,S$GLB,, | Performed by: PHYSICIAN ASSISTANT

## 2022-04-05 PROCEDURE — 1159F MED LIST DOCD IN RCRD: CPT | Mod: CPTII,S$GLB,, | Performed by: PHYSICIAN ASSISTANT

## 2022-04-05 PROCEDURE — 3080F PR MOST RECENT DIASTOLIC BLOOD PRESSURE >= 90 MM HG: ICD-10-PCS | Mod: CPTII,S$GLB,, | Performed by: PHYSICIAN ASSISTANT

## 2022-04-05 PROCEDURE — 99213 PR OFFICE/OUTPT VISIT, EST, LEVL III, 20-29 MIN: ICD-10-PCS | Mod: 25,S$GLB,, | Performed by: PHYSICIAN ASSISTANT

## 2022-04-05 RX ORDER — TRIAMCINOLONE ACETONIDE 40 MG/ML
40 INJECTION, SUSPENSION INTRA-ARTICULAR; INTRAMUSCULAR
Status: DISCONTINUED | OUTPATIENT
Start: 2022-04-05 | End: 2022-04-05 | Stop reason: HOSPADM

## 2022-04-05 RX ADMIN — TRIAMCINOLONE ACETONIDE 40 MG: 40 INJECTION, SUSPENSION INTRA-ARTICULAR; INTRAMUSCULAR at 03:04

## 2022-04-05 NOTE — PROCEDURES
Large Joint Aspiration/Injection: R subacromial bursa    Date/Time: 4/5/2022 3:30 PM  Performed by: Bhavana Raya PA-C  Authorized by: Bhavana Raya PA-C     Consent Done?:  Yes (Verbal)  Indications:  Pain  Site marked: the procedure site was marked    Timeout: prior to procedure the correct patient, procedure, and site was verified    Prep: patient was prepped and draped in usual sterile fashion      Local anesthesia used?: Yes    Local anesthetic:  Lidocaine 1% without epinephrine and topical anesthetic    Details:  Needle Size:  21 G  Ultrasonic Guidance for needle placement?: No    Approach:  Posterior  Location:  Shoulder  Site:  R subacromial bursa  Medications:  40 mg triamcinolone acetonide 40 mg/mL  Patient tolerance:  Patient tolerated the procedure well with no immediate complications

## 2022-04-05 NOTE — PROGRESS NOTES
Subjective:      Patient ID: Enio Earl is a 46 y.o. male.    Chief Complaint: Pain of the Right Shoulder      45yo RHD male presents with couple month history of right shoulder pain.  He states his pain is anterior.  Worse with overhead motions like washing his hair.  He works for [employer] and is constantly lifting heavy objects.  He currently takes Aleve for pain which helps minimally.  He denies any paresthesias, numbness, weakness, neck pain.      Review of Systems   Constitutional: Negative for chills and fever.   Cardiovascular: Negative for chest pain.   Respiratory: Negative for cough.    Hematologic/Lymphatic: Does not bruise/bleed easily.   Skin: Negative for poor wound healing and rash.   Musculoskeletal: Positive for joint pain and myalgias. Negative for muscle weakness, neck pain and stiffness.   Gastrointestinal: Negative for abdominal pain.   Genitourinary: Negative for bladder incontinence.   Neurological: Negative for dizziness, loss of balance and weakness.   Psychiatric/Behavioral: Negative for altered mental status.       Review of patient's allergies indicates:  No Known Allergies     Current Outpatient Medications   Medication Sig Dispense Refill    atorvastatin (LIPITOR) 10 MG tablet Take 1 tablet (10 mg total) by mouth once daily. 90 tablet 1    clobetasoL (TEMOVATE) 0.05 % cream Compound clobetasol 0.1% / niacinamide 2% ointment. Apply to affected areas of hands BID prn eczema. 60 g 2    fluocinonide 0.05% (LIDEX) 0.05 % cream AAA of hands bid prn rash 60 g 3    losartan (COZAAR) 100 MG tablet Take 1 tablet (100 mg total) by mouth once daily. 90 tablet 1    naproxen (EC-NAPROSYN) 375 MG TbEC EC tablet Take 1 tablet (375 mg total) by mouth 2 (two) times daily. 60 tablet 1    sertraline (ZOLOFT) 25 MG tablet Take 1 tablet (25 mg total) by mouth once daily. 90 tablet 1    tretinoin (RETIN-A) 0.05 % cream Apply a pea-sized amount to face qhs. 20 g 5    varenicline (CHANTIX) 1 mg  "Tab Take  0.5mg ( 1/2 tablet ) tablet by mouth once daily X3 days,then increase to  0.5mg ( 1/2 tablet ) twice daily X4 days,then increase to 1mg ( 1 tablet )  twice daily (Patient taking differently: Take  0.5mg ( 1/2 tablet ) tablet by mouth once daily X3 days,then increase to  0.5mg ( 1/2 tablet ) twice daily X4 days,then increase to 1mg ( 1 tablet )  twice daily) 56 tablet 0    verapamiL (CALAN-SR) 120 MG CR tablet Take 1 tablet (120 mg total) by mouth every evening. 90 tablet 1     No current facility-administered medications for this visit.        The patient's relevant past medical, surgical, and social history was reviewed in Epic.       Objective:      VITAL SIGNS: BP (!) 144/90 (BP Location: Left arm, Patient Position: Sitting, BP Method: X-Large (Automatic))   Pulse 95   Ht 5' 9" (1.753 m)   Wt 98.1 kg (216 lb 4.3 oz)   BMI 31.94 kg/m²     General    Nursing note and vitals reviewed.  Constitutional: He is oriented to person, place, and time. He appears well-developed and well-nourished.   Neurological: He is alert and oriented to person, place, and time.         Right Shoulder Exam     Tenderness   The patient is tender to palpation of the acromion.    Range of Motion   Active abduction: 170   Passive abduction: 170   Forward Flexion: 180   External Rotation 0 degrees: 90   Internal rotation 0 degrees: T8     Tests & Signs   Esparza test: positive  Impingement: negative  Rotator Cuff Painful Arc/Range: mild  Speed's Test: positive    Other   Sensation: normal    Left Shoulder Exam     Range of Motion   Active abduction: 170   Passive abduction: 170   Forward Flexion: 180   External Rotation 0 degrees: 90   Internal rotation 0 degrees: T6       Muscle Strength   Right Upper Extremity   Shoulder Abduction: 4/5   Shoulder Internal Rotation: 5/5   Shoulder External Rotation: 5/5   Supraspinatus: 4/5   Subscapularis: 5/5   Biceps: 5/5   Left Upper Extremity  Shoulder Abduction: 5/5   Shoulder Internal " Rotation: 5/5   Shoulder External Rotation: 5/5   Supraspinatus: 5/5   Subscapularis: 5/5   Biceps: 5/5     Vascular Exam     Right Pulses      Radial:                    2+       X-ray Shoulder 2 or More Views Right  Narrative: EXAMINATION:  XR SHOULDER COMPLETE 2 OR MORE VIEWS RIGHT    CLINICAL HISTORY:  . Pain in unspecified shoulder    COMPARISON:  None.    TECHNIQUE:  Multiple views of the right shoulder.    FINDINGS:  There is no fracture or dislocation.    Glenohumeral joint: Unremarkable.    AC joint: Mild osteoarthrosis.    Regional thoracic structures and surrounding soft tissues: Unremarkable.  Impression: No acute osseous abnormality.    Electronically signed by: Javan Roper Jr  Date:    04/05/2022  Time:    14:52      I have reviewed the above radiograph and agree with the findings stated by the radiologist.           Assessment:       1. Rotator cuff impingement syndrome of right shoulder          Plan:         Enio was seen today for pain.    Diagnoses and all orders for this visit:    Rotator cuff impingement syndrome of right shoulder  -     Large Joint Aspiration/Injection: R subacromial bursa      Right shoulder rotator cuff impingement.  Explained the natural history of this to the patient.  I recommend conservative treatment at this time which would consist of physical therapy and/or corticosteroid subacromial injection.  He would like to go ahead with an injection today.  We will see how this does.  If no relief consider PT.        Bhavana Raya PA-C   04/05/2022

## 2022-04-11 ENCOUNTER — CLINICAL SUPPORT (OUTPATIENT)
Dept: SMOKING CESSATION | Facility: CLINIC | Age: 47
End: 2022-04-11
Payer: COMMERCIAL

## 2022-04-11 DIAGNOSIS — F17.200 NICOTINE DEPENDENCE: Primary | ICD-10-CM

## 2022-04-11 PROCEDURE — 99999 PR PBB SHADOW E&M-EST. PATIENT-LVL II: CPT | Mod: PBBFAC,,,

## 2022-04-11 PROCEDURE — 99404 PREV MED CNSL INDIV APPRX 60: CPT | Mod: S$GLB,,,

## 2022-04-11 PROCEDURE — 99404 PR PREVENT COUNSEL,INDIV,60 MIN: ICD-10-PCS | Mod: S$GLB,,,

## 2022-04-11 PROCEDURE — 99999 PR PBB SHADOW E&M-EST. PATIENT-LVL II: ICD-10-PCS | Mod: PBBFAC,,,

## 2022-04-11 RX ORDER — IBUPROFEN 200 MG
1 TABLET ORAL DAILY
Qty: 28 PATCH | Refills: 0 | Status: ON HOLD | OUTPATIENT
Start: 2022-04-11 | End: 2024-01-23 | Stop reason: HOSPADM

## 2022-04-11 RX ORDER — VARENICLINE TARTRATE 1 MG/1
1 TABLET, FILM COATED ORAL 2 TIMES DAILY
Qty: 60 TABLET | Refills: 0 | Status: SHIPPED | OUTPATIENT
Start: 2022-04-11 | End: 2022-06-08

## 2022-04-11 NOTE — Clinical Note
Patient will be participating in weekly tobacco cessation meetings and will begin the prescribed tobacco cessation medication regimen of 1 mg Varenicline BID in conjunction with 21 mg nicotine patches QD. Patient denies any low moods or SI/HI , DENISE-D score is 0. FTND score was 7, high level of nicotine dependence. Patient currently smokes 10-20 cigarettes per day. Pt started on rate reduction and wait time of 15 min prior to smoking. Exhaled carbon monoxide level was 23 ppm per Smokerlyzer (0-6 non-smoker).  Will see pt back in office in 1 week.

## 2022-04-18 ENCOUNTER — CLINICAL SUPPORT (OUTPATIENT)
Dept: SMOKING CESSATION | Facility: CLINIC | Age: 47
End: 2022-04-18
Payer: COMMERCIAL

## 2022-04-18 DIAGNOSIS — F17.200 NICOTINE DEPENDENCE: Primary | ICD-10-CM

## 2022-04-18 PROCEDURE — 99999 PR PBB SHADOW E&M-EST. PATIENT-LVL II: CPT | Mod: PBBFAC,,,

## 2022-04-18 PROCEDURE — 99402 PREV MED CNSL INDIV APPRX 30: CPT | Mod: S$GLB,,,

## 2022-04-18 PROCEDURE — 99999 PR PBB SHADOW E&M-EST. PATIENT-LVL II: ICD-10-PCS | Mod: PBBFAC,,,

## 2022-04-18 PROCEDURE — 99402 PR PREVENT COUNSEL,INDIV,30 MIN: ICD-10-PCS | Mod: S$GLB,,,

## 2022-04-18 NOTE — PROGRESS NOTES
Individual Follow-Up Form    4/18/2022    Quit Date: TBD    Clinical Status of Patient: Outpatient    Length of Service: 30 minutes    Continuing Medication: no    Other Medications: hasn't picked up from Pharmacy     Target Symptoms: Withdrawal and medication side effects. The following were rated moderate (3) to severe (4) on TCRS:  · Moderate (3): none  · Severe (4): none    Comments: Pt presents to clinic for smoking cessation follow up.  Patient has been smoking 9-16 cpd. Patient has not started patches nor Chantix, hasn't picked up from Pharmacy. Discussed rate reduction with him and encouraged him to wait 15 min prior to smoking, move location of where he keeps his cigarettes, and to stay distracted and develop new habits oppose to smoking. Pt's goal for the week is to cut back to 10 cigs/day, and start smoking cessation medications. He is to also keep track of daily smoking. Exhaled carbon monoxide level was 29 ppm per Smokerlyzer (0-6 non-smoker).  Will see pt back in 1 week for group session.      Diagnosis: F17.200    Next Visit: 1 week

## 2022-04-18 NOTE — Clinical Note
Pt presents to clinic for smoking cessation follow up.  Patient has been smoking 9-16 cpd. Patient has not started patches nor Chantix, hasn't picked up from Pharmacy. Discussed rate reduction with him and encouraged him to wait 15 min prior to smoking, move location of where he keeps his cigarettes, and to stay distracted and develop new habits oppose to smoking. Pt's goal for the week is to cut back to 10 cigs/day, and start smoking cessation medications. He is to also keep track of daily smoking. Exhaled carbon monoxide level was 29 ppm per Smokerlyzer (0-6 non-smoker).  Will see pt back in 1 week for individual session.

## 2022-04-19 ENCOUNTER — PATIENT OUTREACH (OUTPATIENT)
Dept: ADMINISTRATIVE | Facility: OTHER | Age: 47
End: 2022-04-19
Payer: COMMERCIAL

## 2022-04-19 NOTE — PROGRESS NOTES
Health Maintenance Due   Topic Date Due    TETANUS VACCINE  Never done    Influenza Vaccine (1) 09/01/2021    Pneumococcal Vaccines (Age 0-64) (2 - PCV) 11/03/2021    COVID-19 Vaccine (3 - Booster for Moderna series) 02/02/2022     Updates were requested from care everywhere.  Chart was reviewed for overdue Proactive [facility] Encounters (ALEXYS) topics (CRS, Breast Cancer Screening, Eye exam)  Health Maintenance has been updated.  LINKS immunization registry triggered.  Immunizations were reconciled.

## 2022-04-21 ENCOUNTER — PATIENT MESSAGE (OUTPATIENT)
Dept: FAMILY MEDICINE | Facility: CLINIC | Age: 47
End: 2022-04-21
Payer: COMMERCIAL

## 2022-04-25 ENCOUNTER — CLINICAL SUPPORT (OUTPATIENT)
Dept: SMOKING CESSATION | Facility: CLINIC | Age: 47
End: 2022-04-25
Payer: COMMERCIAL

## 2022-04-25 DIAGNOSIS — F17.200 NICOTINE DEPENDENCE: Primary | ICD-10-CM

## 2022-04-25 PROCEDURE — 99402 PREV MED CNSL INDIV APPRX 30: CPT | Mod: S$GLB,,,

## 2022-04-25 PROCEDURE — 99402 PR PREVENT COUNSEL,INDIV,30 MIN: ICD-10-PCS | Mod: S$GLB,,,

## 2022-04-25 PROCEDURE — 99999 PR PBB SHADOW E&M-EST. PATIENT-LVL II: CPT | Mod: PBBFAC,,,

## 2022-04-25 PROCEDURE — 99999 PR PBB SHADOW E&M-EST. PATIENT-LVL II: ICD-10-PCS | Mod: PBBFAC,,,

## 2022-04-25 NOTE — PROGRESS NOTES
Individual Follow-Up Form    4/25/2022    Quit Date: TBD    Clinical Status of Patient: Outpatient    Length of Service: 30 minutes    Continuing Medication: no    Other Medications: hasn't picked up meds from pharmacy     Target Symptoms: Withdrawal and medication side effects. The following were  rated moderate (3) to severe (4) on TCRS:  · Moderate (3): none  · Severe (4): none    Comments: Pt presents to clinic for smoking cessation follow up.  Patient has been smoking 10-12 cpd. Patient has not started patches nor Chantix, hasn't picked up from Pharmacy. Plans to  tomorrow. Pt's goal for the week is to cut back to 10 cigs/day, and start smoking cessation medications. He leaves to [place] on Wednesday and plans to cut back even more on his smoking while out there. Reviewed strategies, cues, and triggers. Introduced the negative impact of tobacco on health, the health advantages of discontinuing the use of tobacco, time line improved health changes after a quit, withdrawal issues to expect from nicotine and habit, and ways to achieve the goal of a quit. Exhaled carbon monoxide level was 28 ppm per Smokerlyzer (0-6 non-smoker).  Will see pt back in 2 weeks for individual session.    Diagnosis: F17.200    Next Visit: 2 weeks     80

## 2022-04-25 NOTE — Clinical Note
Pt presents to clinic for smoking cessation follow up.  Patient has been smoking 10-12 cpd. Patient has not started patches nor Chantix, hasn't picked up from Pharmacy. Plans to  tomorrow. Pt's goal for the week is to cut back to 10 cigs/day, and start smoking cessation medications. He leaves to [place] on Wednesday and plans to cut back even more on his smoking while out there. Reviewed strategies, cues, and triggers. Introduced the negative impact of tobacco on health, the health advantages of discontinuing the use of tobacco, time line improved health changes after a quit, withdrawal issues to expect from nicotine and habit, and ways to achieve the goal of a quit. Exhaled carbon monoxide level was 28 ppm per Smokerlyzer (0-6 non-smoker).  Will see pt back in 2 weeks for individual session.

## 2022-04-28 ENCOUNTER — TELEPHONE (OUTPATIENT)
Dept: ENDOSCOPY | Facility: HOSPITAL | Age: 47
End: 2022-04-28
Payer: COMMERCIAL

## 2022-04-28 RX ORDER — SODIUM, POTASSIUM,MAG SULFATES 17.5-3.13G
1 SOLUTION, RECONSTITUTED, ORAL ORAL DAILY
Qty: 1 KIT | Refills: 0 | Status: SHIPPED | OUTPATIENT
Start: 2022-04-28 | End: 2022-04-30

## 2022-04-28 NOTE — TELEPHONE ENCOUNTER
Endoscopy Scheduling Questionnaire:         Are you taking any blood thinners? n               If Yes  Have you been on them for longer than one year?     2. Have you been diagnosed with Diverticulitis in past three months?  n    3. Are you on dialysis or have Kidney Disease? n    4. Previous Colonoscopy?  n         If yes Do you have a history of colon polyps?        6. Are you a diabetic?   n    7. Do you have a history of constipation?  n      Procedure scheduled with Dr. BYRD on  06/22/2022    The prep being used is SUPREP    The patient's prep instructions were sent by [facility]

## 2022-05-03 ENCOUNTER — OFFICE VISIT (OUTPATIENT)
Dept: URGENT CARE | Facility: CLINIC | Age: 47
End: 2022-05-03
Payer: COMMERCIAL

## 2022-05-03 VITALS
SYSTOLIC BLOOD PRESSURE: 142 MMHG | DIASTOLIC BLOOD PRESSURE: 88 MMHG | RESPIRATION RATE: 18 BRPM | WEIGHT: 216 LBS | HEIGHT: 69 IN | TEMPERATURE: 98 F | BODY MASS INDEX: 31.99 KG/M2 | OXYGEN SATURATION: 98 % | HEART RATE: 88 BPM

## 2022-05-03 DIAGNOSIS — L73.9 FOLLICULITIS: Primary | ICD-10-CM

## 2022-05-03 PROCEDURE — 3044F HG A1C LEVEL LT 7.0%: CPT | Mod: CPTII,S$GLB,, | Performed by: PHYSICIAN ASSISTANT

## 2022-05-03 PROCEDURE — 1160F PR REVIEW ALL MEDS BY PRESCRIBER/CLIN PHARMACIST DOCUMENTED: ICD-10-PCS | Mod: CPTII,S$GLB,, | Performed by: PHYSICIAN ASSISTANT

## 2022-05-03 PROCEDURE — 4010F PR ACE/ARB THEARPY RXD/TAKEN: ICD-10-PCS | Mod: CPTII,S$GLB,, | Performed by: PHYSICIAN ASSISTANT

## 2022-05-03 PROCEDURE — 3079F DIAST BP 80-89 MM HG: CPT | Mod: CPTII,S$GLB,, | Performed by: PHYSICIAN ASSISTANT

## 2022-05-03 PROCEDURE — 99213 PR OFFICE/OUTPT VISIT, EST, LEVL III, 20-29 MIN: ICD-10-PCS | Mod: S$GLB,,, | Performed by: PHYSICIAN ASSISTANT

## 2022-05-03 PROCEDURE — 4010F ACE/ARB THERAPY RXD/TAKEN: CPT | Mod: CPTII,S$GLB,, | Performed by: PHYSICIAN ASSISTANT

## 2022-05-03 PROCEDURE — 3044F PR MOST RECENT HEMOGLOBIN A1C LEVEL <7.0%: ICD-10-PCS | Mod: CPTII,S$GLB,, | Performed by: PHYSICIAN ASSISTANT

## 2022-05-03 PROCEDURE — 3079F PR MOST RECENT DIASTOLIC BLOOD PRESSURE 80-89 MM HG: ICD-10-PCS | Mod: CPTII,S$GLB,, | Performed by: PHYSICIAN ASSISTANT

## 2022-05-03 PROCEDURE — 3077F SYST BP >= 140 MM HG: CPT | Mod: CPTII,S$GLB,, | Performed by: PHYSICIAN ASSISTANT

## 2022-05-03 PROCEDURE — 3008F PR BODY MASS INDEX (BMI) DOCUMENTED: ICD-10-PCS | Mod: CPTII,S$GLB,, | Performed by: PHYSICIAN ASSISTANT

## 2022-05-03 PROCEDURE — 3077F PR MOST RECENT SYSTOLIC BLOOD PRESSURE >= 140 MM HG: ICD-10-PCS | Mod: CPTII,S$GLB,, | Performed by: PHYSICIAN ASSISTANT

## 2022-05-03 PROCEDURE — 1160F RVW MEDS BY RX/DR IN RCRD: CPT | Mod: CPTII,S$GLB,, | Performed by: PHYSICIAN ASSISTANT

## 2022-05-03 PROCEDURE — 3008F BODY MASS INDEX DOCD: CPT | Mod: CPTII,S$GLB,, | Performed by: PHYSICIAN ASSISTANT

## 2022-05-03 PROCEDURE — 99213 OFFICE O/P EST LOW 20 MIN: CPT | Mod: S$GLB,,, | Performed by: PHYSICIAN ASSISTANT

## 2022-05-03 PROCEDURE — 1159F PR MEDICATION LIST DOCUMENTED IN MEDICAL RECORD: ICD-10-PCS | Mod: CPTII,S$GLB,, | Performed by: PHYSICIAN ASSISTANT

## 2022-05-03 PROCEDURE — 1159F MED LIST DOCD IN RCRD: CPT | Mod: CPTII,S$GLB,, | Performed by: PHYSICIAN ASSISTANT

## 2022-05-03 RX ORDER — SULFAMETHOXAZOLE AND TRIMETHOPRIM 800; 160 MG/1; MG/1
1 TABLET ORAL 2 TIMES DAILY
Qty: 20 TABLET | Refills: 0 | Status: SHIPPED | OUTPATIENT
Start: 2022-05-03 | End: 2022-05-13

## 2022-05-03 NOTE — PATIENT INSTRUCTIONS
If not allergic,take tylenol (acetominophen) for fever control, chills, or body aches every 4 hours. Do not exceed 4000 mg/ day.If not allergic, take Motrin (Ibuprofen) every 4 hours for fever, chills, pain or inflammation. Do not exceed 2400 mg/day. You can alternate taking tylenol and motrin.     You must understand that you've received an Urgent Care treatment only and that you may be released before all your medical problems are known or treated. You, the patient, will arrange for follow up care as instructed.      Follow up with your PCP or specialty clinic as instructed in the next 2-3 days if not improved or as needed. You can call [phone] to schedule an appointment with appropriate provider.      If you condition worsens, we recommend that you receive another evaluation at the emergency room immediately or contact your primary medical clinic's after hours call service to discuss your concerns.      Please return here or go to the Emergency Department for any concerns or worsening condition.      If you were prescribed a narcotic or controlled substance, do not drive or operate heavy equipment or machinery while taking these medications.

## 2022-05-03 NOTE — PROGRESS NOTES
"Subjective:       Patient ID: Enio Earl is a 46 y.o. male.    Vitals:  height is 5' 9" (1.753 m) and weight is 98 kg (216 lb). His temperature is 98.3 °F (36.8 °C). His blood pressure is 142/88 (abnormal) and his pulse is 88. His respiration is 18 and oxygen saturation is 98%.     Chief Complaint: Mass    Bump started about two weeks ago    Patient provider note starts here:  Patient presents with complaints of a few bumps under the left arm which have been present for the past 2 weeks.  Reports that it started out as 1 small bump and has since become numerous bumps and 1 has grown in size.  He denies any drainage from the areas.  Denies previous history of abscesses to this region.  Denies changing deodorant recently or excess sweating. He has not taken any medications for these symptoms.     Mass  This is a new problem. The current episode started 1 to 4 weeks ago. The problem has been gradually worsening. Pertinent negatives include no abdominal pain, chest pain, chills, coughing, fever, neck pain, numbness, rash, sore throat or vomiting. He has tried nothing for the symptoms. The treatment provided no relief.       Constitution: Negative for chills and fever.   HENT: Negative for sore throat.    Neck: Negative for neck pain and neck stiffness.   Cardiovascular: Negative for chest pain.   Respiratory: Negative for chest tightness, cough and wheezing.    Gastrointestinal: Negative for abdominal pain, vomiting and diarrhea.   Musculoskeletal: Negative for pain.   Skin: Positive for erythema and abscess. Negative for rash and wound.        Bump under left arm   Allergic/Immunologic: Negative for itching.   Neurological: Negative for numbness and tingling.       Objective:      Physical Exam   Constitutional: He is oriented to person, place, and time. He appears well-developed.   HENT:   Head: Normocephalic and atraumatic. Head is without abrasion, without contusion and without laceration.   Ears:   Right Ear: " External ear normal.   Left Ear: External ear normal.   Nose: Nose normal.   Mouth/Throat: Oropharynx is clear and moist and mucous membranes are normal.   Eyes: Conjunctivae, EOM and lids are normal. Pupils are equal, round, and reactive to light.   Neck: Trachea normal and phonation normal. Neck supple.   Cardiovascular: Normal rate.   Pulmonary/Chest: Effort normal. No stridor. No respiratory distress.   Musculoskeletal: Normal range of motion.         General: Normal range of motion.   Neurological: no focal deficit. He is alert and oriented to person, place, and time.   Skin: Skin is warm, dry, intact and no rash. Capillary refill takes less than 2 seconds. erythema No abrasion, No burn, No bruising and No ecchymosis         Comments: There are 2 small and one larger areas of swelling with central induration and overlying erythema noted to the left axillary region. There is no drainage from the areas. No fluctuance.    Psychiatric: His speech is normal and behavior is normal. Judgment and thought content normal.   Nursing note and vitals reviewed.        Assessment:       1. Folliculitis          Plan:         Folliculitis  -     sulfamethoxazole-trimethoprim 800-160mg (BACTRIM DS) 800-160 mg Tab; Take 1 tablet by mouth 2 (two) times daily. for 10 days  Dispense: 20 tablet; Refill: 0           Medical Decision Making:   History:   Old Medical Records: I decided to obtain old medical records.  Differential Diagnosis:   Differential diagnosis includes but is not limited to: abscess, folliculitis, infected sebaceous cyst  Urgent Care Management:  Patient presents with complaints of painful erythematous bumps to the left axilla consistent with folliculitis. There is no fluctuance, however I did warn patient that if these areas worsen, I&D may be warranted at that time but I do not feel it is necessary at this time. Prescribed Bactrim and encouraged to follow-up with PCP. Return to clinic precautions discussed. He  verbalized understanding and agreed with plan.         Patient Instructions   If not allergic,take tylenol (acetominophen) for fever control, chills, or body aches every 4 hours. Do not exceed 4000 mg/ day.If not allergic, take Motrin (Ibuprofen) every 4 hours for fever, chills, pain or inflammation. Do not exceed 2400 mg/day. You can alternate taking tylenol and motrin.     You must understand that you've received an Urgent Care treatment only and that you may be released before all your medical problems are known or treated. You, the patient, will arrange for follow up care as instructed.      Follow up with your PCP or specialty clinic as instructed in the next 2-3 days if not improved or as needed. You can call [phone] to schedule an appointment with appropriate provider.      If you condition worsens, we recommend that you receive another evaluation at the emergency room immediately or contact your primary medical clinic's after hours call service to discuss your concerns.      Please return here or go to the Emergency Department for any concerns or worsening condition.      If you were prescribed a narcotic or controlled substance, do not drive or operate heavy equipment or machinery while taking these medications.

## 2022-05-09 ENCOUNTER — CLINICAL SUPPORT (OUTPATIENT)
Dept: SMOKING CESSATION | Facility: CLINIC | Age: 47
End: 2022-05-09
Payer: COMMERCIAL

## 2022-05-09 DIAGNOSIS — F17.200 NICOTINE DEPENDENCE: Primary | ICD-10-CM

## 2022-05-09 PROCEDURE — 99999 PR PBB SHADOW E&M-EST. PATIENT-LVL I: CPT | Mod: PBBFAC,,,

## 2022-05-09 PROCEDURE — 99402 PR PREVENT COUNSEL,INDIV,30 MIN: ICD-10-PCS | Mod: S$GLB,,,

## 2022-05-09 PROCEDURE — 99402 PREV MED CNSL INDIV APPRX 30: CPT | Mod: S$GLB,,,

## 2022-05-09 PROCEDURE — 99999 PR PBB SHADOW E&M-EST. PATIENT-LVL I: ICD-10-PCS | Mod: PBBFAC,,,

## 2022-05-09 NOTE — PROGRESS NOTES
Individual Follow-Up Form    5/9/2022    Quit Date: TBD    Clinical Status of Patient: Outpatient    Length of Service: 30 minutes    Continuing Medication: no    Other Medications: has not started meds     Target Symptoms: Withdrawal and medication side effects. The following were  rated moderate (3) to severe (4) on TCRS:  · Moderate (3): none  · Severe (4): none    Comments: Pt presents to smoking cessation clinic for follow up visit. Pt was smoking 1 ppd prior to starting program, he is now down to 10-15 cpd. He was in [place] for 6 days and was able to go several hours without smoking. He reports 2 packs lasting him the 6 days he was in [place]. Pt has not started Chantix/patches, went to pharmacy on Saturday and it was closed. Provided pt with pharmacy hours, he plans to go after he leaves here. His goal for the week is to start smoking cessation meds and cut back to <10 cpd. Exhaled carbon monoxide level was 22 ppm per Smokerlyzer (0-6 non-smoker). Pt is to follow up next week.       Diagnosis: F17.200    Next Visit: 1 week

## 2022-05-16 ENCOUNTER — TELEPHONE (OUTPATIENT)
Dept: SMOKING CESSATION | Facility: CLINIC | Age: 47
End: 2022-05-16
Payer: COMMERCIAL

## 2022-05-30 ENCOUNTER — CLINICAL SUPPORT (OUTPATIENT)
Dept: SMOKING CESSATION | Facility: CLINIC | Age: 47
End: 2022-05-30
Payer: COMMERCIAL

## 2022-05-30 DIAGNOSIS — F17.200 NICOTINE DEPENDENCE: Primary | ICD-10-CM

## 2022-05-30 PROCEDURE — 99999 PR PBB SHADOW E&M-EST. PATIENT-LVL I: CPT | Mod: PBBFAC,,,

## 2022-05-30 PROCEDURE — 99999 PR PBB SHADOW E&M-EST. PATIENT-LVL I: ICD-10-PCS | Mod: PBBFAC,,,

## 2022-05-30 PROCEDURE — 99402 PR PREVENT COUNSEL,INDIV,30 MIN: ICD-10-PCS | Mod: S$GLB,,,

## 2022-05-30 PROCEDURE — 99402 PREV MED CNSL INDIV APPRX 30: CPT | Mod: S$GLB,,,

## 2022-05-30 NOTE — Clinical Note
Pt presents to clinic for smoking cessation follow up. Pt was smoking about 1 ppd, is now down to 4-12 cpd depending on day of the week and what he is doing. Pt just started Chantix this morning. He is to continue to work on rate reduction, eliminate 2 more cigarettes and wait it out prior to smoking. The patient denies any abnormal behavioral or mental changes at this time. Exhaled carbon monoxide level was 22 ppm per Smokerlyzer (0-6 non-smoker). The patient will continue with  therapy sessions and medication monitoring by CTTS. Prescribed medication management will be by physician. Pt needs certificate of smoking cessation program completion. Will e-mail him a copy. Pt is to follow up in 2 weeks.

## 2022-05-30 NOTE — PROGRESS NOTES
Individual Follow-Up Form    5/30/2022    Quit Date: TBD    Clinical Status of Patient: Outpatient    Length of Service: 30 minutes    Continuing Medication: yes  Chantix    Other Medications: none     Target Symptoms: Withdrawal and medication side effects. The following were  rated moderate (3) to severe (4) on TCRS:  · Moderate (3): none  · Severe (4): none    Comments: Pt presents to clinic for smoking cessation follow up. Pt was smoking about 1 ppd, is now down to 4-12 cpd depending on day of the week and what he is doing. Pt just started Chantix this morning. He is to continue to work on rate reduction, eliminate 2 more cigarettes and wait it out prior to smoking. The patient denies any abnormal behavioral or mental changes at this time. Exhaled carbon monoxide level was 22 ppm per Smokerlyzer (0-6 non-smoker). The patient will continue with  therapy sessions and medication monitoring by CTTS. Prescribed medication management will be by physician. Pt needs certificate of smoking cessation program completion. Will e-mail him a copy. Pt is to follow up in 2 weeks.      Diagnosis: F17.200    Next Visit: 2 weeks

## 2022-06-06 ENCOUNTER — OFFICE VISIT (OUTPATIENT)
Dept: ORTHOPEDICS | Facility: CLINIC | Age: 47
End: 2022-06-06
Payer: COMMERCIAL

## 2022-06-06 VITALS — HEIGHT: 69 IN | BODY MASS INDEX: 31.99 KG/M2 | WEIGHT: 216 LBS

## 2022-06-06 DIAGNOSIS — M77.01 MEDIAL EPICONDYLITIS OF RIGHT ELBOW: Primary | ICD-10-CM

## 2022-06-06 PROCEDURE — 99499 UNLISTED E&M SERVICE: CPT | Mod: S$GLB,,, | Performed by: ORTHOPAEDIC SURGERY

## 2022-06-06 PROCEDURE — 3008F PR BODY MASS INDEX (BMI) DOCUMENTED: ICD-10-PCS | Mod: CPTII,S$GLB,, | Performed by: ORTHOPAEDIC SURGERY

## 2022-06-06 PROCEDURE — 20551 NJX 1 TENDON ORIGIN/INSJ: CPT | Mod: RT,S$GLB,, | Performed by: ORTHOPAEDIC SURGERY

## 2022-06-06 PROCEDURE — 20551 PR INJECT TENDON ORIGIN/INSERT: ICD-10-PCS | Mod: RT,S$GLB,, | Performed by: ORTHOPAEDIC SURGERY

## 2022-06-06 PROCEDURE — 99999 PR PBB SHADOW E&M-EST. PATIENT-LVL III: CPT | Mod: PBBFAC,,, | Performed by: ORTHOPAEDIC SURGERY

## 2022-06-06 PROCEDURE — 3008F BODY MASS INDEX DOCD: CPT | Mod: CPTII,S$GLB,, | Performed by: ORTHOPAEDIC SURGERY

## 2022-06-06 PROCEDURE — 4010F PR ACE/ARB THEARPY RXD/TAKEN: ICD-10-PCS | Mod: CPTII,S$GLB,, | Performed by: ORTHOPAEDIC SURGERY

## 2022-06-06 PROCEDURE — 3044F HG A1C LEVEL LT 7.0%: CPT | Mod: CPTII,S$GLB,, | Performed by: ORTHOPAEDIC SURGERY

## 2022-06-06 PROCEDURE — 1159F MED LIST DOCD IN RCRD: CPT | Mod: CPTII,S$GLB,, | Performed by: ORTHOPAEDIC SURGERY

## 2022-06-06 PROCEDURE — 99499 NO LOS: ICD-10-PCS | Mod: S$GLB,,, | Performed by: ORTHOPAEDIC SURGERY

## 2022-06-06 PROCEDURE — 3044F PR MOST RECENT HEMOGLOBIN A1C LEVEL <7.0%: ICD-10-PCS | Mod: CPTII,S$GLB,, | Performed by: ORTHOPAEDIC SURGERY

## 2022-06-06 PROCEDURE — 4010F ACE/ARB THERAPY RXD/TAKEN: CPT | Mod: CPTII,S$GLB,, | Performed by: ORTHOPAEDIC SURGERY

## 2022-06-06 PROCEDURE — 99999 PR PBB SHADOW E&M-EST. PATIENT-LVL III: ICD-10-PCS | Mod: PBBFAC,,, | Performed by: ORTHOPAEDIC SURGERY

## 2022-06-06 PROCEDURE — 1159F PR MEDICATION LIST DOCUMENTED IN MEDICAL RECORD: ICD-10-PCS | Mod: CPTII,S$GLB,, | Performed by: ORTHOPAEDIC SURGERY

## 2022-06-06 RX ORDER — TRIAMCINOLONE ACETONIDE 40 MG/ML
20 INJECTION, SUSPENSION INTRA-ARTICULAR; INTRAMUSCULAR
Status: COMPLETED | OUTPATIENT
Start: 2022-06-06 | End: 2022-06-06

## 2022-06-06 RX ADMIN — TRIAMCINOLONE ACETONIDE 20 MG: 40 INJECTION, SUSPENSION INTRA-ARTICULAR; INTRAMUSCULAR at 04:06

## 2022-06-06 NOTE — PROGRESS NOTES
Subjective:      Patient ID: Enio Earl is a 46 y.o. male.  Chief Complaint: Pain of the Right Shoulder and Pain of the Right Elbow      HPI  Enio Earl is a  46 y.o. male presenting today for follow up of right elbow pain medial epicondylitis.  He reports that he is having ongoing symptoms  We tried an injection several months ago which helped out for about a month and symptoms recurred  He has not had therapy yet he does have a tennis elbow strap which does not appear to be helping.    Review of patient's allergies indicates:  No Known Allergies      Current Outpatient Medications   Medication Sig Dispense Refill    atorvastatin (LIPITOR) 10 MG tablet Take 1 tablet (10 mg total) by mouth once daily. 90 tablet 1    clobetasoL (TEMOVATE) 0.05 % cream Compound clobetasol 0.1% / niacinamide 2% ointment. Apply to affected areas of hands BID prn eczema. 60 g 2    fluocinonide 0.05% (LIDEX) 0.05 % cream AAA of hands bid prn rash 60 g 3    losartan (COZAAR) 100 MG tablet Take 1 tablet (100 mg total) by mouth once daily. 90 tablet 1    naproxen (EC-NAPROSYN) 375 MG TbEC EC tablet TAKE 1 TABLET BY MOUTH 2 TIMES DAILY. 60 tablet 1    nicotine (NICODERM CQ) 21 mg/24 hr Place 1 patch onto the skin once daily. 28 patch 0    sertraline (ZOLOFT) 25 MG tablet Take 1 tablet (25 mg total) by mouth once daily. 90 tablet 1    tretinoin (RETIN-A) 0.05 % cream Apply a pea-sized amount to face qhs. 20 g 5    varenicline (CHANTIX) 1 mg Tab Take 1 tablet (1 mg total) by mouth 2 (two) times daily. /CASTILLO 60 tablet 0    verapamiL (CALAN-SR) 120 MG CR tablet Take 1 tablet (120 mg total) by mouth every evening. 90 tablet 1     No current facility-administered medications for this visit.       Past Medical History:   Diagnosis Date    Anxiety     Hypertension     Supraventricular tachycardia        Past Surgical History:   Procedure Laterality Date    CARDIAC ELECTROPHYSIOLOGY MAPPING AND ABLATION  07/2020    for PSVT  "by Dr. Lim     LATERAL EPICONDYLE RELEASE Left 5/18/2021    Procedure: RELEASE, ELBOW, LATERAL EPICONDYLE;  Surgeon: Cristi Adam Jr., MD;  Location: [facility];  Service: Orthopedics;  Laterality: Left;    NOSE SURGERY      sepltoplasty       OBJECTIVE:   PHYSICAL EXAM:  Height: 5' 9" (175.3 cm) Weight: 98 kg (216 lb)  Vitals:    06/06/22 1521   Weight: 98 kg (216 lb)   Height: 5' 9" (1.753 m)   PainSc:   4     Ortho/SPM Exam  Examination right elbow there is tenderness medially around the medial epicondylar area  No swelling no bruising  Range of motion elbow is full  He does have some pain wit resisted wrist flexion  Tinel sign negative over the ulnar nerve  No instability    RADIOGRAPHS:  None  Comments: I have personally reviewed the imaging and I agree with the above radiologist's report.    ASSESSMENT/PLAN:     IMPRESSION:  Medial epicondylitis right elbow    PLAN:  I have ordered some therapy for stretching strengthening right elbow and forearm  He would also like another injection  After pause for time-out identified the right elbow injected medially with combination Kenalog 20 mg 0.5 cc xylocaine sterile technique  Tolerated the procedure well without complication  I recommended he continue anti-inflammatory medication by mouth and also topical Voltaren gel  Follow-up 4-6 weeks    FOLLOW UP:  4-6 weeks    Disclaimer: This note has been generated using voice-recognition software. There may be typographical errors that have been missed during proof-reading.    "

## 2022-06-07 ENCOUNTER — OFFICE VISIT (OUTPATIENT)
Dept: DERMATOLOGY | Facility: CLINIC | Age: 47
End: 2022-06-07
Payer: COMMERCIAL

## 2022-06-07 DIAGNOSIS — Z22.321 SUSPECTED CARRIER OF METHICILLIN SUSCEPTIBLE STAPHYLOCOCCUS AUREUS (MSSA): ICD-10-CM

## 2022-06-07 DIAGNOSIS — B35.3 TINEA PEDIS OF BOTH FEET: Primary | ICD-10-CM

## 2022-06-07 DIAGNOSIS — L73.8 BACTERIAL FOLLICULITIS: ICD-10-CM

## 2022-06-07 PROCEDURE — 3044F HG A1C LEVEL LT 7.0%: CPT | Mod: CPTII,S$GLB,, | Performed by: DERMATOLOGY

## 2022-06-07 PROCEDURE — 1159F PR MEDICATION LIST DOCUMENTED IN MEDICAL RECORD: ICD-10-PCS | Mod: CPTII,S$GLB,, | Performed by: DERMATOLOGY

## 2022-06-07 PROCEDURE — 1159F MED LIST DOCD IN RCRD: CPT | Mod: CPTII,S$GLB,, | Performed by: DERMATOLOGY

## 2022-06-07 PROCEDURE — 87220 PR  TISSUE EXAM BY KOH: ICD-10-PCS | Mod: S$GLB,,, | Performed by: DERMATOLOGY

## 2022-06-07 PROCEDURE — 1160F RVW MEDS BY RX/DR IN RCRD: CPT | Mod: CPTII,S$GLB,, | Performed by: DERMATOLOGY

## 2022-06-07 PROCEDURE — 3044F PR MOST RECENT HEMOGLOBIN A1C LEVEL <7.0%: ICD-10-PCS | Mod: CPTII,S$GLB,, | Performed by: DERMATOLOGY

## 2022-06-07 PROCEDURE — 99213 PR OFFICE/OUTPT VISIT, EST, LEVL III, 20-29 MIN: ICD-10-PCS | Mod: 25,S$GLB,, | Performed by: DERMATOLOGY

## 2022-06-07 PROCEDURE — 99213 OFFICE O/P EST LOW 20 MIN: CPT | Mod: 25,S$GLB,, | Performed by: DERMATOLOGY

## 2022-06-07 PROCEDURE — 1160F PR REVIEW ALL MEDS BY PRESCRIBER/CLIN PHARMACIST DOCUMENTED: ICD-10-PCS | Mod: CPTII,S$GLB,, | Performed by: DERMATOLOGY

## 2022-06-07 PROCEDURE — 87220 TISSUE EXAM FOR FUNGI: CPT | Mod: S$GLB,,, | Performed by: DERMATOLOGY

## 2022-06-07 PROCEDURE — 4010F PR ACE/ARB THEARPY RXD/TAKEN: ICD-10-PCS | Mod: CPTII,S$GLB,, | Performed by: DERMATOLOGY

## 2022-06-07 PROCEDURE — 4010F ACE/ARB THERAPY RXD/TAKEN: CPT | Mod: CPTII,S$GLB,, | Performed by: DERMATOLOGY

## 2022-06-07 RX ORDER — NAFTIFINE HYDROCHLORIDE 10 MG/G
GEL TOPICAL
Qty: 60 G | Refills: 2 | Status: SHIPPED | OUTPATIENT
Start: 2022-06-07 | End: 2023-02-15

## 2022-06-07 NOTE — PROGRESS NOTES
Patient Information  Name: Enio Earl  : 1975  MRN: [id number]     Referring Physician:  No ref. provider found   Primary Care Physician:  Bhupinder Bowen DO   Date of Visit: 2022      Subjective:     History of Present lllness:    Enio Earl is a 46 y.o. male who presents with a chief complaint of tinea of both feet.    Location: both feet  Duration: years  Signs/Symptoms: itching, crusted and cracks   Relieving factors/Prior treatments: OTC lotrimin foot spray; previously used Rx treatment    Hand eczema is currently well-controlled, not using topical steroid now.    He also reports having had an infection under his L arm last month. Improved with Bactrim. Still has a bump.    Patient was last seen: 2022.  Prior notes by myself reviewed.   Clinical documentation obtained by nursing staff reviewed.    Review of Systems   Constitutional: Negative for fever, chills, fatigue and malaise.   Respiratory: Negative for cough.    Skin: Positive for itching. Negative for rash.   Hematologic/Lymphatic: Negative for adenopathy.   Allergic/Immunologic: Positive for environmental allergies.       Objective:   Physical Exam   Constitutional: He appears well-developed and well-nourished. No distress.   Neurological: He is alert and oriented to person, place, and time. He is not disoriented.   Psychiatric: He has a normal mood and affect.   Skin:   Areas Examined (abnormalities noted in diagram):   Chest / Axilla Inspection Performed  RUE Inspected  LUE Inspection Performed  RLE Inspected  LLE Inspection Performed                Diagram Legend     Erythematous scaling macule/papule c/w actinic keratosis       Vascular papule c/w angioma      Pigmented verrucoid papule/plaque c/w seborrheic keratosis      Yellow umbilicated papule c/w sebaceous hyperplasia      Irregularly shaped tan macule c/w lentigo     1-2 mm smooth white papules consistent with Milia      Movable subcutaneous cyst with punctum  c/w epidermal inclusion cyst      Subcutaneous movable cyst c/w pilar cyst      Firm pink to brown papule c/w dermatofibroma      Pedunculated fleshy papule(s) c/w skin tag(s)      Evenly pigmented macule c/w junctional nevus     Mildly variegated pigmented, slightly irregular-bordered macule c/w mildly atypical nevus      Flesh colored to evenly pigmented papule c/w intradermal nevus       Pink pearly papule/plaque c/w basal cell carcinoma      Erythematous hyperkeratotic cursted plaque c/w SCC      Surgical scar with no sign of skin cancer recurrence      Open and closed comedones      Inflammatory papules and pustules      Verrucoid papule consistent consistent with wart     Erythematous eczematous patches and plaques     Dystrophic onycholytic nail with subungual debris c/w onychomycosis     Umbilicated papule    Erythematous-base heme-crusted tan verrucoid plaque consistent with inflamed seborrheic keratosis     Erythematous Silvery Scaling Plaque c/w Psoriasis     See annotation    No images are attached to the encounter or orders placed in the encounter.      [] Data reviewed  [] Prior external notes reviewed  [] Independent review of test  [] Management discussed with another provider  [] Independent historian    Assessment / Plan:        Tinea pedis of both feet  - acute, uncomplicated problem  Skin scraping was performed on lesion(s) on the feet for a Chlorazol E + KOH prep. Fungal elements were visualized under the microscope.  -     naftifine (NAFTIN) 1 % Gel; Apply to feet BID x 1 month.  Dispense: 60 g; Refill: 2  If Rx is $$$, recommend Lamisil AT (terbinafine) cream to the affected areas 2 times per day for 1 month.    Bacterial folliculitis  Suspected carrier of methicillin susceptible Staphylococcus aureus (MSSA)  - acute, uncomplicated problem  Recommend washing body (from the neck down) with OTC Hibiclens solution for 5-10 minutes daily or every other day until improved. Then, decrease frequency  of use to 1-2 times per week.   Do not allow Hibiclens to contact the eyes or ears.    Follow up in about 3 months (around 9/7/2022), or if symptoms worsen or fail to improve.      Ariane Vance MD, FAAD  [facility]

## 2022-06-07 NOTE — PATIENT INSTRUCTIONS
Recommend Lamisil AT (terbinafine) cream to the feet 2 times per day for 1 month.    Recommend washing body (from the neck down) with OTC Hibiclens solution for 5-10 minutes daily or every other day until improved. Then, decrease frequency of use to 1-2 times per week. Do not allow Hibiclens to contact the eyes or ears.

## 2022-06-09 ENCOUNTER — PATIENT MESSAGE (OUTPATIENT)
Dept: FAMILY MEDICINE | Facility: CLINIC | Age: 47
End: 2022-06-09
Payer: COMMERCIAL

## 2022-06-16 DIAGNOSIS — M25.559 HIP PAIN: ICD-10-CM

## 2022-06-16 DIAGNOSIS — M25.569 ACUTE KNEE PAIN, UNSPECIFIED LATERALITY: Primary | ICD-10-CM

## 2022-06-17 ENCOUNTER — OFFICE VISIT (OUTPATIENT)
Dept: ORTHOPEDICS | Facility: CLINIC | Age: 47
End: 2022-06-17
Payer: COMMERCIAL

## 2022-06-17 ENCOUNTER — HOSPITAL ENCOUNTER (OUTPATIENT)
Dept: RADIOLOGY | Facility: HOSPITAL | Age: 47
Discharge: HOME | End: 2022-06-17
Attending: ORTHOPAEDIC SURGERY
Payer: COMMERCIAL

## 2022-06-17 VITALS — BODY MASS INDEX: 32 KG/M2 | WEIGHT: 216.06 LBS | HEIGHT: 69 IN

## 2022-06-17 DIAGNOSIS — M87.051 AVASCULAR NECROSIS OF BONE OF RIGHT HIP: Primary | ICD-10-CM

## 2022-06-17 DIAGNOSIS — M25.569 ACUTE KNEE PAIN, UNSPECIFIED LATERALITY: ICD-10-CM

## 2022-06-17 DIAGNOSIS — M25.559 HIP PAIN: ICD-10-CM

## 2022-06-17 DIAGNOSIS — M25.559 PAIN IN UNSPECIFIED HIP: ICD-10-CM

## 2022-06-17 PROCEDURE — 1159F MED LIST DOCD IN RCRD: CPT | Mod: CPTII,S$GLB,, | Performed by: ORTHOPAEDIC SURGERY

## 2022-06-17 PROCEDURE — 1159F PR MEDICATION LIST DOCUMENTED IN MEDICAL RECORD: ICD-10-PCS | Mod: CPTII,S$GLB,, | Performed by: ORTHOPAEDIC SURGERY

## 2022-06-17 PROCEDURE — 3008F PR BODY MASS INDEX (BMI) DOCUMENTED: ICD-10-PCS | Mod: CPTII,S$GLB,, | Performed by: ORTHOPAEDIC SURGERY

## 2022-06-17 PROCEDURE — 73562 XR KNEE ORTHO RIGHT WITH FLEXION: ICD-10-PCS | Mod: 26,LT,, | Performed by: RADIOLOGY

## 2022-06-17 PROCEDURE — 73502 X-RAY EXAM HIP UNI 2-3 VIEWS: CPT | Mod: 26,RT,, | Performed by: RADIOLOGY

## 2022-06-17 PROCEDURE — 99213 OFFICE O/P EST LOW 20 MIN: CPT | Mod: S$GLB,,, | Performed by: ORTHOPAEDIC SURGERY

## 2022-06-17 PROCEDURE — 73564 XR KNEE ORTHO RIGHT WITH FLEXION: ICD-10-PCS | Mod: 26,RT,, | Performed by: RADIOLOGY

## 2022-06-17 PROCEDURE — 4010F ACE/ARB THERAPY RXD/TAKEN: CPT | Mod: CPTII,S$GLB,, | Performed by: ORTHOPAEDIC SURGERY

## 2022-06-17 PROCEDURE — 73562 X-RAY EXAM OF KNEE 3: CPT | Mod: TC,PN,59,LT

## 2022-06-17 PROCEDURE — 1160F RVW MEDS BY RX/DR IN RCRD: CPT | Mod: CPTII,S$GLB,, | Performed by: ORTHOPAEDIC SURGERY

## 2022-06-17 PROCEDURE — 3044F HG A1C LEVEL LT 7.0%: CPT | Mod: CPTII,S$GLB,, | Performed by: ORTHOPAEDIC SURGERY

## 2022-06-17 PROCEDURE — 73562 X-RAY EXAM OF KNEE 3: CPT | Mod: 26,LT,, | Performed by: RADIOLOGY

## 2022-06-17 PROCEDURE — 99999 PR PBB SHADOW E&M-EST. PATIENT-LVL III: ICD-10-PCS | Mod: PBBFAC,,, | Performed by: ORTHOPAEDIC SURGERY

## 2022-06-17 PROCEDURE — 73564 X-RAY EXAM KNEE 4 OR MORE: CPT | Mod: 26,RT,, | Performed by: RADIOLOGY

## 2022-06-17 PROCEDURE — 73502 X-RAY EXAM HIP UNI 2-3 VIEWS: CPT | Mod: TC,PN,RT

## 2022-06-17 PROCEDURE — 1160F PR REVIEW ALL MEDS BY PRESCRIBER/CLIN PHARMACIST DOCUMENTED: ICD-10-PCS | Mod: CPTII,S$GLB,, | Performed by: ORTHOPAEDIC SURGERY

## 2022-06-17 PROCEDURE — 99999 PR PBB SHADOW E&M-EST. PATIENT-LVL III: CPT | Mod: PBBFAC,,, | Performed by: ORTHOPAEDIC SURGERY

## 2022-06-17 PROCEDURE — 3008F BODY MASS INDEX DOCD: CPT | Mod: CPTII,S$GLB,, | Performed by: ORTHOPAEDIC SURGERY

## 2022-06-17 PROCEDURE — 4010F PR ACE/ARB THEARPY RXD/TAKEN: ICD-10-PCS | Mod: CPTII,S$GLB,, | Performed by: ORTHOPAEDIC SURGERY

## 2022-06-17 PROCEDURE — 3044F PR MOST RECENT HEMOGLOBIN A1C LEVEL <7.0%: ICD-10-PCS | Mod: CPTII,S$GLB,, | Performed by: ORTHOPAEDIC SURGERY

## 2022-06-17 PROCEDURE — 99213 PR OFFICE/OUTPT VISIT, EST, LEVL III, 20-29 MIN: ICD-10-PCS | Mod: S$GLB,,, | Performed by: ORTHOPAEDIC SURGERY

## 2022-06-17 PROCEDURE — 73502 XR HIP WITH PELVIS WHEN PERFORMED, 2 OR 3  VIEWS RIGHT: ICD-10-PCS | Mod: 26,RT,, | Performed by: RADIOLOGY

## 2022-06-17 NOTE — PROGRESS NOTES
Subjective:      Patient ID: Enio Earl is a 46 y.o. male.    Chief Complaint: Pain of the Right Hip and Pain of the Right Knee    HPI      Enio Earl is seen for evaluation and treatment of hip pain.  They have experienced problems with their right hip over the past 6 weeks Pain is located in the groin and  referred to the knee. They have been treated with NA.   Symptoms have recently stayed the same. Ambulation reportedly has been impaired. Self care ADLs are not painful.  The patient denies risk factors for avascular necrosis    Review of Systems   Constitutional: Negative for fever and weight loss.   HENT: Negative for congestion.    Eyes: Negative for visual disturbance.   Cardiovascular: Negative for chest pain.   Respiratory: Negative for shortness of breath.    Hematologic/Lymphatic: Negative for bleeding problem. Does not bruise/bleed easily.   Skin: Negative for poor wound healing.   Musculoskeletal: Positive for joint pain.   Gastrointestinal: Negative for abdominal pain.   Genitourinary: Negative for dysuria.   Neurological: Negative for seizures.   Psychiatric/Behavioral: Negative for altered mental status.   Allergic/Immunologic: Negative for persistent infections.         Objective:            Ortho/SPM Exam      Right hip    The patient is not in acute distress.   Body habitus is:normal.   Sclerae normal  The patient walks with a limp.   Respiratory distress:  none  The skin over the hip is:intact.   There is:no local tenderness.   Range of motion- Flexion 100, External rotation 40, internal rotation 25.  Resisted SLR positive.  Pain with rotation positive  Sciatic tension findings negative.  Shortening/lengthening compared to the contralateral side absent.  Pulses DP present, PT present.  Motor normal 5/5 strength in all tested muscle groups.   Sensory normal.    I reviewed the relevant imaging for the patient's condition:  Right hip films show no fracture or dislocation.  No localized  bone lesion.  Joint space is maintained.        Assessment:       Encounter Diagnosis   Name Primary?    Avascular necrosis of bone of right hip Yes   Differential diagnosis includes avascular necrosis, transient osteoporosis/bone marrow edema and synovitis due to early DJD.        Plan:       Enio was seen today for pain and pain.    Diagnoses and all orders for this visit:    Avascular necrosis of bone of right hip    I explained my diagnostic impression and the reasoning behind it in detail, using layman's terms.  Models and/or pictures were used to help in the explanation.    Patient has Naprosyn-consistent usage recommended    MRI    Further treatment will be based upon MRI results and clinical course

## 2022-06-20 ENCOUNTER — TELEPHONE (OUTPATIENT)
Dept: ENDOSCOPY | Facility: HOSPITAL | Age: 47
End: 2022-06-20
Payer: COMMERCIAL

## 2022-06-20 NOTE — TELEPHONE ENCOUNTER
Patient states he needs to reschedule his colonoscopy exam scheduled for Wed, 6/22/22. Given number for .  
Yes

## 2022-07-12 ENCOUNTER — CLINICAL SUPPORT (OUTPATIENT)
Dept: OTHER | Facility: CLINIC | Age: 47
End: 2022-07-12
Payer: COMMERCIAL

## 2022-07-12 DIAGNOSIS — Z00.8 ENCOUNTER FOR OTHER GENERAL EXAMINATION: ICD-10-CM

## 2022-07-13 VITALS
DIASTOLIC BLOOD PRESSURE: 98 MMHG | WEIGHT: 220 LBS | BODY MASS INDEX: 33.34 KG/M2 | SYSTOLIC BLOOD PRESSURE: 155 MMHG | HEIGHT: 68 IN

## 2022-07-13 LAB
GLUCOSE SERPL-MCNC: 93 MG/DL (ref 60–140)
HDLC SERPL-MCNC: 63 MG/DL
POC CHOLESTEROL, TOTAL: 128 MG/DL
TRIGL SERPL-MCNC: 44 MG/DL

## 2022-08-09 ENCOUNTER — TELEPHONE (OUTPATIENT)
Dept: ENDOSCOPY | Facility: HOSPITAL | Age: 47
End: 2022-08-09
Payer: COMMERCIAL

## 2022-08-09 NOTE — TELEPHONE ENCOUNTER
[employer] message to cancel upcoming colonoscopy, called, no answer left message with call back number

## 2022-08-10 ENCOUNTER — TELEPHONE (OUTPATIENT)
Dept: ENDOSCOPY | Facility: HOSPITAL | Age: 47
End: 2022-08-10
Payer: COMMERCIAL

## 2022-08-10 NOTE — TELEPHONE ENCOUNTER
Spoke to Enio Earl needs to cancel colonoscopy d/t being out of town for work.  Will call back when he can reschedule

## 2022-09-05 DIAGNOSIS — F41.8 SITUATIONAL ANXIETY: ICD-10-CM

## 2022-09-05 RX ORDER — SERTRALINE HYDROCHLORIDE 25 MG/1
TABLET, FILM COATED ORAL
Qty: 90 TABLET | Refills: 1 | Status: SHIPPED | OUTPATIENT
Start: 2022-09-05 | End: 2023-01-13 | Stop reason: SDUPTHER

## 2022-09-05 NOTE — TELEPHONE ENCOUNTER
No new care gaps identified.  [facility] Embedded Care Gaps. Reference number: [id number]. 9/05/2022   12:16:26 AM CDT

## 2022-09-05 NOTE — TELEPHONE ENCOUNTER
Refill Decision Note   Enio Mady  is requesting a refill authorization.  Brief Assessment and Rationale for Refill:  Approve     Medication Therapy Plan:       Medication Reconciliation Completed: No   Comments:     No Care Gaps recommended.     Note composed:10:54 AM 09/05/2022

## 2022-10-20 ENCOUNTER — TELEPHONE (OUTPATIENT)
Dept: SMOKING CESSATION | Facility: CLINIC | Age: 47
End: 2022-10-20
Payer: COMMERCIAL

## 2022-10-20 NOTE — TELEPHONE ENCOUNTER
First attempt left message regarding smoking cessation quit 4 episode. Resolved quit #3 episode.

## 2022-11-03 ENCOUNTER — OFFICE VISIT (OUTPATIENT)
Dept: ORTHOPEDICS | Facility: CLINIC | Age: 47
End: 2022-11-03
Payer: COMMERCIAL

## 2022-11-03 VITALS — HEIGHT: 68 IN | WEIGHT: 220 LBS | BODY MASS INDEX: 33.34 KG/M2

## 2022-11-03 DIAGNOSIS — G89.29 CHRONIC RIGHT SHOULDER PAIN: Primary | ICD-10-CM

## 2022-11-03 DIAGNOSIS — M25.511 CHRONIC RIGHT SHOULDER PAIN: Primary | ICD-10-CM

## 2022-11-03 DIAGNOSIS — M77.01 MEDIAL EPICONDYLITIS OF RIGHT ELBOW: ICD-10-CM

## 2022-11-03 PROCEDURE — 99999 PR PBB SHADOW E&M-EST. PATIENT-LVL III: CPT | Mod: PBBFAC,,, | Performed by: ORTHOPAEDIC SURGERY

## 2022-11-03 PROCEDURE — 4010F PR ACE/ARB THEARPY RXD/TAKEN: ICD-10-PCS | Mod: CPTII,S$GLB,, | Performed by: ORTHOPAEDIC SURGERY

## 2022-11-03 PROCEDURE — 4010F ACE/ARB THERAPY RXD/TAKEN: CPT | Mod: CPTII,S$GLB,, | Performed by: ORTHOPAEDIC SURGERY

## 2022-11-03 PROCEDURE — 20551 PR INJECT TENDON ORIGIN/INSERT: ICD-10-PCS | Mod: RT,S$GLB,, | Performed by: ORTHOPAEDIC SURGERY

## 2022-11-03 PROCEDURE — 3008F BODY MASS INDEX DOCD: CPT | Mod: CPTII,S$GLB,, | Performed by: ORTHOPAEDIC SURGERY

## 2022-11-03 PROCEDURE — 99213 PR OFFICE/OUTPT VISIT, EST, LEVL III, 20-29 MIN: ICD-10-PCS | Mod: 25,S$GLB,, | Performed by: ORTHOPAEDIC SURGERY

## 2022-11-03 PROCEDURE — 3044F PR MOST RECENT HEMOGLOBIN A1C LEVEL <7.0%: ICD-10-PCS | Mod: CPTII,S$GLB,, | Performed by: ORTHOPAEDIC SURGERY

## 2022-11-03 PROCEDURE — 20551 NJX 1 TENDON ORIGIN/INSJ: CPT | Mod: RT,S$GLB,, | Performed by: ORTHOPAEDIC SURGERY

## 2022-11-03 PROCEDURE — 3044F HG A1C LEVEL LT 7.0%: CPT | Mod: CPTII,S$GLB,, | Performed by: ORTHOPAEDIC SURGERY

## 2022-11-03 PROCEDURE — 1159F PR MEDICATION LIST DOCUMENTED IN MEDICAL RECORD: ICD-10-PCS | Mod: CPTII,S$GLB,, | Performed by: ORTHOPAEDIC SURGERY

## 2022-11-03 PROCEDURE — 1159F MED LIST DOCD IN RCRD: CPT | Mod: CPTII,S$GLB,, | Performed by: ORTHOPAEDIC SURGERY

## 2022-11-03 PROCEDURE — 99213 OFFICE O/P EST LOW 20 MIN: CPT | Mod: 25,S$GLB,, | Performed by: ORTHOPAEDIC SURGERY

## 2022-11-03 PROCEDURE — 3008F PR BODY MASS INDEX (BMI) DOCUMENTED: ICD-10-PCS | Mod: CPTII,S$GLB,, | Performed by: ORTHOPAEDIC SURGERY

## 2022-11-03 PROCEDURE — 99999 PR PBB SHADOW E&M-EST. PATIENT-LVL III: ICD-10-PCS | Mod: PBBFAC,,, | Performed by: ORTHOPAEDIC SURGERY

## 2022-11-03 RX ORDER — NAPROXEN 375 MG/1
375 TABLET ORAL 2 TIMES DAILY
Qty: 60 TABLET | Refills: 1 | Status: SHIPPED | OUTPATIENT
Start: 2022-11-03 | End: 2023-02-01

## 2022-11-03 RX ORDER — TRIAMCINOLONE ACETONIDE 40 MG/ML
20 INJECTION, SUSPENSION INTRA-ARTICULAR; INTRAMUSCULAR
Status: COMPLETED | OUTPATIENT
Start: 2022-11-03 | End: 2022-11-03

## 2022-11-03 RX ADMIN — TRIAMCINOLONE ACETONIDE 20 MG: 40 INJECTION, SUSPENSION INTRA-ARTICULAR; INTRAMUSCULAR at 11:11

## 2022-11-03 NOTE — PROGRESS NOTES
Subjective:      Patient ID: Enio Earl is a 47 y.o. male.  Chief Complaint: Follow-up of the Right Shoulder and Follow-up of the Right Elbow      HPI  Enio Earl is a  47 y.o. male presenting today for follow up of right shoulder and right elbow pain.  He reports that he is having a flare-up in both areas  The injections helped out both but the right shoulder has been painful for more than 6 months now he has not had an MRI yet   In terms of the elbow he is doing some stretching strengthening exercises in done had does have a tennis elbow strap which helps.    Review of patient's allergies indicates:  No Known Allergies      Current Outpatient Medications   Medication Sig Dispense Refill    atorvastatin (LIPITOR) 10 MG tablet Take 1 tablet (10 mg total) by mouth once daily. 90 tablet 1    clobetasoL (TEMOVATE) 0.05 % cream Compound clobetasol 0.1% / niacinamide 2% ointment. Apply to affected areas of hands BID prn eczema. 60 g 2    fluocinonide 0.05% (LIDEX) 0.05 % cream AAA of hands bid prn rash 60 g 3    losartan (COZAAR) 100 MG tablet Take 1 tablet (100 mg total) by mouth once daily. 90 tablet 1    naftifine (NAFTIN) 1 % Gel Apply to feet BID x 1 month. 60 g 2    naproxen (EC-NAPROSYN) 375 MG TbEC EC tablet TAKE 1 TABLET BY MOUTH 2 TIMES DAILY. 60 tablet 1    nicotine (NICODERM CQ) 21 mg/24 hr Place 1 patch onto the skin once daily. 28 patch 0    sertraline (ZOLOFT) 25 MG tablet TAKE 1 TABLET BY MOUTH EVERY DAY 90 tablet 1    tretinoin (RETIN-A) 0.05 % cream Apply a pea-sized amount to face qhs. 20 g 5    verapamiL (CALAN-SR) 120 MG CR tablet Take 1 tablet (120 mg total) by mouth every evening. 90 tablet 1     No current facility-administered medications for this visit.       Past Medical History:   Diagnosis Date    Anxiety     Hypertension     Supraventricular tachycardia        Past Surgical History:   Procedure Laterality Date    CARDIAC ELECTROPHYSIOLOGY MAPPING AND ABLATION  07/2020    for PSVT  "by Dr. Lim     LATERAL EPICONDYLE RELEASE Left 5/18/2021    Procedure: RELEASE, ELBOW, LATERAL EPICONDYLE;  Surgeon: Cristi Adam Jr., MD;  Location: [facility];  Service: Orthopedics;  Laterality: Left;    NOSE SURGERY      sepltoplasty       OBJECTIVE:   PHYSICAL EXAM:  Height: 5' 8" (172.7 cm) Weight: 99.8 kg (220 lb)  Vitals:    11/03/22 1120   Weight: 99.8 kg (220 lb)   Height: 5' 8" (1.727 m)   PainSc:   4     Ortho/SPM Exam  Examination of the right shoulder some mild tenderness anteriorly over the biceps range of motion full  Positive impingement sign  Positive supraspinatus stress test   No instability  Examination right elbow demonstrates tenderness medially over the medial epicondylar area range of motion full no instability    RADIOGRAPHS:  None  Comments: I have personally reviewed the imaging and I agree with the above radiologist's report.    ASSESSMENT/PLAN:     IMPRESSION:  1. Medial epicondylitis right elbow.      2. Right shoulder impingement chronic.      3. Possible rotator cuff tear right shoulder    PLAN:  Because of the ongoing symptoms in his right shoulder despite exercise program and anti-inflammatory medication I have ordered an MRI scan to check the rotator cuff   He would also like injection for the right elbow  After pause for time-out identified the right elbow injected medially with combination Kenalog 20 mg 0.5 cc xylocaine sterile technique  Tolerated the procedure well without complication   Continue anti-inflammatory medication by mouth  Follow-up after the MRI is complete    FOLLOW UP:  After the MRI    Disclaimer: This note has been generated using voice-recognition software. There may be typographical errors that have been missed during proof-reading.     "

## 2022-12-15 ENCOUNTER — OFFICE VISIT (OUTPATIENT)
Dept: ORTHOPEDICS | Facility: CLINIC | Age: 47
End: 2022-12-15
Payer: COMMERCIAL

## 2022-12-15 VITALS — BODY MASS INDEX: 33.34 KG/M2 | WEIGHT: 220 LBS | HEIGHT: 68 IN

## 2022-12-15 DIAGNOSIS — M25.521 RIGHT ELBOW PAIN: Primary | ICD-10-CM

## 2022-12-15 PROCEDURE — 99999 PR PBB SHADOW E&M-EST. PATIENT-LVL III: CPT | Mod: PBBFAC,,, | Performed by: ORTHOPAEDIC SURGERY

## 2022-12-15 PROCEDURE — 3044F HG A1C LEVEL LT 7.0%: CPT | Mod: CPTII,S$GLB,, | Performed by: ORTHOPAEDIC SURGERY

## 2022-12-15 PROCEDURE — 1159F PR MEDICATION LIST DOCUMENTED IN MEDICAL RECORD: ICD-10-PCS | Mod: CPTII,S$GLB,, | Performed by: ORTHOPAEDIC SURGERY

## 2022-12-15 PROCEDURE — 3044F PR MOST RECENT HEMOGLOBIN A1C LEVEL <7.0%: ICD-10-PCS | Mod: CPTII,S$GLB,, | Performed by: ORTHOPAEDIC SURGERY

## 2022-12-15 PROCEDURE — 99213 PR OFFICE/OUTPT VISIT, EST, LEVL III, 20-29 MIN: ICD-10-PCS | Mod: S$GLB,,, | Performed by: ORTHOPAEDIC SURGERY

## 2022-12-15 PROCEDURE — 99999 PR PBB SHADOW E&M-EST. PATIENT-LVL III: ICD-10-PCS | Mod: PBBFAC,,, | Performed by: ORTHOPAEDIC SURGERY

## 2022-12-15 PROCEDURE — 4010F PR ACE/ARB THEARPY RXD/TAKEN: ICD-10-PCS | Mod: CPTII,S$GLB,, | Performed by: ORTHOPAEDIC SURGERY

## 2022-12-15 PROCEDURE — 1159F MED LIST DOCD IN RCRD: CPT | Mod: CPTII,S$GLB,, | Performed by: ORTHOPAEDIC SURGERY

## 2022-12-15 PROCEDURE — 3008F PR BODY MASS INDEX (BMI) DOCUMENTED: ICD-10-PCS | Mod: CPTII,S$GLB,, | Performed by: ORTHOPAEDIC SURGERY

## 2022-12-15 PROCEDURE — 99213 OFFICE O/P EST LOW 20 MIN: CPT | Mod: S$GLB,,, | Performed by: ORTHOPAEDIC SURGERY

## 2022-12-15 PROCEDURE — 4010F ACE/ARB THERAPY RXD/TAKEN: CPT | Mod: CPTII,S$GLB,, | Performed by: ORTHOPAEDIC SURGERY

## 2022-12-15 PROCEDURE — 3008F BODY MASS INDEX DOCD: CPT | Mod: CPTII,S$GLB,, | Performed by: ORTHOPAEDIC SURGERY

## 2022-12-15 NOTE — PROGRESS NOTES
Subjective:      Patient ID: Enio Earl is a 47 y.o. male.  Chief Complaint: Pain of the Right Elbow      HPI  Enio Earl is a  47 y.o. male presenting today for follow up of right shoulder and right elbow symptoms.  He reports that he is improved a little bit in the right shoulder the Naprosyn seems to be helping but having pain in the right elbow  Previously we tried an injection which really did not help much he is not had an MRI of the elbow although he is currently scheduled for an MRI of his right shoulder.    Review of patient's allergies indicates:  No Known Allergies      Current Outpatient Medications   Medication Sig Dispense Refill    atorvastatin (LIPITOR) 10 MG tablet Take 1 tablet (10 mg total) by mouth once daily. 90 tablet 1    clobetasoL (TEMOVATE) 0.05 % cream Compound clobetasol 0.1% / niacinamide 2% ointment. Apply to affected areas of hands BID prn eczema. 60 g 2    fluocinonide 0.05% (LIDEX) 0.05 % cream AAA of hands bid prn rash 60 g 3    losartan (COZAAR) 100 MG tablet TAKE 1 TABLET BY MOUTH EVERY DAY 90 tablet 0    naftifine (NAFTIN) 1 % Gel Apply to feet BID x 1 month. 60 g 2    naproxen (EC-NAPROSYN) 375 MG TbEC EC tablet Take 1 tablet (375 mg total) by mouth 2 (two) times daily. 60 tablet 1    nicotine (NICODERM CQ) 21 mg/24 hr Place 1 patch onto the skin once daily. 28 patch 0    sertraline (ZOLOFT) 25 MG tablet TAKE 1 TABLET BY MOUTH EVERY DAY 90 tablet 1    tretinoin (RETIN-A) 0.05 % cream Apply a pea-sized amount to face qhs. 20 g 5    verapamiL (CALAN-SR) 120 MG CR tablet TAKE 1 TABLET (120 MG TOTAL) BY MOUTH EVERY EVENING. 90 tablet 0     No current facility-administered medications for this visit.       Past Medical History:   Diagnosis Date    Anxiety     Hypertension     Supraventricular tachycardia        Past Surgical History:   Procedure Laterality Date    CARDIAC ELECTROPHYSIOLOGY MAPPING AND ABLATION  07/2020    for PSVT by Dr. Raphael MCCARTNEY  "RELEASE Left 5/18/2021    Procedure: RELEASE, ELBOW, LATERAL EPICONDYLE;  Surgeon: Cristi Adam Jr., MD;  Location: [facility];  Service: Orthopedics;  Laterality: Left;    NOSE SURGERY      sepltoplasty       OBJECTIVE:   PHYSICAL EXAM:  Height: 5' 8" (172.7 cm) Weight: 99.8 kg (220 lb)  Vitals:    12/15/22 1111   Weight: 99.8 kg (220 lb)   Height: 5' 8" (1.727 m)   PainSc:   6   PainLoc: Elbow     Ortho/SPM Exam  Examination right shoulder no tenderness no swelling full range of motion positive impingement sign no instability   Examination right elbow demonstrates tenderness and swelling medially near the medial epicondylar area range of motion full some pain on terminal extension no instability he does have pain on valgus stress   Tinel sign negative    RADIOGRAPHS:  None  Comments: I have personally reviewed the imaging and I agree with the above radiologist's report.    ASSESSMENT/PLAN:     IMPRESSION:  1.  Right shoulder pain possible rotator cuff tear.      2. Medial epicondylitis right elbow     PLAN:  Because of ongoing symptoms I have ordered an MRI scan for the right elbow   He is currently scheduled for an MRI of the shoulder so maybe we can get these together I would like to evaluate both the shoulder and the elbow  In the meantime continue anti-inflammatory medication by mouth continue tennis elbow strap stretching strengthening exercises       FOLLOW UP:  After the MRIs are complete shoulder and elbow    Disclaimer: This note has been generated using voice-recognition software. There may be typographical errors that have been missed during proof-reading.     "

## 2022-12-30 ENCOUNTER — TELEPHONE (OUTPATIENT)
Dept: URGENT CARE | Facility: CLINIC | Age: 47
End: 2022-12-30

## 2022-12-30 ENCOUNTER — OFFICE VISIT (OUTPATIENT)
Dept: URGENT CARE | Facility: CLINIC | Age: 47
End: 2022-12-30
Payer: COMMERCIAL

## 2022-12-30 ENCOUNTER — HOSPITAL ENCOUNTER (EMERGENCY)
Facility: HOSPITAL | Age: 47
Discharge: HOME | End: 2022-12-30
Attending: EMERGENCY MEDICINE
Payer: COMMERCIAL

## 2022-12-30 VITALS
BODY MASS INDEX: 33.34 KG/M2 | HEART RATE: 135 BPM | WEIGHT: 220 LBS | DIASTOLIC BLOOD PRESSURE: 74 MMHG | TEMPERATURE: 99 F | HEIGHT: 68 IN | SYSTOLIC BLOOD PRESSURE: 115 MMHG | OXYGEN SATURATION: 97 %

## 2022-12-30 VITALS
OXYGEN SATURATION: 98 % | RESPIRATION RATE: 17 BRPM | HEART RATE: 109 BPM | DIASTOLIC BLOOD PRESSURE: 75 MMHG | SYSTOLIC BLOOD PRESSURE: 113 MMHG | TEMPERATURE: 99 F

## 2022-12-30 DIAGNOSIS — L03.113 RIGHT ARM CELLULITIS: Primary | ICD-10-CM

## 2022-12-30 DIAGNOSIS — L03.113 CELLULITIS OF RIGHT ARM: Primary | ICD-10-CM

## 2022-12-30 DIAGNOSIS — Z86.2 HISTORY OF LEUKOCYTOSIS: ICD-10-CM

## 2022-12-30 LAB
ALBUMIN SERPL BCP-MCNC: 3.4 G/DL (ref 3.5–5.2)
ALP SERPL-CCNC: 61 U/L (ref 55–135)
ALT SERPL W/O P-5'-P-CCNC: 29 U/L (ref 10–44)
ANION GAP SERPL CALC-SCNC: 13 MMOL/L (ref 8–16)
ANISOCYTOSIS BLD QL SMEAR: SLIGHT
AST SERPL-CCNC: 34 U/L (ref 10–40)
BASOPHILS # BLD AUTO: 0.11 K/UL (ref 0–0.2)
BASOPHILS NFR BLD: 0 % (ref 0–1.9)
BASOPHILS NFR BLD: 0.3 % (ref 0–1.9)
BILIRUB SERPL-MCNC: 0.6 MG/DL (ref 0.1–1)
BUN SERPL-MCNC: 19 MG/DL (ref 6–20)
CALCIUM SERPL-MCNC: 9.1 MG/DL (ref 8.7–10.5)
CHLORIDE SERPL-SCNC: 98 MMOL/L (ref 95–110)
CO2 SERPL-SCNC: 24 MMOL/L (ref 23–29)
CREAT SERPL-MCNC: 1.2 MG/DL (ref 0.5–1.4)
DIFFERENTIAL METHOD: ABNORMAL
DIFFERENTIAL METHOD: ABNORMAL
EOSINOPHIL # BLD AUTO: 0 K/UL (ref 0–0.5)
EOSINOPHIL NFR BLD: 0 % (ref 0–8)
EOSINOPHIL NFR BLD: 0 % (ref 0–8)
ERYTHROCYTE [DISTWIDTH] IN BLOOD BY AUTOMATED COUNT: 11.9 % (ref 11.5–14.5)
ERYTHROCYTE [DISTWIDTH] IN BLOOD BY AUTOMATED COUNT: 12.1 % (ref 11.5–14.5)
EST. GFR  (NO RACE VARIABLE): >60 ML/MIN/1.73 M^2
GLUCOSE SERPL-MCNC: 106 MG/DL (ref 70–110)
HCT VFR BLD AUTO: 42.1 % (ref 40–54)
HCT VFR BLD AUTO: 44.6 % (ref 40–54)
HGB BLD-MCNC: 14.5 G/DL (ref 14–18)
HGB BLD-MCNC: 15.3 G/DL (ref 14–18)
HYPOCHROMIA BLD QL SMEAR: ABNORMAL
IMM GRANULOCYTES # BLD AUTO: 1.25 K/UL (ref 0–0.04)
IMM GRANULOCYTES # BLD AUTO: ABNORMAL K/UL (ref 0–0.04)
IMM GRANULOCYTES NFR BLD AUTO: 3 % (ref 0–0.5)
IMM GRANULOCYTES NFR BLD AUTO: ABNORMAL % (ref 0–0.5)
LACTATE SERPL-SCNC: 1.3 MMOL/L (ref 0.5–2.2)
LYMPHOCYTES # BLD AUTO: 1.1 K/UL (ref 1–4.8)
LYMPHOCYTES NFR BLD: 0 % (ref 18–48)
LYMPHOCYTES NFR BLD: 2.6 % (ref 18–48)
MCH RBC QN AUTO: 34.9 PG (ref 27–31)
MCH RBC QN AUTO: 35 PG (ref 27–31)
MCHC RBC AUTO-ENTMCNC: 34.3 G/DL (ref 32–36)
MCHC RBC AUTO-ENTMCNC: 34.4 G/DL (ref 32–36)
MCV RBC AUTO: 101 FL (ref 82–98)
MCV RBC AUTO: 102 FL (ref 82–98)
MONOCYTES # BLD AUTO: 1.3 K/UL (ref 0.3–1)
MONOCYTES NFR BLD: 0 % (ref 4–15)
MONOCYTES NFR BLD: 3.2 % (ref 4–15)
NEUTROPHILS # BLD AUTO: 37.5 K/UL (ref 1.8–7.7)
NEUTROPHILS NFR BLD: 86 % (ref 38–73)
NEUTROPHILS NFR BLD: 90.9 % (ref 38–73)
NEUTS BAND NFR BLD MANUAL: 14 %
NRBC BLD-RTO: 0 /100 WBC
NRBC BLD-RTO: 0 /100 WBC
PLATELET # BLD AUTO: 182 K/UL (ref 150–450)
PLATELET # BLD AUTO: 201 K/UL (ref 150–450)
PLATELET BLD QL SMEAR: ABNORMAL
PLATELET BLD QL SMEAR: ABNORMAL
PMV BLD AUTO: 11.9 FL (ref 9.2–12.9)
PMV BLD AUTO: 12.5 FL (ref 9.2–12.9)
POTASSIUM SERPL-SCNC: 2.9 MMOL/L (ref 3.5–5.1)
PROT SERPL-MCNC: 7.1 G/DL (ref 6–8.4)
RBC # BLD AUTO: 4.16 M/UL (ref 4.6–6.2)
RBC # BLD AUTO: 4.37 M/UL (ref 4.6–6.2)
SODIUM SERPL-SCNC: 135 MMOL/L (ref 136–145)
WBC # BLD AUTO: 31.61 K/UL (ref 3.9–12.7)
WBC # BLD AUTO: 41.34 K/UL (ref 3.9–12.7)

## 2022-12-30 PROCEDURE — 93010 EKG 12-LEAD: ICD-10-PCS | Mod: ,,, | Performed by: INTERNAL MEDICINE

## 2022-12-30 PROCEDURE — 3074F SYST BP LT 130 MM HG: CPT | Mod: CPTII,S$GLB,, | Performed by: FAMILY MEDICINE

## 2022-12-30 PROCEDURE — 80053 COMPREHEN METABOLIC PANEL: CPT | Performed by: PHYSICIAN ASSISTANT

## 2022-12-30 PROCEDURE — 99285 EMERGENCY DEPT VISIT HI MDM: CPT | Mod: 25

## 2022-12-30 PROCEDURE — 85025 COMPLETE CBC W/AUTO DIFF WBC: CPT | Mod: 91 | Performed by: PHYSICIAN ASSISTANT

## 2022-12-30 PROCEDURE — 1159F MED LIST DOCD IN RCRD: CPT | Mod: CPTII,S$GLB,, | Performed by: FAMILY MEDICINE

## 2022-12-30 PROCEDURE — 85025 COMPLETE CBC W/AUTO DIFF WBC: CPT | Performed by: FAMILY MEDICINE

## 2022-12-30 PROCEDURE — 99214 PR OFFICE/OUTPT VISIT, EST, LEVL IV, 30-39 MIN: ICD-10-PCS | Mod: 25,S$GLB,, | Performed by: FAMILY MEDICINE

## 2022-12-30 PROCEDURE — 3078F PR MOST RECENT DIASTOLIC BLOOD PRESSURE < 80 MM HG: ICD-10-PCS | Mod: CPTII,S$GLB,, | Performed by: FAMILY MEDICINE

## 2022-12-30 PROCEDURE — 1159F PR MEDICATION LIST DOCUMENTED IN MEDICAL RECORD: ICD-10-PCS | Mod: CPTII,S$GLB,, | Performed by: FAMILY MEDICINE

## 2022-12-30 PROCEDURE — 96372 PR INJECTION,THERAP/PROPH/DIAG2ST, IM OR SUBCUT: ICD-10-PCS | Mod: S$GLB,,, | Performed by: FAMILY MEDICINE

## 2022-12-30 PROCEDURE — 3074F PR MOST RECENT SYSTOLIC BLOOD PRESSURE < 130 MM HG: ICD-10-PCS | Mod: CPTII,S$GLB,, | Performed by: FAMILY MEDICINE

## 2022-12-30 PROCEDURE — 3008F BODY MASS INDEX DOCD: CPT | Mod: CPTII,S$GLB,, | Performed by: FAMILY MEDICINE

## 2022-12-30 PROCEDURE — 83605 ASSAY OF LACTIC ACID: CPT | Performed by: PHYSICIAN ASSISTANT

## 2022-12-30 PROCEDURE — 93005 ELECTROCARDIOGRAM TRACING: CPT

## 2022-12-30 PROCEDURE — 96372 THER/PROPH/DIAG INJ SC/IM: CPT | Mod: S$GLB,,, | Performed by: FAMILY MEDICINE

## 2022-12-30 PROCEDURE — 87040 BLOOD CULTURE FOR BACTERIA: CPT | Performed by: PHYSICIAN ASSISTANT

## 2022-12-30 PROCEDURE — 93010 ELECTROCARDIOGRAM REPORT: CPT | Mod: ,,, | Performed by: INTERNAL MEDICINE

## 2022-12-30 PROCEDURE — 99214 OFFICE O/P EST MOD 30 MIN: CPT | Mod: 25,S$GLB,, | Performed by: FAMILY MEDICINE

## 2022-12-30 PROCEDURE — 96374 THER/PROPH/DIAG INJ IV PUSH: CPT

## 2022-12-30 PROCEDURE — 3008F PR BODY MASS INDEX (BMI) DOCUMENTED: ICD-10-PCS | Mod: CPTII,S$GLB,, | Performed by: FAMILY MEDICINE

## 2022-12-30 PROCEDURE — 96375 TX/PRO/DX INJ NEW DRUG ADDON: CPT

## 2022-12-30 PROCEDURE — 3078F DIAST BP <80 MM HG: CPT | Mod: CPTII,S$GLB,, | Performed by: FAMILY MEDICINE

## 2022-12-30 PROCEDURE — 63600175 PHARM REV CODE 636 W HCPCS: Performed by: EMERGENCY MEDICINE

## 2022-12-30 RX ORDER — SULFAMETHOXAZOLE AND TRIMETHOPRIM 800; 160 MG/1; MG/1
1 TABLET ORAL 2 TIMES DAILY
Qty: 20 TABLET | Refills: 0 | Status: SHIPPED | OUTPATIENT
Start: 2022-12-30 | End: 2023-01-05

## 2022-12-30 RX ORDER — ONDANSETRON 2 MG/ML
4 INJECTION INTRAMUSCULAR; INTRAVENOUS
Status: COMPLETED | OUTPATIENT
Start: 2022-12-30 | End: 2022-12-30

## 2022-12-30 RX ORDER — MORPHINE SULFATE 4 MG/ML
4 INJECTION, SOLUTION INTRAMUSCULAR; INTRAVENOUS
Status: COMPLETED | OUTPATIENT
Start: 2022-12-30 | End: 2022-12-30

## 2022-12-30 RX ORDER — CEFTRIAXONE 1 G/1
1 INJECTION, POWDER, FOR SOLUTION INTRAMUSCULAR; INTRAVENOUS
Status: COMPLETED | OUTPATIENT
Start: 2022-12-30 | End: 2022-12-30

## 2022-12-30 RX ORDER — MUPIROCIN 20 MG/G
OINTMENT TOPICAL
Qty: 22 G | Refills: 1 | Status: SHIPPED | OUTPATIENT
Start: 2022-12-30 | End: 2023-02-15

## 2022-12-30 RX ORDER — ONDANSETRON 4 MG/1
4 TABLET, ORALLY DISINTEGRATING ORAL EVERY 6 HOURS PRN
Qty: 10 TABLET | Refills: 0 | Status: SHIPPED | OUTPATIENT
Start: 2022-12-30 | End: 2023-02-15

## 2022-12-30 RX ORDER — HYDROCODONE BITARTRATE AND ACETAMINOPHEN 5; 325 MG/1; MG/1
1 TABLET ORAL EVERY 4 HOURS PRN
Qty: 18 TABLET | Refills: 0 | Status: SHIPPED | OUTPATIENT
Start: 2022-12-30 | End: 2023-02-15

## 2022-12-30 RX ADMIN — ONDANSETRON HYDROCHLORIDE 4 MG: 2 SOLUTION INTRAMUSCULAR; INTRAVENOUS at 05:12

## 2022-12-30 RX ADMIN — CEFTRIAXONE 1 G: 1 INJECTION, POWDER, FOR SOLUTION INTRAMUSCULAR; INTRAVENOUS at 10:12

## 2022-12-30 RX ADMIN — MORPHINE SULFATE 4 MG: 4 INJECTION INTRAVENOUS at 05:12

## 2022-12-30 NOTE — PROGRESS NOTES
"Subjective:       Patient ID: Enio Earl is a 47 y.o. male.    Vitals:  height is 5' 8" (1.727 m) and weight is 99.8 kg (220 lb). His temperature is 98.6 °F (37 °C). His blood pressure is 115/74 and his pulse is 135 (abnormal). His oxygen saturation is 97%.     Chief Complaint: Headache, Arm Swelling, and Arm Pain    47 year old presents headache/arm swelling. Patient stated headache started Wednesday. Patient also stated he was exposed to strep. Arm swelling started yesterday./possibly bit by something.    Headache   This is a new problem. The current episode started in the past 7 days (wednesday). The problem occurs constantly. The problem has been gradually worsening. He has tried acetaminophen for the symptoms.     Neurological:  Positive for headaches.     Objective:      Physical Exam   Cardiovascular: Normal rate, regular rhythm, normal heart sounds and normal pulses.   Pulmonary/Chest: Effort normal and breath sounds normal.   Abdominal: Normal appearance.   Musculoskeletal:         General: Swelling (erythema and swelling right arm extending upper arm) and tenderness (tender right axillary adenopathy) present.   Neurological: He is alert.   Nursing note and vitals reviewed.      Assessment:       1. Cellulitis of right arm          Plan:         Cellulitis of right arm  -     cefTRIAXone injection 1 g  -     sulfamethoxazole-trimethoprim 800-160mg (BACTRIM DS) 800-160 mg Tab; Take 1 tablet by mouth 2 (two) times daily.  Dispense: 20 tablet; Refill: 0  -     mupirocin (BACTROBAN) 2 % ointment; Apply to affected area 3 times daily  Dispense: 22 g; Refill: 1  -     CBC auto differential  -     Refer to Emergency Dept.    Return to clinic in one day                 "

## 2022-12-30 NOTE — ED PROVIDER NOTES
Encounter Date: 12/30/2022       History     Chief Complaint   Patient presents with    Arm Swelling     Pt presents to ED today c/o right arm swelling and redness onset yesterday worse this am denies injury or trauma   Pt was seen at  this am was given injection of antibiotics and labs were drawn pt advised to be seen in ED for further eval  + radial pulse      The patient is a 47-year-old with the below past medical history.  He presents with pain, swelling, redness to his right upper and lower arm that began yesterday.  He denies trauma.  He denies fever, chills, nausea, and vomiting.  He was evaluated and [facility] earlier today where he received IM Rocephin and was discharged on Bactrim DS and Bactroban.  Labs were also performed.  His white blood cell count was very elevated on CBC.  He was contacted by the urgent care provider in instructed to come to the ED for further evaluation.  He denies history of DVT.  He has no motor sensory deficits at the extremity.  His pain is worsened by palpation and movement.  He is right-handed.    The history is provided by the patient. No  was used.   Review of patient's allergies indicates:  No Known Allergies  Past Medical History:   Diagnosis Date    Anxiety     Hypertension     Supraventricular tachycardia      Past Surgical History:   Procedure Laterality Date    CARDIAC ELECTROPHYSIOLOGY MAPPING AND ABLATION  07/2020    for PSVT by Dr. Lim     LATERAL EPICONDYLE RELEASE Left 5/18/2021    Procedure: RELEASE, ELBOW, LATERAL EPICONDYLE;  Surgeon: Cristi Adam Jr., MD;  Location: [facility];  Service: Orthopedics;  Laterality: Left;    NOSE SURGERY      sepltoplasty     Family History   Problem Relation Age of Onset    Stroke Maternal Grandmother     No Known Problems Mother     Emphysema Father     No Known Problems Brother     Diabetes Mellitus Neg Hx     Cancer Neg Hx     Heart disease Neg Hx      Social History     Tobacco Use     Smoking status: Every Day     Packs/day: 1.00     Years: 15.00     Pack years: 15.00     Types: Cigarettes     Start date: 10/1/2005    Smokeless tobacco: Never   Substance Use Topics    Alcohol use: Yes     Alcohol/week: 16.0 standard drinks     Types: 10 Shots of liquor, 6 Drinks containing 0.5 oz of alcohol per week    Drug use: Never     Review of Systems   Constitutional:  Negative for chills and fever.   Respiratory:  Negative for shortness of breath.    Cardiovascular:  Negative for chest pain.   Gastrointestinal:  Negative for nausea and vomiting.   Musculoskeletal:         + right upper extremity pain, redness, and swelling   Skin:  Negative for wound.   Allergic/Immunologic: Negative for immunocompromised state.   Neurological:  Negative for weakness and numbness.     Physical Exam     Initial Vitals [12/30/22 1521]   BP Pulse Resp Temp SpO2   128/81 (!) 125 19 98.9 °F (37.2 °C) 98 %      MAP       --         Physical Exam    Nursing note and vitals reviewed.  Constitutional: He is not diaphoretic. No distress.   Cardiovascular:  Regular rhythm and normal heart sounds.   Tachycardia present.   Exam reveals no gallop and no friction rub.       No murmur heard.  Pulses:       Radial pulses are 2+ on the right side.   Musculoskeletal:      Comments: Right upper extremity:  Moderate-to-severe swelling to the upper arm and forearm with large area erythema that easily blanches.  The upper and lower arm are very tender.  Abrasion type rash to the dorsum of the forearm.  No areas of fluctuance.  No sloughing of skin.     Neurological: He is alert and oriented to person, place, and time. GCS eye subscore is 4. GCS verbal subscore is 5. GCS motor subscore is 6.   Normal strength and sensation throughout the right upper extremity.   Psychiatric: He has a normal mood and affect. His speech is normal and behavior is normal. He is not actively hallucinating. He is attentive.       ED Course   Procedures  Labs  Reviewed   COMPREHENSIVE METABOLIC PANEL - Abnormal; Notable for the following components:       Result Value    Sodium 135 (*)     Potassium 2.9 (*)     Albumin 3.4 (*)     All other components within normal limits   CULTURE, BLOOD   CULTURE, BLOOD   LACTIC ACID, PLASMA   CBC W/ AUTO DIFFERENTIAL   URINALYSIS, REFLEX TO URINE CULTURE   LACTIC ACID, PLASMA     EKG Readings: (Independently Interpreted)   12/30/2022 16:49  Sinus tachycardia.  Ventricular rate 160 beats per minute.  Normal axis.  Normal QRS and QT intervals.  No ST segment elevation or depression.  Normal T-wave morphology.  Overall impression sinus tachycardia, otherwise normal EKG.     Imaging Results              US Upper Extremity Veins Right (In process)                      Medications   morphine injection 4 mg (4 mg Intravenous Given 12/30/22 1726)   ondansetron injection 4 mg (4 mg Intravenous Given 12/30/22 1726)     Medical Decision Making:   History:   Old Medical Records: I decided to obtain old medical records.  Old Records Summarized: other records.       <> Summary of Records: Urgent care clinic visit reviewed as documented in the HPI.  Clinical Tests:   Lab Tests: Ordered and Reviewed  Radiological Study: Ordered and Reviewed    MDM:  This was an urgent evaluation for acute pain, redness, swelling to the right upper extremity.  He was tachycardic on arrival.  Vital signs were otherwise normal.  Sepsis workup was initiated by the triage telemedicine provider due to his elevated heart rate and leukocytosis on outpatient labs performed earlier in the day.  He was neurovascularly intact throughout the extremity.  Exam was consistent with cellulitis.  Labs showed improvement and white blood cell count elevation from earlier in the day.  Venous ultrasound of the extremity was negative for DVT.  His pain improved with ED management.  He was discharged.  He was prescribed pain and nausea medications.  He was instructed to arrange close  follow-up with his PCP.  On chart review, his white blood cell count has been persistently elevated for some time.  Ambulatory referral to hematology-oncology was submitted.                        Clinical Impression:   Final diagnoses:  [R00.0] Tachycardia               Kobe Acevedo III, MD  12/30/22 2241       Kobe Acevedo III, MD  12/31/22 0000

## 2022-12-30 NOTE — FIRST PROVIDER EVALUATION
Emergency Department TeleTriage Encounter Note      CHIEF COMPLAINT    Chief Complaint   Patient presents with    Arm Swelling     Pt presents to ED today c/o right arm swelling and redness onset yesterday worse this am denies injury or trauma   Pt was seen at  this am was given injection of antibiotics and labs were drawn pt advised to be seen in ED for further eval  + radial pulse        VITAL SIGNS   Initial Vitals [12/30/22 1521]   BP Pulse Resp Temp SpO2   128/81 (!) 125 19 98.9 °F (37.2 °C) 98 %      MAP       --            ALLERGIES    Review of patient's allergies indicates:  No Known Allergies    PROVIDER TRIAGE NOTE  This is a teletriage evaluation of a 47 y.o. male presenting to the ED complaining of arm swelling. Patient seen at urgent care this morning for arm swelling and erythema that started last night. No known injury or wound. Symptoms worsened today, went to  and was given antibiotic injection and prescription. WBC from labs there is 41. Patient advised to come to the ED.     Patient is alert and oriented. He speaks in complete sentences. He is sitting upright in the chair in no distress. Right arm is very swollen. Erythema noted.     Initial orders will be placed and care will be transferred to an alternate provider when patient is roomed for a full evaluation. Any additional orders and the final disposition will be determined by that provider.         ORDERS  Labs Reviewed   CULTURE, BLOOD   CBC W/ AUTO DIFFERENTIAL   COMPREHENSIVE METABOLIC PANEL   LACTIC ACID, PLASMA   URINALYSIS, REFLEX TO URINE CULTURE       ED Orders (720h ago, onward)      Start Ordered     Status Ordering Provider    12/30/22 1954 12/30/22 1554  Lactic acid, plasma #2  In 4 hours         Ordered RENEE RUDD.    12/30/22 1554 12/30/22 1554  ED Preference List Used to Initiate Sepsis Orders  Until discontinued         Ordered RENEE RUDD G.    12/30/22 1554 12/30/22 1554  Blood culture x two cultures. Draw prior to  antibiotics.  Every 15 min      Comments: Aerobic and anaerobic     Order ID Start Status Ordering Provider   [id number] 12/30/22 1554 Ordered TOBIN, RENEE G.    12/30/22 1609 Scheduled TOBIN, RENEE G.       Ordered TOBIN, RENEE G.    12/30/22 1554 12/30/22 1554  CBC auto differential  STAT         Ordered TOBIN, RENEE G.    12/30/22 1554 12/30/22 1554  Comprehensive metabolic panel  STAT         Ordered TOBIN, RENEE G.    12/30/22 1554 12/30/22 1554  Lactic acid, plasma #1  STAT         Ordered TOBIN, RENEE G.    12/30/22 1554 12/30/22 1554  Vital signs  Every 15 min        Comments: Every 15 minutes until SBP greater than 90 or MAP greater than 65, then every 30 minutes times one hour, then every one hour.    Ordered TOBIN, RENEE G.    12/30/22 1554 12/30/22 1554  Bed rest  Until discontinued         Ordered TOBIN, RENEE G.    12/30/22 1554 12/30/22 1554  Cardiac Monitoring - Adult  Continuous        Comments: Notify Physician If:    Ordered TOBIN, RENEE G.    12/30/22 1554 12/30/22 1554  Pulse Oximetry Continuous  Continuous         Ordered TOBIN, RENEE G.    12/30/22 1554 12/30/22 1554  Strict intake and output  Until discontinued         Ordered TOBIN, RENEE G.    12/30/22 1554 12/30/22 1554  Urinalysis, Reflex to Urine Culture Urine, Clean Catch  STAT         Ordered TOBIN, RENEE G.    12/30/22 1554 12/30/22 1554  Saline lock IV  Once         Ordered TOBIN, RENEE G.    12/30/22 1554 12/30/22 1554  EKG 12-lead  Once         Ordered TOBIN, RENEE G.              Virtual Visit Note: The provider triage portion of this emergency department evaluation and documentation was performed via [employer], a HIPAA-compliant telemedicine application, in concert with a tele-presenter in the room. A face to face patient evaluation with one of my colleagues will occur once the patient is placed in an emergency department room.      DISCLAIMER: This note was prepared with M*Modal voice recognition transcription software.  Garbled syntax, mangled pronouns, and other bizarre constructions may be attributed to that software system.

## 2022-12-31 ENCOUNTER — HOSPITAL ENCOUNTER (EMERGENCY)
Facility: HOSPITAL | Age: 47
Discharge: HOME | End: 2022-12-31
Attending: EMERGENCY MEDICINE
Payer: COMMERCIAL

## 2022-12-31 VITALS
OXYGEN SATURATION: 97 % | RESPIRATION RATE: 16 BRPM | SYSTOLIC BLOOD PRESSURE: 129 MMHG | TEMPERATURE: 99 F | WEIGHT: 220 LBS | DIASTOLIC BLOOD PRESSURE: 65 MMHG | HEART RATE: 90 BPM | BODY MASS INDEX: 33.45 KG/M2

## 2022-12-31 DIAGNOSIS — A46 ERYSIPELAS: Primary | ICD-10-CM

## 2022-12-31 LAB
BASOPHILS NFR BLD: 0 % (ref 0–1.9)
DIFFERENTIAL METHOD: ABNORMAL
EOSINOPHIL NFR BLD: 0 % (ref 0–8)
ERYTHROCYTE [DISTWIDTH] IN BLOOD BY AUTOMATED COUNT: 12.1 % (ref 11.5–14.5)
HCT VFR BLD AUTO: 36.1 % (ref 40–54)
HGB BLD-MCNC: 12.9 G/DL (ref 14–18)
IMM GRANULOCYTES # BLD AUTO: ABNORMAL K/UL (ref 0–0.04)
IMM GRANULOCYTES NFR BLD AUTO: ABNORMAL % (ref 0–0.5)
LYMPHOCYTES NFR BLD: 8 % (ref 18–48)
MCH RBC QN AUTO: 34.8 PG (ref 27–31)
MCHC RBC AUTO-ENTMCNC: 35.7 G/DL (ref 32–36)
MCV RBC AUTO: 97 FL (ref 82–98)
MONOCYTES NFR BLD: 4 % (ref 4–15)
NEUTROPHILS NFR BLD: 88 % (ref 38–73)
NRBC BLD-RTO: 0 /100 WBC
PLATELET # BLD AUTO: 164 K/UL (ref 150–450)
PLATELET BLD QL SMEAR: ABNORMAL
PMV BLD AUTO: 11.5 FL (ref 9.2–12.9)
RBC # BLD AUTO: 3.71 M/UL (ref 4.6–6.2)
WBC # BLD AUTO: 24.24 K/UL (ref 3.9–12.7)

## 2022-12-31 PROCEDURE — 63600175 PHARM REV CODE 636 W HCPCS: Performed by: EMERGENCY MEDICINE

## 2022-12-31 PROCEDURE — 96365 THER/PROPH/DIAG IV INF INIT: CPT

## 2022-12-31 PROCEDURE — 85027 COMPLETE CBC AUTOMATED: CPT | Performed by: EMERGENCY MEDICINE

## 2022-12-31 PROCEDURE — 85007 BL SMEAR W/DIFF WBC COUNT: CPT | Performed by: EMERGENCY MEDICINE

## 2022-12-31 PROCEDURE — 96375 TX/PRO/DX INJ NEW DRUG ADDON: CPT

## 2022-12-31 PROCEDURE — 99284 EMERGENCY DEPT VISIT MOD MDM: CPT | Mod: 25

## 2022-12-31 RX ORDER — KETOROLAC TROMETHAMINE 30 MG/ML
15 INJECTION, SOLUTION INTRAMUSCULAR; INTRAVENOUS
Status: COMPLETED | OUTPATIENT
Start: 2022-12-31 | End: 2022-12-31

## 2022-12-31 RX ORDER — CEPHALEXIN 250 MG/1
250 CAPSULE ORAL 4 TIMES DAILY
Qty: 28 CAPSULE | Refills: 0 | Status: SHIPPED | OUTPATIENT
Start: 2022-12-31 | End: 2023-01-05

## 2022-12-31 RX ORDER — CEFAZOLIN SODIUM 2 G/50ML
2 SOLUTION INTRAVENOUS
Status: COMPLETED | OUTPATIENT
Start: 2022-12-31 | End: 2022-12-31

## 2022-12-31 RX ADMIN — KETOROLAC TROMETHAMINE 15 MG: 30 INJECTION, SOLUTION INTRAMUSCULAR; INTRAVENOUS at 09:12

## 2022-12-31 RX ADMIN — CEFAZOLIN SODIUM 2 G: 2 SOLUTION INTRAVENOUS at 09:12

## 2022-12-31 NOTE — ED NOTES
Review of patient's allergies indicates:  No Known Allergies     Pt states woke up with swollen right arm then accompanied with HA, denies trauma, hx of tendinitis to BUE with MRI due today on affected extremity.    Patient has verified the spelling of their name and  on armband.   NEURO: +HA without dizziness, 5/5 strength major flexors/extensors bilaterally. Sensory intact to light touch bilaterally. [place] coma scale: eyes open spontaneously-4, oriented & converses-5, obeys commands-6. No neurological abnormalities.  CARDIAC: +tachycardic, denies CP, S1 and S2 noted.  RESPIRATORY: +tachypneic, denies SOB, Breath sounds clear bilaterally throughout chest. Respirations are equal and unlabored.    GASTRO: Bowel sounds normal, abdomen is soft, no tenderness, and no abdominal distention. Denies NVD.  SKIN: +redness, swelling to RUE, skin intact, no drainage noted however skin seems more moist in comparison to LUE.  PERIPHERAL VASCULAR: +2 radial pulse to RUE; peripheral pulses present. Normal cap refill. Warm to touch.  : Voids without complication.

## 2023-01-01 NOTE — ED TRIAGE NOTES
Patient arrived to ED with increasing swelling, pain, blisters appearing to R arm.   Patient seen at urgent care 2 days ago discharged with a bactrim prescriptions, instructed to come to ER. Blood work done and discharged home with nausea and pain medication and instructed to continue taking antibiotics. Patient has been taking prescriptions as prescribed but today increasing reddens up posterior R upper arm, increasing in swelling to whole hand, and blisters appearing. R hand dominant. Able to move hand. CMS intact.

## 2023-01-01 NOTE — ED NOTES
APPEARANCE: Alert, oriented and in no acute distress.  CARDIAC: Normal rate and rhythm, no murmur heard.   PERIPHERAL VASCULAR: RUE red, warm, swollen, +3 edema. Radial pulse present.   RESPIRATORY:Normal rate and effort, breath sounds clear bilaterally throughout chest. Respirations are equal and unlabored no obvious signs of distress.  GASTRO: soft, bowel sounds normal, no tenderness, no abdominal distention.  MUSC: Full ROM except in R wrist and elbow. No bony tenderness or soft tissue tenderness. No obvious deformity.  SKIN: Skin is warm and dry, normal skin turgor, mucous membranes moist. RUE redness and blisters   NEURO: 5/5 strength major flexors/extensors bilaterally. Sensory intact to light touch bilaterally. [place] coma scale: eyes open spontaneously-4, oriented & converses-5, obeys commands-6. No neurological abnormalities.   MENTAL STATUS: awake, alert and aware of environment.  EYE: PERRL, both eyes: pupils brisk and reactive to light. Normal size.  ENT: EARS: no obvious drainage. NOSE: no active bleeding.

## 2023-01-01 NOTE — ED PROVIDER NOTES
Encounter Date: 12/31/2022       History     Chief Complaint   Patient presents with    Arm Injury     Patient reports being seen here yesterday for same.  States he woke on Thursday with redness, heat, and swelling to right arm.  Patient denies any change since yesterday, but does not feel it is improving on home antibiotics.       HPI  This is a 47 y.o. male who has a past medical history of Anxiety, Hypertension, and Supraventricular tachycardia.     The patient presents to the Emergency Department with arm infection and assoc pain.  Patient states that he also has swelling to the right arm, slightly worse since yesterday. No fever, myalgias. Placed on bactrim by  yesterday, seen later here in the ED and dc home. Pt and wife concerned sx are not improving.      Review of patient's allergies indicates:  No Known Allergies  Past Medical History:   Diagnosis Date    Anxiety     Hypertension     Supraventricular tachycardia      Past Surgical History:   Procedure Laterality Date    CARDIAC ELECTROPHYSIOLOGY MAPPING AND ABLATION  07/2020    for PSVT by Dr. Lim     LATERAL EPICONDYLE RELEASE Left 5/18/2021    Procedure: RELEASE, ELBOW, LATERAL EPICONDYLE;  Surgeon: Cristi Adam Jr., MD;  Location: [facility];  Service: Orthopedics;  Laterality: Left;    NOSE SURGERY      sepltoplasty     Family History   Problem Relation Age of Onset    Stroke Maternal Grandmother     No Known Problems Mother     Emphysema Father     No Known Problems Brother     Diabetes Mellitus Neg Hx     Cancer Neg Hx     Heart disease Neg Hx      Social History     Tobacco Use    Smoking status: Every Day     Packs/day: 1.00     Years: 15.00     Pack years: 15.00     Types: Cigarettes     Start date: 10/1/2005    Smokeless tobacco: Never   Substance Use Topics    Alcohol use: Yes     Alcohol/week: 16.0 standard drinks     Types: 10 Shots of liquor, 6 Drinks containing 0.5 oz of alcohol per week    Drug use: Never     Review of Systems    Constitutional:  Negative for fever.   Musculoskeletal:  Negative for myalgias.        Arm pain   Skin:  Positive for color change and wound.     Physical Exam     Initial Vitals [12/31/22 2039]   BP Pulse Resp Temp SpO2   129/65 101 16 98.5 °F (36.9 °C) 96 %      MAP       --         Physical Exam    Nursing note and vitals reviewed.  Constitutional: He appears well-developed and well-nourished. He is not diaphoretic. No distress.   HENT:   Head: Normocephalic and atraumatic.   Mouth/Throat: Oropharynx is clear and moist.   Eyes: Conjunctivae are normal.   Cardiovascular:  Normal rate and regular rhythm.           Pulmonary/Chest: No respiratory distress.   Musculoskeletal:         General: Edema (RUE) present.     Neurological: He is alert and oriented to person, place, and time. He has normal strength.   Skin: Skin is warm and dry. Capillary refill takes less than 2 seconds. No rash noted. There is erythema. No pallor.   Erysipelas to RUE past elbow into arm   Psychiatric: He has a normal mood and affect.       ED Course   Procedures  Labs Reviewed   CBC W/ AUTO DIFFERENTIAL - Abnormal; Notable for the following components:       Result Value    WBC 24.24 (*)     RBC 3.71 (*)     Hemoglobin 12.9 (*)     Hematocrit 36.1 (*)     MCH 34.8 (*)     Gran % 88.0 (*)     Lymph % 8.0 (*)     All other components within normal limits          Imaging Results    None          Medications   cefazolin (ANCEF) 2 gram in dextrose 5% 50 mL IVPB (premix) (2 g Intravenous New Bag 12/31/22 2153)   ketorolac injection 15 mg (15 mg Intravenous Given 12/31/22 2158)     Medical Decision Making:   Initial Assessment:   This is an emergent evaluation of a 47 y.o.male patient with presentation of erysipelas to the right upper, no evidence of sepsis.  Patient previously had DVT study which was negative.  Previous visit yesterday reviewed, elevated white blood cell count    Initial differentials include but are not limited to:   Erysipelas, cellulitis, doubt necrotizing fasciitis, sepsis.     Plan:  IV Ancef, CBC.     Clinical Tests:   Lab Tests: Ordered and Reviewed           ED Course as of 12/31/22 2238   Sat Dec 31, 2022   2223 WBC(!): 24.24 [NP]      ED Course User Index  [NP] Jovanny Hamilton MD          CBC reviewed.  White count decreasing from previous.  Patient status stable, vital signs stable, not septic.  Stable for discharge.    Will add Keflex to current regimen with Bactrim.  F/U with PCP in 1 week or rtn for any emergent concerns.       Clinical Impression:   Final diagnoses:  [A46] Erysipelas (Primary)        ED Disposition Condition    Discharge Stable          ED Prescriptions       Medication Sig Dispense Start Date End Date Auth. Provider    cephALEXin (KEFLEX) 250 MG capsule Take 1 capsule (250 mg total) by mouth 4 (four) times daily. for 7 days 28 capsule 12/31/2022 1/7/2023 Jovanny Hamilton MD          Follow-up Information       Follow up With Specialties Details Why Contact Info    Bhupinder Bowen,  Family Medicine Schedule an appointment as soon as possible for a visit   [address] [facility]  [place] [id number] [phone]               Jovanny Hamilton MD  01/01/23 0807

## 2023-01-04 ENCOUNTER — PATIENT MESSAGE (OUTPATIENT)
Dept: FAMILY MEDICINE | Facility: CLINIC | Age: 48
End: 2023-01-04
Payer: COMMERCIAL

## 2023-01-04 DIAGNOSIS — Z13.220 ENCOUNTER FOR LIPID SCREENING FOR CARDIOVASCULAR DISEASE: Primary | ICD-10-CM

## 2023-01-04 DIAGNOSIS — I10 ESSENTIAL HYPERTENSION: ICD-10-CM

## 2023-01-04 DIAGNOSIS — Z13.1 DIABETES MELLITUS SCREENING: ICD-10-CM

## 2023-01-04 DIAGNOSIS — Z13.6 ENCOUNTER FOR LIPID SCREENING FOR CARDIOVASCULAR DISEASE: Primary | ICD-10-CM

## 2023-01-05 ENCOUNTER — OFFICE VISIT (OUTPATIENT)
Dept: FAMILY MEDICINE | Facility: CLINIC | Age: 48
End: 2023-01-05
Payer: COMMERCIAL

## 2023-01-05 VITALS
OXYGEN SATURATION: 96 % | SYSTOLIC BLOOD PRESSURE: 130 MMHG | WEIGHT: 226.19 LBS | TEMPERATURE: 98 F | HEART RATE: 88 BPM | HEIGHT: 68 IN | BODY MASS INDEX: 34.28 KG/M2 | DIASTOLIC BLOOD PRESSURE: 84 MMHG

## 2023-01-05 DIAGNOSIS — D72.829 LEUKOCYTOSIS, UNSPECIFIED TYPE: ICD-10-CM

## 2023-01-05 DIAGNOSIS — L03.113 CELLULITIS OF RIGHT UPPER EXTREMITY: Primary | ICD-10-CM

## 2023-01-05 LAB — BACTERIA BLD CULT: NORMAL

## 2023-01-05 PROCEDURE — 99999 PR PBB SHADOW E&M-EST. PATIENT-LVL V: CPT | Mod: PBBFAC,,, | Performed by: FAMILY MEDICINE

## 2023-01-05 PROCEDURE — 99214 OFFICE O/P EST MOD 30 MIN: CPT | Mod: S$GLB,,, | Performed by: FAMILY MEDICINE

## 2023-01-05 PROCEDURE — 3079F DIAST BP 80-89 MM HG: CPT | Mod: CPTII,S$GLB,, | Performed by: FAMILY MEDICINE

## 2023-01-05 PROCEDURE — 3008F PR BODY MASS INDEX (BMI) DOCUMENTED: ICD-10-PCS | Mod: CPTII,S$GLB,, | Performed by: FAMILY MEDICINE

## 2023-01-05 PROCEDURE — 3008F BODY MASS INDEX DOCD: CPT | Mod: CPTII,S$GLB,, | Performed by: FAMILY MEDICINE

## 2023-01-05 PROCEDURE — 3075F SYST BP GE 130 - 139MM HG: CPT | Mod: CPTII,S$GLB,, | Performed by: FAMILY MEDICINE

## 2023-01-05 PROCEDURE — 1160F RVW MEDS BY RX/DR IN RCRD: CPT | Mod: CPTII,S$GLB,, | Performed by: FAMILY MEDICINE

## 2023-01-05 PROCEDURE — 3079F PR MOST RECENT DIASTOLIC BLOOD PRESSURE 80-89 MM HG: ICD-10-PCS | Mod: CPTII,S$GLB,, | Performed by: FAMILY MEDICINE

## 2023-01-05 PROCEDURE — 99999 PR PBB SHADOW E&M-EST. PATIENT-LVL V: ICD-10-PCS | Mod: PBBFAC,,, | Performed by: FAMILY MEDICINE

## 2023-01-05 PROCEDURE — 1160F PR REVIEW ALL MEDS BY PRESCRIBER/CLIN PHARMACIST DOCUMENTED: ICD-10-PCS | Mod: CPTII,S$GLB,, | Performed by: FAMILY MEDICINE

## 2023-01-05 PROCEDURE — 1159F MED LIST DOCD IN RCRD: CPT | Mod: CPTII,S$GLB,, | Performed by: FAMILY MEDICINE

## 2023-01-05 PROCEDURE — 3075F PR MOST RECENT SYSTOLIC BLOOD PRESS GE 130-139MM HG: ICD-10-PCS | Mod: CPTII,S$GLB,, | Performed by: FAMILY MEDICINE

## 2023-01-05 PROCEDURE — 1159F PR MEDICATION LIST DOCUMENTED IN MEDICAL RECORD: ICD-10-PCS | Mod: CPTII,S$GLB,, | Performed by: FAMILY MEDICINE

## 2023-01-05 PROCEDURE — 99214 PR OFFICE/OUTPT VISIT, EST, LEVL IV, 30-39 MIN: ICD-10-PCS | Mod: S$GLB,,, | Performed by: FAMILY MEDICINE

## 2023-01-05 RX ORDER — LEVOFLOXACIN 750 MG/1
750 TABLET ORAL DAILY
Qty: 7 TABLET | Refills: 0 | Status: SHIPPED | OUTPATIENT
Start: 2023-01-05 | End: 2023-01-13 | Stop reason: SDUPTHER

## 2023-01-05 RX ORDER — CLINDAMYCIN HYDROCHLORIDE 300 MG/1
300 CAPSULE ORAL EVERY 6 HOURS
Qty: 28 CAPSULE | Refills: 0 | Status: SHIPPED | OUTPATIENT
Start: 2023-01-05 | End: 2023-01-13 | Stop reason: SDUPTHER

## 2023-01-05 NOTE — PROGRESS NOTES
Subjective:       Patient ID: Enio Earl is a 47 y.o. male.    Chief Complaint: Recurrent Skin Infections    Enio is a 47 y.o. male who presents today for f/u on his cellulitis. Not seen in almost a year.     Woke up with this. Not sure of any trauma.     Had a possible 'bug bite' that worsened. Went to the ER twice and an UC.     US in the ER: No thrombus in central veins of the right upper extremity.    Leukocytosis noted.     Review of Systems   Constitutional:  Negative for chills and fever.   Musculoskeletal:  Positive for joint swelling.   Skin:  Positive for rash and wound.           Objective:     Vitals:    01/05/23 0900   BP: 130/84   Pulse: 88   Temp: 98.3 °F (36.8 °C)        Physical Exam  Constitutional:       General: He is not in acute distress.     Appearance: He is obese. He is not ill-appearing, toxic-appearing or diaphoretic.   Cardiovascular:      Rate and Rhythm: Normal rate and regular rhythm.   Pulmonary:      Effort: Pulmonary effort is normal.      Breath sounds: Normal breath sounds. No wheezing.   Skin:     Findings: Erythema and rash present.      Comments: See pictures below  Warm to the touch  No obvious collection of fluid or abscess.    Neurological:      General: No focal deficit present.      Mental Status: He is alert.   Psychiatric:         Mood and Affect: Mood normal.         Behavior: Behavior normal.         Thought Content: Thought content normal.         Judgment: Judgment normal.         1/5/2023 Saturday 12/31 Friday 12/30        Assessment:       1. Cellulitis of right upper extremity    2. Leukocytosis, unspecified type        Plan:       Arm not improving as much as I expected  Stop keflex  Stop bactrim  Start levoquin and clindamycin  Start yogurt with culture to try to prevent diarrhea  Repeat labs in 1 week  F/u 1 week.   If symptoms are not significantly improved in 48-72 hours, would recommend ER at [facility] for possible IV abx.      Leukocytosis to be followed, may still need hematology input.     F/u after resolution of acute issues for annual.     Cellulitis of right upper extremity  -     levoFLOXacin (LEVAQUIN) 750 MG tablet; Take 1 tablet (750 mg total) by mouth once daily. for 7 days  Dispense: 7 tablet; Refill: 0  -     clindamycin (CLEOCIN) 300 MG capsule; Take 1 capsule (300 mg total) by mouth every 6 (six) hours. for 7 days  Dispense: 28 capsule; Refill: 0    Leukocytosis, unspecified type  -     CBC Auto Differential; Future; Expected date: 01/05/2023  -     Comprehensive Metabolic Panel; Future; Expected date: 01/05/2023  -     Pathologist Interpretation Differential; Future; Expected date: 01/05/2023  -     Sedimentation rate; Future; Expected date: 01/05/2023  -     C-Reactive Protein; Future; Expected date: 01/05/2023            Warning signs discussed, patient to call with any further issues or worsening of symptoms.

## 2023-01-05 NOTE — PATIENT INSTRUCTIONS
Arm not improving as much as I expected  Stop keflex  Stop bactrim  Start levoquin and clindamycin  Start yogurt with culture to try to prevent diarrhea  Repeat labs in 1 week  F/u 1 week.   If symptoms are not significantly improved in 48-72 hours, would recommend ER at main campus for possible IV abx.     Leukocytosis to be followed, may still need hematology input.     F/u after resolution of acute issues for annual.

## 2023-01-10 ENCOUNTER — PATIENT MESSAGE (OUTPATIENT)
Dept: FAMILY MEDICINE | Facility: CLINIC | Age: 48
End: 2023-01-10
Payer: COMMERCIAL

## 2023-01-13 ENCOUNTER — OFFICE VISIT (OUTPATIENT)
Dept: FAMILY MEDICINE | Facility: CLINIC | Age: 48
End: 2023-01-13
Payer: COMMERCIAL

## 2023-01-13 VITALS — DIASTOLIC BLOOD PRESSURE: 74 MMHG | TEMPERATURE: 98 F | SYSTOLIC BLOOD PRESSURE: 138 MMHG

## 2023-01-13 DIAGNOSIS — L85.3 DRY SKIN DERMATITIS: ICD-10-CM

## 2023-01-13 DIAGNOSIS — L03.113 CELLULITIS OF RIGHT UPPER EXTREMITY: Primary | ICD-10-CM

## 2023-01-13 DIAGNOSIS — L29.9 ITCHING: ICD-10-CM

## 2023-01-13 DIAGNOSIS — F41.8 SITUATIONAL ANXIETY: ICD-10-CM

## 2023-01-13 PROCEDURE — 99214 OFFICE O/P EST MOD 30 MIN: CPT | Mod: S$GLB,,, | Performed by: FAMILY MEDICINE

## 2023-01-13 PROCEDURE — 3078F DIAST BP <80 MM HG: CPT | Mod: CPTII,S$GLB,, | Performed by: FAMILY MEDICINE

## 2023-01-13 PROCEDURE — 1159F MED LIST DOCD IN RCRD: CPT | Mod: CPTII,S$GLB,, | Performed by: FAMILY MEDICINE

## 2023-01-13 PROCEDURE — 3075F SYST BP GE 130 - 139MM HG: CPT | Mod: CPTII,S$GLB,, | Performed by: FAMILY MEDICINE

## 2023-01-13 PROCEDURE — 3078F PR MOST RECENT DIASTOLIC BLOOD PRESSURE < 80 MM HG: ICD-10-PCS | Mod: CPTII,S$GLB,, | Performed by: FAMILY MEDICINE

## 2023-01-13 PROCEDURE — 99999 PR PBB SHADOW E&M-EST. PATIENT-LVL III: CPT | Mod: PBBFAC,,, | Performed by: FAMILY MEDICINE

## 2023-01-13 PROCEDURE — 1159F PR MEDICATION LIST DOCUMENTED IN MEDICAL RECORD: ICD-10-PCS | Mod: CPTII,S$GLB,, | Performed by: FAMILY MEDICINE

## 2023-01-13 PROCEDURE — 99999 PR PBB SHADOW E&M-EST. PATIENT-LVL III: ICD-10-PCS | Mod: PBBFAC,,, | Performed by: FAMILY MEDICINE

## 2023-01-13 PROCEDURE — 99214 PR OFFICE/OUTPT VISIT, EST, LEVL IV, 30-39 MIN: ICD-10-PCS | Mod: S$GLB,,, | Performed by: FAMILY MEDICINE

## 2023-01-13 PROCEDURE — 1160F PR REVIEW ALL MEDS BY PRESCRIBER/CLIN PHARMACIST DOCUMENTED: ICD-10-PCS | Mod: CPTII,S$GLB,, | Performed by: FAMILY MEDICINE

## 2023-01-13 PROCEDURE — 1160F RVW MEDS BY RX/DR IN RCRD: CPT | Mod: CPTII,S$GLB,, | Performed by: FAMILY MEDICINE

## 2023-01-13 PROCEDURE — 3075F PR MOST RECENT SYSTOLIC BLOOD PRESS GE 130-139MM HG: ICD-10-PCS | Mod: CPTII,S$GLB,, | Performed by: FAMILY MEDICINE

## 2023-01-13 RX ORDER — SERTRALINE HYDROCHLORIDE 25 MG/1
25 TABLET, FILM COATED ORAL DAILY
Qty: 90 TABLET | Refills: 1 | Status: SHIPPED | OUTPATIENT
Start: 2023-01-13 | End: 2023-02-15 | Stop reason: SDUPTHER

## 2023-01-13 RX ORDER — LEVOFLOXACIN 750 MG/1
750 TABLET ORAL DAILY
Qty: 7 TABLET | Refills: 0 | Status: SHIPPED | OUTPATIENT
Start: 2023-01-13 | End: 2023-02-01 | Stop reason: SDUPTHER

## 2023-01-13 RX ORDER — TRIAMCINOLONE ACETONIDE 1 MG/G
CREAM TOPICAL 2 TIMES DAILY
Qty: 30 G | Refills: 0 | Status: SHIPPED | OUTPATIENT
Start: 2023-01-13 | End: 2023-02-15 | Stop reason: SDUPTHER

## 2023-01-13 RX ORDER — CLINDAMYCIN HYDROCHLORIDE 300 MG/1
300 CAPSULE ORAL EVERY 6 HOURS
Qty: 28 CAPSULE | Refills: 0 | Status: SHIPPED | OUTPATIENT
Start: 2023-01-13 | End: 2023-02-01 | Stop reason: SDUPTHER

## 2023-01-13 NOTE — PROGRESS NOTES
Subjective:       Patient ID: Enio Earl is a 47 y.o. male.    Chief Complaint: No chief complaint on file.      Enio is a 47 y.o. male who presents today for f/u on cellulitis. Erythema and swelling is much better. There is a persistent area of erythema noted. This is the area that is TTP.     Review of Systems   Constitutional:  Negative for chills and fever.   Gastrointestinal:  Negative for nausea and vomiting.   Skin:  Positive for rash and wound.             Objective:     Vitals:    01/13/23 1000   BP: 138/74   Temp: 98.2 °F (36.8 °C)        Physical Exam  Constitutional:       General: He is not in acute distress.     Appearance: He is obese. He is not ill-appearing, toxic-appearing or diaphoretic.   Musculoskeletal:         General: Swelling present.   Skin:     Findings: Erythema and rash present.      Comments: See pictures below   Neurological:      General: No focal deficit present.      Mental Status: He is alert.   Psychiatric:         Mood and Affect: Mood normal.         Behavior: Behavior normal.         Thought Content: Thought content normal.         Judgment: Judgment normal.         Today:             Last visit:        In the ER          Assessment:       1. Cellulitis of right upper extremity    2. Itching    3. Dry skin dermatitis    4. Situational anxiety        Plan:       Cellulitis improving, but not fully resolved  Abx appear to be helping  Will extend course by another 7 days  Can consider another 14 days total.   Send me a picture in 7 days    Er precautions discussed    Vaseline for dry skin as well  Kenalog cream  Refer back to derm, I believe wounds in the hands may have precipitated this    Restart OTC hibiclens.  Recommend washing body (from the neck down) with OTC Hibiclens solution for 5-10 minutes daily or every other day until improved. Then, decrease frequency of use to 1-2 times per week.   Do not allow Hibiclens to contact the eyes or ears.    Labs prior to  upcoming apt. Will follow leukocytosis at that time, hopefully when infection has resolved. May still need hematology input.         Cellulitis of right upper extremity  -     levoFLOXacin (LEVAQUIN) 750 MG tablet; Take 1 tablet (750 mg total) by mouth once daily. for 7 days  Dispense: 7 tablet; Refill: 0  -     clindamycin (CLEOCIN) 300 MG capsule; Take 1 capsule (300 mg total) by mouth every 6 (six) hours. for 7 days  Dispense: 28 capsule; Refill: 0    Itching  -     triamcinolone acetonide 0.1% (KENALOG) 0.1 % cream; Apply topically 2 (two) times daily.  Dispense: 30 g; Refill: 0    Dry skin dermatitis  -     triamcinolone acetonide 0.1% (KENALOG) 0.1 % cream; Apply topically 2 (two) times daily.  Dispense: 30 g; Refill: 0    Situational anxiety  -     sertraline (ZOLOFT) 25 MG tablet; Take 1 tablet (25 mg total) by mouth once daily.  Dispense: 90 tablet; Refill: 1          Warning signs discussed, patient to call with any further issues or worsening of symptoms. Above note may have utilized dictation, please excuse any transcription errors.

## 2023-01-13 NOTE — PATIENT INSTRUCTIONS
Cellulitis improving, but not fully resolved  Abx appear to be helping  Will extend course by another 7 days  Send me a picture in 7 days    Er precautions discussed    Vaseline for dry skin as well  Kenalog cream  Refer back to derm, I believe wounds in the hands may have precipitated this    Restart OTC hibiclens.  Recommend washing body (from the neck down) with OTC Hibiclens solution for 5-10 minutes daily or every other day until improved. Then, decrease frequency of use to 1-2 times per week.   Do not allow Hibiclens to contact the eyes or ears.    Labs prior to upcoming apt

## 2023-01-18 ENCOUNTER — PATIENT MESSAGE (OUTPATIENT)
Dept: ADMINISTRATIVE | Facility: HOSPITAL | Age: 48
End: 2023-01-18
Payer: COMMERCIAL

## 2023-01-18 ENCOUNTER — PATIENT OUTREACH (OUTPATIENT)
Dept: ADMINISTRATIVE | Facility: HOSPITAL | Age: 48
End: 2023-01-18
Payer: COMMERCIAL

## 2023-01-18 DIAGNOSIS — Z12.11 SCREENING FOR COLON CANCER: Primary | ICD-10-CM

## 2023-01-18 NOTE — PROGRESS NOTES
2023 Care Everywhere updates requested and reviewed.  Immunizations reconciled. Media reports reviewed.  Duplicate HM overrides and  orders removed.  Overdue HM topic chart audit and/or requested.  Overdue lab testing linked to upcoming lab appointments if applies.  Lab amparo, and ViSSee reviewed   WOG orders placed. Fitfully  Portal outreached regarding Health maintenance     Health Maintenance Due   Topic Date Due    TETANUS VACCINE  Never done    COVID-19 Vaccine (3 - Booster for Moderna series) 10/28/2021    Pneumococcal Vaccines (Age 0-64) (2 - PCV) 2021    Colorectal Cancer Screening  Never done

## 2023-01-29 ENCOUNTER — PATIENT MESSAGE (OUTPATIENT)
Dept: FAMILY MEDICINE | Facility: CLINIC | Age: 48
End: 2023-01-29
Payer: COMMERCIAL

## 2023-01-29 DIAGNOSIS — M25.421 ELBOW SWELLING, RIGHT: Primary | ICD-10-CM

## 2023-01-30 ENCOUNTER — PATIENT MESSAGE (OUTPATIENT)
Dept: FAMILY MEDICINE | Facility: CLINIC | Age: 48
End: 2023-01-30
Payer: COMMERCIAL

## 2023-02-01 ENCOUNTER — OFFICE VISIT (OUTPATIENT)
Dept: FAMILY MEDICINE | Facility: CLINIC | Age: 48
End: 2023-02-01
Payer: COMMERCIAL

## 2023-02-01 ENCOUNTER — HOSPITAL ENCOUNTER (OUTPATIENT)
Dept: RADIOLOGY | Facility: HOSPITAL | Age: 48
Discharge: HOME | End: 2023-02-01
Attending: FAMILY MEDICINE
Payer: COMMERCIAL

## 2023-02-01 VITALS
WEIGHT: 228.38 LBS | TEMPERATURE: 98 F | HEIGHT: 68 IN | BODY MASS INDEX: 34.61 KG/M2 | SYSTOLIC BLOOD PRESSURE: 148 MMHG | OXYGEN SATURATION: 98 % | DIASTOLIC BLOOD PRESSURE: 88 MMHG | HEART RATE: 103 BPM

## 2023-02-01 DIAGNOSIS — Z13.6 ENCOUNTER FOR LIPID SCREENING FOR CARDIOVASCULAR DISEASE: ICD-10-CM

## 2023-02-01 DIAGNOSIS — I10 ESSENTIAL HYPERTENSION: ICD-10-CM

## 2023-02-01 DIAGNOSIS — Z13.220 ENCOUNTER FOR LIPID SCREENING FOR CARDIOVASCULAR DISEASE: ICD-10-CM

## 2023-02-01 DIAGNOSIS — M25.421 ELBOW SWELLING, RIGHT: ICD-10-CM

## 2023-02-01 DIAGNOSIS — L03.113 CELLULITIS OF RIGHT UPPER EXTREMITY: Primary | ICD-10-CM

## 2023-02-01 PROCEDURE — 1159F MED LIST DOCD IN RCRD: CPT | Mod: CPTII,S$GLB,, | Performed by: FAMILY MEDICINE

## 2023-02-01 PROCEDURE — 1160F RVW MEDS BY RX/DR IN RCRD: CPT | Mod: CPTII,S$GLB,, | Performed by: FAMILY MEDICINE

## 2023-02-01 PROCEDURE — 1160F PR REVIEW ALL MEDS BY PRESCRIBER/CLIN PHARMACIST DOCUMENTED: ICD-10-PCS | Mod: CPTII,S$GLB,, | Performed by: FAMILY MEDICINE

## 2023-02-01 PROCEDURE — 73070 X-RAY EXAM OF ELBOW: CPT | Mod: 26,RT,, | Performed by: RADIOLOGY

## 2023-02-01 PROCEDURE — 1159F PR MEDICATION LIST DOCUMENTED IN MEDICAL RECORD: ICD-10-PCS | Mod: CPTII,S$GLB,, | Performed by: FAMILY MEDICINE

## 2023-02-01 PROCEDURE — 3079F PR MOST RECENT DIASTOLIC BLOOD PRESSURE 80-89 MM HG: ICD-10-PCS | Mod: CPTII,S$GLB,, | Performed by: FAMILY MEDICINE

## 2023-02-01 PROCEDURE — 3008F BODY MASS INDEX DOCD: CPT | Mod: CPTII,S$GLB,, | Performed by: FAMILY MEDICINE

## 2023-02-01 PROCEDURE — 99999 PR PBB SHADOW E&M-EST. PATIENT-LVL V: CPT | Mod: PBBFAC,,, | Performed by: FAMILY MEDICINE

## 2023-02-01 PROCEDURE — 99214 OFFICE O/P EST MOD 30 MIN: CPT | Mod: S$GLB,,, | Performed by: FAMILY MEDICINE

## 2023-02-01 PROCEDURE — 99214 PR OFFICE/OUTPT VISIT, EST, LEVL IV, 30-39 MIN: ICD-10-PCS | Mod: S$GLB,,, | Performed by: FAMILY MEDICINE

## 2023-02-01 PROCEDURE — 3079F DIAST BP 80-89 MM HG: CPT | Mod: CPTII,S$GLB,, | Performed by: FAMILY MEDICINE

## 2023-02-01 PROCEDURE — 3077F SYST BP >= 140 MM HG: CPT | Mod: CPTII,S$GLB,, | Performed by: FAMILY MEDICINE

## 2023-02-01 PROCEDURE — 73070 X-RAY EXAM OF ELBOW: CPT | Mod: TC,FY,PO,RT

## 2023-02-01 PROCEDURE — 3077F PR MOST RECENT SYSTOLIC BLOOD PRESSURE >= 140 MM HG: ICD-10-PCS | Mod: CPTII,S$GLB,, | Performed by: FAMILY MEDICINE

## 2023-02-01 PROCEDURE — 3008F PR BODY MASS INDEX (BMI) DOCUMENTED: ICD-10-PCS | Mod: CPTII,S$GLB,, | Performed by: FAMILY MEDICINE

## 2023-02-01 PROCEDURE — 99999 PR PBB SHADOW E&M-EST. PATIENT-LVL V: ICD-10-PCS | Mod: PBBFAC,,, | Performed by: FAMILY MEDICINE

## 2023-02-01 PROCEDURE — 73070 XR ELBOW 2 VIEWS RIGHT: ICD-10-PCS | Mod: 26,RT,, | Performed by: RADIOLOGY

## 2023-02-01 RX ORDER — CLINDAMYCIN HYDROCHLORIDE 300 MG/1
300 CAPSULE ORAL EVERY 6 HOURS
Qty: 56 CAPSULE | Refills: 0 | Status: SHIPPED | OUTPATIENT
Start: 2023-02-01 | End: 2023-02-15

## 2023-02-01 RX ORDER — LEVOFLOXACIN 750 MG/1
750 TABLET ORAL DAILY
Qty: 14 TABLET | Refills: 0 | Status: SHIPPED | OUTPATIENT
Start: 2023-02-01 | End: 2023-02-01 | Stop reason: SDUPTHER

## 2023-02-01 RX ORDER — LEVOFLOXACIN 750 MG/1
750 TABLET ORAL DAILY
Qty: 14 TABLET | Refills: 0 | Status: SHIPPED | OUTPATIENT
Start: 2023-02-01 | End: 2023-02-15

## 2023-02-01 NOTE — PATIENT INSTRUCTIONS
Had cellulitis  Improved but still has some redness and warmth  Elbow appears swollen  Limited ROM  Osteomyelitis??  If MRI shows this, ER  If not, continue abx given x 2 weeks  Update me in 2, 4, and 7 days  Will get MRI asap  Will fwd message to hand surgery

## 2023-02-01 NOTE — PROGRESS NOTES
"Subjective:       Patient ID: Enio Earl is a 47 y.o. male.    Chief Complaint: Hypertension    Enio is a 47 y.o. male who presents today for a f/u    F/u cellulitis: finished levaquin and clinda x 14 days.   Elbow pain: having swelling and limited ROM. Has been ongoing for about 7-10 days, worse over the weekend. Has had injections, but none recently.         Review of Systems   Constitutional:  Negative for chills and fever.   Gastrointestinal:  Negative for diarrhea, nausea and vomiting.   Genitourinary:  Negative for dysuria.   Musculoskeletal:  Positive for joint swelling.   Skin:  Positive for rash. Negative for wound.           Objective:     Vitals:    02/01/23 1353   Temp: 98.3 °F (36.8 °C)   TempSrc: Oral   SpO2: 98%   Weight: 103.6 kg (228 lb 6.3 oz)   Height: 5' 8" (1.727 m)        Physical Exam  Vitals and nursing note reviewed.   Constitutional:       General: He is not in acute distress.     Appearance: He is obese. He is not ill-appearing, toxic-appearing or diaphoretic.   Musculoskeletal:         General: Swelling and tenderness present.      Comments: See pictures below   Skin:     Findings: Erythema and rash present.   Neurological:      General: No focal deficit present.      Mental Status: He is alert.   Psychiatric:         Mood and Affect: Mood normal.         Behavior: Behavior normal.         Thought Content: Thought content normal.         Judgment: Judgment normal.                         Assessment:       1. Cellulitis of right upper extremity    2. Elbow swelling, right        Plan:       Had cellulitis  Improved but still has some redness and warmth  Elbow appears swollen  Limited ROM  Osteomyelitis??  If MRI shows this, ER  If not, continue abx given x 2 weeks  Update me in 2, 4, and 7 days  Will get MRI asap  Will fwd message to hand surgery      Cellulitis of right upper extremity  -     Discontinue: levoFLOXacin (LEVAQUIN) 750 MG tablet; Take 1 tablet (750 mg total) by " mouth once daily. for 7 days  Dispense: 14 tablet; Refill: 0  -     clindamycin (CLEOCIN) 300 MG capsule; Take 1 capsule (300 mg total) by mouth every 6 (six) hours. for 7 days  Dispense: 56 capsule; Refill: 0  -     levoFLOXacin (LEVAQUIN) 750 MG tablet; Take 1 tablet (750 mg total) by mouth once daily.  Dispense: 14 tablet; Refill: 0    Elbow swelling, right  -     MRI Elbow Joint W WO Contrast Right; Future; Expected date: 02/01/2023  -     Ambulatory referral/consult to Hand Surgery; Future; Expected date: 02/08/2023            Warning signs discussed, patient to call with any further issues or worsening of symptoms. Above note may have utilized dictation, please excuse any transcription errors.

## 2023-02-10 ENCOUNTER — HOSPITAL ENCOUNTER (OUTPATIENT)
Dept: RADIOLOGY | Facility: HOSPITAL | Age: 48
Discharge: HOME | End: 2023-02-10
Attending: FAMILY MEDICINE
Payer: COMMERCIAL

## 2023-02-10 DIAGNOSIS — M25.421 ELBOW SWELLING, RIGHT: ICD-10-CM

## 2023-02-10 PROCEDURE — 73223 MRI JOINT UPR EXTR W/O&W/DYE: CPT | Mod: TC,RT

## 2023-02-10 PROCEDURE — 25500020 PHARM REV CODE 255: Performed by: FAMILY MEDICINE

## 2023-02-10 PROCEDURE — 73223 MRI JOINT UPR EXTR W/O&W/DYE: CPT | Mod: 26,RT,, | Performed by: RADIOLOGY

## 2023-02-10 PROCEDURE — 73223 MRI ELBOW W WO CONTRAST RIGHT: ICD-10-PCS | Mod: 26,RT,, | Performed by: RADIOLOGY

## 2023-02-10 PROCEDURE — A9585 GADOBUTROL INJECTION: HCPCS | Performed by: FAMILY MEDICINE

## 2023-02-10 RX ORDER — GADOBUTROL 604.72 MG/ML
10 INJECTION INTRAVENOUS
Status: COMPLETED | OUTPATIENT
Start: 2023-02-10 | End: 2023-02-10

## 2023-02-10 RX ADMIN — GADOBUTROL 10 ML: 604.72 INJECTION INTRAVENOUS at 03:02

## 2023-02-13 ENCOUNTER — HOSPITAL ENCOUNTER (OUTPATIENT)
Dept: RADIOLOGY | Facility: HOSPITAL | Age: 48
Discharge: HOME | End: 2023-02-13
Attending: ORTHOPAEDIC SURGERY
Payer: COMMERCIAL

## 2023-02-13 ENCOUNTER — PATIENT MESSAGE (OUTPATIENT)
Dept: DERMATOLOGY | Facility: CLINIC | Age: 48
End: 2023-02-13
Payer: COMMERCIAL

## 2023-02-13 DIAGNOSIS — G89.29 CHRONIC RIGHT SHOULDER PAIN: ICD-10-CM

## 2023-02-13 DIAGNOSIS — M25.511 CHRONIC RIGHT SHOULDER PAIN: ICD-10-CM

## 2023-02-13 PROCEDURE — 73221 MRI SHOULDER WITHOUT CONTRAST RIGHT: ICD-10-PCS | Mod: 26,RT,, | Performed by: RADIOLOGY

## 2023-02-13 PROCEDURE — 73221 MRI JOINT UPR EXTREM W/O DYE: CPT | Mod: TC,RT

## 2023-02-13 PROCEDURE — 73221 MRI JOINT UPR EXTREM W/O DYE: CPT | Mod: 26,RT,, | Performed by: RADIOLOGY

## 2023-02-15 ENCOUNTER — OFFICE VISIT (OUTPATIENT)
Dept: FAMILY MEDICINE | Facility: CLINIC | Age: 48
End: 2023-02-15
Payer: COMMERCIAL

## 2023-02-15 VITALS
HEART RATE: 102 BPM | DIASTOLIC BLOOD PRESSURE: 84 MMHG | OXYGEN SATURATION: 98 % | HEIGHT: 68 IN | SYSTOLIC BLOOD PRESSURE: 116 MMHG | BODY MASS INDEX: 34.73 KG/M2

## 2023-02-15 DIAGNOSIS — I47.10 PSVT (PAROXYSMAL SUPRAVENTRICULAR TACHYCARDIA): ICD-10-CM

## 2023-02-15 DIAGNOSIS — I47.10 SVT (SUPRAVENTRICULAR TACHYCARDIA): ICD-10-CM

## 2023-02-15 DIAGNOSIS — Z00.00 VISIT FOR WELL MAN HEALTH CHECK: Primary | ICD-10-CM

## 2023-02-15 DIAGNOSIS — I10 ESSENTIAL HYPERTENSION: ICD-10-CM

## 2023-02-15 DIAGNOSIS — Z12.11 COLON CANCER SCREENING: ICD-10-CM

## 2023-02-15 DIAGNOSIS — F41.8 SITUATIONAL ANXIETY: ICD-10-CM

## 2023-02-15 DIAGNOSIS — E78.49 OTHER HYPERLIPIDEMIA: ICD-10-CM

## 2023-02-15 DIAGNOSIS — L85.3 DRY SKIN DERMATITIS: ICD-10-CM

## 2023-02-15 DIAGNOSIS — L29.9 ITCHING: ICD-10-CM

## 2023-02-15 PROCEDURE — 3079F DIAST BP 80-89 MM HG: CPT | Mod: CPTII,S$GLB,, | Performed by: FAMILY MEDICINE

## 2023-02-15 PROCEDURE — 99999 PR PBB SHADOW E&M-EST. PATIENT-LVL III: CPT | Mod: PBBFAC,,, | Performed by: FAMILY MEDICINE

## 2023-02-15 PROCEDURE — 3074F SYST BP LT 130 MM HG: CPT | Mod: CPTII,S$GLB,, | Performed by: FAMILY MEDICINE

## 2023-02-15 PROCEDURE — 3074F PR MOST RECENT SYSTOLIC BLOOD PRESSURE < 130 MM HG: ICD-10-PCS | Mod: CPTII,S$GLB,, | Performed by: FAMILY MEDICINE

## 2023-02-15 PROCEDURE — 3008F PR BODY MASS INDEX (BMI) DOCUMENTED: ICD-10-PCS | Mod: CPTII,S$GLB,, | Performed by: FAMILY MEDICINE

## 2023-02-15 PROCEDURE — 3044F HG A1C LEVEL LT 7.0%: CPT | Mod: CPTII,S$GLB,, | Performed by: FAMILY MEDICINE

## 2023-02-15 PROCEDURE — 1160F PR REVIEW ALL MEDS BY PRESCRIBER/CLIN PHARMACIST DOCUMENTED: ICD-10-PCS | Mod: CPTII,S$GLB,, | Performed by: FAMILY MEDICINE

## 2023-02-15 PROCEDURE — 4010F ACE/ARB THERAPY RXD/TAKEN: CPT | Mod: CPTII,S$GLB,, | Performed by: FAMILY MEDICINE

## 2023-02-15 PROCEDURE — 3044F PR MOST RECENT HEMOGLOBIN A1C LEVEL <7.0%: ICD-10-PCS | Mod: CPTII,S$GLB,, | Performed by: FAMILY MEDICINE

## 2023-02-15 PROCEDURE — 1159F PR MEDICATION LIST DOCUMENTED IN MEDICAL RECORD: ICD-10-PCS | Mod: CPTII,S$GLB,, | Performed by: FAMILY MEDICINE

## 2023-02-15 PROCEDURE — 99396 PREV VISIT EST AGE 40-64: CPT | Mod: S$GLB,,, | Performed by: FAMILY MEDICINE

## 2023-02-15 PROCEDURE — 1160F RVW MEDS BY RX/DR IN RCRD: CPT | Mod: CPTII,S$GLB,, | Performed by: FAMILY MEDICINE

## 2023-02-15 PROCEDURE — 1159F MED LIST DOCD IN RCRD: CPT | Mod: CPTII,S$GLB,, | Performed by: FAMILY MEDICINE

## 2023-02-15 PROCEDURE — 3079F PR MOST RECENT DIASTOLIC BLOOD PRESSURE 80-89 MM HG: ICD-10-PCS | Mod: CPTII,S$GLB,, | Performed by: FAMILY MEDICINE

## 2023-02-15 PROCEDURE — 99999 PR PBB SHADOW E&M-EST. PATIENT-LVL III: ICD-10-PCS | Mod: PBBFAC,,, | Performed by: FAMILY MEDICINE

## 2023-02-15 PROCEDURE — 3008F BODY MASS INDEX DOCD: CPT | Mod: CPTII,S$GLB,, | Performed by: FAMILY MEDICINE

## 2023-02-15 PROCEDURE — 4010F PR ACE/ARB THEARPY RXD/TAKEN: ICD-10-PCS | Mod: CPTII,S$GLB,, | Performed by: FAMILY MEDICINE

## 2023-02-15 PROCEDURE — 99396 PR PREVENTIVE VISIT,EST,40-64: ICD-10-PCS | Mod: S$GLB,,, | Performed by: FAMILY MEDICINE

## 2023-02-15 RX ORDER — ATORVASTATIN CALCIUM 10 MG/1
10 TABLET, FILM COATED ORAL DAILY
Qty: 90 TABLET | Refills: 1 | Status: SHIPPED | OUTPATIENT
Start: 2023-02-15 | End: 2023-06-12

## 2023-02-15 RX ORDER — VERAPAMIL HYDROCHLORIDE 120 MG/1
120 TABLET, FILM COATED, EXTENDED RELEASE ORAL NIGHTLY
Qty: 90 TABLET | Refills: 0 | Status: SHIPPED | OUTPATIENT
Start: 2023-02-15 | End: 2023-06-19

## 2023-02-15 RX ORDER — TRIAMCINOLONE ACETONIDE 1 MG/G
CREAM TOPICAL 2 TIMES DAILY
Qty: 30 G | Refills: 3 | Status: SHIPPED | OUTPATIENT
Start: 2023-02-15 | End: 2023-08-11 | Stop reason: SDUPTHER

## 2023-02-15 RX ORDER — SERTRALINE HYDROCHLORIDE 25 MG/1
25 TABLET, FILM COATED ORAL DAILY
Qty: 90 TABLET | Refills: 1 | Status: SHIPPED | OUTPATIENT
Start: 2023-02-15 | End: 2023-08-11 | Stop reason: SDUPTHER

## 2023-02-15 RX ORDER — LOSARTAN POTASSIUM 100 MG/1
100 TABLET ORAL DAILY
Qty: 90 TABLET | Refills: 1 | Status: SHIPPED | OUTPATIENT
Start: 2023-02-15 | End: 2023-08-11 | Stop reason: SDUPTHER

## 2023-02-15 NOTE — PATIENT INSTRUCTIONS
Continue losartan  Continue atorvastatin  Continue verapamil  Continue zoloft    F/u with ortho  Will likely need shoulder surgery and elbow fluid drainage    If surgery needed, cleared for surgery from primary care perspective.     F/u 6 months    Greetings    We want to make sure that you do miss out on screening for colon cancer. If you are having any issues with scheduling your colonoscopy please do not hesitate to reach out to our staff who can directly help you with getting this scheduled.  The number is [phone]

## 2023-02-15 NOTE — PROGRESS NOTES
Subjective:       Patient ID: Enio Earl is a 47 y.o. male.    Chief Complaint: Follow-up      Enio Earl is a 47 y.o. male who presents today for an annual exam     Labs: ordered and reviewed     C-scope: ordered.      SVT/HTN:  EP recommended verapamil 120 mg 24 hour tabs qam for BP and SVT. I added losartan 100 mg. Will avoid amlodipine and metoprolol for now.   Anxiety: on zoloft. Mood has improved.   DLD: on atorvastatin  Rash: seeing dermatology. Improving significantly with kenalog cream. Missed last apt with dermatology.     Elbow swelling: seeing hand surgery tomorrow.   Rotator cuff tear: seeing ortho tomorrow.      PMHx: reviewed in EMR and updated  Meds: reviewed in EMR and updated  Shx: reviewed in EMR and updated  FMHx: reviewed in EMR and updated  Social: he lives with his wife and two kids (13 and 4). 3 dogs at home. 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 No smokers at home.     Review of Systems   Constitutional:  Negative for chills and fever.   Respiratory:  Negative for cough and shortness of breath.    Cardiovascular:  Negative for chest pain.   Gastrointestinal:  Negative for diarrhea, nausea and vomiting.   Musculoskeletal:  Positive for joint swelling.   Skin:  Negative for rash and wound.   Neurological:  Negative for dizziness, numbness and headaches.       Health Maintenance Due   Topic Date Due    TETANUS VACCINE  Never done    COVID-19 Vaccine (3 - Booster for Moderna series) 10/28/2021    Pneumococcal Vaccines (Age 0-64) (2 - PCV) 11/03/2021    Colorectal Cancer Screening  Never done     Immunization History   Administered Date(s) Administered    COVID-19, MRNA, LN-S, PF (MODERNA FULL 0.5 ML DOSE) 07/30/2021, 09/02/2021    Influenza 10/17/2019    Influenza - Quadrivalent - PF *Preferred* (6 months and older) 11/17/2018, 11/17/2018, 11/16/2022    Influenza - Trivalent - MDCK - PF 11/22/2013    Influenza Split 11/05/2014    Pneumococcal Polysaccharide - 23 Valent 11/03/2020       Results  for orders placed or performed in visit on 02/01/23   CBC Auto Differential   Result Value Ref Range    WBC 11.28 3.90 - 12.70 K/uL    RBC 4.44 (L) 4.60 - 6.20 M/uL    Hemoglobin 15.4 14.0 - 18.0 g/dL    Hematocrit 46.5 40.0 - 54.0 %     (H) 82 - 98 fL    MCH 34.7 (H) 27.0 - 31.0 pg    MCHC 33.1 32.0 - 36.0 g/dL    RDW 12.6 11.5 - 14.5 %    Platelets 219 150 - 450 K/uL    MPV 12.7 9.2 - 12.9 fL    Immature Granulocytes 0.4 0.0 - 0.5 %    Gran # (ANC) 7.0 1.8 - 7.7 K/uL    Immature Grans (Abs) 0.04 0.00 - 0.04 K/uL    Lymph # 3.0 1.0 - 4.8 K/uL    Mono # 0.8 0.3 - 1.0 K/uL    Eos # 0.4 0.0 - 0.5 K/uL    Baso # 0.09 0.00 - 0.20 K/uL    nRBC 0 0 /100 WBC    Gran % 61.7 38.0 - 73.0 %    Lymph % 26.6 18.0 - 48.0 %    Mono % 6.9 4.0 - 15.0 %    Eosinophil % 3.6 0.0 - 8.0 %    Basophil % 0.8 0.0 - 1.9 %    Differential Method Automated    Comprehensive Metabolic Panel   Result Value Ref Range    Sodium 140 136 - 145 mmol/L    Potassium 3.8 3.5 - 5.1 mmol/L    Chloride 107 95 - 110 mmol/L    CO2 21 (L) 23 - 29 mmol/L    Glucose 109 70 - 110 mg/dL    BUN 20 6 - 20 mg/dL    Creatinine 0.8 0.5 - 1.4 mg/dL    Calcium 9.3 8.7 - 10.5 mg/dL    Total Protein 7.6 6.0 - 8.4 g/dL    Albumin 4.0 3.5 - 5.2 g/dL    Total Bilirubin 0.5 0.1 - 1.0 mg/dL    Alkaline Phosphatase 49 (L) 55 - 135 U/L    AST 34 10 - 40 U/L    ALT 38 10 - 44 U/L    Anion Gap 12 8 - 16 mmol/L    eGFR >60.0 >60 mL/min/1.73 m^2   Pathologist Interpretation Differential   Result Value Ref Range    Pathologist Review Review completed    Sedimentation rate   Result Value Ref Range    Sed Rate 6 0 - 23 mm/Hr   C-Reactive Protein   Result Value Ref Range    CRP 2.0 0.0 - 8.2 mg/L   Hemoglobin A1C   Result Value Ref Range    Hemoglobin A1C 5.2 4.0 - 5.6 %    Estimated Avg Glucose 103 68 - 131 mg/dL   Lipid Panel   Result Value Ref Range    Cholesterol 172 120 - 199 mg/dL    Triglycerides 333 (H) 30 - 150 mg/dL    HDL 50 40 - 75 mg/dL    LDL Cholesterol 55.4 (L)  "63.0 - 159.0 mg/dL    HDL/Cholesterol Ratio 29.1 20.0 - 50.0 %    Total Cholesterol/HDL Ratio 3.4 2.0 - 5.0    Non-HDL Cholesterol 122 mg/dL   TSH   Result Value Ref Range    TSH 0.877 0.400 - 4.000 uIU/mL   Procalcitonin   Result Value Ref Range    Procalcitonin 0.02 <0.25 ng/mL   URIC ACID   Result Value Ref Range    Uric Acid 5.1 3.4 - 7.0 mg/dL   Pathologist Review   Result Value Ref Range    Pathologist Review Peripheral Smear REVIEWED        Objective:     Vitals:    02/15/23 1344   BP: 116/84   BP Location: Right arm   Patient Position: Sitting   BP Method: Large (Manual)   Pulse: 102   SpO2: 98%   Height: 5' 8" (1.727 m)        Physical Exam  Vitals and nursing note reviewed.   Constitutional:       General: He is not in acute distress.     Appearance: He is not ill-appearing, toxic-appearing or diaphoretic.   Cardiovascular:      Rate and Rhythm: Normal rate and regular rhythm.   Pulmonary:      Effort: Pulmonary effort is normal.      Breath sounds: Normal breath sounds.   Abdominal:      Palpations: Abdomen is soft.      Tenderness: There is no abdominal tenderness.   Musculoskeletal:         General: Swelling, tenderness and deformity present.      Comments: Swelling appears much better    Area of induration seen at last visit on right arm, has resolved    There is no TTP of the elbow today, nor is there any warmth or erythema.    Neurological:      General: No focal deficit present.      Mental Status: He is alert.   Psychiatric:         Mood and Affect: Mood normal.         Behavior: Behavior normal.         Thought Content: Thought content normal.         Judgment: Judgment normal.                       Previous visits:                Assessment:       1. Visit for well man health check    2. Colon cancer screening    3. Essential hypertension    4. Other hyperlipidemia    5. PSVT (paroxysmal supraventricular tachycardia)    6. SVT (supraventricular tachycardia)    7. Situational anxiety    8. Itching "    9. Dry skin dermatitis        Plan:       Continue losartan  Continue atorvastatin  Continue verapamil  Continue zoloft    F/u with ortho  Will likely need shoulder surgery and elbow fluid drainage    If surgery needed, cleared for surgery from primary care perspective.     Visit for [facility] health check    Colon cancer screening  -     Case Request Endoscopy: COLONOSCOPY    Essential hypertension  -     losartan (COZAAR) 100 MG tablet; Take 1 tablet (100 mg total) by mouth once daily.  Dispense: 90 tablet; Refill: 1  -     verapamiL (CALAN-SR) 120 MG CR tablet; Take 1 tablet (120 mg total) by mouth every evening.  Dispense: 90 tablet; Refill: 0    Other hyperlipidemia  -     atorvastatin (LIPITOR) 10 MG tablet; Take 1 tablet (10 mg total) by mouth once daily.  Dispense: 90 tablet; Refill: 1    PSVT (paroxysmal supraventricular tachycardia)  -     verapamiL (CALAN-SR) 120 MG CR tablet; Take 1 tablet (120 mg total) by mouth every evening.  Dispense: 90 tablet; Refill: 0    SVT (supraventricular tachycardia)  -     verapamiL (CALAN-SR) 120 MG CR tablet; Take 1 tablet (120 mg total) by mouth every evening.  Dispense: 90 tablet; Refill: 0    Situational anxiety  -     sertraline (ZOLOFT) 25 MG tablet; Take 1 tablet (25 mg total) by mouth once daily.  Dispense: 90 tablet; Refill: 1    Itching  -     triamcinolone acetonide 0.1% (KENALOG) 0.1 % cream; Apply topically 2 (two) times daily.  Dispense: 30 g; Refill: 3    Dry skin dermatitis  -     triamcinolone acetonide 0.1% (KENALOG) 0.1 % cream; Apply topically 2 (two) times daily.  Dispense: 30 g; Refill: 3

## 2023-02-16 ENCOUNTER — OFFICE VISIT (OUTPATIENT)
Dept: ORTHOPEDICS | Facility: CLINIC | Age: 48
End: 2023-02-16
Payer: COMMERCIAL

## 2023-02-16 VITALS — BODY MASS INDEX: 34.56 KG/M2 | WEIGHT: 228 LBS | HEIGHT: 68 IN

## 2023-02-16 DIAGNOSIS — M25.421 ELBOW SWELLING, RIGHT: ICD-10-CM

## 2023-02-16 DIAGNOSIS — M77.01 MEDIAL EPICONDYLITIS OF RIGHT ELBOW: Primary | ICD-10-CM

## 2023-02-16 DIAGNOSIS — M75.121 COMPLETE TEAR OF RIGHT ROTATOR CUFF, UNSPECIFIED WHETHER TRAUMATIC: ICD-10-CM

## 2023-02-16 PROCEDURE — 3044F HG A1C LEVEL LT 7.0%: CPT | Mod: CPTII,S$GLB,, | Performed by: ORTHOPAEDIC SURGERY

## 2023-02-16 PROCEDURE — 3008F BODY MASS INDEX DOCD: CPT | Mod: CPTII,S$GLB,, | Performed by: ORTHOPAEDIC SURGERY

## 2023-02-16 PROCEDURE — 3008F PR BODY MASS INDEX (BMI) DOCUMENTED: ICD-10-PCS | Mod: CPTII,S$GLB,, | Performed by: ORTHOPAEDIC SURGERY

## 2023-02-16 PROCEDURE — 99214 OFFICE O/P EST MOD 30 MIN: CPT | Mod: S$GLB,,, | Performed by: ORTHOPAEDIC SURGERY

## 2023-02-16 PROCEDURE — 99999 PR PBB SHADOW E&M-EST. PATIENT-LVL III: ICD-10-PCS | Mod: PBBFAC,,, | Performed by: ORTHOPAEDIC SURGERY

## 2023-02-16 PROCEDURE — 3044F PR MOST RECENT HEMOGLOBIN A1C LEVEL <7.0%: ICD-10-PCS | Mod: CPTII,S$GLB,, | Performed by: ORTHOPAEDIC SURGERY

## 2023-02-16 PROCEDURE — 99999 PR PBB SHADOW E&M-EST. PATIENT-LVL III: CPT | Mod: PBBFAC,,, | Performed by: ORTHOPAEDIC SURGERY

## 2023-02-16 PROCEDURE — 1159F PR MEDICATION LIST DOCUMENTED IN MEDICAL RECORD: ICD-10-PCS | Mod: CPTII,S$GLB,, | Performed by: ORTHOPAEDIC SURGERY

## 2023-02-16 PROCEDURE — 4010F PR ACE/ARB THEARPY RXD/TAKEN: ICD-10-PCS | Mod: CPTII,S$GLB,, | Performed by: ORTHOPAEDIC SURGERY

## 2023-02-16 PROCEDURE — 4010F ACE/ARB THERAPY RXD/TAKEN: CPT | Mod: CPTII,S$GLB,, | Performed by: ORTHOPAEDIC SURGERY

## 2023-02-16 PROCEDURE — 99214 PR OFFICE/OUTPT VISIT, EST, LEVL IV, 30-39 MIN: ICD-10-PCS | Mod: S$GLB,,, | Performed by: ORTHOPAEDIC SURGERY

## 2023-02-16 PROCEDURE — 1159F MED LIST DOCD IN RCRD: CPT | Mod: CPTII,S$GLB,, | Performed by: ORTHOPAEDIC SURGERY

## 2023-02-16 RX ORDER — CEFAZOLIN SODIUM 2 G/50ML
2 SOLUTION INTRAVENOUS
Status: CANCELLED | OUTPATIENT
Start: 2023-02-16

## 2023-02-16 RX ORDER — MINOCYCLINE HYDROCHLORIDE 100 MG/1
100 CAPSULE ORAL 2 TIMES DAILY
Qty: 28 CAPSULE | Refills: 1 | Status: SHIPPED | OUTPATIENT
Start: 2023-02-16 | End: 2023-08-11

## 2023-02-16 RX ORDER — TRAMADOL HYDROCHLORIDE 50 MG/1
50 TABLET ORAL EVERY 6 HOURS PRN
Qty: 30 TABLET | Refills: 0 | Status: SHIPPED | OUTPATIENT
Start: 2023-02-16 | End: 2023-04-04

## 2023-02-16 NOTE — PROGRESS NOTES
Subjective:      Patient ID: Enio Earl is a 47 y.o. male.    Chief Complaint: Follow-up of the Right Elbow and Follow-up of the Right Shoulder      HPI  Enio Earl is a  47 y.o. male presenting today for different problems in the right arm.  There was not a history of trauma.  Onset of symptoms began several months ago  He has been treated for both shoulder and elbow problems in the past   Recent MRI scan of the right shoulder showed evidence of a full-thickness rotator cuff tear with impingement from the AC joint   He also had an MRI of the right elbow which showed some bursitis as well as medial epicondylitis and tendinitis of the biceps   I went over both of these MRI scans with him   On unrelated matter he has been treated for cellulitis of the right forearm recently possibly related to an old cut has completed antibiotics but still having some symptoms   His main complaint today is the right shoulder and he would like to consider surgery for the right shoulder.      Review of patient's allergies indicates:  No Known Allergies      Current Outpatient Medications   Medication Sig Dispense Refill    atorvastatin (LIPITOR) 10 MG tablet Take 1 tablet (10 mg total) by mouth once daily. 90 tablet 1    losartan (COZAAR) 100 MG tablet Take 1 tablet (100 mg total) by mouth once daily. 90 tablet 1    nicotine (NICODERM CQ) 21 mg/24 hr Place 1 patch onto the skin once daily. 28 patch 0    sertraline (ZOLOFT) 25 MG tablet Take 1 tablet (25 mg total) by mouth once daily. 90 tablet 1    triamcinolone acetonide 0.1% (KENALOG) 0.1 % cream Apply topically 2 (two) times daily. 30 g 3    verapamiL (CALAN-SR) 120 MG CR tablet Take 1 tablet (120 mg total) by mouth every evening. 90 tablet 0    minocycline (MINOCIN,DYNACIN) 100 MG capsule Take 1 capsule (100 mg total) by mouth 2 (two) times a day. 28 capsule 1    traMADoL (ULTRAM) 50 mg tablet Take 1 tablet (50 mg total) by mouth every 6 (six) hours as needed for Pain.  "30 tablet 0     No current facility-administered medications for this visit.       Past Medical History:   Diagnosis Date    Anxiety     Hypertension     Supraventricular tachycardia        Past Surgical History:   Procedure Laterality Date    CARDIAC ELECTROPHYSIOLOGY MAPPING AND ABLATION  07/2020    for PSVT by Dr. Lim     LATERAL EPICONDYLE RELEASE Left 5/18/2021    Procedure: RELEASE, ELBOW, LATERAL EPICONDYLE;  Surgeon: Cristi Adam Jr., MD;  Location: [facility];  Service: Orthopedics;  Laterality: Left;    NOSE SURGERY      sepltoplasty       Review of Systems:  ROS    OBJECTIVE:     PHYSICAL EXAM:  Height: 5' 8" (172.7 cm) Weight: 103.4 kg (228 lb)  Vitals:    02/16/23 1125   Weight: 103.4 kg (228 lb)   Height: 5' 8" (1.727 m)   PainSc:   8     Well developed, well nourished male in no acute distress  Alert and oriented x 3  HEENT- Normal exam  Lungs- Clear to auscultation  Heart- Regular rate and rhythm  Abdomen- Soft nontender  Extremity exam- examination of the right shoulder there is no tenderness no swelling there is some bony prominence of the distal clavicle range of motion right shoulder full mildly positive impingement sign slight weakness rotator cuff no instability  Examination of the right elbow shows some mild swelling around the olecranon bursa but no redness or infection around the bursa   He does have tenderness medially over the medial epicondylar area   In the forearm there is some swelling and redness consistent with cellulitis no packed no abscess no fluctuance no open wound       RADIOGRAPHS:  MRI scan noted above  Comments: I have personally reviewed the imaging and I agree with the above radiologist's report.    ASSESSMENT/PLAN:     IMPRESSION:  1. Resolving cellulitis right forearm.      2. Medial epicondylitis right elbow.      3. Olecranon bursitis noninfectious right elbow.      4. Rotator cuff tear full-thickness right shoulder.    PLAN:  He does have a lot going on in the " right arm and we talked at length about this today   For the forearm I have placed him back on minocycline 100 mg b.i.d. for 2 weeks I would like him to follow-up in 2 weeks for recheck   For the right shoulder he would like to go ahead and set up surgery for rotator cuff repair right shoulder as an outpatient surgery   The elbow I have started him on some therapy for the elbow we can start that before the shoulder surgery for stretching strengthening and modality treatments   Continue anti-inflammatory medication by mouth follow-up 2-3 weeks to check on the cellulitis       - We talked at length about the anatomy and pathophysiology of   Encounter Diagnoses   Name Primary?    Elbow swelling, right     Medial epicondylitis of right elbow Yes           Disclaimer: This note has been generated using voice-recognition software. There may be typographical errors that have been missed during proof-reading.

## 2023-03-20 ENCOUNTER — OFFICE VISIT (OUTPATIENT)
Dept: ORTHOPEDICS | Facility: CLINIC | Age: 48
End: 2023-03-20
Payer: COMMERCIAL

## 2023-03-20 VITALS — WEIGHT: 220 LBS | BODY MASS INDEX: 33.34 KG/M2 | HEIGHT: 68 IN

## 2023-03-20 DIAGNOSIS — M77.01 MEDIAL EPICONDYLITIS OF RIGHT ELBOW: Primary | ICD-10-CM

## 2023-03-20 PROCEDURE — 1159F PR MEDICATION LIST DOCUMENTED IN MEDICAL RECORD: ICD-10-PCS | Mod: CPTII,S$GLB,, | Performed by: ORTHOPAEDIC SURGERY

## 2023-03-20 PROCEDURE — 99213 PR OFFICE/OUTPT VISIT, EST, LEVL III, 20-29 MIN: ICD-10-PCS | Mod: 25,S$GLB,, | Performed by: ORTHOPAEDIC SURGERY

## 2023-03-20 PROCEDURE — 20551 PR INJECT TENDON ORIGIN/INSERT: ICD-10-PCS | Mod: RT,S$GLB,, | Performed by: ORTHOPAEDIC SURGERY

## 2023-03-20 PROCEDURE — 3008F PR BODY MASS INDEX (BMI) DOCUMENTED: ICD-10-PCS | Mod: CPTII,S$GLB,, | Performed by: ORTHOPAEDIC SURGERY

## 2023-03-20 PROCEDURE — 99999 PR PBB SHADOW E&M-EST. PATIENT-LVL III: CPT | Mod: PBBFAC,,, | Performed by: ORTHOPAEDIC SURGERY

## 2023-03-20 PROCEDURE — 99999 PR PBB SHADOW E&M-EST. PATIENT-LVL III: ICD-10-PCS | Mod: PBBFAC,,, | Performed by: ORTHOPAEDIC SURGERY

## 2023-03-20 PROCEDURE — 3044F PR MOST RECENT HEMOGLOBIN A1C LEVEL <7.0%: ICD-10-PCS | Mod: CPTII,S$GLB,, | Performed by: ORTHOPAEDIC SURGERY

## 2023-03-20 PROCEDURE — 3008F BODY MASS INDEX DOCD: CPT | Mod: CPTII,S$GLB,, | Performed by: ORTHOPAEDIC SURGERY

## 2023-03-20 PROCEDURE — 99213 OFFICE O/P EST LOW 20 MIN: CPT | Mod: 25,S$GLB,, | Performed by: ORTHOPAEDIC SURGERY

## 2023-03-20 PROCEDURE — 4010F ACE/ARB THERAPY RXD/TAKEN: CPT | Mod: CPTII,S$GLB,, | Performed by: ORTHOPAEDIC SURGERY

## 2023-03-20 PROCEDURE — 1159F MED LIST DOCD IN RCRD: CPT | Mod: CPTII,S$GLB,, | Performed by: ORTHOPAEDIC SURGERY

## 2023-03-20 PROCEDURE — 4010F PR ACE/ARB THEARPY RXD/TAKEN: ICD-10-PCS | Mod: CPTII,S$GLB,, | Performed by: ORTHOPAEDIC SURGERY

## 2023-03-20 PROCEDURE — 20551 NJX 1 TENDON ORIGIN/INSJ: CPT | Mod: RT,S$GLB,, | Performed by: ORTHOPAEDIC SURGERY

## 2023-03-20 PROCEDURE — 3044F HG A1C LEVEL LT 7.0%: CPT | Mod: CPTII,S$GLB,, | Performed by: ORTHOPAEDIC SURGERY

## 2023-03-20 RX ORDER — TRIAMCINOLONE ACETONIDE 40 MG/ML
20 INJECTION, SUSPENSION INTRA-ARTICULAR; INTRAMUSCULAR
Status: COMPLETED | OUTPATIENT
Start: 2023-03-20 | End: 2023-03-20

## 2023-03-20 RX ADMIN — TRIAMCINOLONE ACETONIDE 20 MG: 40 INJECTION, SUSPENSION INTRA-ARTICULAR; INTRAMUSCULAR at 10:03

## 2023-03-20 NOTE — PROGRESS NOTES
Subjective:      Patient ID: Enio Earl is a 47 y.o. male.  Chief Complaint: Elbow Pain and Shoulder Pain      HPI  Enio Earl is a  47 y.o. male presenting today for follow up of right shoulder and right elbow pain.  He reports that he is improved with the cellulitis in the right forearm  He is currently scheduled for right shoulder rotator cuff repair but having pain in the right elbow   He would like injection for the elbow today a possible   .    Review of patient's allergies indicates:  No Known Allergies      Current Outpatient Medications   Medication Sig Dispense Refill    atorvastatin (LIPITOR) 10 MG tablet Take 1 tablet (10 mg total) by mouth once daily. 90 tablet 1    losartan (COZAAR) 100 MG tablet Take 1 tablet (100 mg total) by mouth once daily. 90 tablet 1    naproxen (EC-NAPROSYN) 375 MG TbEC EC tablet TAKE 1 TABLET BY MOUTH 2 TIMES DAILY. 60 tablet 1    nicotine (NICODERM CQ) 21 mg/24 hr Place 1 patch onto the skin once daily. 28 patch 0    sertraline (ZOLOFT) 25 MG tablet Take 1 tablet (25 mg total) by mouth once daily. 90 tablet 1    triamcinolone acetonide 0.1% (KENALOG) 0.1 % cream Apply topically 2 (two) times daily. 30 g 3    verapamiL (CALAN-SR) 120 MG CR tablet Take 1 tablet (120 mg total) by mouth every evening. 90 tablet 0    minocycline (MINOCIN,DYNACIN) 100 MG capsule Take 1 capsule (100 mg total) by mouth 2 (two) times a day. (Patient not taking: Reported on 3/20/2023) 28 capsule 1     No current facility-administered medications for this visit.       Past Medical History:   Diagnosis Date    Anxiety     Hypertension     Supraventricular tachycardia        Past Surgical History:   Procedure Laterality Date    CARDIAC ELECTROPHYSIOLOGY MAPPING AND ABLATION  07/2020    for PSVT by Dr. Lim     LATERAL EPICONDYLE RELEASE Left 5/18/2021    Procedure: RELEASE, ELBOW, LATERAL EPICONDYLE;  Surgeon: Cristi Adam Jr., MD;  Location: [facility];  Service: Orthopedics;  Laterality:  "Left;    NOSE SURGERY      sepltoplasty       OBJECTIVE:   PHYSICAL EXAM:  Height: 5' 8" (172.7 cm) Weight: 99.8 kg (220 lb)  Vitals:    03/20/23 1018   Weight: 99.8 kg (220 lb)   Height: 5' 8" (1.727 m)   PainSc:   7   PainLoc: Shoulder     Ortho/SPM Exam  Examination right elbow there is tenderness medially slight swelling full range of motion no instability   Tinel sign negative       RADIOGRAPHS:  None  Comments: I have personally reviewed the imaging and I agree with the above radiologist's report.    ASSESSMENT/PLAN:     IMPRESSION:  Medial epicondylitis right elbow    PLAN:  After pause for time-out identified the right elbow injected medial epicondylar area with combination Kenalog 20 mg 0.5 cc xylocaine sterile technique  Tolerated the procedure well without complication  Continue stretching strengthening exercises follow-up for shoulder surgery    FOLLOW UP:  For the shoulder surgery    Disclaimer: This note has been generated using voice-recognition software. There may be typographical errors that have been missed during proof-reading.     "

## 2023-04-19 ENCOUNTER — TELEPHONE (OUTPATIENT)
Dept: PREADMISSION TESTING | Facility: HOSPITAL | Age: 48
End: 2023-04-19
Payer: COMMERCIAL

## 2023-04-19 DIAGNOSIS — Z01.818 PRE-OP EVALUATION: Primary | ICD-10-CM

## 2023-04-19 DIAGNOSIS — I10 HYPERTENSION, UNSPECIFIED TYPE: ICD-10-CM

## 2023-04-22 ENCOUNTER — PATIENT MESSAGE (OUTPATIENT)
Dept: FAMILY MEDICINE | Facility: CLINIC | Age: 48
End: 2023-04-22
Payer: COMMERCIAL

## 2023-04-24 ENCOUNTER — HOSPITAL ENCOUNTER (EMERGENCY)
Facility: HOSPITAL | Age: 48
Discharge: HOME | End: 2023-04-24
Attending: EMERGENCY MEDICINE
Payer: COMMERCIAL

## 2023-04-24 VITALS
HEART RATE: 96 BPM | SYSTOLIC BLOOD PRESSURE: 139 MMHG | BODY MASS INDEX: 33.45 KG/M2 | RESPIRATION RATE: 20 BRPM | DIASTOLIC BLOOD PRESSURE: 78 MMHG | WEIGHT: 220 LBS | OXYGEN SATURATION: 98 % | TEMPERATURE: 98 F

## 2023-04-24 DIAGNOSIS — R55 NEAR SYNCOPE: Primary | ICD-10-CM

## 2023-04-24 DIAGNOSIS — R07.9 CHEST PAIN: ICD-10-CM

## 2023-04-24 LAB
ALBUMIN SERPL BCP-MCNC: 4.1 G/DL (ref 3.5–5.2)
ALP SERPL-CCNC: 49 U/L (ref 55–135)
ALT SERPL W/O P-5'-P-CCNC: 57 U/L (ref 10–44)
ANION GAP SERPL CALC-SCNC: 10 MMOL/L (ref 8–16)
AST SERPL-CCNC: 83 U/L (ref 10–40)
BASOPHILS # BLD AUTO: 0.05 K/UL (ref 0–0.2)
BASOPHILS NFR BLD: 0.3 % (ref 0–1.9)
BILIRUB SERPL-MCNC: 0.8 MG/DL (ref 0.1–1)
BUN SERPL-MCNC: 18 MG/DL (ref 6–20)
CALCIUM SERPL-MCNC: 8.8 MG/DL (ref 8.7–10.5)
CHLORIDE SERPL-SCNC: 105 MMOL/L (ref 95–110)
CO2 SERPL-SCNC: 24 MMOL/L (ref 23–29)
CREAT SERPL-MCNC: 0.8 MG/DL (ref 0.5–1.4)
DIFFERENTIAL METHOD: ABNORMAL
EOSINOPHIL # BLD AUTO: 0.1 K/UL (ref 0–0.5)
EOSINOPHIL NFR BLD: 0.7 % (ref 0–8)
ERYTHROCYTE [DISTWIDTH] IN BLOOD BY AUTOMATED COUNT: 11.9 % (ref 11.5–14.5)
EST. GFR  (NO RACE VARIABLE): >60 ML/MIN/1.73 M^2
GLUCOSE SERPL-MCNC: 86 MG/DL (ref 70–110)
HCT VFR BLD AUTO: 40.8 % (ref 40–54)
HGB BLD-MCNC: 14.4 G/DL (ref 14–18)
IMM GRANULOCYTES # BLD AUTO: 0.08 K/UL (ref 0–0.04)
IMM GRANULOCYTES NFR BLD AUTO: 0.5 % (ref 0–0.5)
LYMPHOCYTES # BLD AUTO: 0.9 K/UL (ref 1–4.8)
LYMPHOCYTES NFR BLD: 5.9 % (ref 18–48)
MCH RBC QN AUTO: 35 PG (ref 27–31)
MCHC RBC AUTO-ENTMCNC: 35.3 G/DL (ref 32–36)
MCV RBC AUTO: 99 FL (ref 82–98)
MONOCYTES # BLD AUTO: 1 K/UL (ref 0.3–1)
MONOCYTES NFR BLD: 6.3 % (ref 4–15)
NEUTROPHILS # BLD AUTO: 13.1 K/UL (ref 1.8–7.7)
NEUTROPHILS NFR BLD: 86.3 % (ref 38–73)
NRBC BLD-RTO: 0 /100 WBC
PLATELET # BLD AUTO: 184 K/UL (ref 150–450)
PMV BLD AUTO: 11.1 FL (ref 9.2–12.9)
POTASSIUM SERPL-SCNC: 3.4 MMOL/L (ref 3.5–5.1)
PROT SERPL-MCNC: 7.7 G/DL (ref 6–8.4)
RBC # BLD AUTO: 4.12 M/UL (ref 4.6–6.2)
SODIUM SERPL-SCNC: 139 MMOL/L (ref 136–145)
TROPONIN I SERPL DL<=0.01 NG/ML-MCNC: <0.006 NG/ML (ref 0–0.03)
TROPONIN I SERPL DL<=0.01 NG/ML-MCNC: <0.006 NG/ML (ref 0–0.03)
WBC # BLD AUTO: 15.13 K/UL (ref 3.9–12.7)

## 2023-04-24 PROCEDURE — 84484 ASSAY OF TROPONIN QUANT: CPT | Performed by: PHYSICIAN ASSISTANT

## 2023-04-24 PROCEDURE — 80053 COMPREHEN METABOLIC PANEL: CPT | Performed by: PHYSICIAN ASSISTANT

## 2023-04-24 PROCEDURE — 93010 ELECTROCARDIOGRAM REPORT: CPT | Mod: ,,, | Performed by: INTERNAL MEDICINE

## 2023-04-24 PROCEDURE — 85025 COMPLETE CBC W/AUTO DIFF WBC: CPT | Performed by: PHYSICIAN ASSISTANT

## 2023-04-24 PROCEDURE — 99285 EMERGENCY DEPT VISIT HI MDM: CPT | Mod: 25

## 2023-04-24 PROCEDURE — 96374 THER/PROPH/DIAG INJ IV PUSH: CPT

## 2023-04-24 PROCEDURE — 93010 EKG 12-LEAD: ICD-10-PCS | Mod: ,,, | Performed by: INTERNAL MEDICINE

## 2023-04-24 PROCEDURE — 25000003 PHARM REV CODE 250: Performed by: EMERGENCY MEDICINE

## 2023-04-24 PROCEDURE — 96361 HYDRATE IV INFUSION ADD-ON: CPT

## 2023-04-24 PROCEDURE — 84484 ASSAY OF TROPONIN QUANT: CPT | Mod: 91 | Performed by: EMERGENCY MEDICINE

## 2023-04-24 PROCEDURE — 93005 ELECTROCARDIOGRAM TRACING: CPT

## 2023-04-24 PROCEDURE — 63600175 PHARM REV CODE 636 W HCPCS: Performed by: EMERGENCY MEDICINE

## 2023-04-24 RX ORDER — PROCHLORPERAZINE EDISYLATE 5 MG/ML
10 INJECTION INTRAMUSCULAR; INTRAVENOUS
Status: DISCONTINUED | OUTPATIENT
Start: 2023-04-24 | End: 2023-04-24 | Stop reason: HOSPADM

## 2023-04-24 RX ORDER — KETOROLAC TROMETHAMINE 30 MG/ML
15 INJECTION, SOLUTION INTRAMUSCULAR; INTRAVENOUS
Status: COMPLETED | OUTPATIENT
Start: 2023-04-24 | End: 2023-04-24

## 2023-04-24 RX ORDER — ASPIRIN 325 MG
325 TABLET ORAL
Status: COMPLETED | OUTPATIENT
Start: 2023-04-24 | End: 2023-04-24

## 2023-04-24 RX ADMIN — KETOROLAC TROMETHAMINE 15 MG: 30 INJECTION, SOLUTION INTRAMUSCULAR; INTRAVENOUS at 03:04

## 2023-04-24 RX ADMIN — SODIUM CHLORIDE 1000 ML: 0.9 INJECTION, SOLUTION INTRAVENOUS at 02:04

## 2023-04-24 RX ADMIN — ASPIRIN 325 MG ORAL TABLET 325 MG: 325 PILL ORAL at 01:04

## 2023-04-24 NOTE — DISCHARGE INSTRUCTIONS
Please follow up with your primary care physician and/or cardiologist this week for reevaluation. Please return with any new or worsening symptoms.

## 2023-04-24 NOTE — ED NOTES
Pt to ED c/o chest pain, SOB and lightheaded episode this morning. PT reports 4/10 to right mid chest at this time. Denies SOB. Hx SVT. NSR noted on CM.

## 2023-04-24 NOTE — FIRST PROVIDER EVALUATION
Emergency Department TeleTriage Encounter Note      CHIEF COMPLAINT    Chief Complaint   Patient presents with    Chest Pain     Midsteral CP that began 2 hours ago described as intermittent tightness, + lightheaded reported,+ left shoulder pain        VITAL SIGNS   Initial Vitals [04/24/23 1225]   BP Pulse Resp Temp SpO2   (!) 140/81 109 18 97.9 °F (36.6 °C) 99 %      MAP       --            ALLERGIES    Review of patient's allergies indicates:  No Known Allergies    PROVIDER TRIAGE NOTE  Patient presents with complaint of chest pain and feeling light headed that started about two hours ago. Denied SOB, nausea or other complaints.       Phy:   Constitutional: well nourished, well developed, appearing stated age, NAD   HEENT: NCAT, symmetrical lids, No obvious facial deformity.  Normal phonation. Normal Conjunctiva   Neck: NAROM   Respiratory: Normal effort.  No obvious use of accessory muscles   Musculoskeletal: Moved upper extremities well   Neuro: Alert, answers questions appropriately    Psych: appropriate mood and affect      Initial orders will be placed and care will be transferred to an alternate provider when patient is roomed for a full evaluation. Any additional orders and the final disposition will be determined by that provider.        ORDERS  Labs Reviewed - No data to display    ED Orders (720h ago, onward)      None              Virtual Visit Note: The provider triage portion of this emergency department evaluation and documentation was performed via [employer], a HIPAA-compliant telemedicine application, in concert with a tele-presenter in the room. A face to face patient evaluation with one of my colleagues will occur once the patient is placed in an emergency department room.      DISCLAIMER: This note was prepared with [employer] voice recognition transcription software. Garbled syntax, mangled pronouns, and other bizarre constructions may be attributed to that software system.

## 2023-04-24 NOTE — ED PROVIDER NOTES
Encounter Date: 4/24/2023       History     Chief Complaint   Patient presents with    Chest Pain     Midsteral CP that began 2 hours ago described as intermittent tightness, + lightheaded reported,+ left shoulder pain      48 y/o M presents to the ER c/o lightheadedness, CP, SOB. Onset a few hours PTA, around 10AM. No exertional component reported. Denies any cough or congestion. Symptoms have improved since onset, and were at a maximum when they began. Deneis any fever, but does note some nausea without emesis. No other symptoms reported at this time.     Review of patient's allergies indicates:  No Known Allergies  Past Medical History:   Diagnosis Date    Anxiety     Hypertension     Supraventricular tachycardia      Past Surgical History:   Procedure Laterality Date    CARDIAC ELECTROPHYSIOLOGY MAPPING AND ABLATION  07/2020    for PSVT by Dr. Lim     LATERAL EPICONDYLE RELEASE Left 5/18/2021    Procedure: RELEASE, ELBOW, LATERAL EPICONDYLE;  Surgeon: Cristi Adam Jr., MD;  Location: [facility] OR;  Service: Orthopedics;  Laterality: Left;    NOSE SURGERY      sepltoplasty     Family History   Problem Relation Age of Onset    Stroke Maternal Grandmother     No Known Problems Mother     Emphysema Father     No Known Problems Brother     Diabetes Mellitus Neg Hx     Cancer Neg Hx     Heart disease Neg Hx      Social History     Tobacco Use    Smoking status: Every Day     Packs/day: 1.00     Years: 15.00     Pack years: 15.00     Types: Cigarettes     Start date: 10/1/2005    Smokeless tobacco: Never   Substance Use Topics    Alcohol use: Yes     Alcohol/week: 16.0 standard drinks     Types: 10 Shots of liquor, 6 Drinks containing 0.5 oz of alcohol per week    Drug use: Never     Review of Systems   Constitutional:  Negative for chills and fever.   HENT:  Negative for congestion.    Respiratory:  Positive for shortness of breath. Negative for cough.    Cardiovascular:  Positive for chest pain.   Gastrointestinal:   Negative for abdominal pain.   Musculoskeletal:  Negative for back pain.   Neurological:  Positive for light-headedness. Negative for headaches.     Physical Exam     Initial Vitals [04/24/23 1225]   BP Pulse Resp Temp SpO2   (!) 140/81 109 18 97.9 °F (36.6 °C) 99 %      MAP       --         Physical Exam    Nursing note and vitals reviewed.  Constitutional: He appears well-developed and well-nourished. No distress.   HENT:   Head: Normocephalic and atraumatic.   Eyes: Conjunctivae and EOM are normal. Pupils are equal, round, and reactive to light.   Neck: Neck supple. No tracheal deviation present.   Normal range of motion.  Cardiovascular:  Normal rate and intact distal pulses.           Pulmonary/Chest: No respiratory distress.   Musculoskeletal:         General: No tenderness or edema. Normal range of motion.      Cervical back: Normal range of motion and neck supple.     Neurological: He is alert and oriented to person, place, and time. He has normal strength. No cranial nerve deficit. GCS score is 15. GCS eye subscore is 4. GCS verbal subscore is 5. GCS motor subscore is 6.   Skin: Skin is warm and dry.       ED Course   Procedures  Labs Reviewed   CBC W/ AUTO DIFFERENTIAL - Abnormal; Notable for the following components:       Result Value    WBC 15.13 (*)     RBC 4.12 (*)     MCV 99 (*)     MCH 35.0 (*)     Gran # (ANC) 13.1 (*)     Immature Grans (Abs) 0.08 (*)     Lymph # 0.9 (*)     Gran % 86.3 (*)     Lymph % 5.9 (*)     All other components within normal limits   COMPREHENSIVE METABOLIC PANEL - Abnormal; Notable for the following components:    Potassium 3.4 (*)     Alkaline Phosphatase 49 (*)     AST 83 (*)     ALT 57 (*)     All other components within normal limits   TROPONIN I   TROPONIN I     EKG Readings: (Independently Interpreted)   Initial Reading: No STEMI. Previous EKG: Compared with most recent EKG Previous EKG Date: 12/30/2022 (Minimal change). Rhythm: Normal Sinus Rhythm. Heart Rate: 99.  Ectopy: No Ectopy. Conduction: Normal. ST Segments: Normal ST Segments. Axis: Normal.   EKG independently interpreted by me pending cardiology review:   ECG Results              EKG 12-lead (In process)  Result time 04/24/23 13:43:37      In process by Interface, Lab In [facility] (04/24/23 13:43:37)                   Narrative:    Test Reason : R07.9,    Vent. Rate : 097 BPM     Atrial Rate : 097 BPM     P-R Int : 168 ms          QRS Dur : 098 ms      QT Int : 356 ms       P-R-T Axes : 059 063 006 degrees     QTc Int : 452 ms    Normal sinus rhythm  Normal ECG  When compared with ECG of 30-DEC-2022 16:49,  No significant change was found    Referred By: [email or web address]   SELF           Confirmed By:                                       X-Rays:   Independently Interpreted Readings:   Other Readings:  CXR independently interpreted by me pending cardiology review: No acute infiltrate, no pneumothorax, no effusion    Imaging Results              X-Ray Chest PA And Lateral (Final result)  Result time 04/24/23 15:46:18      Final result by Mik Curiel MD (04/24/23 15:46:18)                   Impression:      Left lower lobe patchy subtle opacities concerning for airspace disease including aspiration or pneumonia.  Consider short-term follow-up chest radiography after therapy to ensure resolution.      Electronically signed by: Mik Curiel MD  Date:    04/24/2023  Time:    15:46               Narrative:    EXAMINATION:  XR CHEST PA AND LATERAL    CLINICAL HISTORY:  Chest Pain;    TECHNIQUE:  PA and lateral views of the chest were performed.    COMPARISON:  Chest radiograph    FINDINGS:  Monitoring leads overlie the chest.  Patient is slightly rotated.  No detrimental change.  Trachea is midline and patent.Few scattered linear opacities throughout the lungs consistent with minimal platelike scarring versus atelectasis.  Subtle patchy opacities within the left lower lobe.  The lungs are otherwise well expanded without pleural  effusion or pneumothorax.    The cardiac silhouette is normal in size. Pulmonary vasculature and hilar and mediastinal contours are unremarkable.    Bones are intact.                                      Medications   prochlorperazine injection Soln 10 mg (0 mg Intravenous Hold 4/24/23 1415)   aspirin tablet 325 mg (325 mg Oral Given 4/24/23 1305)   sodium chloride 0.9% bolus 1,000 mL 1,000 mL (0 mLs Intravenous Stopped 4/24/23 1515)   ketorolac injection 15 mg (15 mg Intravenous Given 4/24/23 1530)     Medical Decision Making:   History:   Old Medical Records: I decided to obtain old medical records.  Old Records Summarized: records from clinic visits.       <> Summary of Records: Reviewed most recent ortho note documenting PMH and baseline medications  Initial Assessment:   46 y/o M presents to the ER c/o CP, SOB, lightheadedness  Differential Diagnosis:   ACS, dissection, PNA, PTX, musculoskeletal, GERD, dehydration, anemia, electrolyte dyscrasia, arrhythmia, vasovagal   Independently Interpreted Test(s):   I have ordered and independently interpreted X-rays - see prior notes.  I have ordered and independently interpreted EKG Reading(s) - see prior notes  Clinical Tests:   Lab Tests: Reviewed       <> Summary of Lab: CBC with mild leukocytosis, normal H/H; CMP with normal liver and renal function tests, normal electrolytes; troponin negative x 2    ED Management:  Patient initially given an . Subsequently given IVF and toradol. On reevaluation reports resolution of symptoms. VSS. Informed him of results as well as plan to discharge with instructions on home mgmt, OTC medications, f/u with PCP and/or Cardiology, strict return precautions given. Patient comfortable with discharge at this time.                         Clinical Impression:   Final diagnoses:  [R07.9] Chest pain  [R55] Near syncope (Primary)        ED Disposition Condition    Discharge Stable          ED Prescriptions    None       Follow-up  Information       Follow up With Specialties Details Why Contact Info    Bhupinder Bowen, DO Family Medicine Schedule an appointment as soon as possible for a visit   [address] [facility] [address] [facility]  [place] [id number] [phone]               Genaro Anthony MD  04/24/23 [id number]

## 2023-04-27 ENCOUNTER — PATIENT MESSAGE (OUTPATIENT)
Dept: SURGERY | Facility: HOSPITAL | Age: 48
End: 2023-04-27
Payer: COMMERCIAL

## 2023-05-02 ENCOUNTER — ANESTHESIA EVENT (OUTPATIENT)
Dept: SURGERY | Facility: HOSPITAL | Age: 48
End: 2023-05-02
Payer: COMMERCIAL

## 2023-05-02 ENCOUNTER — HOSPITAL ENCOUNTER (OUTPATIENT)
Dept: PREADMISSION TESTING | Facility: HOSPITAL | Age: 48
Discharge: HOME | End: 2023-05-02
Attending: ORTHOPAEDIC SURGERY
Payer: COMMERCIAL

## 2023-05-02 RX ORDER — LIDOCAINE HYDROCHLORIDE 10 MG/ML
1 INJECTION, SOLUTION EPIDURAL; INFILTRATION; INTRACAUDAL; PERINEURAL ONCE
Status: CANCELLED | OUTPATIENT
Start: 2023-05-02 | End: 2023-05-02

## 2023-05-02 RX ORDER — SODIUM CHLORIDE, SODIUM LACTATE, POTASSIUM CHLORIDE, CALCIUM CHLORIDE 600; 310; 30; 20 MG/100ML; MG/100ML; MG/100ML; MG/100ML
INJECTION, SOLUTION INTRAVENOUS CONTINUOUS
Status: CANCELLED | OUTPATIENT
Start: 2023-05-02

## 2023-05-02 NOTE — PRE-PROCEDURE INSTRUCTIONS
Phone Visit     Wife Mojgan [phone]    Allergies, medical, surgical, family and psychosocial histories reviewed with patient. Periop plan of care reviewed. Preop instructions given, including medications to take and to hold. Hibiclens soap and instructions on use given. Time allotted for questions to be addressed.  Patient verbalized understanding.

## 2023-05-02 NOTE — DISCHARGE INSTRUCTIONS
Your surgery is scheduled for 5/9/23.    Please report to [facility] on the 1st Floor at 0530 a.m.    THIS TIME IS SUBJECT TO CHANGE.  YOU WILL RECEIVE A PHONE CALL THE DAY BEFORE SURGERY BY 3:30 PM TO CONFIRM YOUR TIME OF ARRIVAL.  IF YOU HAVE NOT RECEIVED A PHONE CALL BY 3:30 PM THE DAY BEFORE YOUR SURGERY PLEASE CALL [phone]     INSTRUCTIONS IMPORTANT!!!  ¨ Do not eat or drink after 12 midnight-including water, candy, gum, & mints. OK to brush teeth.      ____  Proceed to [facility] on *** for additional testing.        ____  Do not wear makeup, including mascara.  ____  No powder, lotions or creams to surgical area.  ____  Please remove all jewelry, including piercings and leave at home.  ____  No money or valuables needed. Please leave at home.  ____  Please bring any documents given by your doctor.  ____  If going home the same day, arrange for a ride home. You will not be able to             drive if Anesthesia was used.  ____  Children under 18 years require a parent / guardian present the entire time             they are in surgery / recovery.  ____  Wear loose fitting clothing. Allow for dressings, bandages.  ____  Stop Aspirin, Ibuprofen, Motrin, Aleve, Goody's/BC powders, Excedrine and Naproxen (NSAIDS) at least 3-5 days before surgery, unless otherwise instructed by your doctor, or the nurse.   You MAY use Tylenol/acetaminophen until day of surgery.  ____  Wash the surgical area with Hibiclens the night before surgery, and again the             morning of surgery.  Be sure to rinse hibiclens off completely (if instructed by   nurse).  ____  If you take diabetic medication, do not take am of surgery unless instructed by Doctor.  ____  Call MD for temperature above 101 degrees or any other signs of infection such as Urinary (bladder) infection, Upper respiratory infection, skin boils, etc.  ____ Stop taking any Fish Oil supplement or any Vitamins that contain Vitamin E at  least 5 days prior to surgery.  ____ Do Not wear your contact lenses the day of your procedure.  You may wear your glasses.      ____Do not shave surgical site for 3 days prior to surgery.  ____ Practice Good hand washing before, during, and after procedure.      I have read or had read and explained to me, and understand the above information.  Additional comments or instructions:  For additional questions call [phone]      ANESTHESIA SIDE EFFECTS  -For the first 24 hours after surgery:  Do not drive, use heavy equipment, make important decisions, or drink alcohol  -It is normal to feel sleepy for several hours.  Rest until you are more awake.  -Have someone stay with you, if needed.  They can watch for problems and help keep you safe.  -Some possible post anesthesia side effects include: nausea and vomiting, sore throat and hoarseness, sleepiness, and dizziness.        Pre-Op Bathing Instructions    Before surgery, you can play an important role in your own health.    Because skin is not sterile, we need to be sure that your skin is as free of germs as possible. By following the instructions below, you can reduce the number of germs on your skin before surgery.    IMPORTANT: You will need to shower with a special soap called Hibiclens*, available at any pharmacy.  If you are allergic to Chlorhexidine (the antiseptic in Hibiclens), use an antibacterial soap such as Dial Soap for your preoperative shower.  You will shower with Hibiclens both the night before your surgery and the morning of your surgery.  Do not use Hibiclens on the head, face or genitals to avoid injury to those areas.    STEP #1: THE NIGHT BEFORE YOUR SURGERY     Do not shave the area of your body where your surgery will be performed.  Shower and wash your hair and body as usual with your normal soap and shampoo.  Rinse your hair and body thoroughly after you shower to remove all soap residue.  With your hand, apply one packet of Hibiclens soap to  the surgical site.   Wash the site gently for five (5) minutes. Do not scrub your skin too hard.   Do not wash with your regular soap after Hibiclens is used.  Rinse your body thoroughly.  Pat yourself dry with a clean, soft towel.  Do not use lotion, cream, or powder.  Wear clean clothes.    STEP #2: THE MORNING OF YOUR SURGERY     Repeat Step #1.    * Not to be used by people allergic to Chlorhexidine.

## 2023-05-02 NOTE — ANESTHESIA PREPROCEDURE EVALUATION
05/02/2023  Enio Earl is a 47 y.o., male scheduled for REPAIR, ROTATOR CUFF, ARTHROSCOPIC (Right: Shoulder) 5/9/23.    Past Medical History:   Diagnosis Date    Anxiety     Hypertension     Supraventricular tachycardia      Past Surgical History:   Procedure Laterality Date    CARDIAC ELECTROPHYSIOLOGY MAPPING AND ABLATION  07/2020    for PSVT by Dr. Lim     LATERAL EPICONDYLE RELEASE Left 5/18/2021    Procedure: RELEASE, ELBOW, LATERAL EPICONDYLE;  Surgeon: Cristi Adam Jr., MD;  Location: [facility];  Service: Orthopedics;  Laterality: Left;    NOSE SURGERY      sepltoplasty     Current Outpatient Medications   Medication Instructions    atorvastatin (LIPITOR) 10 mg, Oral, Daily    losartan (COZAAR) 100 mg, Oral, Daily    minocycline (MINOCIN,DYNACIN) 100 mg, Oral, 2 times daily    naproxen (EC-NAPROSYN) 375 MG TbEC EC tablet TAKE 1 TABLET BY MOUTH 2 TIMES DAILY.    nicotine (NICODERM CQ) 21 mg/24 hr 1 patch, Transdermal, Daily    sertraline (ZOLOFT) 25 mg, Oral, Daily    traMADoL (ULTRAM) 50 mg tablet TAKE 1 TABLET BY MOUTH EVERY 6 HOURS AS NEEDED FOR PAIN    triamcinolone acetonide 0.1% (KENALOG) 0.1 % cream Topical (Top), 2 times daily    verapamiL (CALAN-SR) 120 mg, Oral, Nightly       Pre-op Assessment    I have reviewed the Patient Summary Reports.     I have reviewed the Nursing Notes.    I have reviewed the Medications.     Review of Systems  Anesthesia Hx:  No problems with previous Anesthesia   Denies Personal Hx of Anesthesia complications.   Social:  Smoker, Alcohol Use    Cardiovascular:   Exercise tolerance: good Hypertension Dysrhythmias  Denies Angina. hyperlipidemia    Pulmonary:   Denies Shortness of breath. Sleep Apnea (does not have CPAP)    Education provided regarding risk of obstructive sleep apnea     Renal/:  Renal/ Normal     Hepatic/GI:  Hepatic/GI  Normal    Musculoskeletal:  Musculoskeletal Normal    Neurological:  Neurology Normal Denies TIA.  Denies CVA. Denies Seizures.    Endocrine:  Endocrine Normal  Obesity / BMI > 30  Psych:   anxiety          Physical Exam  General: Oriented and Cooperative    Airway:  Mallampati: I / I  Mouth Opening: Normal  TM Distance: Normal  Tongue: Normal  Neck ROM: Normal ROM    Dental:  Intact    Chest/Lungs:  Clear to auscultation    Heart:  Rate: Normal  Rhythm: Regular Rhythm  Sounds: Normal      Lab Results   Component Value Date    WBC 15.13 (H) 04/24/2023    HGB 14.4 04/24/2023    HCT 40.8 04/24/2023     04/24/2023    CHOL 172 02/01/2023    TRIG 333 (H) 02/01/2023    HDL 50 02/01/2023    ALT 57 (H) 04/24/2023    AST 83 (H) 04/24/2023     04/24/2023    K 3.4 (L) 04/24/2023     04/24/2023    CREATININE 0.8 04/24/2023    BUN 18 04/24/2023    CO2 24 04/24/2023    TSH 0.877 02/01/2023    INR 0.9 06/08/2020    HGBA1C 5.2 02/01/2023     Results for orders placed or performed during the hospital encounter of 04/24/23   EKG 12-lead    Collection Time: 04/24/23  1:02 PM    Narrative    Test Reason : R07.9,    Vent. Rate : 097 BPM     Atrial Rate : 097 BPM     P-R Int : 168 ms          QRS Dur : 098 ms      QT Int : 356 ms       P-R-T Axes : 059 063 006 degrees     QTc Int : 452 ms    Normal sinus rhythm  Normal ECG  When compared with ECG of 30-DEC-2022 16:49,  No significant change was found  Confirmed by Fadi Escobar MD ([id number]) on 4/24/2023 8:20:33 PM    Referred By: [email or web address]   SELF           Confirmed By:Fadi Escobar MD     CXR 4/24/23  Monitoring leads overlie the chest.  Patient is slightly rotated.  No detrimental change.  Trachea is midline and patent.Few scattered linear opacities throughout the lungs consistent with minimal platelike scarring versus atelectasis.  Subtle patchy opacities within the left lower lobe.  The lungs are otherwise well expanded without pleural effusion or  pneumothorax.  The cardiac silhouette is normal in size. Pulmonary vasculature and hilar and mediastinal contours are unremarkable.  Bones are intact.     Impression:  Left lower lobe patchy subtle opacities concerning for airspace disease including aspiration or pneumonia.  Consider short-term follow-up chest radiography after therapy to ensure resolution.      Anesthesia Plan  Type of Anesthesia, risks & benefits discussed:    Anesthesia Type: Gen ETT, Regional  Intra-op Monitoring Plan: Standard ASA Monitors  Post Op Pain Control Plan: multimodal analgesia  Induction:  IV  Airway Plan: Direct  Informed Consent: Informed consent signed with the Patient and all parties understand the risks and agree with anesthesia plan.  All questions answered.   ASA Score: 2  Day of Surgery Review of History & Physical: H&P Update referred to the surgeon/provider.  Anesthesia Plan Notes: Anesthesia consent to be signed prior to surgery 5/9/23    FYI- Patient [facility] to [facility] after being discharged from the ED on 4/24 for evaluation of near syncope. He has been able to ambulate on average 20,000 steps each day without chest pain or shortness of breath. Denied headaches, palpitations, and HOWARD.    Ready For Surgery From Anesthesia Perspective.     .

## 2023-05-09 ENCOUNTER — ANESTHESIA (OUTPATIENT)
Dept: SURGERY | Facility: HOSPITAL | Age: 48
End: 2023-05-09
Payer: COMMERCIAL

## 2023-05-09 ENCOUNTER — HOSPITAL ENCOUNTER (OUTPATIENT)
Facility: HOSPITAL | Age: 48
Discharge: HOME | End: 2023-05-09
Attending: ORTHOPAEDIC SURGERY | Admitting: ORTHOPAEDIC SURGERY
Payer: COMMERCIAL

## 2023-05-09 VITALS
OXYGEN SATURATION: 96 % | TEMPERATURE: 98 F | DIASTOLIC BLOOD PRESSURE: 61 MMHG | RESPIRATION RATE: 16 BRPM | WEIGHT: 205 LBS | HEIGHT: 68 IN | HEART RATE: 68 BPM | BODY MASS INDEX: 31.07 KG/M2 | SYSTOLIC BLOOD PRESSURE: 122 MMHG

## 2023-05-09 DIAGNOSIS — M75.121 COMPLETE TEAR OF RIGHT ROTATOR CUFF, UNSPECIFIED WHETHER TRAUMATIC: ICD-10-CM

## 2023-05-09 DIAGNOSIS — M77.01 MEDIAL EPICONDYLITIS OF RIGHT ELBOW: ICD-10-CM

## 2023-05-09 PROCEDURE — 71000015 HC POSTOP RECOV 1ST HR: Performed by: ORTHOPAEDIC SURGERY

## 2023-05-09 PROCEDURE — 37000008 HC ANESTHESIA 1ST 15 MINUTES: Performed by: ORTHOPAEDIC SURGERY

## 2023-05-09 PROCEDURE — D9220A PRA ANESTHESIA: Mod: CRNA,,, | Performed by: NURSE ANESTHETIST, CERTIFIED REGISTERED

## 2023-05-09 PROCEDURE — 63600175 PHARM REV CODE 636 W HCPCS: Performed by: ORTHOPAEDIC SURGERY

## 2023-05-09 PROCEDURE — 29826 SHO ARTHRS SRG DECOMPRESSION: CPT | Mod: RT,,, | Performed by: ORTHOPAEDIC SURGERY

## 2023-05-09 PROCEDURE — D9220A PRA ANESTHESIA: Mod: ANES,,, | Performed by: ANESTHESIOLOGY

## 2023-05-09 PROCEDURE — 64415 NJX AA&/STRD BRCH PLXS IMG: CPT | Performed by: ANESTHESIOLOGY

## 2023-05-09 PROCEDURE — 63600175 PHARM REV CODE 636 W HCPCS: Performed by: NURSE ANESTHETIST, CERTIFIED REGISTERED

## 2023-05-09 PROCEDURE — 36000711: Performed by: ORTHOPAEDIC SURGERY

## 2023-05-09 PROCEDURE — 63600175 PHARM REV CODE 636 W HCPCS: Performed by: ANESTHESIOLOGY

## 2023-05-09 PROCEDURE — 37000009 HC ANESTHESIA EA ADD 15 MINS: Performed by: ORTHOPAEDIC SURGERY

## 2023-05-09 PROCEDURE — 25000003 PHARM REV CODE 250: Performed by: ANESTHESIOLOGY

## 2023-05-09 PROCEDURE — D9220A PRA ANESTHESIA: ICD-10-PCS | Mod: ANES,,, | Performed by: ANESTHESIOLOGY

## 2023-05-09 PROCEDURE — C1889 IMPLANT/INSERT DEVICE, NOC: HCPCS | Performed by: ORTHOPAEDIC SURGERY

## 2023-05-09 PROCEDURE — D9220A PRA ANESTHESIA: ICD-10-PCS | Mod: CRNA,,, | Performed by: NURSE ANESTHETIST, CERTIFIED REGISTERED

## 2023-05-09 PROCEDURE — 29826 PR SHLDR ARTHROSCOP,PART ACROMIOPLAS: ICD-10-PCS | Mod: RT,,, | Performed by: ORTHOPAEDIC SURGERY

## 2023-05-09 PROCEDURE — 27201423 OPTIME MED/SURG SUP & DEVICES STERILE SUPPLY: Performed by: ORTHOPAEDIC SURGERY

## 2023-05-09 PROCEDURE — 25000003 PHARM REV CODE 250: Performed by: NURSE ANESTHETIST, CERTIFIED REGISTERED

## 2023-05-09 PROCEDURE — 27200704 HC ULTRASOUND NDL/ECHOSTIM: Performed by: ANESTHESIOLOGY

## 2023-05-09 PROCEDURE — 71000033 HC RECOVERY, INTIAL HOUR: Performed by: ORTHOPAEDIC SURGERY

## 2023-05-09 PROCEDURE — 36000710: Performed by: ORTHOPAEDIC SURGERY

## 2023-05-09 PROCEDURE — 29827 PR SHLDR ARTHROSCOP,SURG,W/ROTAT CUFF REPR: ICD-10-PCS | Mod: RT,,, | Performed by: ORTHOPAEDIC SURGERY

## 2023-05-09 PROCEDURE — 64415 R IS SS: ICD-10-PCS | Mod: 59,RT,, | Performed by: ANESTHESIOLOGY

## 2023-05-09 PROCEDURE — C1713 ANCHOR/SCREW BN/BN,TIS/BN: HCPCS | Performed by: ORTHOPAEDIC SURGERY

## 2023-05-09 PROCEDURE — 29827 SHO ARTHRS SRG RT8TR CUF RPR: CPT | Mod: RT,,, | Performed by: ORTHOPAEDIC SURGERY

## 2023-05-09 DEVICE — ANCHOR SUT KNOTLESS MAG 2: Type: IMPLANTABLE DEVICE | Site: SHOULDER | Status: FUNCTIONAL

## 2023-05-09 RX ORDER — ONDANSETRON 2 MG/ML
4 INJECTION INTRAMUSCULAR; INTRAVENOUS DAILY PRN
Status: DISCONTINUED | OUTPATIENT
Start: 2023-05-09 | End: 2023-05-09 | Stop reason: SDUPTHER

## 2023-05-09 RX ORDER — FENTANYL CITRATE 50 UG/ML
25 INJECTION, SOLUTION INTRAMUSCULAR; INTRAVENOUS EVERY 5 MIN PRN
Status: DISCONTINUED | OUTPATIENT
Start: 2023-05-09 | End: 2023-05-09 | Stop reason: HOSPADM

## 2023-05-09 RX ORDER — BUPIVACAINE HYDROCHLORIDE 5 MG/ML
INJECTION, SOLUTION EPIDURAL; INTRACAUDAL
Status: COMPLETED | OUTPATIENT
Start: 2023-05-09 | End: 2023-05-09

## 2023-05-09 RX ORDER — DEXAMETHASONE SODIUM PHOSPHATE 4 MG/ML
INJECTION, SOLUTION INTRA-ARTICULAR; INTRALESIONAL; INTRAMUSCULAR; INTRAVENOUS; SOFT TISSUE
Status: DISCONTINUED | OUTPATIENT
Start: 2023-05-09 | End: 2023-05-09

## 2023-05-09 RX ORDER — OXYCODONE HYDROCHLORIDE 5 MG/1
10 TABLET ORAL EVERY 4 HOURS PRN
Status: DISCONTINUED | OUTPATIENT
Start: 2023-05-09 | End: 2023-05-09 | Stop reason: HOSPADM

## 2023-05-09 RX ORDER — CEFAZOLIN SODIUM 2 G/50ML
2 SOLUTION INTRAVENOUS
Status: DISCONTINUED | OUTPATIENT
Start: 2023-05-09 | End: 2023-05-09 | Stop reason: HOSPADM

## 2023-05-09 RX ORDER — HYDROMORPHONE HYDROCHLORIDE 2 MG/ML
0.5 INJECTION, SOLUTION INTRAMUSCULAR; INTRAVENOUS; SUBCUTANEOUS EVERY 5 MIN PRN
Status: DISCONTINUED | OUTPATIENT
Start: 2023-05-09 | End: 2023-05-09 | Stop reason: HOSPADM

## 2023-05-09 RX ORDER — SODIUM CHLORIDE, SODIUM LACTATE, POTASSIUM CHLORIDE, CALCIUM CHLORIDE 600; 310; 30; 20 MG/100ML; MG/100ML; MG/100ML; MG/100ML
INJECTION, SOLUTION INTRAVENOUS CONTINUOUS
Status: DISCONTINUED | OUTPATIENT
Start: 2023-05-09 | End: 2023-05-09 | Stop reason: HOSPADM

## 2023-05-09 RX ORDER — OXYCODONE HYDROCHLORIDE 5 MG/1
5 TABLET ORAL
Status: DISCONTINUED | OUTPATIENT
Start: 2023-05-09 | End: 2023-05-09 | Stop reason: HOSPADM

## 2023-05-09 RX ORDER — PROPOFOL 10 MG/ML
VIAL (ML) INTRAVENOUS
Status: DISCONTINUED | OUTPATIENT
Start: 2023-05-09 | End: 2023-05-09

## 2023-05-09 RX ORDER — ONDANSETRON 2 MG/ML
INJECTION INTRAMUSCULAR; INTRAVENOUS
Status: DISCONTINUED | OUTPATIENT
Start: 2023-05-09 | End: 2023-05-09

## 2023-05-09 RX ORDER — HYDROCODONE BITARTRATE AND ACETAMINOPHEN 7.5; 325 MG/1; MG/1
1 TABLET ORAL EVERY 4 HOURS PRN
Qty: 30 TABLET | Refills: 0 | Status: SHIPPED | OUTPATIENT
Start: 2023-05-09 | End: 2023-05-23 | Stop reason: ALTCHOICE

## 2023-05-09 RX ORDER — KETOROLAC TROMETHAMINE 30 MG/ML
30 INJECTION, SOLUTION INTRAMUSCULAR; INTRAVENOUS ONCE
Status: DISCONTINUED | OUTPATIENT
Start: 2023-05-09 | End: 2023-05-09 | Stop reason: HOSPADM

## 2023-05-09 RX ORDER — LIDOCAINE HYDROCHLORIDE 10 MG/ML
1 INJECTION, SOLUTION EPIDURAL; INFILTRATION; INTRACAUDAL; PERINEURAL ONCE
Status: DISCONTINUED | OUTPATIENT
Start: 2023-05-09 | End: 2023-05-09 | Stop reason: HOSPADM

## 2023-05-09 RX ORDER — MIDAZOLAM HYDROCHLORIDE 1 MG/ML
INJECTION INTRAMUSCULAR; INTRAVENOUS
Status: COMPLETED
Start: 2023-05-09 | End: 2023-05-09

## 2023-05-09 RX ORDER — ONDANSETRON 2 MG/ML
4 INJECTION INTRAMUSCULAR; INTRAVENOUS DAILY PRN
Status: DISCONTINUED | OUTPATIENT
Start: 2023-05-09 | End: 2023-05-09 | Stop reason: HOSPADM

## 2023-05-09 RX ORDER — FENTANYL CITRATE 50 UG/ML
INJECTION, SOLUTION INTRAMUSCULAR; INTRAVENOUS
Status: DISCONTINUED | OUTPATIENT
Start: 2023-05-09 | End: 2023-05-09

## 2023-05-09 RX ORDER — ROCURONIUM BROMIDE 10 MG/ML
INJECTION, SOLUTION INTRAVENOUS
Status: DISCONTINUED | OUTPATIENT
Start: 2023-05-09 | End: 2023-05-09

## 2023-05-09 RX ORDER — MIDAZOLAM HYDROCHLORIDE 1 MG/ML
0.5 INJECTION INTRAMUSCULAR; INTRAVENOUS
Status: DISCONTINUED | OUTPATIENT
Start: 2023-05-09 | End: 2023-05-09 | Stop reason: HOSPADM

## 2023-05-09 RX ORDER — LIDOCAINE HYDROCHLORIDE 20 MG/ML
INJECTION INTRAVENOUS
Status: DISCONTINUED | OUTPATIENT
Start: 2023-05-09 | End: 2023-05-09

## 2023-05-09 RX ORDER — EPINEPHRINE 1 MG/ML
INJECTION, SOLUTION, CONCENTRATE INTRAVENOUS
Status: DISCONTINUED | OUTPATIENT
Start: 2023-05-09 | End: 2023-05-09 | Stop reason: HOSPADM

## 2023-05-09 RX ORDER — ONDANSETRON 8 MG/1
8 TABLET, ORALLY DISINTEGRATING ORAL EVERY 8 HOURS PRN
Status: DISCONTINUED | OUTPATIENT
Start: 2023-05-09 | End: 2023-05-09 | Stop reason: HOSPADM

## 2023-05-09 RX ORDER — ACETAMINOPHEN 325 MG/1
650 TABLET ORAL EVERY 4 HOURS PRN
Status: DISCONTINUED | OUTPATIENT
Start: 2023-05-09 | End: 2023-05-09 | Stop reason: HOSPADM

## 2023-05-09 RX ADMIN — ONDANSETRON 8 MG: 2 INJECTION, SOLUTION INTRAMUSCULAR; INTRAVENOUS at 01:05

## 2023-05-09 RX ADMIN — CEFAZOLIN SODIUM 2 G: 2 SOLUTION INTRAVENOUS at 01:05

## 2023-05-09 RX ADMIN — FENTANYL CITRATE 100 MCG: 50 INJECTION, SOLUTION INTRAMUSCULAR; INTRAVENOUS at 01:05

## 2023-05-09 RX ADMIN — PROPOFOL 200 MG: 10 INJECTION, EMULSION INTRAVENOUS at 01:05

## 2023-05-09 RX ADMIN — DEXAMETHASONE SODIUM PHOSPHATE 12 MG: 4 INJECTION, SOLUTION INTRA-ARTICULAR; INTRALESIONAL; INTRAMUSCULAR; INTRAVENOUS; SOFT TISSUE at 01:05

## 2023-05-09 RX ADMIN — SODIUM CHLORIDE, SODIUM LACTATE, POTASSIUM CHLORIDE, AND CALCIUM CHLORIDE: .6; .31; .03; .02 INJECTION, SOLUTION INTRAVENOUS at 02:05

## 2023-05-09 RX ADMIN — SUGAMMADEX 200 MG: 100 INJECTION, SOLUTION INTRAVENOUS at 02:05

## 2023-05-09 RX ADMIN — BUPIVACAINE HYDROCHLORIDE 15 ML: 5 INJECTION, SOLUTION EPIDURAL; INTRACAUDAL; PERINEURAL at 12:05

## 2023-05-09 RX ADMIN — MIDAZOLAM HYDROCHLORIDE 2 MG: 1 INJECTION, SOLUTION INTRAMUSCULAR; INTRAVENOUS at 01:05

## 2023-05-09 RX ADMIN — LIDOCAINE HYDROCHLORIDE 60 MG: 20 INJECTION, SOLUTION INTRAVENOUS at 01:05

## 2023-05-09 RX ADMIN — ROCURONIUM BROMIDE 50 MG: 10 INJECTION, SOLUTION INTRAVENOUS at 01:05

## 2023-05-09 RX ADMIN — MIDAZOLAM HYDROCHLORIDE 2 MG: 1 INJECTION, SOLUTION INTRAMUSCULAR; INTRAVENOUS at 12:05

## 2023-05-09 RX ADMIN — SODIUM CHLORIDE, SODIUM LACTATE, POTASSIUM CHLORIDE, AND CALCIUM CHLORIDE: .6; .31; .03; .02 INJECTION, SOLUTION INTRAVENOUS at 01:05

## 2023-05-09 RX ADMIN — FENTANYL CITRATE 150 MCG: 50 INJECTION, SOLUTION INTRAMUSCULAR; INTRAVENOUS at 01:05

## 2023-05-09 NOTE — ANESTHESIA PROCEDURE NOTES
Intubation    Date/Time: 5/9/2023 1:44 PM  Performed by: [place] CRNA  Authorized by: Rita Oswald MD     Intubation:     Induction:  Intravenous    Intubated:  Postinduction    Mask Ventilation:  Easy with oral airway    Attempts:  1    Attempted By:  CRNA    Method of Intubation:  Direct    Blade:  Crockett 2    Laryngeal View Grade: Grade I - full view of cords      Difficult Airway Encountered?: No      Complications:  None    Airway Device:  Oral endotracheal tube    Airway Device Size:  7.5    Style/Cuff Inflation:  Cuffed (inflated to minimal occlusive pressure)    Tube secured:  22    Secured at:  The lips    Placement Verified By:  Capnometry    Complicating Factors:  Anterior larynx and large prominent central incisors    Findings Post-Intubation:  BS equal bilateral and atraumatic/condition of teeth unchanged

## 2023-05-09 NOTE — H&P
Patient ID: Enio Earl is a 47 y.o. male.     Chief Complaint: Follow-up of the Right Elbow and Follow-up of the Right Shoulder        HPI  Enio Earl is a  47 y.o. male presenting today for different problems in the right arm.  There was not a history of trauma.  Onset of symptoms began several months ago  He has been treated for both shoulder and elbow problems in the past   Recent MRI scan of the right shoulder showed evidence of a full-thickness rotator cuff tear with impingement from the AC joint   He also had an MRI of the right elbow which showed some bursitis as well as medial epicondylitis and tendinitis of the biceps   I went over both of these MRI scans with him   On unrelated matter he has been treated for cellulitis of the right forearm recently possibly related to an old cut has completed antibiotics but still having some symptoms   His main complaint today is the right shoulder and he would like to consider surgery for the right shoulder.       Review of patient's allergies indicates:  No Known Allergies       Current Medications          Current Outpatient Medications   Medication Sig Dispense Refill    atorvastatin (LIPITOR) 10 MG tablet Take 1 tablet (10 mg total) by mouth once daily. 90 tablet 1    losartan (COZAAR) 100 MG tablet Take 1 tablet (100 mg total) by mouth once daily. 90 tablet 1    nicotine (NICODERM CQ) 21 mg/24 hr Place 1 patch onto the skin once daily. 28 patch 0    sertraline (ZOLOFT) 25 MG tablet Take 1 tablet (25 mg total) by mouth once daily. 90 tablet 1    triamcinolone acetonide 0.1% (KENALOG) 0.1 % cream Apply topically 2 (two) times daily. 30 g 3    verapamiL (CALAN-SR) 120 MG CR tablet Take 1 tablet (120 mg total) by mouth every evening. 90 tablet 0    minocycline (MINOCIN,DYNACIN) 100 MG capsule Take 1 capsule (100 mg total) by mouth 2 (two) times a day. 28 capsule 1    traMADoL (ULTRAM) 50 mg tablet Take 1 tablet (50 mg total) by mouth every 6 (six) hours as  "needed for Pain. 30 tablet 0      No current facility-administered medications for this visit.                 Past Medical History:   Diagnosis Date    Anxiety      Hypertension      Supraventricular tachycardia                 Past Surgical History:   Procedure Laterality Date    CARDIAC ELECTROPHYSIOLOGY MAPPING AND ABLATION   07/2020     for PSVT by Dr. Lim     LATERAL EPICONDYLE RELEASE Left 5/18/2021     Procedure: RELEASE, ELBOW, LATERAL EPICONDYLE;  Surgeon: Cristi Adam Jr., MD;  Location: [facility];  Service: Orthopedics;  Laterality: Left;    NOSE SURGERY         sepltoplasty         Review of Systems:  ROS     OBJECTIVE:      PHYSICAL EXAM:  Height: 5' 8" (172.7 cm) Weight: 103.4 kg (228 lb)      Vitals:     02/16/23 1125   Weight: 103.4 kg (228 lb)   Height: 5' 8" (1.727 m)   PainSc:   8      Well developed, well nourished male in no acute distress  Alert and oriented x 3  HEENT- Normal exam  Lungs- Clear to auscultation  Heart- Regular rate and rhythm  Abdomen- Soft nontender  Extremity exam- examination of the right shoulder there is no tenderness no swelling there is some bony prominence of the distal clavicle range of motion right shoulder full mildly positive impingement sign slight weakness rotator cuff no instability  Examination of the right elbow shows some mild swelling around the olecranon bursa but no redness or infection around the bursa   He does have tenderness medially over the medial epicondylar area   In the forearm there is some swelling and redness consistent with cellulitis no packed no abscess no fluctuance no open wound         RADIOGRAPHS:  MRI scan noted above  Comments: I have personally reviewed the imaging and I agree with the above radiologist's report.     ASSESSMENT/PLAN:      IMPRESSION:  1. Resolving cellulitis right forearm.      2. Medial epicondylitis right elbow.      3. Olecranon bursitis noninfectious right elbow.      4. Rotator cuff tear full-thickness right " shoulder.     PLAN:  He does have a lot going on in the right arm and we talked at length about this today   For the forearm I have placed him back on minocycline 100 mg b.i.d. for 2 weeks I would like him to follow-up in 2 weeks for recheck   For the right shoulder he would like to go ahead and set up surgery for rotator cuff repair right shoulder as an outpatient surgery   The elbow I have started him on some therapy for the elbow we can start that before the shoulder surgery for stretching strengthening and modality treatments   Continue anti-inflammatory medication by mouth follow-up 2-3 weeks to check on the cellulitis        - We talked at length about the anatomy and pathophysiology of        Encounter Diagnoses   Name Primary?    Elbow swelling, right      Medial epicondylitis of right elbow Yes

## 2023-05-09 NOTE — DISCHARGE INSTRUCTIONS
ANESTHESIA  -For the first 24 hours after surgery:  Do not drive, use heavy equipment, make important decisions, or drink alcohol  -It is normal to feel sleepy for several hours.  Rest until you are more awake.  -Have someone stay with you, if needed.  They can watch for problems and help keep you safe.  -Some possible post anesthesia side effects include: nausea and vomiting, sore throat and hoarseness, sleepiness, and dizziness.    PAIN  -If you have pain after surgery, pain medicine will help you feel better.  Take it as directed, before pain becomes severe.  Most pain relievers taken by mouth need at least 20-30 minutes to start working.  -Do not drive or drink alcohol while taking pain medicine.  -Pain medication can upset your stomach.  Taking them with a little food may help.  -Other ways to help control pain: elevation, ice, and relaxation  -Call your surgeon if still having unmanageable pain an hour after taking pain medicine.  -Pain medicine can cause constipation.  Taking an over-the counter stool softener while on prescription pain medicine and drinking plenty of fluids can prevent this side effect.  -Call your surgeon if you have severe side effects like: breathing problems, trouble waking up, dizziness, confusion, or severe constipation.    NAUSEA  -Some people have nausea after surgery.  This is often because of anesthesia, pain, pain medicine, or the stress of surgery.  -Do not push yourself to eat.  Start off with clear liquids and soup.  Slowly move to solid foods.  Don't eat fatty, rich, spicy foods at first.  Eat smaller amounts.  -If you develop persistent nausea and vomiting please notify your surgeon immediately.    BLEEDING  -Different types of surgery require different types of care and dressing changes.  It is important to follow all instructions and advice from your surgeon.  Change dressing as directed.  Call your surgeon for any concerns regarding postop bleeding.    SIGNS OF  INFECTION  -Signs of infection include: fever, swelling, drainage, and redness  -Notify your surgeon if you have a fever of 100.4 F (38.0 C) or higher.  -Notify your surgeon if you notice redness, swelling, increased pain, pus, or a foul smell at the incision site.     Notify Dr. Adam for any questions or concerns

## 2023-05-09 NOTE — TRANSFER OF CARE
"Anesthesia Transfer of Care Note    Patient: Enio Earl    Procedure(s) Performed: Procedure(s) (LRB):  REPAIR, ROTATOR CUFF, ARTHROSCOPIC (Right)    Patient location: PACU    Anesthesia Type: general    Transport from OR: Transported from OR on 6-10 L/min O2 by face mask with adequate spontaneous ventilation    Post pain: adequate analgesia    Post assessment: no apparent anesthetic complications and tolerated procedure well    Post vital signs: stable    Level of consciousness: awake, alert and oriented    Nausea/Vomiting: no nausea/vomiting    Complications: none    Transfer of care protocol was followed      Last vitals:   Visit Vitals  /85   Pulse 94   Temp 36.8 °C (98.2 °F) (Skin)   Resp 18   Ht 5' 8" (1.727 m)   Wt 93 kg (205 lb)   SpO2 98%   BMI 31.17 kg/m²     "

## 2023-05-09 NOTE — ANESTHESIA PROCEDURE NOTES
R IS SS    Patient location during procedure: pre-op   Block not for primary anesthetic.  Reason for block: at surgeon's request and post-op pain management   Post-op Pain Location: R shoulder pain   Start time: 5/9/2023 12:12 PM  Timeout: 5/9/2023 12:10 PM   End time: 5/9/2023 12:20 PM    Staffing  Authorizing Provider: Rita Oswald MD  Performing Provider: Rita Oswald MD    Preanesthetic Checklist  Completed: patient identified, IV checked, site marked, risks and benefits discussed, surgical consent, monitors and equipment checked, pre-op evaluation and timeout performed  Peripheral Block  Patient position: sitting  Prep: ChloraPrep  Patient monitoring: heart rate, cardiac monitor, continuous pulse ox, continuous capnometry and frequent blood pressure checks  Block type: interscalene  Laterality: right  Injection technique: single shot  Needle  Needle type: Stimuplex   Needle gauge: 22 G  Needle length: 2 in  Needle localization: anatomical landmarks and ultrasound guidance  Needle insertion depth: 1.5 cm   -ultrasound image captured on disc.  Assessment  Injection assessment: negative aspiration, negative parasthesia and local visualized surrounding nerve  Paresthesia pain: none  Heart rate change: no  Slow fractionated injection: yes  Pain Tolerance: no complaints and comfortable throughout block  Medications:    Medications: bupivacaine (pf) (MARCAINE) injection 0.5% - Perineural   15 mL - 5/9/2023 12:15:00 PM    Additional Notes  VSS.  [facility] RN monitoring vitals throughout procedure.  Patient tolerated procedure well.     20 cc 0.375% bupiv with 1: 400 k epi; local was injected at superior trunk

## 2023-05-09 NOTE — OP NOTE
[place] - [facility] (Hospital)  Operative Note      Date of Procedure: 5/9/2023     Procedure: Procedure(s) (LRB):  REPAIR, ROTATOR CUFF, ARTHROSCOPIC (Right)  ARTHROSCOPY, SHOULDER, WITH SUBACROMIAL SPACE DECOMPRESSION (Right)     Surgeon(s) and Role:     * Cristi Adam Jr., MD - Primary    Assisting Surgeon: None    Pre-Operative Diagnosis: Complete tear of right rotator cuff, unspecified whether traumatic [M75.121]    Post-Operative Diagnosis: Post-Op Diagnosis Codes:     * Complete tear of right rotator cuff, unspecified whether traumatic [M75.121]    Anesthesia: General/Regional    Operative Findings (including complications, if any): rot cuff tear right    Description of Technical Procedures: DATE OF PROCEDURE:   5/9/2023     PREOPERATIVE DIAGNOSIS:  Rotator cuff tear,right shoulder.     POSTOPERATIVE DIAGNOSIS:  Rotator cuff tear,right shoulder.     OPERATIVE PROCEDURES:  1.  right shoulder arthroscopy with subacromial decompression.  2.  right shoulder arthroscopic rotator cuff repair using Opus suture anchor X 1     SURGEON:  Cristi Adam Jr., MJULIUS     FIRST ASSISTANT:  Bon     ANESTHESIA:  General endotracheal.     ESTIMATED BLOOD LOSS:  Minimal.     COMPLICATIONS:  None.     SPECIMENS:  None.     BRIEF INDICATIONS:  A 47-year-old male with rotator cuff tear,   unresponsive to conservative treatment.     OPERATIVE PROCEDURE IN DETAIL:  After operative consent was obtained, the   patient brought to the Operating Room, placed supine on the operating room   table.  Anesthesia by GET method was performed by the Anesthesia staff.  After   the patient was asleep, the patient was carefully turned to the lateral decubitus position   and stabilized on the bean bag and the operative arm was prepped and draped out   in the normal sterile fashion.  The arm suspended to longitudinal traction 12   pounds.     Following this, a posterolateral stab incision made with a #15 blade and the   arthroscope was inserted  into the shoulder joint.  Diagnostic arthroscopy showed   a complete tear of the supraspinatus tendon near its   Insertion. The scope was reinserted into the subacromial space.  The lateral portal was created   with a #15 blade and a sucker shaver inserted laterally.  A large amount of   bursal tissue was encountered and a complete bursectomy was performed including   removal of the CA ligament.  The subacromial space was very tight and the bur   was brought in laterally and an acromioplasty was performed from lateral to   medial decompressing the subacromial space all the way to the AC joint, which   had some mild-to-moderate degenerative wear.  The inferior clavicle was   co-planed but not removed and after smoothing all bony surfaces, the subacromial   space was noted to be well decompressed.     Attention then turned back to the rotator cuff, which had a complete tear,   which was debrided.     The leading edge of the rotator cuff was freshened up and carefully mobilized   and a suture passer was used to place an inverted horizontal mattress suture in   leading edge of the rotator cuff.  Following this, a superior portal was created   with a #15 blade and the drill used to drill into the greater tuberosity,   followed by insertion of the Opus suture anchor, which achieved good purchase.    The suture passed through the anchor and then tightened up locking the suture   in, bring the rotator cuff down to bone flush, it was locked in place and cut   short.  Range of motion was checked and noted to be full without impingement.    Good repair was achieved.  Hemostasis achieved with the Bovie.  The instruments   were removed and the portals then closed using interrupted 3-0 nylon suture on   the skin.  Sterile dressing applied followed by a pillow sling.  The patient was   then extubated and brought to the Recovery Room in stable condition.  All   sponge and needle counts reported as correct.  No complications.           Significant Surgical Tasks Conducted by the Assistant(s), if Applicable: scope    Estimated Blood Loss (EBL): 10 mL           Implants:   Implant Name Type Inv. Item Serial No.  Lot No. LRB No. Used Action   ANCHOR SUT KNOTLESS MAG 2 - [id number]  ANCHOR SUT KNOTLESS MAG 2  [employer] [id number] Right 1 Implanted       Specimens:   Specimen (24h ago, onward)      None                    Condition: Good    Disposition: PACU - hemodynamically stable.    Attestation: I was present and scrubbed for the entire procedure.    Discharge Note    OUTCOME: Patient tolerated treatment/procedure well without complication and is now ready for discharge.    DISPOSITION: Home or Self Care    FINAL DIAGNOSIS:  Complete tear of right rotator cuff    FOLLOWUP: In clinic    DISCHARGE INSTRUCTIONS:    Discharge Procedure Orders   Diet general     Call MD for:  temperature >100.4     Call MD for:  persistent nausea and vomiting     Call MD for:  severe uncontrolled pain     Ice to affected area   Order Comments: using barrier between ice and skin (specify duration&frequency)     Remove dressing in 24 hours

## 2023-05-15 NOTE — ANESTHESIA POSTPROCEDURE EVALUATION
Anesthesia Post Evaluation    Patient: Enio Earl    Procedure(s) Performed: Procedure(s) (LRB):  REPAIR, ROTATOR CUFF, ARTHROSCOPIC (Right)  ARTHROSCOPY, SHOULDER, WITH SUBACROMIAL SPACE DECOMPRESSION (Right)    Final Anesthesia Type: general      Patient location during evaluation: PACU  Patient participation: Yes- Able to Participate  Level of consciousness: awake and alert  Post-procedure vital signs: reviewed and stable  Pain management: adequate  Airway patency: patent    PONV status at discharge: No PONV  Anesthetic complications: no      Cardiovascular status: blood pressure returned to baseline  Respiratory status: unassisted  Hydration status: euvolemic  Follow-up not needed.          Vitals Value Taken Time   /61 05/09/23 1557   Temp 36.7 °C (98 °F) 05/09/23 1557   Pulse 68 05/09/23 1557   Resp 16 05/09/23 1557   SpO2 96 % 05/09/23 1557         Event Time   Out of Recovery 15:25:00         Pain/Nacho Score: No data recorded

## 2023-05-16 ENCOUNTER — PATIENT MESSAGE (OUTPATIENT)
Dept: ORTHOPEDICS | Facility: CLINIC | Age: 48
End: 2023-05-16
Payer: COMMERCIAL

## 2023-05-17 ENCOUNTER — PATIENT MESSAGE (OUTPATIENT)
Dept: ORTHOPEDICS | Facility: CLINIC | Age: 48
End: 2023-05-17
Payer: COMMERCIAL

## 2023-05-18 ENCOUNTER — PATIENT MESSAGE (OUTPATIENT)
Dept: ORTHOPEDICS | Facility: CLINIC | Age: 48
End: 2023-05-18
Payer: COMMERCIAL

## 2023-05-22 ENCOUNTER — PATIENT MESSAGE (OUTPATIENT)
Dept: ORTHOPEDICS | Facility: CLINIC | Age: 48
End: 2023-05-22

## 2023-05-22 ENCOUNTER — TELEPHONE (OUTPATIENT)
Dept: SMOKING CESSATION | Facility: CLINIC | Age: 48
End: 2023-05-22
Payer: COMMERCIAL

## 2023-05-22 ENCOUNTER — OFFICE VISIT (OUTPATIENT)
Dept: ORTHOPEDICS | Facility: CLINIC | Age: 48
End: 2023-05-22
Payer: COMMERCIAL

## 2023-05-22 VITALS — BODY MASS INDEX: 31.07 KG/M2 | WEIGHT: 205 LBS | HEIGHT: 68 IN

## 2023-05-22 DIAGNOSIS — Z98.890 S/P ROTATOR CUFF REPAIR: Primary | ICD-10-CM

## 2023-05-22 DIAGNOSIS — R07.9 CHEST PAIN, UNSPECIFIED TYPE: Primary | ICD-10-CM

## 2023-05-22 PROCEDURE — 3044F PR MOST RECENT HEMOGLOBIN A1C LEVEL <7.0%: ICD-10-PCS | Mod: CPTII,S$GLB,, | Performed by: PHYSICIAN ASSISTANT

## 2023-05-22 PROCEDURE — 1159F PR MEDICATION LIST DOCUMENTED IN MEDICAL RECORD: ICD-10-PCS | Mod: CPTII,S$GLB,, | Performed by: PHYSICIAN ASSISTANT

## 2023-05-22 PROCEDURE — 99024 PR POST-OP FOLLOW-UP VISIT: ICD-10-PCS | Mod: S$GLB,,, | Performed by: PHYSICIAN ASSISTANT

## 2023-05-22 PROCEDURE — 3044F HG A1C LEVEL LT 7.0%: CPT | Mod: CPTII,S$GLB,, | Performed by: PHYSICIAN ASSISTANT

## 2023-05-22 PROCEDURE — 1160F RVW MEDS BY RX/DR IN RCRD: CPT | Mod: CPTII,S$GLB,, | Performed by: PHYSICIAN ASSISTANT

## 2023-05-22 PROCEDURE — 4010F PR ACE/ARB THEARPY RXD/TAKEN: ICD-10-PCS | Mod: CPTII,S$GLB,, | Performed by: PHYSICIAN ASSISTANT

## 2023-05-22 PROCEDURE — 1160F PR REVIEW ALL MEDS BY PRESCRIBER/CLIN PHARMACIST DOCUMENTED: ICD-10-PCS | Mod: CPTII,S$GLB,, | Performed by: PHYSICIAN ASSISTANT

## 2023-05-22 PROCEDURE — 99999 PR PBB SHADOW E&M-EST. PATIENT-LVL IV: CPT | Mod: PBBFAC,,, | Performed by: PHYSICIAN ASSISTANT

## 2023-05-22 PROCEDURE — 99024 POSTOP FOLLOW-UP VISIT: CPT | Mod: S$GLB,,, | Performed by: PHYSICIAN ASSISTANT

## 2023-05-22 PROCEDURE — 4010F ACE/ARB THERAPY RXD/TAKEN: CPT | Mod: CPTII,S$GLB,, | Performed by: PHYSICIAN ASSISTANT

## 2023-05-22 PROCEDURE — 1159F MED LIST DOCD IN RCRD: CPT | Mod: CPTII,S$GLB,, | Performed by: PHYSICIAN ASSISTANT

## 2023-05-22 PROCEDURE — 3008F BODY MASS INDEX DOCD: CPT | Mod: CPTII,S$GLB,, | Performed by: PHYSICIAN ASSISTANT

## 2023-05-22 PROCEDURE — 3008F PR BODY MASS INDEX (BMI) DOCUMENTED: ICD-10-PCS | Mod: CPTII,S$GLB,, | Performed by: PHYSICIAN ASSISTANT

## 2023-05-22 PROCEDURE — 99999 PR PBB SHADOW E&M-EST. PATIENT-LVL IV: ICD-10-PCS | Mod: PBBFAC,,, | Performed by: PHYSICIAN ASSISTANT

## 2023-05-22 RX ORDER — CEPHALEXIN 500 MG/1
500 CAPSULE ORAL 4 TIMES DAILY
Qty: 28 CAPSULE | Refills: 0 | Status: SHIPPED | OUTPATIENT
Start: 2023-05-22 | End: 2023-05-29

## 2023-05-22 RX ORDER — HYDROCODONE BITARTRATE AND ACETAMINOPHEN 5; 325 MG/1; MG/1
1 TABLET ORAL EVERY 8 HOURS PRN
Qty: 21 TABLET | Refills: 0 | Status: SHIPPED | OUTPATIENT
Start: 2023-05-22 | End: 2023-05-23 | Stop reason: SDUPTHER

## 2023-05-23 ENCOUNTER — CLINICAL SUPPORT (OUTPATIENT)
Dept: REHABILITATION | Facility: HOSPITAL | Age: 48
End: 2023-05-23
Payer: COMMERCIAL

## 2023-05-23 DIAGNOSIS — Z98.890 S/P ROTATOR CUFF REPAIR: ICD-10-CM

## 2023-05-23 DIAGNOSIS — M25.511 ACUTE PAIN OF RIGHT SHOULDER: ICD-10-CM

## 2023-05-23 DIAGNOSIS — R29.898 DECREASED STRENGTH OF UPPER EXTREMITY: ICD-10-CM

## 2023-05-23 PROCEDURE — 97161 PT EVAL LOW COMPLEX 20 MIN: CPT | Mod: PN

## 2023-05-23 PROCEDURE — 97110 THERAPEUTIC EXERCISES: CPT | Mod: PN

## 2023-05-23 PROCEDURE — 97530 THERAPEUTIC ACTIVITIES: CPT | Mod: PN

## 2023-05-23 RX ORDER — HYDROCODONE BITARTRATE AND ACETAMINOPHEN 5; 325 MG/1; MG/1
1 TABLET ORAL EVERY 8 HOURS PRN
Qty: 21 TABLET | Refills: 0 | Status: SHIPPED | OUTPATIENT
Start: 2023-05-23 | End: 2023-06-20 | Stop reason: SDUPTHER

## 2023-05-25 PROBLEM — M25.511 ACUTE PAIN OF RIGHT SHOULDER: Status: ACTIVE | Noted: 2023-05-25

## 2023-05-25 PROBLEM — R29.898 DECREASED STRENGTH OF UPPER EXTREMITY: Status: ACTIVE | Noted: 2023-05-25

## 2023-05-25 NOTE — PLAN OF CARE
[facility]  Physical Therapy Initial Evaluation    Name: Enio Earl  Clinic Number: [id number]    Therapy Diagnosis:   Encounter Diagnoses   Name Primary?    S/P rotator cuff repair     Acute pain of right shoulder     Decreased strength of upper extremity      Physician: Sarah Zamorano PA-C    Physician Orders: PT Eval and Treat  Medical Diagnosis: Z98.890 (ICD-10-CM) - S/P rotator cuff repair  Evaluation Date: 5/23/2023  Authorization Period: 05/21/2024  Plan of Care Certification Period: 5/23/2023 to 08/25/2023  Visit # / Visits authorized: 1/TBD  FOTO:  1/8  PTA visits: 0/5    Time In: 1205  Time Out: 1250  Total Billable Time: 45 minutes (1 LCE, 1 TE, 1 SIMBA)  Precautions: post-op RC repair.     OPERATIVE PROCEDURES:  1.  right shoulder arthroscopy with subacromial decompression.  2.  right shoulder arthroscopic rotator cuff repair using Opus suture anchor X 1    Subjective   Enio reports to OPPT today to begin his OPPT course following right shoulder surgery.  S/p RC repair on 05/09/2023.  Comes to PT with abd pillow sling donned properly. Voices minimal pain.  Good f/u visit with Dr. Adam's office.  Sleeping in bed without sling on his left side. RHD.        Past Medical History:   Diagnosis Date    Anxiety     Hypertension     Supraventricular tachycardia      Enio Earl  has a past surgical history that includes Nose surgery; Cardiac electrophysiology mapping and ablation (07/2020); Lateral epicondyle release (Left, 5/18/2021); Arthroscopic repair of rotator cuff of shoulder (Right, 5/9/2023); and Arthroscopy of shoulder with decompression of subacromial space (Right, 5/9/2023).    Enio has a current medication list which includes the following prescription(s): atorvastatin, cephalexin, hydrocodone-acetaminophen, losartan, minocycline, naproxen, nicotine, sertraline, tramadol, triamcinolone acetonide 0.1%, and verapamil.    Review of patient's allergies  "indicates:  No Known Allergies     Imaging: right shoulder MRI (pre-op): Findings: "Full-thickness footprint tear supraspinatus without retraction or atrophy.  Partial undersurface tear infraspinatus with interstitial extension.  Tear posterosuperior labrum.  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  Job involves lifting, repetitive reaching, carrying.   Prior Level of Function:  Progressive worsening of shoulder pain and function.   Current Level of Function:  See above.     Pain:  Current 2/10, worst 4/10, best 1/10   Location: right shoulder   Description: sore.     Pts goals:  1. To return to work without restrictions.     Objective     Posture:   well developed upper extremity, scapular, and shoulder musculature.     One portal site with mild scott-wound redness.     Gross Movement Analysis:  -Shoulder flexion:  full left shoulder flexion without compensation, pain-free.   -Cervical clearing test:  normal.     Range of Motion:    Left Right   Shoulder Flexion  empty   Shoulder abduction WNL 80   Shoulder ER WNL 20 scapular plane   Shoulder IR WNL deferred   Elbow WNL WNL   Wrist WNL WNL      Upper Extremity Strength:  deferred   Special Tests: deferred  Sensation: normal light touch sensation to BUE in C4-T2 dermatomal pattern    CMS Impairment/Limitation/Restriction for FOTO Shoulder Survey    Therapist reviewed FOTO scores for Enio Earl on 5/23/2023.   FOTO documents entered into [facility] - see Media section.    Limitation Score: 39% --> 21%  DASH: 56/150         TREATMENT   Treatment Time In: 1225  Treatment Time Out: 1250  Total Treatment time separate from Evaluation time:25'    Enio received therapeutic exercises to develop strength, " "endurance, and ROM for 10 minutes including:  Elbow flexion/extension 2x10  Seated arm supported scapular retractions 10x5"  Pendulum hangs 3x30"    Therapeutic activities: total time 10 minutes, including:   - sling donning/doffing  - sleeping position education  - sitting position education    Enio received the following manual therapy techniques: total time 5 minutes:  - passive range of motion check.      Home Exercises and Patient Education Provided  Education provided re:   - RC OPPT course including stage criterion approach, range of motion goals, healing time frames  - home exercise program: pendulum hangs, scap setting (arm supported), elbow flexion  - sling worn at all times except during bathing/dressing and home exercise program.   - no active lifting, pushing, pulling, reaching behind his back or overhead.     Written Home Exercises Provided: yes.  Exercises were reviewed and Enio was able to demonstrate them prior to the end of the session.   Pt received a written copy of exercises to perform at home. Enio demonstrated good  understanding of the education provided.     See EMR under patient instructions for exercises given @ initial evaluation.      Assessment   Enio is a 47 y.o. male referred to outpatient Physical Therapy with a medical diagnosis of Z98.890 (ICD-10-CM) - S/P rotator cuff repair including SAD.  Small size ?; 1 anchor.  Sx date: 05/09/2023.  Post-op week 2 weeks (POD 14). Passive range of motion check good.  Good scapular activation.  Patient probably has been doing too much with his shoulder already.  Fortunately his pain is low and he is sleeping well.  Heavy focus during the session on education including healing time frames, do's/don't's at this stage of his post-op course, and prognosis in regards to work-related activities.  Re-emphasized need to protect his surgery - this includes wearing his sling.  Will see again in 2 weeks for range of motion and home exercise " program check.     Pt prognosis is Excellent.   Pt will benefit from skilled outpatient Physical Therapy to address the deficits stated above and in the chart below, provide pt/family education, and to maximize pt's level of independence.     Plan of care discussed with patient: Yes  Pt's spiritual, cultural and educational needs considered and patient is agreeable to the plan of care and goals as stated below:     Anticipated Barriers for therapy: co-morbidities, eagerness to RTW.     Medical Necessity is demonstrated by the following  History  Co-morbidities and personal factors that may impact the plan of care Co-morbidities:   Anxiety or Panic Disorders, BMI over 30, High Blood Pressure    Personal Factors:   lifestyle     moderate   Examination  Body Structures and Functions, activity limitations and participation restrictions that may impact the plan of care Body Regions:   upper extremities    Body Systems:    ROM  strength  transfers  motor control  edema  scar formation    Participation Restrictions:   See above    Activity limitations:   Learning and applying knowledge  no deficits    General Tasks and Commands  no deficits    Communication  no deficits    Mobility  lifting and carrying objects  driving (bike, car, motorcycle)    Self care  washing oneself (bathing, drying, washing hands)  toileting  dressing    Domestic Life  shopping  cooking  doing house work (cleaning house, washing dishes, laundry)  assisting others    Interactions/Relationships  no deficits    Life Areas  employment    Community and Social Life  community life  recreation and leisure         moderate   Clinical Presentation stable and uncomplicated low   Decision Making/ Complexity Score: low     Goals:  Short Term Goals: 3 weeks   1.  Patient will demonstrate understanding of and compliance with home exercise program.   2.  Patient will demonstrate >140 degrees of right shoulder passive FE for improved overhead reaching range of  motion.   3.  Patient will demonstrate at least 60 degrees of right shoulder external rotation in scapular plane from improved activities of daily living tolerance.     Long Term Goals: 12-16 weeks   1. Patient will demonstrate >150 degrees of active right shoulder FE without compensatory hiking for overhead reaching.   2. Patient will demonstrate >3+/5 right shoulder FE, external rotation, internal rotation strength for independent job performance.   3. Patient will demonstrate <21% FOTO score.   4. Patient will demonstrate >20 point improvement on DASH score.    Plan   Certification Period/Plan of care expiration: 5/23/2023 to 08/25/2023.    Outpatient Physical Therapy 1 times weekly for 12 weeks to include the following interventions: Electrical Stimulation IFC, Manual Therapy, Moist Heat/ Ice, Neuromuscular Re-ed, Orthotic Management and Training, Patient Education, Self Care, Therapeutic Activities, and Therapeutic Exercise.     Nixon Lowery, PT, DPT, OCS

## 2023-05-26 ENCOUNTER — TELEPHONE (OUTPATIENT)
Dept: ORTHOPEDICS | Facility: CLINIC | Age: 48
End: 2023-05-26
Payer: COMMERCIAL

## 2023-05-30 ENCOUNTER — PATIENT MESSAGE (OUTPATIENT)
Dept: SLEEP MEDICINE | Facility: CLINIC | Age: 48
End: 2023-05-30
Payer: COMMERCIAL

## 2023-05-30 ENCOUNTER — OFFICE VISIT (OUTPATIENT)
Dept: OTOLARYNGOLOGY | Facility: CLINIC | Age: 48
End: 2023-05-30
Payer: COMMERCIAL

## 2023-05-30 VITALS
DIASTOLIC BLOOD PRESSURE: 103 MMHG | BODY MASS INDEX: 32.13 KG/M2 | WEIGHT: 211.31 LBS | HEART RATE: 99 BPM | SYSTOLIC BLOOD PRESSURE: 153 MMHG

## 2023-05-30 DIAGNOSIS — J34.89 NASAL VESTIBULITIS: Primary | ICD-10-CM

## 2023-05-30 DIAGNOSIS — J01.00 ACUTE NON-RECURRENT MAXILLARY SINUSITIS: ICD-10-CM

## 2023-05-30 DIAGNOSIS — Z72.0 TOBACCO ABUSE: ICD-10-CM

## 2023-05-30 DIAGNOSIS — R04.0 EPISTAXIS: ICD-10-CM

## 2023-05-30 PROCEDURE — 3080F DIAST BP >= 90 MM HG: CPT | Mod: CPTII,S$GLB,, | Performed by: OTOLARYNGOLOGY

## 2023-05-30 PROCEDURE — 3077F PR MOST RECENT SYSTOLIC BLOOD PRESSURE >= 140 MM HG: ICD-10-PCS | Mod: CPTII,S$GLB,, | Performed by: OTOLARYNGOLOGY

## 2023-05-30 PROCEDURE — 99999 PR PBB SHADOW E&M-EST. PATIENT-LVL III: ICD-10-PCS | Mod: PBBFAC,,, | Performed by: OTOLARYNGOLOGY

## 2023-05-30 PROCEDURE — 1159F PR MEDICATION LIST DOCUMENTED IN MEDICAL RECORD: ICD-10-PCS | Mod: CPTII,S$GLB,, | Performed by: OTOLARYNGOLOGY

## 2023-05-30 PROCEDURE — 1159F MED LIST DOCD IN RCRD: CPT | Mod: CPTII,S$GLB,, | Performed by: OTOLARYNGOLOGY

## 2023-05-30 PROCEDURE — 3077F SYST BP >= 140 MM HG: CPT | Mod: CPTII,S$GLB,, | Performed by: OTOLARYNGOLOGY

## 2023-05-30 PROCEDURE — 3044F HG A1C LEVEL LT 7.0%: CPT | Mod: CPTII,S$GLB,, | Performed by: OTOLARYNGOLOGY

## 2023-05-30 PROCEDURE — 3080F PR MOST RECENT DIASTOLIC BLOOD PRESSURE >= 90 MM HG: ICD-10-PCS | Mod: CPTII,S$GLB,, | Performed by: OTOLARYNGOLOGY

## 2023-05-30 PROCEDURE — 3008F BODY MASS INDEX DOCD: CPT | Mod: CPTII,S$GLB,, | Performed by: OTOLARYNGOLOGY

## 2023-05-30 PROCEDURE — 4010F ACE/ARB THERAPY RXD/TAKEN: CPT | Mod: CPTII,S$GLB,, | Performed by: OTOLARYNGOLOGY

## 2023-05-30 PROCEDURE — 3044F PR MOST RECENT HEMOGLOBIN A1C LEVEL <7.0%: ICD-10-PCS | Mod: CPTII,S$GLB,, | Performed by: OTOLARYNGOLOGY

## 2023-05-30 PROCEDURE — 99213 OFFICE O/P EST LOW 20 MIN: CPT | Mod: S$GLB,,, | Performed by: OTOLARYNGOLOGY

## 2023-05-30 PROCEDURE — 4010F PR ACE/ARB THEARPY RXD/TAKEN: ICD-10-PCS | Mod: CPTII,S$GLB,, | Performed by: OTOLARYNGOLOGY

## 2023-05-30 PROCEDURE — 3008F PR BODY MASS INDEX (BMI) DOCUMENTED: ICD-10-PCS | Mod: CPTII,S$GLB,, | Performed by: OTOLARYNGOLOGY

## 2023-05-30 PROCEDURE — 99213 PR OFFICE/OUTPT VISIT, EST, LEVL III, 20-29 MIN: ICD-10-PCS | Mod: S$GLB,,, | Performed by: OTOLARYNGOLOGY

## 2023-05-30 PROCEDURE — 99999 PR PBB SHADOW E&M-EST. PATIENT-LVL III: CPT | Mod: PBBFAC,,, | Performed by: OTOLARYNGOLOGY

## 2023-05-30 RX ORDER — MUPIROCIN 20 MG/G
OINTMENT TOPICAL 2 TIMES DAILY
Qty: 15 G | Refills: 3 | Status: SHIPPED | OUTPATIENT
Start: 2023-05-30 | End: 2023-06-09

## 2023-05-30 RX ORDER — SULFAMETHOXAZOLE AND TRIMETHOPRIM 800; 160 MG/1; MG/1
1 TABLET ORAL 2 TIMES DAILY
Qty: 20 TABLET | Refills: 1 | Status: SHIPPED | OUTPATIENT
Start: 2023-05-30 | End: 2023-06-09

## 2023-05-30 NOTE — PATIENT INSTRUCTIONS
Clean nasal area twice daily with peroxide and qtip.    Apply mupirocin ointment 2-3 times daily to both nostrils.      Start bactrim twice daily for 10 days.  Refill and continue if not totally resolved after 10 days.

## 2023-05-30 NOTE — PROGRESS NOTES
Chief Complaint   Patient presents with    Nasal Congestion     Left nasal    Epistaxis     Left nasal, started Easter       HPI:  Patient is a 47 y.o. male who has previously seen me for nasal congestion, rhinitis. He is still smoking.  Had recent rotator cuff surgery and cellulitic infection around one wound site.  He is currently finishing a course of Keflex.      Since the last visit, the patient reports that in April he began with nasal congestion, purulent mucus, and irritation in the left nostril, as well as bleeding from the left nostril on occasion.  He has been using vaseline in the nose, but no other nasal sprays or medications.    Active Ambulatory Problems     Diagnosis Date Noted    PSVT (paroxysmal supraventricular tachycardia) 02/04/2020    Essential hypertension 02/04/2020    SVT (supraventricular tachycardia) 06/08/2020    Lateral epicondylitis of left elbow 08/11/2020    Other hyperlipidemia 11/04/2020    Situational anxiety 06/26/2021    SCOTTIE (obstructive sleep apnea)     Medial epicondylitis of right elbow 03/10/2022    Tobacco dependence 03/21/2022    Complete tear of right rotator cuff 05/09/2023    Acute pain of right shoulder 05/25/2023    Decreased strength of upper extremity 05/25/2023     Resolved Ambulatory Problems     Diagnosis Date Noted    Left elbow pain 07/06/2020    Stiffness in joint 07/06/2021    Muscle weakness 07/06/2021    Arthralgia of left elbow 07/06/2021     Past Medical History:   Diagnosis Date    Anxiety     Hypertension     Supraventricular tachycardia        Review of Systems  General: negative for chills, fever or weight loss  Psychological: negative for mood changes or depression  Ophthalmic: negative for blurry vision, photophobia or eye pain  ENT: see HPI  Respiratory: no cough, shortness of breath, or wheezing  Cardiovascular: no chest pain or dyspnea on exertion  Gastrointestinal: no abdominal pain, change in bowel habits, or black/ bloody  stools  Musculoskeletal: negative for gait disturbance or muscular weakness  Neurological: no syncope or seizures; no ataxia  Dermatological: negative for pruritis, rash and jaundice  Hematologic/lymphatic: no easy bruising, no new adenopathy    Physical Exam     Vitals:    05/30/23 0922   BP: (!) 153/103   Pulse: 99         Constitutional:   He is oriented to person, place, and time. Vital signs are normal. He appears well-developed and well-nourished. He appears alert. He is cooperative. Normal speech.      Head:  Normocephalic and atraumatic. Salivary glands normal.  Facial strength is normal.      Ears:  Hearing normal to normal and whispered voice; external ear normal without scars, lesions, or masses; ear canal, tympanic membrane, and middle ear normal., right ear hearing normal to normal and whispered voice; external ear normal without scars, lesions, or masses; ear canal, tympanic membrane, and middle ear normal. and left ear hearing normal to normal and whispered voice; external ear normal without scars, lesions, or masses; ear canal, tympanic membrane, and middle ear normal..   Right Ear: No middle ear effusion.   Left Ear:  No middle ear effusion.     Nose:  Mucosal edema, rhinorrhea (scant purulent drainage noted in bilateral nasal cavities) and sinus tenderness present. No septal deviation or polyps. Epistaxis (dried blood in nasal vestibule, no active bleeding or prominent vessels) is observed. Turbinates abnormal and turbinate hypertrophy (3+, boggy, congested mucosa).  Right sinus exhibits no maxillary sinus tenderness and no frontal sinus tenderness. Left sinus exhibits no maxillary sinus tenderness and no frontal sinus tenderness.   Crusted area around base of the left nostril, no significant surrounding erythema,edema, or induration; sloughed skin and scant bleeding after cleaning the crusts with peroxide    Mouth/Throat  Oropharynx clear and moist without lesions or asymmetry, lips, teeth, and  gums normal and oropharynx normal. No mucous membrane lesions. No oropharyngeal exudate, posterior oropharyngeal edema or posterior oropharyngeal erythema. Tonsillar erythema, tonsillar exudate.  Mirror exam not performed due to patient tolerance.  Mirror exam not performed due to patient tolerance.      Neck:  Neck normal without thyromegaly masses, asymmetry, normal tracheal structure, crepitus, and tenderness, thyroid normal, trachea normal, phonation normal, full range of motion with neck supple and no adenopathy. No JVD present. Carotid bruit is not present. No thyroid mass and no thyromegaly present.     He has no cervical adenopathy.     Cardiovascular:    Normal rate, regular rhythm and rate and rhythm, heart sounds, and pulses normal.              Pulmonary/Chest:   Effort and breath sounds normal.     Psychiatric:   He has a normal mood and affect. His speech is normal and behavior is normal.     Neurological:   He is alert and oriented to person, place, and time. He has neurological normal, alert and oriented. No cranial nerve deficit.     Skin:   No abrasions, lacerations, lesions, or rashes.         Assessment/Plan:      ICD-10-CM ICD-9-CM    1. Nasal vestibulitis  J34.89 478.19 mupirocin (BACTROBAN) 2 % ointment      sulfamethoxazole-trimethoprim 800-160mg (BACTRIM DS) 800-160 mg Tab      2. Tobacco abuse  Z72.0 305.1       3. Acute non-recurrent maxillary sinusitis  J01.00 461.0       4. Epistaxis  R04.0 784.7             Finish keflex.  Start bactrim DS.  Cleanse area gentle BID and apply mupirocin BID or TID.  Gave instructions on prevention of nasal drying.      Follow up in 6-8 weeks.      Bria Marroquin MD  [facility]

## 2023-06-05 ENCOUNTER — OFFICE VISIT (OUTPATIENT)
Dept: ORTHOPEDICS | Facility: CLINIC | Age: 48
End: 2023-06-05
Payer: COMMERCIAL

## 2023-06-05 VITALS — HEIGHT: 68 IN | BODY MASS INDEX: 32.13 KG/M2

## 2023-06-05 DIAGNOSIS — M75.121 NONTRAUMATIC COMPLETE TEAR OF RIGHT ROTATOR CUFF: Primary | ICD-10-CM

## 2023-06-05 PROCEDURE — 3044F PR MOST RECENT HEMOGLOBIN A1C LEVEL <7.0%: ICD-10-PCS | Mod: CPTII,S$GLB,, | Performed by: ORTHOPAEDIC SURGERY

## 2023-06-05 PROCEDURE — 3044F HG A1C LEVEL LT 7.0%: CPT | Mod: CPTII,S$GLB,, | Performed by: ORTHOPAEDIC SURGERY

## 2023-06-05 PROCEDURE — 99024 PR POST-OP FOLLOW-UP VISIT: ICD-10-PCS | Mod: S$GLB,,, | Performed by: ORTHOPAEDIC SURGERY

## 2023-06-05 PROCEDURE — 99999 PR PBB SHADOW E&M-EST. PATIENT-LVL III: CPT | Mod: PBBFAC,,, | Performed by: ORTHOPAEDIC SURGERY

## 2023-06-05 PROCEDURE — 4010F PR ACE/ARB THEARPY RXD/TAKEN: ICD-10-PCS | Mod: CPTII,S$GLB,, | Performed by: ORTHOPAEDIC SURGERY

## 2023-06-05 PROCEDURE — 3008F PR BODY MASS INDEX (BMI) DOCUMENTED: ICD-10-PCS | Mod: CPTII,S$GLB,, | Performed by: ORTHOPAEDIC SURGERY

## 2023-06-05 PROCEDURE — 99024 POSTOP FOLLOW-UP VISIT: CPT | Mod: S$GLB,,, | Performed by: ORTHOPAEDIC SURGERY

## 2023-06-05 PROCEDURE — 99999 PR PBB SHADOW E&M-EST. PATIENT-LVL III: ICD-10-PCS | Mod: PBBFAC,,, | Performed by: ORTHOPAEDIC SURGERY

## 2023-06-05 PROCEDURE — 4010F ACE/ARB THERAPY RXD/TAKEN: CPT | Mod: CPTII,S$GLB,, | Performed by: ORTHOPAEDIC SURGERY

## 2023-06-05 PROCEDURE — 3008F BODY MASS INDEX DOCD: CPT | Mod: CPTII,S$GLB,, | Performed by: ORTHOPAEDIC SURGERY

## 2023-06-05 PROCEDURE — 1159F MED LIST DOCD IN RCRD: CPT | Mod: CPTII,S$GLB,, | Performed by: ORTHOPAEDIC SURGERY

## 2023-06-05 PROCEDURE — 1159F PR MEDICATION LIST DOCUMENTED IN MEDICAL RECORD: ICD-10-PCS | Mod: CPTII,S$GLB,, | Performed by: ORTHOPAEDIC SURGERY

## 2023-06-05 NOTE — PROGRESS NOTES
Subjective:      Patient ID: Enio Earl is a 47 y.o. male.  Chief Complaint: Post-op Evaluation (right shoulder ( Sx 5/9))      HPI  Enio Earl is a  47 y.o. male presenting today for post op visit.  He is s/p right shoulder arthroscopy with rotator cuff repair he is now about 4 weeks postop  He is doing well he has started physical therapy.     Review of patient's allergies indicates:  No Known Allergies      Current Outpatient Medications   Medication Sig Dispense Refill    atorvastatin (LIPITOR) 10 MG tablet Take 1 tablet (10 mg total) by mouth once daily. 90 tablet 1    HYDROcodone-acetaminophen (NORCO) 5-325 mg per tablet Take 1 tablet by mouth every 8 (eight) hours as needed for Pain (as needed for severe pain only). 21 tablet 0    losartan (COZAAR) 100 MG tablet Take 1 tablet (100 mg total) by mouth once daily. 90 tablet 1    minocycline (MINOCIN,DYNACIN) 100 MG capsule Take 1 capsule (100 mg total) by mouth 2 (two) times a day. 28 capsule 1    mupirocin (BACTROBAN) 2 % ointment by Nasal route 2 (two) times daily. Apply to nose twice daily for 10 days 15 g 3    naproxen (EC-NAPROSYN) 375 MG TbEC EC tablet TAKE 1 TABLET BY MOUTH 2 TIMES DAILY. 60 tablet 1    nicotine (NICODERM CQ) 21 mg/24 hr Place 1 patch onto the skin once daily. 28 patch 0    sertraline (ZOLOFT) 25 MG tablet Take 1 tablet (25 mg total) by mouth once daily. 90 tablet 1    sulfamethoxazole-trimethoprim 800-160mg (BACTRIM DS) 800-160 mg Tab Take 1 tablet by mouth 2 (two) times daily. for 10 days 20 tablet 1    traMADoL (ULTRAM) 50 mg tablet TAKE 1 TABLET BY MOUTH EVERY 6 HOURS AS NEEDED FOR PAIN 30 tablet 0    triamcinolone acetonide 0.1% (KENALOG) 0.1 % cream Apply topically 2 (two) times daily. 30 g 3    verapamiL (CALAN-SR) 120 MG CR tablet Take 1 tablet (120 mg total) by mouth every evening. 90 tablet 0     No current facility-administered medications for this visit.       Past Medical History:   Diagnosis Date    Anxiety      "Hypertension     Supraventricular tachycardia        Past Surgical History:   Procedure Laterality Date    ARTHROSCOPIC REPAIR OF ROTATOR CUFF OF SHOULDER Right 5/9/2023    Procedure: REPAIR, ROTATOR CUFF, ARTHROSCOPIC;  Surgeon: Cristi Adam Jr., MD;  Location: [facility] OR;  Service: Orthopedics;  Laterality: Right;  need opsu system muna notified cc    ARTHROSCOPY OF SHOULDER WITH DECOMPRESSION OF SUBACROMIAL SPACE Right 5/9/2023    Procedure: ARTHROSCOPY, SHOULDER, WITH SUBACROMIAL SPACE DECOMPRESSION;  Surgeon: Cristi Adam Jr., MD;  Location: [facility] OR;  Service: Orthopedics;  Laterality: Right;    CARDIAC ELECTROPHYSIOLOGY MAPPING AND ABLATION  07/2020    for PSVT by Dr. Lim     LATERAL EPICONDYLE RELEASE Left 5/18/2021    Procedure: RELEASE, ELBOW, LATERAL EPICONDYLE;  Surgeon: Cristi Adam Jr., MD;  Location: [facility] OR;  Service: Orthopedics;  Laterality: Left;    NOSE SURGERY      sepltoplasty       OBJECTIVE:   PHYSICAL EXAM:  Height: 5' 8" (172.7 cm)    Vitals:    06/05/23 1517   Height: 5' 8" (1.727 m)   PainSc:   8   PainLoc: Shoulder     Ortho/SPM Exam  Examination right shoulder the incisions are all well healed no evidence of infection range of motion shoulder is excellent minimal pain    RADIOGRAPHS:  None  Comments: I have personally reviewed the imaging and I agree with the above radiologist's report.    ASSESSMENT/PLAN:     IMPRESSION:  Status post rotator cuff repair right shoulder    PLAN:  He can discontinue the sling at home still wear the sling when he is out in public, no heavy lifting or overhead use  Continue therapy  Advil or Motrin as needed    FOLLOW UP:  3-4 weeks    Disclaimer: This note has been generated using voice-recognition software. There may be typographical errors that have been missed during proof-reading.     "

## 2023-06-07 ENCOUNTER — CLINICAL SUPPORT (OUTPATIENT)
Dept: REHABILITATION | Facility: HOSPITAL | Age: 48
End: 2023-06-07
Payer: COMMERCIAL

## 2023-06-07 DIAGNOSIS — M25.511 ACUTE PAIN OF RIGHT SHOULDER: Primary | ICD-10-CM

## 2023-06-07 DIAGNOSIS — R29.898 DECREASED STRENGTH OF UPPER EXTREMITY: ICD-10-CM

## 2023-06-07 PROCEDURE — 97140 MANUAL THERAPY 1/> REGIONS: CPT | Mod: PN

## 2023-06-07 PROCEDURE — 97112 NEUROMUSCULAR REEDUCATION: CPT | Mod: PN

## 2023-06-07 PROCEDURE — 97110 THERAPEUTIC EXERCISES: CPT | Mod: PN

## 2023-06-07 NOTE — PROGRESS NOTES
"[facility]   Physical Therapy Treatment Note      Name: Enio Earl  Clinic Number: [id number]    Therapy Diagnosis:   Encounter Diagnoses   Name Primary?    Acute pain of right shoulder Yes    Decreased strength of upper extremity      Physician: Sarah Zamorano PA-C    Visit Date: 6/7/2023    Physician Orders: PT Eval and Treat  Medical Diagnosis: Z98.890 (ICD-10-CM) - S/P rotator cuff repair  Evaluation Date: 5/23/2023  Authorization Period: 12/31/2023  Plan of Care Certification Period: 5/23/2023 to 08/25/2023  Visit # / Visits authorized: 1/20 (+eval)  FOTO:  2/8  PTA visits: 0/5     Time In: 1500  Time Out: 1545  Total Billable Time: 45 minutes (1 TE, 1 NM, 1 MT)  Precautions: post-op RC repair.      OPERATIVE PROCEDURES:  1.  right shoulder arthroscopy with subacromial decompression.  2.  right shoulder arthroscopic rotator cuff repair using Opus suture anchor X 1    Subjective     Pt reports: no new complaints; occasional grabbing pain but otherwise pain levels low.  No night pain.  No wearing sling at home.   He was compliant with home exercise program.  Response to previous treatment: eval and HeP  Functional change: none voiced    Pain: 1/10  Location: right shoulder     Objective      Objective Measures updated at progress report unless specified.     Treatment     Enio received the treatments listed below:      Enio received the following manual therapy techniques: total time 10 minutes:  - passive range of motion check.   - passive range of motion 4D submax    Enio received therapeutic exercises to develop strength, endurance, and ROM for 25 minutes including:  Pendulum hangs 3x30"  Supine wand FE 90-full 2x10  Supine wand press 2x15   Elbow flexion 3# 2x12    Neuromuscular re-education: total time 10 minutes, including:  Prone extension 2x10 1#  Prone rows 2x10 2#       Patient Education and Home Exercises       Education provided:   - home exercise program: " supine wand press    Written Home Exercises Provided: Patient instructed to cont prior HEP. Exercises were reviewed and Enio was able to demonstrate them prior to the end of the session.  Enio demonstrated good  understanding of the education provided. See EMR under Patient Instructions for exercises provided during therapy sessions    Assessment   A: Enio is a 47 y.o. male referred to outpatient Physical Therapy with a medical diagnosis of Z98.890 (ICD-10-CM) - S/P rotator cuff repair including SAD.  Small size ?; 1 anchor.  Sx date: 05/09/2023.  Post-op week 4 weeks (POD 29). Passive range of motion check good; normal FE. Good scapular activation.  Patient doing more than recommended at home/work with shoulder but no/minimal pain and sleeping well.  Initiated light AA activities today with EMG level <15%.   Again re-emphasized need to protect his surgery. Overall doing well.  Will see in 1 week.      Enio Is progressing well towards his goals.   Pt prognosis is Excellent.     Pt will continue to benefit from skilled outpatient physical therapy to address the deficits listed in the problem list box on initial evaluation, provide pt/family education and to maximize pt's level of independence in the home and community environment.   Pt's spiritual, cultural and educational needs considered and pt agreeable to plan of care and goals.     Anticipated barriers to physical therapy: co-morbidities, eagerness to RTW.     Goals:   Short Term Goals: 3 weeks   1.  Patient will demonstrate understanding of and compliance with home exercise program. progressing towards; not met  2.  Patient will demonstrate >140 degrees of right shoulder passive FE for improved overhead reaching range of motion. progressing towards; not met  3.  Patient will demonstrate at least 60 degrees of right shoulder external rotation in scapular plane from improved activities of daily living tolerance. progressing towards; not met     Long  Term Goals: 12-16 weeks   1. Patient will demonstrate >150 degrees of active right shoulder FE without compensatory hiking for overhead reaching. progressing towards; not met  2. Patient will demonstrate >3+/5 right shoulder FE, external rotation, internal rotation strength for independent job performance. progressing towards; not met  3. Patient will demonstrate <21% FOTO score. progressing towards; not met  4. Patient will demonstrate >20 point improvement on DASH score.  progressing towards; not met       Plan   Phase II/moderate protective phase/ active assisted range of motion phase    Nixon Lowery, PT, DPT, OCS

## 2023-06-11 DIAGNOSIS — E78.49 OTHER HYPERLIPIDEMIA: ICD-10-CM

## 2023-06-11 NOTE — TELEPHONE ENCOUNTER
No care due was identified.  [facility] Embedded Care Due Messages. Reference number: [id number].   6/11/2023 1:24:27 PM CDT

## 2023-06-12 RX ORDER — ATORVASTATIN CALCIUM 10 MG/1
TABLET, FILM COATED ORAL
Qty: 90 TABLET | Refills: 0 | Status: SHIPPED | OUTPATIENT
Start: 2023-06-12 | End: 2023-08-11 | Stop reason: SDUPTHER

## 2023-06-12 NOTE — TELEPHONE ENCOUNTER
Refill Decision Note   Enio Mady  is requesting a refill authorization.  Brief Assessment and Rationale for Refill:  Approve     Medication Therapy Plan:  ED visit reviewed; approved 90+0 to bridge to FOV 8/9/23      Extended chart review required: Yes   Comments:     Note composed:8:02 AM 06/12/2023             Appointments     Last Visit   2/15/2023 Bhupinder Bowen,    Next Visit   8/9/2023 Bhupinder Bowen DO

## 2023-06-14 ENCOUNTER — CLINICAL SUPPORT (OUTPATIENT)
Dept: REHABILITATION | Facility: HOSPITAL | Age: 48
End: 2023-06-14
Payer: COMMERCIAL

## 2023-06-14 DIAGNOSIS — R29.898 DECREASED STRENGTH OF UPPER EXTREMITY: ICD-10-CM

## 2023-06-14 DIAGNOSIS — M25.511 ACUTE PAIN OF RIGHT SHOULDER: Primary | ICD-10-CM

## 2023-06-14 PROCEDURE — 97112 NEUROMUSCULAR REEDUCATION: CPT | Mod: PN

## 2023-06-14 PROCEDURE — 97140 MANUAL THERAPY 1/> REGIONS: CPT | Mod: PN

## 2023-06-14 PROCEDURE — 97110 THERAPEUTIC EXERCISES: CPT | Mod: PN
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
## 2023-06-19 ENCOUNTER — PATIENT MESSAGE (OUTPATIENT)
Dept: ORTHOPEDICS | Facility: CLINIC | Age: 48
End: 2023-06-19
Payer: COMMERCIAL

## 2023-06-19 DIAGNOSIS — I10 ESSENTIAL HYPERTENSION: ICD-10-CM

## 2023-06-19 DIAGNOSIS — I47.10 PSVT (PAROXYSMAL SUPRAVENTRICULAR TACHYCARDIA): ICD-10-CM

## 2023-06-19 DIAGNOSIS — I47.10 SVT (SUPRAVENTRICULAR TACHYCARDIA): ICD-10-CM

## 2023-06-19 RX ORDER — VERAPAMIL HYDROCHLORIDE 120 MG/1
TABLET, FILM COATED, EXTENDED RELEASE ORAL
Qty: 90 TABLET | Refills: 0 | Status: SHIPPED | OUTPATIENT
Start: 2023-06-19 | End: 2023-08-11 | Stop reason: SDUPTHER

## 2023-06-19 NOTE — TELEPHONE ENCOUNTER
No care due was identified.  [facility] Embedded Care Due Messages. Reference number: [id number].   6/19/2023 12:22:53 AM CDT

## 2023-06-20 ENCOUNTER — PATIENT MESSAGE (OUTPATIENT)
Dept: ORTHOPEDICS | Facility: CLINIC | Age: 48
End: 2023-06-20
Payer: COMMERCIAL

## 2023-06-21 ENCOUNTER — OFFICE VISIT (OUTPATIENT)
Dept: ORTHOPEDICS | Facility: CLINIC | Age: 48
End: 2023-06-21
Payer: COMMERCIAL

## 2023-06-21 ENCOUNTER — HOSPITAL ENCOUNTER (OUTPATIENT)
Dept: RADIOLOGY | Facility: HOSPITAL | Age: 48
Discharge: HOME | End: 2023-06-21
Attending: ORTHOPAEDIC SURGERY
Payer: COMMERCIAL

## 2023-06-21 VITALS — BODY MASS INDEX: 32.01 KG/M2 | HEIGHT: 68 IN | WEIGHT: 211.19 LBS

## 2023-06-21 DIAGNOSIS — Z98.890 S/P ROTATOR CUFF REPAIR: Primary | ICD-10-CM

## 2023-06-21 DIAGNOSIS — Z98.890 S/P ROTATOR CUFF REPAIR: ICD-10-CM

## 2023-06-21 PROCEDURE — 99024 PR POST-OP FOLLOW-UP VISIT: ICD-10-PCS | Mod: S$GLB,,, | Performed by: ORTHOPAEDIC SURGERY

## 2023-06-21 PROCEDURE — 99999 PR PBB SHADOW E&M-EST. PATIENT-LVL III: CPT | Mod: PBBFAC,,, | Performed by: ORTHOPAEDIC SURGERY

## 2023-06-21 PROCEDURE — 99999 PR PBB SHADOW E&M-EST. PATIENT-LVL III: ICD-10-PCS | Mod: PBBFAC,,, | Performed by: ORTHOPAEDIC SURGERY

## 2023-06-21 PROCEDURE — 3008F BODY MASS INDEX DOCD: CPT | Mod: CPTII,S$GLB,, | Performed by: ORTHOPAEDIC SURGERY

## 2023-06-21 PROCEDURE — 99024 POSTOP FOLLOW-UP VISIT: CPT | Mod: S$GLB,,, | Performed by: ORTHOPAEDIC SURGERY

## 2023-06-21 PROCEDURE — 3044F HG A1C LEVEL LT 7.0%: CPT | Mod: CPTII,S$GLB,, | Performed by: ORTHOPAEDIC SURGERY

## 2023-06-21 PROCEDURE — 73030 X-RAY EXAM OF SHOULDER: CPT | Mod: 26,RT,, | Performed by: RADIOLOGY

## 2023-06-21 PROCEDURE — 73030 XR SHOULDER COMPLETE 2 OR MORE VIEWS RIGHT: ICD-10-PCS | Mod: 26,RT,, | Performed by: RADIOLOGY

## 2023-06-21 PROCEDURE — 73030 X-RAY EXAM OF SHOULDER: CPT | Mod: TC,FY,RT

## 2023-06-21 PROCEDURE — 1159F MED LIST DOCD IN RCRD: CPT | Mod: CPTII,S$GLB,, | Performed by: ORTHOPAEDIC SURGERY

## 2023-06-21 PROCEDURE — 1159F PR MEDICATION LIST DOCUMENTED IN MEDICAL RECORD: ICD-10-PCS | Mod: CPTII,S$GLB,, | Performed by: ORTHOPAEDIC SURGERY

## 2023-06-21 PROCEDURE — 4010F ACE/ARB THERAPY RXD/TAKEN: CPT | Mod: CPTII,S$GLB,, | Performed by: ORTHOPAEDIC SURGERY

## 2023-06-21 PROCEDURE — 3044F PR MOST RECENT HEMOGLOBIN A1C LEVEL <7.0%: ICD-10-PCS | Mod: CPTII,S$GLB,, | Performed by: ORTHOPAEDIC SURGERY

## 2023-06-21 PROCEDURE — 3008F PR BODY MASS INDEX (BMI) DOCUMENTED: ICD-10-PCS | Mod: CPTII,S$GLB,, | Performed by: ORTHOPAEDIC SURGERY

## 2023-06-21 PROCEDURE — 4010F PR ACE/ARB THEARPY RXD/TAKEN: ICD-10-PCS | Mod: CPTII,S$GLB,, | Performed by: ORTHOPAEDIC SURGERY

## 2023-06-21 RX ORDER — INDOMETHACIN 50 MG/1
50 CAPSULE ORAL 2 TIMES DAILY WITH MEALS
Qty: 28 CAPSULE | Refills: 0 | Status: SHIPPED | OUTPATIENT
Start: 2023-06-21 | End: 2023-07-05

## 2023-06-21 RX ORDER — HYDROCODONE BITARTRATE AND ACETAMINOPHEN 5; 325 MG/1; MG/1
1 TABLET ORAL EVERY 8 HOURS PRN
Qty: 21 TABLET | Refills: 0 | Status: SHIPPED | OUTPATIENT
Start: 2023-06-21 | End: 2023-06-28

## 2023-06-21 NOTE — PROGRESS NOTES
Subjective:      Patient ID: Enio Earl is a 47 y.o. male.    Chief Complaint: Shoulder Pain (right )      HPI: Enio Earl is here for postop follow-up.  He is approximately 6 weeks status post right shoulder arthroscopy with subacromial decompression and rotator cuff repair with 1 suture anchor.  He is currently in physical therapy and reports he was doing very well until recently.  Over the weekend he had increased right shoulder pain and throbbing which is new since surgery.  Pain disrupts sleep.  Denies any numbness and tingling.  Denies any relevant history of injury or aggravation to start the symptoms.    Past Medical History:   Diagnosis Date    Anxiety     Hypertension     Supraventricular tachycardia        Current Outpatient Medications:     atorvastatin (LIPITOR) 10 MG tablet, TAKE 1 TABLET BY MOUTH EVERY DAY, Disp: 90 tablet, Rfl: 0    losartan (COZAAR) 100 MG tablet, Take 1 tablet (100 mg total) by mouth once daily., Disp: 90 tablet, Rfl: 1    minocycline (MINOCIN,DYNACIN) 100 MG capsule, Take 1 capsule (100 mg total) by mouth 2 (two) times a day., Disp: 28 capsule, Rfl: 1    naproxen (EC-NAPROSYN) 375 MG TbEC EC tablet, TAKE 1 TABLET BY MOUTH 2 TIMES DAILY., Disp: 60 tablet, Rfl: 1    sertraline (ZOLOFT) 25 MG tablet, Take 1 tablet (25 mg total) by mouth once daily., Disp: 90 tablet, Rfl: 1    triamcinolone acetonide 0.1% (KENALOG) 0.1 % cream, Apply topically 2 (two) times daily., Disp: 30 g, Rfl: 3    verapamiL (CALAN-SR) 120 MG CR tablet, TAKE 1 TABLET BY MOUTH EVERYDAY AT BEDTIME, Disp: 90 tablet, Rfl: 0    HYDROcodone-acetaminophen (NORCO) 5-325 mg per tablet, Take 1 tablet by mouth every 8 (eight) hours as needed for Pain (as needed for severe pain only). (Patient not taking: Reported on 6/21/2023), Disp: 21 tablet, Rfl: 0    indomethacin (INDOCIN) 50 MG capsule, Take 1 capsule (50 mg total) by mouth 2 (two) times daily with meals. for 14 days, Disp: 28 capsule, Rfl: 0    nicotine  "(NICODERM CQ) 21 mg/24 hr, Place 1 patch onto the skin once daily. (Patient not taking: Reported on 6/21/2023), Disp: 28 patch, Rfl: 0  Review of patient's allergies indicates:  No Known Allergies    Ht 5' 8" (1.727 m)   Wt 95.8 kg (211 lb 3.2 oz)   BMI 32.11 kg/m²     ROS      Objective:    Ortho Exam   GEN: Well developed, well nourished male. AAOX3. No acute distress.   Normocephalic, atraumatic.   RUBEN  Breathing unlabored.  Mood and affect appropriate.     RIGHT SHOULDER:  Skin:  healed scars .  Atrophy: none noted.  Tenderness to palpation: AC Joint and Biceps Tendon.  AROM (deg): abduction- 90, flexion-110, rotation- unrestricted, painful rotation- absent.  Rotator cuff: limited by pain          Assessment:     Imaging:  I have personally reviewed and interpreted the radiographs. Xray of the right shoulder shows rotator cuff anchor well-fixed within bone          1. S/P rotator cuff repair          Plan:       Patient reports increased right shoulder pain over the last few days and was concerned for damage to the repair  The x-ray shows a well-fixed bone anchor, he still has good range of motion.  At this time, my suspicion for damage to the repair is very low  Recommend he rest from physical therapy this week  Start Indocin, wear the sling for activities  Anticipate improvement over the next week  Refill Norco before bed as needed    Orders Placed This Encounter    indomethacin (INDOCIN) 50 MG capsule     Follow up in about 1 week (around 6/28/2023) for Virtual Visit.            "

## 2023-06-28 ENCOUNTER — CLINICAL SUPPORT (OUTPATIENT)
Dept: REHABILITATION | Facility: HOSPITAL | Age: 48
End: 2023-06-28
Payer: COMMERCIAL

## 2023-06-28 DIAGNOSIS — R29.898 DECREASED STRENGTH OF UPPER EXTREMITY: ICD-10-CM

## 2023-06-28 DIAGNOSIS — M25.511 ACUTE PAIN OF RIGHT SHOULDER: Primary | ICD-10-CM

## 2023-06-28 PROCEDURE — 97140 MANUAL THERAPY 1/> REGIONS: CPT | Mod: PN

## 2023-06-28 PROCEDURE — 97112 NEUROMUSCULAR REEDUCATION: CPT | Mod: PN

## 2023-06-28 PROCEDURE — 97110 THERAPEUTIC EXERCISES: CPT | Mod: PN

## 2023-06-28 NOTE — PROGRESS NOTES
[facility]   Physical Therapy Treatment Note      Name: Enio Earl  Clinic Number: [id number]    Therapy Diagnosis:   Encounter Diagnoses   Name Primary?    Acute pain of right shoulder Yes    Decreased strength of upper extremity        Physician: Sarah Zamorano PA-C    Visit Date: 6/28/2023    Physician Orders: PT Eval and Treat  Medical Diagnosis: Z98.890 (ICD-10-CM) - S/P rotator cuff repair  Evaluation Date: 5/23/2023  Authorization Period: 12/31/2023  Plan of Care Certification Period: 5/23/2023 to 08/25/2023  Visit # / Visits authorized: 3/20 (+eval)  FOTO:  4/8  PTA visits: 0/5     Time In: 1403  Time Out: 1443  Total Billable Time: 40 minutes (1 TE, 1 NM, 1 MT)  Precautions: post-op RC repair.      OPERATIVE PROCEDURES:  1.  right shoulder arthroscopy with subacromial decompression.  2.  right shoulder arthroscopic rotator cuff repair using Opus suture anchor X 1    Subjective     Pt reports: Reports the Saturday before fathers day he started to have increased pain in his R arm that ran all the way down to his hand . States that he was barely able to sleep it hurt so much. Got in to see his ortho's PA and she told him to rest from PT for a week and gave him some anti-inflammatories. Pt states that the pain went away after he started taking the anti-inflammatories. States xray showed no anchor disruption. Denies any new or increased activity prior to onset of pain.  He was compliant with home exercise program.  Response to previous treatment: no increase in pain  Functional change: none voiced    Pain: 0/10  Location: right shoulder     Objective      Objective Measures updated at progress report unless specified.     Treatment     Enio received the treatments listed below:      Enio received the following manual therapy techniques: total time 8 minutes:  - passive range of motion check.   - passive range of motion 4D submax    Enio received therapeutic exercises  "to develop strength, endurance, and ROM for 22 minutes including:  Pendulum hangs 3x30"  Supine wand FE 90-full 3x10  Supine wand press 2x15   Elbow flexion 8# 3 x 10  Sidelying shoulder external rotation light isometric - 25% MVIC x 5  Sidelying shoulder external rotation 2 x 10 - stopping at neutral  Standing shoulder aarom flexion with wand 2 x 10   Shoulder extension 2 x 10 blue theraband - cuing to walk backwards while holding band to bring shoulder into flexed position passively    Neuromuscular re-education: total time 10 minutes, including:  Prone extension 3x10 4#  Prone rows 3x10 4#         Patient Education and Home Exercises       Education provided:   - home exercise program: supine wand press    Written Home Exercises Provided: Patient instructed to cont prior HEP. Exercises were reviewed and Enio was able to demonstrate them prior to the end of the session.  Enio demonstrated good  understanding of the education provided. See EMR under Patient Instructions for exercises provided during therapy sessions    Assessment   A: Enio is a 47 y.o. male referred to outpatient Physical Therapy with a medical diagnosis of Z98.890 (ICD-10-CM) - S/P rotator cuff repair including SAD.  Small size ?; 1 anchor.  Sx date: 05/09/2023.  Post-op week 8  (POD 50). Passive range of motion check good; normal FE. Exercises in bold were added or progressed. No reported pain or discomfort with any exercise. Appears to have recovered from flare-up. Did not wear sling today. Confirmed next appt.    Enio Is progressing well towards his goals.   Pt prognosis is Excellent.     Pt will continue to benefit from skilled outpatient physical therapy to address the deficits listed in the problem list box on initial evaluation, provide pt/family education and to maximize pt's level of independence in the home and community environment.   Pt's spiritual, cultural and educational needs considered and pt agreeable to plan of care " and goals.     Anticipated barriers to physical therapy: co-morbidities, eagerness to RTW.     Goals:   Short Term Goals: 3 weeks   1.  Patient will demonstrate understanding of and compliance with home exercise program. progressing towards; not met  2.  Patient will demonstrate >140 degrees of right shoulder passive FE for improved overhead reaching range of motion. progressing towards; not met  3.  Patient will demonstrate at least 60 degrees of right shoulder external rotation in scapular plane from improved activities of daily living tolerance. progressing towards; not met     Long Term Goals: 12-16 weeks   1. Patient will demonstrate >150 degrees of active right shoulder FE without compensatory hiking for overhead reaching. progressing towards; not met  2. Patient will demonstrate >3+/5 right shoulder FE, external rotation, internal rotation strength for independent job performance. progressing towards; not met  3. Patient will demonstrate <21% FOTO score. progressing towards; not met  4. Patient will demonstrate >20 point improvement on DASH score.  progressing towards; not met       Plan   Phase II/moderate protective phase/ active assisted range of motion phase    Anson Dotson, PT, DPT

## 2023-07-05 ENCOUNTER — CLINICAL SUPPORT (OUTPATIENT)
Dept: REHABILITATION | Facility: HOSPITAL | Age: 48
End: 2023-07-05
Payer: COMMERCIAL

## 2023-07-05 DIAGNOSIS — M25.511 ACUTE PAIN OF RIGHT SHOULDER: Primary | ICD-10-CM

## 2023-07-05 DIAGNOSIS — R29.898 DECREASED STRENGTH OF UPPER EXTREMITY: ICD-10-CM

## 2023-07-05 PROCEDURE — 97112 NEUROMUSCULAR REEDUCATION: CPT | Mod: PN

## 2023-07-05 PROCEDURE — 97140 MANUAL THERAPY 1/> REGIONS: CPT | Mod: PN

## 2023-07-05 PROCEDURE — 97110 THERAPEUTIC EXERCISES: CPT | Mod: PN

## 2023-07-05 NOTE — PROGRESS NOTES
[facility]   Physical Therapy Treatment Note      Name: Enio Earl  Clinic Number: [id number]    Therapy Diagnosis:   Encounter Diagnoses   Name Primary?    Acute pain of right shoulder Yes    Decreased strength of upper extremity        Physician: Sarah Zamorano PA-C    Visit Date: 7/5/2023    Physician Orders: PT Eval and Treat  Medical Diagnosis: Z98.890 (ICD-10-CM) - S/P rotator cuff repair  Evaluation Date: 5/23/2023  Authorization Period: 12/31/2023  Plan of Care Certification Period: 5/23/2023 to 08/25/2023  Visit # / Visits authorized: 4/20 (+eval)  FOTO:  5/6  PTA visits: 0/5     Time In: 1500  Time Out: 1545  Total Billable Time: 40 minutes (1 TE, 1 NM, 1 MT)  Precautions: post-op RC repair.      OPERATIVE PROCEDURES:  1.  right shoulder arthroscopy with subacromial decompression.  2.  right shoulder arthroscopic rotator cuff repair using Opus suture anchor X 1    Subjective     Pt reports: no more pain episodes.  Back to doing well.  He continues to use his arm including swimming.  On light duty at work.  Sleeping well.     He was compliant with home exercise program.  Response to previous treatment: no increase in pain  Functional change: none voiced    Pain: 0/10  Location: right shoulder     Objective      Objective Measures updated at progress report unless specified.     Shoulder PRoM:   , ExR (scapular) 60   Active FE check 150    Treatment     Enio received the treatments listed below:      Enio received the following manual therapy techniques: total time 10 minutes  - passive range of motion 4D submax    Enio received therapeutic exercises to develop strength, endurance, and ROM for 15 minutes including:  (+) pulleys 2'  (+) Supine FE with red TB 90-full 2x15  Elbow flexion 7# 3 x 10  Sidelying external rotation 2x12    Neuromuscular re-education: total time 15 minutes, including:  Prone extension 2x15 3#  Prone rows 2x15 4#  (+) Wall slides  2x10       Patient Education and Home Exercises       Education provided:   - home exercise program: supine wand press    Written Home Exercises Provided: Patient instructed to cont prior HEP. Exercises were reviewed and Enio was able to demonstrate them prior to the end of the session.  Enio demonstrated good  understanding of the education provided. See EMR under Patient Instructions for exercises provided during therapy sessions    Assessment   A: Enio is a 47 y.o. male referred to outpatient Physical Therapy with a medical diagnosis of Z98.890 (ICD-10-CM) - S/P rotator cuff repair including SAD.  Small size ?; 1 anchor.  Sx date: 05/09/2023.  Post-op week 8  (POD 57). Full passive range of motion; FE active check good. Appears to have recovered from flare-up. Probably still over doing it with his shoulder a bit outside of PT. See Ortho team next week.     Enio Is progressing well towards his goals.   Pt prognosis is Excellent.     Pt will continue to benefit from skilled outpatient physical therapy to address the deficits listed in the problem list box on initial evaluation, provide pt/family education and to maximize pt's level of independence in the home and community environment.   Pt's spiritual, cultural and educational needs considered and pt agreeable to plan of care and goals.     Anticipated barriers to physical therapy: co-morbidities, eagerness to RTW.     Goals:   Short Term Goals: 3 weeks   1.  Patient will demonstrate understanding of and compliance with home exercise program. progressing towards; not met  2.  Patient will demonstrate >140 degrees of right shoulder passive FE for improved overhead reaching range of motion. progressing towards; not met  3.  Patient will demonstrate at least 60 degrees of right shoulder external rotation in scapular plane from improved activities of daily living tolerance. progressing towards; not met     Long Term Goals: 12-16 weeks   1. Patient will  demonstrate >150 degrees of active right shoulder FE without compensatory hiking for overhead reaching. progressing towards; not met  2. Patient will demonstrate >3+/5 right shoulder FE, external rotation, internal rotation strength for independent job performance. progressing towards; not met  3. Patient will demonstrate <21% FOTO score. progressing towards; not met  4. Patient will demonstrate >20 point improvement on DASH score.  progressing towards; not met       Plan   Phase II/moderate protective phase/ active assisted range of motion phase    Nixon Lowery, PT, DPT

## 2023-07-10 ENCOUNTER — OFFICE VISIT (OUTPATIENT)
Dept: ORTHOPEDICS | Facility: CLINIC | Age: 48
End: 2023-07-10
Payer: COMMERCIAL

## 2023-07-10 VITALS — HEIGHT: 68 IN | BODY MASS INDEX: 32.11 KG/M2

## 2023-07-10 DIAGNOSIS — M75.121 NONTRAUMATIC COMPLETE TEAR OF RIGHT ROTATOR CUFF: Primary | ICD-10-CM

## 2023-07-10 PROCEDURE — 99999 PR PBB SHADOW E&M-EST. PATIENT-LVL III: ICD-10-PCS | Mod: PBBFAC,,, | Performed by: ORTHOPAEDIC SURGERY

## 2023-07-10 PROCEDURE — 3044F PR MOST RECENT HEMOGLOBIN A1C LEVEL <7.0%: ICD-10-PCS | Mod: CPTII,S$GLB,, | Performed by: ORTHOPAEDIC SURGERY

## 2023-07-10 PROCEDURE — 4010F ACE/ARB THERAPY RXD/TAKEN: CPT | Mod: CPTII,S$GLB,, | Performed by: ORTHOPAEDIC SURGERY

## 2023-07-10 PROCEDURE — 3008F BODY MASS INDEX DOCD: CPT | Mod: CPTII,S$GLB,, | Performed by: ORTHOPAEDIC SURGERY

## 2023-07-10 PROCEDURE — 99024 PR POST-OP FOLLOW-UP VISIT: ICD-10-PCS | Mod: S$GLB,,, | Performed by: ORTHOPAEDIC SURGERY

## 2023-07-10 PROCEDURE — 99999 PR PBB SHADOW E&M-EST. PATIENT-LVL III: CPT | Mod: PBBFAC,,, | Performed by: ORTHOPAEDIC SURGERY

## 2023-07-10 PROCEDURE — 3008F PR BODY MASS INDEX (BMI) DOCUMENTED: ICD-10-PCS | Mod: CPTII,S$GLB,, | Performed by: ORTHOPAEDIC SURGERY

## 2023-07-10 PROCEDURE — 99024 POSTOP FOLLOW-UP VISIT: CPT | Mod: S$GLB,,, | Performed by: ORTHOPAEDIC SURGERY

## 2023-07-10 PROCEDURE — 4010F PR ACE/ARB THEARPY RXD/TAKEN: ICD-10-PCS | Mod: CPTII,S$GLB,, | Performed by: ORTHOPAEDIC SURGERY

## 2023-07-10 PROCEDURE — 1159F PR MEDICATION LIST DOCUMENTED IN MEDICAL RECORD: ICD-10-PCS | Mod: CPTII,S$GLB,, | Performed by: ORTHOPAEDIC SURGERY

## 2023-07-10 PROCEDURE — 1159F MED LIST DOCD IN RCRD: CPT | Mod: CPTII,S$GLB,, | Performed by: ORTHOPAEDIC SURGERY

## 2023-07-10 PROCEDURE — 3044F HG A1C LEVEL LT 7.0%: CPT | Mod: CPTII,S$GLB,, | Performed by: ORTHOPAEDIC SURGERY

## 2023-07-10 NOTE — PROGRESS NOTES
Subjective:      Patient ID: Enio Earl is a 47 y.o. male.  Chief Complaint: Post-op Evaluation (9wk p/o- rt shoulder )      HPI  Enio Earl is a  47 y.o. male presenting today for post op visit.  He is s/p rotator cuff repair right shoulder now 9 weeks postop   He is doing well pain minimal currently in therapy.     Review of patient's allergies indicates:  No Known Allergies      Current Outpatient Medications   Medication Sig Dispense Refill    atorvastatin (LIPITOR) 10 MG tablet TAKE 1 TABLET BY MOUTH EVERY DAY 90 tablet 0    losartan (COZAAR) 100 MG tablet Take 1 tablet (100 mg total) by mouth once daily. 90 tablet 1    minocycline (MINOCIN,DYNACIN) 100 MG capsule Take 1 capsule (100 mg total) by mouth 2 (two) times a day. 28 capsule 1    naproxen (EC-NAPROSYN) 375 MG TbEC EC tablet TAKE 1 TABLET BY MOUTH 2 TIMES DAILY. 60 tablet 1    sertraline (ZOLOFT) 25 MG tablet Take 1 tablet (25 mg total) by mouth once daily. 90 tablet 1    verapamiL (CALAN-SR) 120 MG CR tablet TAKE 1 TABLET BY MOUTH EVERYDAY AT BEDTIME 90 tablet 0    nicotine (NICODERM CQ) 21 mg/24 hr Place 1 patch onto the skin once daily. (Patient not taking: Reported on 6/21/2023) 28 patch 0    triamcinolone acetonide 0.1% (KENALOG) 0.1 % cream Apply topically 2 (two) times daily. (Patient not taking: Reported on 7/10/2023) 30 g 3     No current facility-administered medications for this visit.       Past Medical History:   Diagnosis Date    Anxiety     Hypertension     Supraventricular tachycardia        Past Surgical History:   Procedure Laterality Date    ARTHROSCOPIC REPAIR OF ROTATOR CUFF OF SHOULDER Right 5/9/2023    Procedure: REPAIR, ROTATOR CUFF, ARTHROSCOPIC;  Surgeon: Cristi Adam Jr., MD;  Location: [facility] OR;  Service: Orthopedics;  Laterality: Right;  need opsu system muna notified cc    ARTHROSCOPY OF SHOULDER WITH DECOMPRESSION OF SUBACROMIAL SPACE Right 5/9/2023    Procedure: ARTHROSCOPY, SHOULDER, WITH SUBACROMIAL  "SPACE DECOMPRESSION;  Surgeon: Cristi Adam Jr., MD;  Location: [facility] OR;  Service: Orthopedics;  Laterality: Right;    CARDIAC ELECTROPHYSIOLOGY MAPPING AND ABLATION  07/2020    for PSVT by Dr. Lim     LATERAL EPICONDYLE RELEASE Left 5/18/2021    Procedure: RELEASE, ELBOW, LATERAL EPICONDYLE;  Surgeon: Cristi Adam Jr., MD;  Location: [facility] OR;  Service: Orthopedics;  Laterality: Left;    NOSE SURGERY      sepltoplasty       OBJECTIVE:   PHYSICAL EXAM:  Height: 5' 8" (172.7 cm)    Vitals:    07/10/23 1402   Height: 5' 8" (1.727 m)   PainSc:   2   PainLoc: Shoulder     Ortho/SPM Exam  Examination right shoulder incisions well-healed no swelling no tenderness range of motion full strength improved neurologic exam intact    RADIOGRAPHS:  None  Comments: I have personally reviewed the imaging and I agree with the above radiologist's report.    ASSESSMENT/PLAN:     IMPRESSION:  Status post rotator cuff repair right shoulder    PLAN:  Continue therapy  Advance activities as tolerated   Light duty work only   No heavy lifting   Advil or Motrin by mouth       FOLLOW UP:  4-6 weeks    Disclaimer: This note has been generated using voice-recognition software. There may be typographical errors that have been missed during proof-reading.     "

## 2023-07-12 ENCOUNTER — CLINICAL SUPPORT (OUTPATIENT)
Dept: REHABILITATION | Facility: HOSPITAL | Age: 48
End: 2023-07-12
Payer: COMMERCIAL

## 2023-07-12 ENCOUNTER — PATIENT MESSAGE (OUTPATIENT)
Dept: ORTHOPEDICS | Facility: CLINIC | Age: 48
End: 2023-07-12
Payer: COMMERCIAL

## 2023-07-12 ENCOUNTER — CLINICAL SUPPORT (OUTPATIENT)
Dept: OTHER | Facility: CLINIC | Age: 48
End: 2023-07-12
Payer: COMMERCIAL

## 2023-07-12 DIAGNOSIS — Z00.8 ENCOUNTER FOR OTHER GENERAL EXAMINATION: ICD-10-CM

## 2023-07-12 DIAGNOSIS — M25.511 ACUTE PAIN OF RIGHT SHOULDER: Primary | ICD-10-CM

## 2023-07-12 DIAGNOSIS — R29.898 DECREASED STRENGTH OF UPPER EXTREMITY: ICD-10-CM

## 2023-07-12 PROCEDURE — 97110 THERAPEUTIC EXERCISES: CPT | Mod: PN

## 2023-07-12 PROCEDURE — 97140 MANUAL THERAPY 1/> REGIONS: CPT | Mod: PN

## 2023-07-12 PROCEDURE — 97112 NEUROMUSCULAR REEDUCATION: CPT | Mod: PN

## 2023-07-12 NOTE — PROGRESS NOTES
[facility]   Physical Therapy Treatment Note      Name: Enio Earl  Clinic Number: [id number]    Therapy Diagnosis:   No diagnosis found.      Physician: Sarah Zamorano PA-C    Visit Date: 7/12/2023    Physician Orders: PT Eval and Treat  Medical Diagnosis: Z98.890 (ICD-10-CM) - S/P rotator cuff repair  Evaluation Date: 5/23/2023  Authorization Period: 12/31/2023  Plan of Care Certification Period: 5/23/2023 to 08/25/2023  Visit # / Visits authorized: 4/20 (+eval)  FOTO:  5/6  PTA visits: 0/5     Time In: 1500  Time Out: 1545  Total Billable Time: 40 minutes (1 TE, 1 NM, 1 MT)  Precautions: post-op RC repair.      OPERATIVE PROCEDURES:  1.  right shoulder arthroscopy with subacromial decompression.  2.  right shoulder arthroscopic rotator cuff repair using Opus suture anchor X 1    Subjective     Pt reports: no more pain episodes.  Back to doing well.  He continues to use his arm including swimming.  On light duty at work.  Sleeping well.     He was compliant with home exercise program.  Response to previous treatment: no increase in pain  Functional change: none voiced    Pain: 0/10  Location: right shoulder     Objective      Objective Measures updated at progress report unless specified.     Shoulder PRoM:   , ExR (scapular) 60   Active FE check 150    Treatment     Enio received the treatments listed below:      Enio received the following manual therapy techniques: total time 10 minutes  - passive range of motion 4D submax    Enio received therapeutic exercises to develop strength, endurance, and ROM for 15 minutes including:  (+) pulleys 2'  (+) Supine FE with red TB 90-full 2x15  Elbow flexion 7# 3 x 10  Sidelying external rotation 2x12    Neuromuscular re-education: total time 15 minutes, including:  Prone extension 2x15 3#  Prone rows 2x15 4#  (+) Wall slides 2x10       Patient Education and Home Exercises       Education provided:   - home exercise  program: supine wand press    Written Home Exercises Provided: Patient instructed to cont prior HEP. Exercises were reviewed and Enio was able to demonstrate them prior to the end of the session.  Enio demonstrated good  understanding of the education provided. See EMR under Patient Instructions for exercises provided during therapy sessions    Assessment   A: Enio is a 47 y.o. male referred to outpatient Physical Therapy with a medical diagnosis of Z98.890 (ICD-10-CM) - S/P rotator cuff repair including SAD.  Small size ?; 1 anchor.  Sx date: 05/09/2023.  Post-op week 8  (POD 57). Full passive range of motion; FE active check good. Appears to have recovered from flare-up. Probably still over doing it with his shoulder a bit outside of PT. See Ortho team next week.     Enio Is progressing well towards his goals.   Pt prognosis is Excellent.     Pt will continue to benefit from skilled outpatient physical therapy to address the deficits listed in the problem list box on initial evaluation, provide pt/family education and to maximize pt's level of independence in the home and community environment.   Pt's spiritual, cultural and educational needs considered and pt agreeable to plan of care and goals.     Anticipated barriers to physical therapy: co-morbidities, eagerness to RTW.     Goals:   Short Term Goals: 3 weeks   1.  Patient will demonstrate understanding of and compliance with home exercise program. progressing towards; not met  2.  Patient will demonstrate >140 degrees of right shoulder passive FE for improved overhead reaching range of motion. progressing towards; not met  3.  Patient will demonstrate at least 60 degrees of right shoulder external rotation in scapular plane from improved activities of daily living tolerance. progressing towards; not met     Long Term Goals: 12-16 weeks   1. Patient will demonstrate >150 degrees of active right shoulder FE without compensatory hiking for overhead  reaching. progressing towards; not met  2. Patient will demonstrate >3+/5 right shoulder FE, external rotation, internal rotation strength for independent job performance. progressing towards; not met  3. Patient will demonstrate <21% FOTO score. progressing towards; not met  4. Patient will demonstrate >20 point improvement on DASH score.  progressing towards; not met       Plan   Phase II/moderate protective phase/ active assisted range of motion phase    Kamille Ding, PTA

## 2023-07-12 NOTE — PROGRESS NOTES
[facility]   Physical Therapy Treatment Note      Name: Enio Earl  Clinic Number: [id number]    Therapy Diagnosis:   Encounter Diagnoses   Name Primary?    Acute pain of right shoulder Yes    Decreased strength of upper extremity        Physician: Sarah Zamorano PA-C    Visit Date: 7/12/2023    Physician Orders: PT Eval and Treat  Medical Diagnosis: Z98.890 (ICD-10-CM) - S/P rotator cuff repair  Evaluation Date: 5/23/2023  Authorization Period: 12/31/2023  Plan of Care Certification Period: 5/23/2023 to 08/25/2023  Visit # / Visits authorized: 5/20 (+eval)  FOTO: today PTA visits: 0/5     Time In: 1500  Time Out: 1555  Total Billable Time: 55 minutes (2 TE, 1 NM, 1 MT)  Precautions: post-op RC repair.      OPERATIVE PROCEDURES:  1.  right shoulder arthroscopy with subacromial decompression.  2.  right shoulder arthroscopic rotator cuff repair using Opus suture anchor X 1    Subjective     Pt reports: good Ortho visit.  Not yet cleared for full duty.     He was compliant with home exercise program.  Response to previous treatment: no increase in pain  Functional change: none voiced    Pain: 0/10  Location: right shoulder     Objective      Objective Measures updated at progress report unless specified.     Shoulder PRoM:   , ExR (scapular) 60   Active FE check 150    Treatment     Enio received the treatments listed below:      Enio received the following manual therapy techniques: total time 10 minutes  - passive range of motion 4D submax    Enio received therapeutic exercises to develop strength, endurance, and ROM for 30 minutes including:  pulleys 2'  UBE 3'  Supine FE with red TB 90-full 2x15  Elbow flexion 7# 3 x 10  Sidelying external rotation 2x12 1#  (+) AA FE with wand 1x10    Neuromuscular re-education: total time 15 minutes, including:  Prone extension 2x15 3#  Prone rows 2x15 4#  (+) Sidelying HAB 2x10 1#  Wall slides 2x10       Patient Education and  Home Exercises       Education provided:   - home exercise program: supine wand press    Written Home Exercises Provided: Patient instructed to cont prior HEP. Exercises were reviewed and Enio was able to demonstrate them prior to the end of the session.  Enio demonstrated good  understanding of the education provided. See EMR under Patient Instructions for exercises provided during therapy sessions    Assessment   A: Enio is a 47 y.o. male referred to outpatient Physical Therapy with a medical diagnosis of Z98.890 (ICD-10-CM) - S/P rotator cuff repair including SAD.  Small size ?; 1 anchor.  Sx date: 05/09/2023.  Post-op week 9  (POD 64). Full passive range of motion; FE active check good. Good f/u with Ortho team.  Set to f/u at 16 weeks. Initiation of light active FE without issue.     Enio Is progressing well towards his goals.   Pt prognosis is Excellent.     Pt will continue to benefit from skilled outpatient physical therapy to address the deficits listed in the problem list box on initial evaluation, provide pt/family education and to maximize pt's level of independence in the home and community environment.   Pt's spiritual, cultural and educational needs considered and pt agreeable to plan of care and goals.     Anticipated barriers to physical therapy: co-morbidities, eagerness to RTW.     Goals:   Short Term Goals: 3 weeks   1.  Patient will demonstrate understanding of and compliance with home exercise program. progressing towards; not met  2.  Patient will demonstrate >140 degrees of right shoulder passive FE for improved overhead reaching range of motion. progressing towards; not met  3.  Patient will demonstrate at least 60 degrees of right shoulder external rotation in scapular plane from improved activities of daily living tolerance. progressing towards; not met     Long Term Goals: 12-16 weeks   1. Patient will demonstrate >150 degrees of active right shoulder FE without compensatory  hiking for overhead reaching. progressing towards; not met  2. Patient will demonstrate >3+/5 right shoulder FE, external rotation, internal rotation strength for independent job performance. progressing towards; not met  3. Patient will demonstrate <21% FOTO score. progressing towards; not met  4. Patient will demonstrate >20 point improvement on DASH score.  progressing towards; not met       Plan   Phase II/moderate protective phase/ active assisted-to-active range of motion phase    Nixon Lowery, PT, DPT, OCS

## 2023-07-13 VITALS
DIASTOLIC BLOOD PRESSURE: 85 MMHG | HEIGHT: 69 IN | SYSTOLIC BLOOD PRESSURE: 143 MMHG | WEIGHT: 220 LBS | BODY MASS INDEX: 32.58 KG/M2

## 2023-07-13 LAB
GLUCOSE SERPL-MCNC: 126 MG/DL (ref 60–140)
HDLC SERPL-MCNC: 49 MG/DL
POC CHOLESTEROL, LDL (DOCK): 53 MG/DL
POC CHOLESTEROL, TOTAL: 147 MG/DL
TRIGL SERPL-MCNC: 291 MG/DL

## 2023-07-19 ENCOUNTER — CLINICAL SUPPORT (OUTPATIENT)
Dept: REHABILITATION | Facility: HOSPITAL | Age: 48
End: 2023-07-19
Payer: COMMERCIAL

## 2023-07-19 DIAGNOSIS — R29.898 DECREASED STRENGTH OF UPPER EXTREMITY: ICD-10-CM

## 2023-07-19 DIAGNOSIS — M25.511 ACUTE PAIN OF RIGHT SHOULDER: Primary | ICD-10-CM

## 2023-07-19 PROCEDURE — 97112 NEUROMUSCULAR REEDUCATION: CPT | Mod: PN

## 2023-07-19 PROCEDURE — 97110 THERAPEUTIC EXERCISES: CPT | Mod: PN

## 2023-07-19 PROCEDURE — 97140 MANUAL THERAPY 1/> REGIONS: CPT | Mod: PN

## 2023-07-19 NOTE — PROGRESS NOTES
[facility]   Physical Therapy Treatment Note      Name: Enio Earl  Clinic Number: [id number]    Therapy Diagnosis:   Encounter Diagnoses   Name Primary?    Acute pain of right shoulder Yes    Decreased strength of upper extremity        Physician: Sarah Zamorano PA-C    Visit Date: 7/19/2023    Physician Orders: PT Eval and Treat  Medical Diagnosis: Z98.890 (ICD-10-CM) - S/P rotator cuff repair  Evaluation Date: 5/23/2023  Authorization Period: 12/31/2023  Plan of Care Certification Period: 5/23/2023 to 08/25/2023  Visit # / Visits authorized: 6/20 (+eval)  FOTO: at dc PTA visits: 0/5     Time In: 1400  Time Out: 1455  Total Billable Time: 55 minutes (2 TE, 1 NM, 1 MT)  Precautions: post-op RC repair.      OPERATIVE PROCEDURES:  1.  right shoulder arthroscopy with subacromial decompression.  2.  right shoulder arthroscopic rotator cuff repair using Opus suture anchor X 1    Subjective     Pt reports: posterior shoulder pain.  Woke him up last night.  Has been sore in this area for the last 2-3 days.     He was compliant with home exercise program.  Response to previous treatment: no increase in pain  Functional change: none voiced    Pain: 0/10  Location: right shoulder     Objective      Objective Measures updated at progress report unless specified.     Shoulder PRoM:   , ExR (scapular) 60   Active FE check 150    Treatment     Enio received the treatments listed below:      Enio received the following manual therapy techniques: total time 10 minutes  - passive range of motion 4D submax  - grade I/II right GhJ mobs.     Enio received therapeutic exercises to develop strength, endurance, and ROM for 30 minutes including:  pulleys 2'  UBE 3'  Supine FE with red TB 90-full 2x15  Elbow flexion 8# 3 x 10  Sidelying external rotation 2x12 1# (not today)  AA FE with wand 2x10 --> 2x10 1#    Neuromuscular re-education: total time 15 minutes, including:  Prone extension  2x15 3#  Prone rows 2x15 3#  Wall slides 2x10  Standing shoulder IntR red TB 2x10       Patient Education and Home Exercises       Education provided:   - home exercise program: supine wand press    Written Home Exercises Provided: Patient instructed to cont prior HEP. Exercises were reviewed and Enio was able to demonstrate them prior to the end of the session.  Enio demonstrated good  understanding of the education provided. See EMR under Patient Instructions for exercises provided during therapy sessions    Assessment   A: Enio is a 47 y.o. male referred to outpatient Physical Therapy with a medical diagnosis of Z98.890 (ICD-10-CM) - S/P rotator cuff repair including SAD.  Small size ?; 1 anchor.  Sx date: 05/09/2023.  Post-op week 10 (POD 71). Mild posterior shoulder soreness upon arrival today.  Following light manual work, pain improved.  Held shoulder posterior RC work. Full passive range of motion; FE active check good. Good f/u with Ortho team.  Set to f/u at 16 weeks.     Enio Is progressing well towards his goals.   Pt prognosis is Excellent.     Pt will continue to benefit from skilled outpatient physical therapy to address the deficits listed in the problem list box on initial evaluation, provide pt/family education and to maximize pt's level of independence in the home and community environment.   Pt's spiritual, cultural and educational needs considered and pt agreeable to plan of care and goals.     Anticipated barriers to physical therapy: co-morbidities, eagerness to RTW.     Goals:   Short Term Goals: 3 weeks   1.  Patient will demonstrate understanding of and compliance with home exercise program. progressing towards; not met  2.  Patient will demonstrate >140 degrees of right shoulder passive FE for improved overhead reaching range of motion. progressing towards; not met  3.  Patient will demonstrate at least 60 degrees of right shoulder external rotation in scapular plane from  improved activities of daily living tolerance. progressing towards; not met     Long Term Goals: 12-16 weeks   1. Patient will demonstrate >150 degrees of active right shoulder FE without compensatory hiking for overhead reaching. progressing towards; not met  2. Patient will demonstrate >3+/5 right shoulder FE, external rotation, internal rotation strength for independent job performance. progressing towards; not met  3. Patient will demonstrate <21% FOTO score. progressing towards; not met  4. Patient will demonstrate >20 point improvement on DASH score.  progressing towards; not met       Plan   Phase II/moderate protective phase/ active assisted-to-active range of motion phase    Nixon Lowery, PT, DPT, OCS

## 2023-07-20 RX ORDER — TRAMADOL HYDROCHLORIDE 50 MG/1
TABLET ORAL
Qty: 30 TABLET | Refills: 0 | Status: SHIPPED | OUTPATIENT
Start: 2023-07-20 | End: 2023-08-20 | Stop reason: SDUPTHER

## 2023-07-21 ENCOUNTER — CLINICAL SUPPORT (OUTPATIENT)
Dept: REHABILITATION | Facility: HOSPITAL | Age: 48
End: 2023-07-21
Payer: COMMERCIAL

## 2023-07-21 DIAGNOSIS — M25.511 ACUTE PAIN OF RIGHT SHOULDER: Primary | ICD-10-CM

## 2023-07-21 DIAGNOSIS — R29.898 DECREASED STRENGTH OF UPPER EXTREMITY: ICD-10-CM

## 2023-07-21 PROCEDURE — 97112 NEUROMUSCULAR REEDUCATION: CPT | Mod: PN

## 2023-07-21 PROCEDURE — 97140 MANUAL THERAPY 1/> REGIONS: CPT | Mod: PN

## 2023-07-21 PROCEDURE — 97110 THERAPEUTIC EXERCISES: CPT | Mod: PN

## 2023-07-21 NOTE — PROGRESS NOTES
[facility]   Physical Therapy Treatment Note      Name: Enio Ealr  Clinic Number: [id number]    Therapy Diagnosis:   Encounter Diagnoses   Name Primary?    Acute pain of right shoulder Yes    Decreased strength of upper extremity        Physician: Sarah Zamorano PA-C    Visit Date: 7/21/2023    Physician Orders: PT Eval and Treat  Medical Diagnosis: Z98.890 (ICD-10-CM) - S/P rotator cuff repair  Evaluation Date: 5/23/2023  Authorization Period: 12/31/2023  Plan of Care Certification Period: 5/23/2023 to 08/25/2023  Visit # / Visits authorized: 7/20 (+eval)  FOTO: at dc PTA visits: 0/5     Time In: 1400  Time Out: 1455  Total Billable Time: 55 minutes (2 TE, 1 NM, 1 MT)  Precautions: post-op RC repair.      OPERATIVE PROCEDURES:  1.  right shoulder arthroscopy with subacromial decompression.  2.  right shoulder arthroscopic rotator cuff repair using Opus suture anchor X 1    Subjective     Pt reports: no pain.  Shoulder feeling better.      He was compliant with home exercise program.  Response to previous treatment: no increase in pain  Functional change: none voiced    Pain: 0/10  Location: right shoulder     Objective      Objective Measures updated at progress report unless specified.     Shoulder PRoM:   , ExR (scapular) 60, at 90= 70   Active FE check 160    Treatment     Enio received the treatments listed below:      Enio received the following manual therapy techniques: total time 10 minutes  - passive range of motion 4D submax  - grade I/II right GhJ mobs.     Enio received therapeutic exercises to develop strength, endurance, and ROM for 30 minutes including:  pulleys 2'  UBE 3'  Supine FE with red TB 90-full 2x15  Elbow flexion 8# 3 x 10  AA FE with wand 2x10 --> 2x10 1#    Neuromuscular re-education: total time 15 minutes, including:  Prone extension 2x15 3#  Prone rows 2x15 3#  Wall slides 2x10 phase I and 1x10 phase II  Standing shoulder IntR/ExR red  TB 2x15       Patient Education and Home Exercises       Education provided:   - home exercise program: supine wand press    Written Home Exercises Provided: Patient instructed to cont prior HEP. Exercises were reviewed and Enio was able to demonstrate them prior to the end of the session.  Enio demonstrated good  understanding of the education provided. See EMR under Patient Instructions for exercises provided during therapy sessions    Assessment   A: Enio is a 47 y.o. male referred to outpatient Physical Therapy with a medical diagnosis of Z98.890 (ICD-10-CM) - S/P rotator cuff repair including SAD.  Small size ?; 1 anchor.  Sx date: 05/09/2023.  Post-op week 10 (POD 73).  No posterior shoulder pain upon arrival today.  Full passive range of motion; FE active check good. Good f/u with Ortho team.  Set to f/u at 16 weeks.     Enio Is progressing well towards his goals.   Pt prognosis is Excellent.     Pt will continue to benefit from skilled outpatient physical therapy to address the deficits listed in the problem list box on initial evaluation, provide pt/family education and to maximize pt's level of independence in the home and community environment.   Pt's spiritual, cultural and educational needs considered and pt agreeable to plan of care and goals.     Anticipated barriers to physical therapy: co-morbidities, eagerness to RTW.     Goals:   Short Term Goals: 3 weeks   1.  Patient will demonstrate understanding of and compliance with home exercise program. progressing towards; not met  2.  Patient will demonstrate >140 degrees of right shoulder passive FE for improved overhead reaching range of motion. progressing towards; not met  3.  Patient will demonstrate at least 60 degrees of right shoulder external rotation in scapular plane from improved activities of daily living tolerance. progressing towards; not met     Long Term Goals: 12-16 weeks   1. Patient will demonstrate >150 degrees of active  right shoulder FE without compensatory hiking for overhead reaching. progressing towards; not met  2. Patient will demonstrate >3+/5 right shoulder FE, external rotation, internal rotation strength for independent job performance. progressing towards; not met  3. Patient will demonstrate <21% FOTO score. progressing towards; not met  4. Patient will demonstrate >20 point improvement on DASH score.  progressing towards; not met      Plan   Phase II/moderate protective phase/ active assisted-to-active range of motion phase    Nixon Lowery, PT, DPT, OCS

## 2023-07-24 ENCOUNTER — PATIENT OUTREACH (OUTPATIENT)
Dept: ADMINISTRATIVE | Facility: HOSPITAL | Age: 48
End: 2023-07-24
Payer: COMMERCIAL

## 2023-07-24 DIAGNOSIS — Z12.11 ENCOUNTER FOR COLORECTAL CANCER SCREENING: Primary | ICD-10-CM

## 2023-07-24 DIAGNOSIS — Z12.12 ENCOUNTER FOR COLORECTAL CANCER SCREENING: Primary | ICD-10-CM

## 2023-07-24 NOTE — PROGRESS NOTES
Non-compliant [facility] report chart review -  07/24/2023  Chart review completed for the following HM test if overdue  (, Colonoscopy,) requested colonoscopy Care Everywhere and Media reports - updates requested and reviewed.  WOG orders placed.    Amb referral Darwin Huang

## 2023-07-25 NOTE — PROGRESS NOTES
[facility]   Physical Therapy Treatment Note      Name: Enio Earl  Clinic Number: [id number]    Therapy Diagnosis:   Encounter Diagnoses   Name Primary?    Acute pain of right shoulder Yes    Decreased strength of upper extremity        Physician: Sarah Zamorano PA-C    Visit Date: 7/26/2023    Physician Orders: PT Eval and Treat  Medical Diagnosis: Z98.890 (ICD-10-CM) - S/P rotator cuff repair  Evaluation Date: 5/23/2023  Authorization Period: 12/31/2023  Plan of Care Certification Period: 5/23/2023 to 08/25/2023  Visit # / Visits authorized: 8/20 (+eval)  FOTO: at dc PTA visits: 0/5     Time In: 1410  Time Out: 1500  Total Billable Time: 50 minutes (1 TE, 2 NM, 1 MT)  Precautions: post-op RC repair.      OPERATIVE PROCEDURES:  1.  right shoulder arthroscopy with subacromial decompression.  2.  right shoulder arthroscopic rotator cuff repair using Opus suture anchor X 1    Subjective     Pt reports: no pain; doing well overall.     He was compliant with home exercise program.  Response to previous treatment: no increase in pain  Functional change: none voiced    Pain: 0/10  Location: right shoulder     Objective      Objective Measures updated at progress report unless specified.     Shoulder PRoM:   , ExR (scapular) 60, at 90= 70   Active FE check 160    Treatment     Enio received the treatments listed below:      Enio received the following manual therapy techniques: total time 10 minutes  - passive range of motion 4D submax  - grade I/II right GhJ mobs.     Enio received therapeutic exercises to develop strength, endurance, and ROM for 10 minutes including:  UBE 3'  Pulleys 4'  sL external rotation 2# 3x8-10  Standing scaption 2x10 1#    Neuromuscular re-education: total time 30 minutes, including:  Prone extension 2x15 3#, rows 2x15 5#  sL HAB 2x10 1#  Wall slides 1x10 phase I-III  Standing shoulder IntR/ExR green/red TB 2x15  Next: static carry  variations         Patient Education and Home Exercises       Education provided:   - home exercise program: supine wand press    Written Home Exercises Provided: Patient instructed to cont prior HEP. Exercises were reviewed and Enio was able to demonstrate them prior to the end of the session.  Enio demonstrated good  understanding of the education provided. See EMR under Patient Instructions for exercises provided during therapy sessions    Assessment   A: Enio is a 47 y.o. male referred to outpatient Physical Therapy with a medical diagnosis of Z98.890 (ICD-10-CM) - S/P rotator cuff repair including SAD.  Small size ?; 1 anchor.  Sx date: 05/09/2023.  Post-op week 11 (POD 78).  No posterior shoulder pain upon arrival today.  Full passive range of motion; FE active check good.  Begin static carries next visit.    Enio Is progressing well towards his goals.   Pt prognosis is Excellent.     Pt will continue to benefit from skilled outpatient physical therapy to address the deficits listed in the problem list box on initial evaluation, provide pt/family education and to maximize pt's level of independence in the home and community environment.   Pt's spiritual, cultural and educational needs considered and pt agreeable to plan of care and goals.     Anticipated barriers to physical therapy: co-morbidities, eagerness to RTW.     Goals:   Short Term Goals: 3 weeks   1.  Patient will demonstrate understanding of and compliance with home exercise program. progressing towards; not met  2.  Patient will demonstrate >140 degrees of right shoulder passive FE for improved overhead reaching range of motion. progressing towards; not met  3.  Patient will demonstrate at least 60 degrees of right shoulder external rotation in scapular plane from improved activities of daily living tolerance. progressing towards; not met     Long Term Goals: 12-16 weeks   1. Patient will demonstrate >150 degrees of active right  shoulder FE without compensatory hiking for overhead reaching. progressing towards; not met  2. Patient will demonstrate >3+/5 right shoulder FE, external rotation, internal rotation strength for independent job performance. progressing towards; not met  3. Patient will demonstrate <21% FOTO score. progressing towards; not met  4. Patient will demonstrate >20 point improvement on DASH score.  progressing towards; not met      Plan   Phase II/moderate protective phase/ active assisted-to-active range of motion phase    Nixon Lowery, PT, DPT, OCS

## 2023-07-26 ENCOUNTER — CLINICAL SUPPORT (OUTPATIENT)
Dept: REHABILITATION | Facility: HOSPITAL | Age: 48
End: 2023-07-26
Payer: COMMERCIAL

## 2023-07-26 DIAGNOSIS — M25.511 ACUTE PAIN OF RIGHT SHOULDER: Primary | ICD-10-CM

## 2023-07-26 DIAGNOSIS — R29.898 DECREASED STRENGTH OF UPPER EXTREMITY: ICD-10-CM

## 2023-07-26 PROCEDURE — 97112 NEUROMUSCULAR REEDUCATION: CPT | Mod: PN

## 2023-07-26 PROCEDURE — 97140 MANUAL THERAPY 1/> REGIONS: CPT | Mod: PN

## 2023-07-26 PROCEDURE — 97110 THERAPEUTIC EXERCISES: CPT | Mod: PN

## 2023-08-01 NOTE — PROGRESS NOTES
[facility]   Physical Therapy Treatment Note      Name: Enio Earl  Clinic Number: [id number]    Therapy Diagnosis:   Encounter Diagnoses   Name Primary?    Acute pain of right shoulder Yes    Decreased strength of upper extremity        Physician: Sarah Zamorano PA-C    Visit Date: 8/2/2023    Physician Orders: PT Eval and Treat  Medical Diagnosis: Z98.890 (ICD-10-CM) - S/P rotator cuff repair  Evaluation Date: 5/23/2023  Authorization Period: 12/31/2023  Plan of Care Certification Period: 5/23/2023 to 08/25/2023  Visit # / Visits authorized: 9/20 (+eval)  FOTO: at dc PTA visits: 0/5     Time In: 1500  Time Out: 1550  Total Billable Time: 50 minutes (1 TE, 1 SIMBA, 1 NM, 1 MT)  Precautions: post-op RC repair.      OPERATIVE PROCEDURES:  1.  right shoulder arthroscopy with subacromial decompression.  2.  right shoulder arthroscopic rotator cuff repair using Opus suture anchor X 1    Subjective     Pt reports: no pain; doing well.     He was compliant with home exercise program.  Response to previous treatment: no increase in pain  Functional change: none voiced    Pain: 0/10  Location: right shoulder     Objective      Objective Measures updated at progress report unless specified.     Shoulder PRoM:   , ExR (scapular) 60, at 90= 70   Active FE check 160    Treatment     Enio received the treatments listed below:      Enio received the following manual therapy techniques: total time 10 minutes  - passive range of motion 4D submax  - grade I/II right GhJ mobs.     Enio received therapeutic exercises to develop strength, endurance, and ROM for 15 minutes including:  UBE 3'  Pulleys 4'  sL external rotation 2# 3x8-10  Standing scaption 2x10 1# (not today)  Prone extension 2x15 3#, rows 2x15 5#    Neuromuscular re-education: total time 15 minutes, including:  SAPD black tB 2x15  Rows 2x15 orange cord  Standing shoulder IntR/ExR blue/green TB 2x15  (+) forward punch green  Tb 2x12      Therapeutic activities: total time 10 minutes  (+)  static carry variations 7#/10#/12# 2 laps [facility]       Patient Education and Home Exercises       Education provided:   - home exercise program: supine wand press    Written Home Exercises Provided: Patient instructed to cont prior HEP. Exercises were reviewed and Enio was able to demonstrate them prior to the end of the session.  Enio demonstrated good  understanding of the education provided. See EMR under Patient Instructions for exercises provided during therapy sessions    Assessment   A: Enio is a 47 y.o. male referred to outpatient Physical Therapy with a medical diagnosis of Z98.890 (ICD-10-CM) - S/P rotator cuff repair including SAD.  Small size ?; 1 anchor.  Sx date: 05/09/2023.  Post-op week 12 (POD 85).  No posterior shoulder pain upon arrival today.  Full passive range of motion; FE active check good.  Began static carries; tolerated well.  No increased pain during or after session.     Enio Is progressing well towards his goals.   Pt prognosis is Excellent.     Pt will continue to benefit from skilled outpatient physical therapy to address the deficits listed in the problem list box on initial evaluation, provide pt/family education and to maximize pt's level of independence in the home and community environment.   Pt's spiritual, cultural and educational needs considered and pt agreeable to plan of care and goals.     Anticipated barriers to physical therapy: co-morbidities, eagerness to RTW.     Goals:   Short Term Goals: 3 weeks   1.  Patient will demonstrate understanding of and compliance with home exercise program. progressing towards; not met  2.  Patient will demonstrate >140 degrees of right shoulder passive FE for improved overhead reaching range of motion. progressing towards; not met  3.  Patient will demonstrate at least 60 degrees of right shoulder external rotation in scapular plane from improved activities of  daily living tolerance. progressing towards; not met     Long Term Goals: 12-16 weeks   1. Patient will demonstrate >150 degrees of active right shoulder FE without compensatory hiking for overhead reaching. progressing towards; not met  2. Patient will demonstrate >3+/5 right shoulder FE, external rotation, internal rotation strength for independent job performance. progressing towards; not met  3. Patient will demonstrate <21% FOTO score. progressing towards; not met  4. Patient will demonstrate >20 point improvement on DASH score.  progressing towards; not met      Plan   Phase III.  Min protective phase/ active range of motion phase    Nixon Lowery, PT, DPT, OCS

## 2023-08-02 ENCOUNTER — CLINICAL SUPPORT (OUTPATIENT)
Dept: REHABILITATION | Facility: HOSPITAL | Age: 48
End: 2023-08-02
Payer: COMMERCIAL

## 2023-08-02 DIAGNOSIS — R29.898 DECREASED STRENGTH OF UPPER EXTREMITY: ICD-10-CM

## 2023-08-02 DIAGNOSIS — M25.511 ACUTE PAIN OF RIGHT SHOULDER: Primary | ICD-10-CM

## 2023-08-02 PROCEDURE — 97110 THERAPEUTIC EXERCISES: CPT | Mod: PN

## 2023-08-02 PROCEDURE — 97530 THERAPEUTIC ACTIVITIES: CPT | Mod: PN

## 2023-08-02 PROCEDURE — 97112 NEUROMUSCULAR REEDUCATION: CPT | Mod: PN

## 2023-08-02 PROCEDURE — 97140 MANUAL THERAPY 1/> REGIONS: CPT | Mod: PN

## 2023-08-10 ENCOUNTER — CLINICAL SUPPORT (OUTPATIENT)
Dept: REHABILITATION | Facility: HOSPITAL | Age: 48
End: 2023-08-10
Payer: COMMERCIAL

## 2023-08-10 DIAGNOSIS — R29.898 DECREASED STRENGTH OF UPPER EXTREMITY: ICD-10-CM

## 2023-08-10 DIAGNOSIS — M25.511 ACUTE PAIN OF RIGHT SHOULDER: Primary | ICD-10-CM

## 2023-08-10 PROCEDURE — 97140 MANUAL THERAPY 1/> REGIONS: CPT | Mod: PN,CQ

## 2023-08-10 PROCEDURE — 97112 NEUROMUSCULAR REEDUCATION: CPT | Mod: PN,CQ

## 2023-08-10 PROCEDURE — 97110 THERAPEUTIC EXERCISES: CPT | Mod: PN,CQ

## 2023-08-10 PROCEDURE — 97530 THERAPEUTIC ACTIVITIES: CPT | Mod: PN,CQ

## 2023-08-10 NOTE — PROGRESS NOTES
[facility]   Physical Therapy Treatment Note      Name: Enio Earl  Clinic Number: [id number]    Therapy Diagnosis:   Encounter Diagnoses   Name Primary?    Acute pain of right shoulder Yes    Decreased strength of upper extremity        Physician: Sarah Zamorano PA-C    Visit Date: 8/10/2023    Physician Orders: PT Eval and Treat  Medical Diagnosis: Z98.890 (ICD-10-CM) - S/P rotator cuff repair  Evaluation Date: 5/23/2023  Authorization Period: 12/31/2023  Plan of Care Certification Period: 5/23/2023 to 08/25/2023  Visit # / Visits authorized: 10/20 (+eval)  FOTO: at dc PTA visits: 1/5     Time In: 1400  Time Out: 1455  Total Billable Time: 55 minutes (1 TE, 1 SIMBA, 1 NM, 1 MT)  Precautions: post-op RC repair.      OPERATIVE PROCEDURES:  1.  right shoulder arthroscopy with subacromial decompression.  2.  right shoulder arthroscopic rotator cuff repair using Opus suture anchor X 1    Subjective     Pt reports: no pain; doing well.     He was compliant with home exercise program.  Response to previous treatment: no increase in pain  Functional change: none voiced    Pain: 0/10  Location: right shoulder     Objective      Objective Measures updated at progress report unless specified.     Shoulder PRoM:   , ExR (scapular) 60, at 90= 70   Active FE check 160    Treatment     Enio received the treatments listed below:      Enio received the following manual therapy techniques: total time 15 minutes  - passive range of motion 4D submax  - grade I/II right GhJ mobs.     Enio received therapeutic exercises to develop strength, endurance, and ROM for 15 minutes including:  UBE 3'  Pulleys 4'  sL external rotation 2# 3x8-10  Standing scaption 2x10 2#   Prone extension 2x15 3#, rows 2x15 5#    Neuromuscular re-education: total time 15 minutes, including:  SAPD black tB 2x15  Rows 2x15 orange cord  Standing shoulder IntR/ExR blue/green TB 2x15  forward punch Black Tb  2x15      Therapeutic activities: total time 10 minutes   static carry variations 20# 3 x 2 laps ea       Patient Education and Home Exercises       Education provided:   - home exercise program: supine wand press    Written Home Exercises Provided: Patient instructed to cont prior HEP. Exercises were reviewed and Enio was able to demonstrate them prior to the end of the session.  Enio demonstrated good  understanding of the education provided. See EMR under Patient Instructions for exercises provided during therapy sessions    Assessment   A: Enio is a 47 y.o. male referred to outpatient Physical Therapy with a medical diagnosis of Z98.890 (ICD-10-CM) - S/P rotator cuff repair including SAD.  Small size ?; 1 anchor.  Sx date: 05/09/2023.  Post-op week 12 (POD 85).  No posterior shoulder pain upon arrival today.  Full passive range of motion; FE active check good.  Progressed static carries; tolerated well.  No increased pain during or after session.     Enio Is progressing well towards his goals.   Pt prognosis is Excellent.     Pt will continue to benefit from skilled outpatient physical therapy to address the deficits listed in the problem list box on initial evaluation, provide pt/family education and to maximize pt's level of independence in the home and community environment.   Pt's spiritual, cultural and educational needs considered and pt agreeable to plan of care and goals.     Anticipated barriers to physical therapy: co-morbidities, eagerness to RTW.     Goals:   Short Term Goals: 3 weeks   1.  Patient will demonstrate understanding of and compliance with home exercise program. progressing towards; not met  2.  Patient will demonstrate >140 degrees of right shoulder passive FE for improved overhead reaching range of motion. progressing towards; not met  3.  Patient will demonstrate at least 60 degrees of right shoulder external rotation in scapular plane from improved activities of daily  living tolerance. progressing towards; not met     Long Term Goals: 12-16 weeks   1. Patient will demonstrate >150 degrees of active right shoulder FE without compensatory hiking for overhead reaching. progressing towards; not met  2. Patient will demonstrate >3+/5 right shoulder FE, external rotation, internal rotation strength for independent job performance. progressing towards; not met  3. Patient will demonstrate <21% FOTO score. progressing towards; not met  4. Patient will demonstrate >20 point improvement on DASH score.  progressing towards; not met      Plan   Phase III.  Min protective phase/ active range of motion phase    Kamille Ding, PTA

## 2023-08-11 ENCOUNTER — OFFICE VISIT (OUTPATIENT)
Dept: FAMILY MEDICINE | Facility: CLINIC | Age: 48
End: 2023-08-11
Payer: COMMERCIAL

## 2023-08-11 VITALS
BODY MASS INDEX: 33.73 KG/M2 | DIASTOLIC BLOOD PRESSURE: 70 MMHG | HEART RATE: 95 BPM | OXYGEN SATURATION: 98 % | SYSTOLIC BLOOD PRESSURE: 128 MMHG | HEIGHT: 69 IN | WEIGHT: 227.75 LBS

## 2023-08-11 DIAGNOSIS — I10 ESSENTIAL HYPERTENSION: Primary | ICD-10-CM

## 2023-08-11 DIAGNOSIS — F17.200 TOBACCO DEPENDENCE: ICD-10-CM

## 2023-08-11 DIAGNOSIS — Z23 NEED FOR VACCINATION AGAINST STREPTOCOCCUS PNEUMONIAE: ICD-10-CM

## 2023-08-11 DIAGNOSIS — L85.3 DRY SKIN DERMATITIS: ICD-10-CM

## 2023-08-11 DIAGNOSIS — I47.10 SVT (SUPRAVENTRICULAR TACHYCARDIA): ICD-10-CM

## 2023-08-11 DIAGNOSIS — L29.9 ITCHING: ICD-10-CM

## 2023-08-11 DIAGNOSIS — Z00.00 VISIT FOR WELL MAN HEALTH CHECK: ICD-10-CM

## 2023-08-11 DIAGNOSIS — Z23 NEED FOR DIPHTHERIA-TETANUS-PERTUSSIS (TDAP) VACCINE: ICD-10-CM

## 2023-08-11 DIAGNOSIS — F41.8 SITUATIONAL ANXIETY: ICD-10-CM

## 2023-08-11 DIAGNOSIS — I47.10 PSVT (PAROXYSMAL SUPRAVENTRICULAR TACHYCARDIA): ICD-10-CM

## 2023-08-11 DIAGNOSIS — E78.49 OTHER HYPERLIPIDEMIA: ICD-10-CM

## 2023-08-11 PROBLEM — M25.511 ACUTE PAIN OF RIGHT SHOULDER: Status: RESOLVED | Noted: 2023-05-25 | Resolved: 2023-08-11

## 2023-08-11 PROCEDURE — 3008F BODY MASS INDEX DOCD: CPT | Mod: CPTII,S$GLB,, | Performed by: FAMILY MEDICINE

## 2023-08-11 PROCEDURE — 90472 TDAP VACCINE GREATER THAN OR EQUAL TO 7YO IM: ICD-10-PCS | Mod: S$GLB,,, | Performed by: FAMILY MEDICINE

## 2023-08-11 PROCEDURE — 1159F MED LIST DOCD IN RCRD: CPT | Mod: CPTII,S$GLB,, | Performed by: FAMILY MEDICINE

## 2023-08-11 PROCEDURE — 3078F PR MOST RECENT DIASTOLIC BLOOD PRESSURE < 80 MM HG: ICD-10-PCS | Mod: CPTII,S$GLB,, | Performed by: FAMILY MEDICINE

## 2023-08-11 PROCEDURE — 1160F PR REVIEW ALL MEDS BY PRESCRIBER/CLIN PHARMACIST DOCUMENTED: ICD-10-PCS | Mod: CPTII,S$GLB,, | Performed by: FAMILY MEDICINE

## 2023-08-11 PROCEDURE — 1160F RVW MEDS BY RX/DR IN RCRD: CPT | Mod: CPTII,S$GLB,, | Performed by: FAMILY MEDICINE

## 2023-08-11 PROCEDURE — 99999 PR PBB SHADOW E&M-EST. PATIENT-LVL IV: CPT | Mod: PBBFAC,,, | Performed by: FAMILY MEDICINE

## 2023-08-11 PROCEDURE — 1159F PR MEDICATION LIST DOCUMENTED IN MEDICAL RECORD: ICD-10-PCS | Mod: CPTII,S$GLB,, | Performed by: FAMILY MEDICINE

## 2023-08-11 PROCEDURE — 3074F PR MOST RECENT SYSTOLIC BLOOD PRESSURE < 130 MM HG: ICD-10-PCS | Mod: CPTII,S$GLB,, | Performed by: FAMILY MEDICINE

## 2023-08-11 PROCEDURE — 3074F SYST BP LT 130 MM HG: CPT | Mod: CPTII,S$GLB,, | Performed by: FAMILY MEDICINE

## 2023-08-11 PROCEDURE — 4010F ACE/ARB THERAPY RXD/TAKEN: CPT | Mod: CPTII,S$GLB,, | Performed by: FAMILY MEDICINE

## 2023-08-11 PROCEDURE — 3008F PR BODY MASS INDEX (BMI) DOCUMENTED: ICD-10-PCS | Mod: CPTII,S$GLB,, | Performed by: FAMILY MEDICINE

## 2023-08-11 PROCEDURE — 4010F PR ACE/ARB THEARPY RXD/TAKEN: ICD-10-PCS | Mod: CPTII,S$GLB,, | Performed by: FAMILY MEDICINE

## 2023-08-11 PROCEDURE — 90715 TDAP VACCINE 7 YRS/> IM: CPT | Mod: S$GLB,,, | Performed by: FAMILY MEDICINE

## 2023-08-11 PROCEDURE — 90715 TDAP VACCINE GREATER THAN OR EQUAL TO 7YO IM: ICD-10-PCS | Mod: S$GLB,,, | Performed by: FAMILY MEDICINE

## 2023-08-11 PROCEDURE — 90471 IMMUNIZATION ADMIN: CPT | Mod: S$GLB,,, | Performed by: FAMILY MEDICINE

## 2023-08-11 PROCEDURE — 90472 IMMUNIZATION ADMIN EACH ADD: CPT | Mod: S$GLB,,, | Performed by: FAMILY MEDICINE

## 2023-08-11 PROCEDURE — 99214 OFFICE O/P EST MOD 30 MIN: CPT | Mod: 25,S$GLB,, | Performed by: FAMILY MEDICINE

## 2023-08-11 PROCEDURE — 99214 PR OFFICE/OUTPT VISIT, EST, LEVL IV, 30-39 MIN: ICD-10-PCS | Mod: 25,S$GLB,, | Performed by: FAMILY MEDICINE

## 2023-08-11 PROCEDURE — 90677 PCV20 VACCINE IM: CPT | Mod: S$GLB,,, | Performed by: FAMILY MEDICINE

## 2023-08-11 PROCEDURE — 3044F HG A1C LEVEL LT 7.0%: CPT | Mod: CPTII,S$GLB,, | Performed by: FAMILY MEDICINE

## 2023-08-11 PROCEDURE — 90677 PNEUMOCOCCAL CONJUGATE VACCINE 20-VALENT: ICD-10-PCS | Mod: S$GLB,,, | Performed by: FAMILY MEDICINE

## 2023-08-11 PROCEDURE — 99999 PR PBB SHADOW E&M-EST. PATIENT-LVL IV: ICD-10-PCS | Mod: PBBFAC,,, | Performed by: FAMILY MEDICINE

## 2023-08-11 PROCEDURE — 3078F DIAST BP <80 MM HG: CPT | Mod: CPTII,S$GLB,, | Performed by: FAMILY MEDICINE

## 2023-08-11 PROCEDURE — 90471 PNEUMOCOCCAL CONJUGATE VACCINE 20-VALENT: ICD-10-PCS | Mod: S$GLB,,, | Performed by: FAMILY MEDICINE

## 2023-08-11 PROCEDURE — 3044F PR MOST RECENT HEMOGLOBIN A1C LEVEL <7.0%: ICD-10-PCS | Mod: CPTII,S$GLB,, | Performed by: FAMILY MEDICINE

## 2023-08-11 RX ORDER — LOSARTAN POTASSIUM 100 MG/1
100 TABLET ORAL DAILY
Qty: 90 TABLET | Refills: 1 | Status: SHIPPED | OUTPATIENT
Start: 2023-08-11 | End: 2024-02-05

## 2023-08-11 RX ORDER — ATORVASTATIN CALCIUM 10 MG/1
10 TABLET, FILM COATED ORAL DAILY
Qty: 90 TABLET | Refills: 1 | Status: SHIPPED | OUTPATIENT
Start: 2023-08-11 | End: 2024-03-11

## 2023-08-11 RX ORDER — VARENICLINE TARTRATE 1 MG/1
1 TABLET, FILM COATED ORAL 2 TIMES DAILY
Qty: 180 TABLET | Refills: 1 | Status: ON HOLD | OUTPATIENT
Start: 2023-09-08 | End: 2024-01-23 | Stop reason: HOSPADM

## 2023-08-11 RX ORDER — VARENICLINE TARTRATE 0.5 (11)-1
KIT ORAL
Qty: 53 EACH | Refills: 0 | Status: ON HOLD | OUTPATIENT
Start: 2023-08-11 | End: 2024-01-23 | Stop reason: HOSPADM

## 2023-08-11 RX ORDER — SERTRALINE HYDROCHLORIDE 25 MG/1
25 TABLET, FILM COATED ORAL DAILY
Qty: 90 TABLET | Refills: 1 | Status: SHIPPED | OUTPATIENT
Start: 2023-08-11

## 2023-08-11 RX ORDER — TRIAMCINOLONE ACETONIDE 1 MG/G
CREAM TOPICAL 2 TIMES DAILY
Qty: 30 G | Refills: 3 | Status: ON HOLD | OUTPATIENT
Start: 2023-08-11 | End: 2024-01-23 | Stop reason: HOSPADM

## 2023-08-11 RX ORDER — VERAPAMIL HYDROCHLORIDE 120 MG/1
120 TABLET, FILM COATED, EXTENDED RELEASE ORAL DAILY
Qty: 90 TABLET | Refills: 1 | Status: SHIPPED | OUTPATIENT
Start: 2023-08-11 | End: 2024-02-06

## 2023-08-11 NOTE — PROGRESS NOTES
"Subjective:       Patient ID: Enio Earl is a 47 y.o. male.    Chief Complaint: Blood Pressure Check      47 year old male presents today for f/u    SVT/HTN:  EP recommended verapamil 120 mg 24 hour tabs qam for BP and SVT. I added losartan 100 mg. Will avoid amlodipine and metoprolol for now.   Anxiety: on zoloft. Mood has stable.   DLD: on atorvastatin  Rash: seeing dermatology. Improving significantly with kenalog cream.     Elbow Bursitis: resolved  Shoulder pain: resolved s/p surgery.     Weight up about 7 pounds s/p surgery. Has to do light duty/limited activity.     Wants to try to quit smoking.       Review of Systems   Constitutional:  Negative for chills and fever.   Respiratory:  Negative for shortness of breath.    Cardiovascular:  Negative for chest pain and palpitations.   Gastrointestinal:  Negative for nausea and vomiting.   Neurological:  Negative for dizziness, light-headedness and headaches.         Health Maintenance Due   Topic Date Due    COVID-19 Vaccine (3 - Moderna series) 10/28/2021    Colorectal Cancer Screening  Never done     Immunization History   Administered Date(s) Administered    COVID-19, MRNA, LN-S, PF (MODERNA FULL 0.5 ML DOSE) 07/30/2021, 09/02/2021    Influenza 10/17/2019    Influenza - Quadrivalent - PF *Preferred* (6 months and older) 11/17/2018, 11/17/2018, 11/16/2022    Influenza - Trivalent - MDCK - PF 11/22/2013    Influenza Split 11/05/2014    Pneumococcal Conjugate - 20 Valent 08/11/2023    Pneumococcal Polysaccharide - 23 Valent 11/03/2020    Tdap 08/11/2023           Objective:     Vitals:    08/11/23 1037   BP: 128/70   BP Location: Left arm   Patient Position: Sitting   BP Method: Medium (Manual)   Pulse: 95   SpO2: 98%   Weight: 103.3 kg (227 lb 11.8 oz)   Height: 5' 9" (1.753 m)        Physical Exam  Constitutional:       General: He is not in acute distress.     Appearance: He is not ill-appearing, toxic-appearing or diaphoretic.   Cardiovascular:      Rate " and Rhythm: Normal rate and regular rhythm.   Pulmonary:      Effort: Pulmonary effort is normal.      Breath sounds: Normal breath sounds.   Musculoskeletal:         General: No swelling or tenderness.   Skin:     Findings: No rash.      Comments: No rash noted on BL hands  No elbow swelling noted   Neurological:      General: No focal deficit present.      Mental Status: He is alert.   Psychiatric:         Mood and Affect: Mood normal.         Behavior: Behavior normal.         Thought Content: Thought content normal.         Judgment: Judgment normal.         Assessment:       1. Essential hypertension    2. Situational anxiety    3. PSVT (paroxysmal supraventricular tachycardia)    4. SVT (supraventricular tachycardia)    5. Other hyperlipidemia    6. Tobacco dependence    7. Itching    8. Dry skin dermatitis    9. Need for vaccination against Streptococcus pneumoniae    10. Need for diphtheria-tetanus-pertussis (Tdap) vaccine    11. Visit for well man health check        Plan:       Continue atorvastatin  Continue losartan  Continue verapamil  Continue zoloft    Quit smoking! Try chantix. Can cause vivid dreams.     Keep apt to schedule colonoscopy    F/u 6 months, labs prior. Annual.     Essential hypertension  -     verapamiL (CALAN-SR) 120 MG CR tablet; Take 1 tablet (120 mg total) by mouth once daily.  Dispense: 90 tablet; Refill: 1  -     losartan (COZAAR) 100 MG tablet; Take 1 tablet (100 mg total) by mouth once daily.  Dispense: 90 tablet; Refill: 1    Situational anxiety  -     sertraline (ZOLOFT) 25 MG tablet; Take 1 tablet (25 mg total) by mouth once daily.  Dispense: 90 tablet; Refill: 1    PSVT (paroxysmal supraventricular tachycardia)  -     verapamiL (CALAN-SR) 120 MG CR tablet; Take 1 tablet (120 mg total) by mouth once daily.  Dispense: 90 tablet; Refill: 1    SVT (supraventricular tachycardia)  -     verapamiL (CALAN-SR) 120 MG CR tablet; Take 1 tablet (120 mg total) by mouth once daily.   Dispense: 90 tablet; Refill: 1    Other hyperlipidemia  -     atorvastatin (LIPITOR) 10 MG tablet; Take 1 tablet (10 mg total) by mouth once daily.  Dispense: 90 tablet; Refill: 1    Tobacco dependence  -     Ambulatory referral/consult to Smoking Cessation Program; Future; Expected date: 08/18/2023  -     varenicline (CHANTIX STARTING MONTH BOX) 0.5 mg (11)- 1 mg (42) tablet; Take one 0.5mg tab by mouth once daily X3 days,then increase to one 0.5mg tab twice daily X4 days,then increase to one 1mg tab twice daily  Dispense: 53 each; Refill: 0  -     varenicline (CHANTIX) 1 mg Tab; Take 1 tablet (1 mg total) by mouth 2 (two) times daily.  Dispense: 180 tablet; Refill: 1    Itching  -     triamcinolone acetonide 0.1% (KENALOG) 0.1 % cream; Apply topically 2 (two) times daily.  Dispense: 30 g; Refill: 3    Dry skin dermatitis  -     triamcinolone acetonide 0.1% (KENALOG) 0.1 % cream; Apply topically 2 (two) times daily.  Dispense: 30 g; Refill: 3    Need for vaccination against Streptococcus pneumoniae  -     (In Office Administered) Pneumococcal Conjugate Vaccine (20 Valent) (IM)    Need for diphtheria-tetanus-pertussis (Tdap) vaccine  -     Tdap Vaccine    Visit for well man health check  -     CBC Auto Differential; Future; Expected date: 08/11/2023  -     Comprehensive Metabolic Panel; Future; Expected date: 08/11/2023  -     Hemoglobin A1C; Future; Expected date: 08/11/2023  -     Lipid Panel; Future; Expected date: 08/11/2023  -     TSH; Future; Expected date: 08/11/2023

## 2023-08-11 NOTE — PATIENT INSTRUCTIONS
Continue atorvastatin  Continue losartan  Continue verapamil  Continue zoloft    Quit smoking! Try chantix. Can cause vivid dreams.     Keep apt to schedule colonoscopy    F/u 6 months, labs prior. Annual.

## 2023-08-16 ENCOUNTER — CLINICAL SUPPORT (OUTPATIENT)
Dept: REHABILITATION | Facility: HOSPITAL | Age: 48
End: 2023-08-16
Payer: COMMERCIAL

## 2023-08-16 DIAGNOSIS — R29.898 DECREASED STRENGTH OF UPPER EXTREMITY: Primary | ICD-10-CM

## 2023-08-16 PROCEDURE — 97530 THERAPEUTIC ACTIVITIES: CPT | Mod: PN

## 2023-08-16 PROCEDURE — 97110 THERAPEUTIC EXERCISES: CPT | Mod: PN

## 2023-08-16 PROCEDURE — 97140 MANUAL THERAPY 1/> REGIONS: CPT | Mod: PN

## 2023-08-16 PROCEDURE — 97112 NEUROMUSCULAR REEDUCATION: CPT | Mod: PN

## 2023-08-16 NOTE — PROGRESS NOTES
[facility]   Physical Therapy Treatment Note      Name: Enio Earl  Clinic Number: [id number]    Therapy Diagnosis:   Encounter Diagnosis   Name Primary?    Decreased strength of upper extremity Yes     Physician: Sarah Zamorano PA-C    Visit Date: 8/16/2023    Physician Orders: PT Eval and Treat  Medical Diagnosis: Z98.890 (ICD-10-CM) - S/P rotator cuff repair  Evaluation Date: 5/23/2023  Authorization Period: 12/31/2023  Plan of Care Certification Period: 5/23/2023 to 08/25/2023  Visit # / Visits authorized: 11/20 (+eval)  FOTO: at dc PTA visits: 1/5     Time In: 1400  Time Out: 1455  Total Billable Time: 55 minutes (1 TE, 1 SIMBA, 1NM, 1 MT)  Precautions: post-op RC repair.      OPERATIVE PROCEDURES:  1.  right shoulder arthroscopy with subacromial decompression.  2.  right shoulder arthroscopic rotator cuff repair using Opus suture anchor X 1    Subjective     Pt reports: no pain.  Eager to return to full work duty.     He was compliant with home exercise program.  Response to previous treatment: no increase in pain  Functional change: none voiced    Pain: 0/10  Location: right shoulder     Objective      Objective Measures updated at progress report unless specified.     Shoulder PRoM:   , ExR (scapular) 60, at 90= 90   Active FE check 170    Treatment     Enio received the treatments listed below:      Enio received the following manual therapy techniques: total time 10 minutes  - passive range of motion 4D submax  - grade I/II right GhJ mobs.     Enio received therapeutic exercises to develop strength, endurance, and ROM for 20 minutes including:  UBE 4'  sL external rotation 2# 2x15, 3# 1x10  Standing scaption 3x10 2#   Prone extension 2x15 3#, rows 2x15 5#, HAB 2x10 2#, flexion 0# 2x10    Neuromuscular re-education: total time 15 minutes, including:  SAPD pink cord 2x15  Rows 2x15 orange cord  Standing shoulder IntR/ExR black/green TB 2x15  forward punch  pink Tb 2x15    Therapeutic activities: total time 10 minutes  -- static carry variations 20# 2 x 2 laps ea --> 30# 2x2 laps       Patient Education and Home Exercises       Education provided:   - home exercise program: supine wand press    Written Home Exercises Provided: Patient instructed to cont prior HEP. Exercises were reviewed and Enio was able to demonstrate them prior to the end of the session.  Enio demonstrated good  understanding of the education provided. See EMR under Patient Instructions for exercises provided during therapy sessions    Assessment   A: Enio is a 47 y.o. male referred to outpatient Physical Therapy with a medical diagnosis of Z98.890 (ICD-10-CM) - S/P rotator cuff repair including SAD.  Small size ?; 1 anchor.  Sx date: 05/09/2023.  Post-op week 14 (POD 99).  Full passive range of motion; FE active check good.  Progressed static carries; tolerated well.  Light strengthening performed to patient tolerance.  Resistance per RM > 15 reps for all exercises.  No increased pain during or after session.     Enio Is progressing well towards his goals.   Pt prognosis is Excellent.     Pt will continue to benefit from skilled outpatient physical therapy to address the deficits listed in the problem list box on initial evaluation, provide pt/family education and to maximize pt's level of independence in the home and community environment.   Pt's spiritual, cultural and educational needs considered and pt agreeable to plan of care and goals.     Anticipated barriers to physical therapy: co-morbidities, eagerness to RTW.     Goals:   Short Term Goals: 3 weeks   1.  Patient will demonstrate understanding of and compliance with home exercise program. progressing towards; not met  2.  Patient will demonstrate >140 degrees of right shoulder passive FE for improved overhead reaching range of motion. progressing towards; not met  3.  Patient will demonstrate at least 60 degrees of right  shoulder external rotation in scapular plane from improved activities of daily living tolerance. progressing towards; not met     Long Term Goals: 12-16 weeks   1. Patient will demonstrate >150 degrees of active right shoulder FE without compensatory hiking for overhead reaching. progressing towards; not met  2. Patient will demonstrate >3+/5 right shoulder FE, external rotation, internal rotation strength for independent job performance. progressing towards; not met  3. Patient will demonstrate <21% FOTO score. progressing towards; not met  4. Patient will demonstrate >20 point improvement on DASH score.  progressing towards; not met      Plan   Phase III.  Min protective phase/ active range of motion phase    Nixon Lowery, PT, DPT, OCS

## 2023-08-17 ENCOUNTER — OFFICE VISIT (OUTPATIENT)
Dept: SLEEP MEDICINE | Facility: CLINIC | Age: 48
End: 2023-08-17
Payer: COMMERCIAL

## 2023-08-17 ENCOUNTER — TELEPHONE (OUTPATIENT)
Dept: PHARMACY | Facility: CLINIC | Age: 48
End: 2023-08-17
Payer: COMMERCIAL

## 2023-08-17 VITALS
BODY MASS INDEX: 34.04 KG/M2 | SYSTOLIC BLOOD PRESSURE: 152 MMHG | HEIGHT: 69 IN | OXYGEN SATURATION: 96 % | HEART RATE: 103 BPM | WEIGHT: 229.81 LBS | DIASTOLIC BLOOD PRESSURE: 104 MMHG

## 2023-08-17 DIAGNOSIS — I10 ESSENTIAL HYPERTENSION: Primary | ICD-10-CM

## 2023-08-17 DIAGNOSIS — G47.33 OSA (OBSTRUCTIVE SLEEP APNEA): ICD-10-CM

## 2023-08-17 PROCEDURE — 3077F PR MOST RECENT SYSTOLIC BLOOD PRESSURE >= 140 MM HG: ICD-10-PCS | Mod: CPTII,S$GLB,, | Performed by: NURSE PRACTITIONER

## 2023-08-17 PROCEDURE — 99214 OFFICE O/P EST MOD 30 MIN: CPT | Mod: S$GLB,,, | Performed by: NURSE PRACTITIONER

## 2023-08-17 PROCEDURE — 4010F ACE/ARB THERAPY RXD/TAKEN: CPT | Mod: CPTII,S$GLB,, | Performed by: NURSE PRACTITIONER

## 2023-08-17 PROCEDURE — 3008F BODY MASS INDEX DOCD: CPT | Mod: CPTII,S$GLB,, | Performed by: NURSE PRACTITIONER

## 2023-08-17 PROCEDURE — 3008F PR BODY MASS INDEX (BMI) DOCUMENTED: ICD-10-PCS | Mod: CPTII,S$GLB,, | Performed by: NURSE PRACTITIONER

## 2023-08-17 PROCEDURE — 1159F MED LIST DOCD IN RCRD: CPT | Mod: CPTII,S$GLB,, | Performed by: NURSE PRACTITIONER

## 2023-08-17 PROCEDURE — 99999 PR PBB SHADOW E&M-EST. PATIENT-LVL III: ICD-10-PCS | Mod: PBBFAC,,, | Performed by: NURSE PRACTITIONER

## 2023-08-17 PROCEDURE — 4010F PR ACE/ARB THEARPY RXD/TAKEN: ICD-10-PCS | Mod: CPTII,S$GLB,, | Performed by: NURSE PRACTITIONER

## 2023-08-17 PROCEDURE — 3080F PR MOST RECENT DIASTOLIC BLOOD PRESSURE >= 90 MM HG: ICD-10-PCS | Mod: CPTII,S$GLB,, | Performed by: NURSE PRACTITIONER

## 2023-08-17 PROCEDURE — 99999 PR PBB SHADOW E&M-EST. PATIENT-LVL III: CPT | Mod: PBBFAC,,, | Performed by: NURSE PRACTITIONER

## 2023-08-17 PROCEDURE — 3044F PR MOST RECENT HEMOGLOBIN A1C LEVEL <7.0%: ICD-10-PCS | Mod: CPTII,S$GLB,, | Performed by: NURSE PRACTITIONER

## 2023-08-17 PROCEDURE — 99214 PR OFFICE/OUTPT VISIT, EST, LEVL IV, 30-39 MIN: ICD-10-PCS | Mod: S$GLB,,, | Performed by: NURSE PRACTITIONER

## 2023-08-17 PROCEDURE — 3044F HG A1C LEVEL LT 7.0%: CPT | Mod: CPTII,S$GLB,, | Performed by: NURSE PRACTITIONER

## 2023-08-17 PROCEDURE — 3080F DIAST BP >= 90 MM HG: CPT | Mod: CPTII,S$GLB,, | Performed by: NURSE PRACTITIONER

## 2023-08-17 PROCEDURE — 3077F SYST BP >= 140 MM HG: CPT | Mod: CPTII,S$GLB,, | Performed by: NURSE PRACTITIONER

## 2023-08-17 PROCEDURE — 1159F PR MEDICATION LIST DOCUMENTED IN MEDICAL RECORD: ICD-10-PCS | Mod: CPTII,S$GLB,, | Performed by: NURSE PRACTITIONER

## 2023-08-17 NOTE — PROGRESS NOTES
Cc: SCOTTIE, new to me, last seen 11/2021 by Dr. Roberts    Underwent HST revealing severe SCOTTIE but never began cpap yet. Can't wait to. Some friends of his do very well using their machine. +ongoing snoring, disrupted sleep and tiredness. Denies recurrent SVT. Didn't take bpmed last night but at recent PCP visit BP was 120/70. ESS=3      HX SVT, s/p ablation.    HST 11/2021 AHI 34(RDI 46)      ASSESSMENT:  SCOTTIE, severe,ready to begin PAP    PLAN:  Expedite setup apap 6-12cm. RTC with myself of MD 5-6 wks after setup adherence. DME THE  Discussed etiology of SCOTTIE and potential ramifications of untreated SCOTTIE, including heart disease, hypertension.   See pcp re HTN mgt/continue meds

## 2023-08-22 ENCOUNTER — CLINICAL SUPPORT (OUTPATIENT)
Dept: SMOKING CESSATION | Facility: CLINIC | Age: 48
End: 2023-08-22
Payer: COMMERCIAL

## 2023-08-22 DIAGNOSIS — F17.200 NICOTINE DEPENDENCE: Primary | ICD-10-CM

## 2023-08-22 PROCEDURE — 99999 PR PBB SHADOW E&M-EST. PATIENT-LVL I: CPT | Mod: PBBFAC,,,

## 2023-08-22 PROCEDURE — 99404 PR PREVENT COUNSEL,INDIV,60 MIN: ICD-10-PCS | Mod: S$GLB,,,

## 2023-08-22 PROCEDURE — 99404 PREV MED CNSL INDIV APPRX 60: CPT | Mod: S$GLB,,,

## 2023-08-22 PROCEDURE — 99999 PR PBB SHADOW E&M-EST. PATIENT-LVL I: ICD-10-PCS | Mod: PBBFAC,,,

## 2023-08-22 NOTE — Clinical Note
Patient will be participating in biweekly tobacco cessation meetings and will begin the prescribed tobacco cessation medication regimen of Chantix starter pack  that was prescribed by his doctor. He is on day 1 of Chantix. Patient denies any low moods or SI/HI , DENISE-D score is 1, no mental distress/probable depression noted at this time. FTND score was 5, high level of nicotine dependence. Patient currently smokes 10-15 cigarettes per day. Pt started on rate reduction and wait time of 15 min prior to smoking. Exhaled carbon monoxide level was 22 ppm per [facility] (0-6 non-smoker). Will see pt back in office in 2 weeks.   
IV discontinued, cath removed intact

## 2023-08-22 NOTE — PROGRESS NOTES
Patient will be participating in biweekly tobacco cessation meetings and will begin the prescribed tobacco cessation medication regimen of Chantix starter pack  that was prescribed by his doctor. He is on day 1 of Chantix. Patient denies any low moods or SI/HI , DENISE-D score is 1, no mental distress/probable depression noted at this time. FTND score was 5, high level of nicotine dependence. Patient currently smokes 10-15 cigarettes per day. Pt started on rate reduction and wait time of 15 min prior to smoking. Exhaled carbon monoxide level was 22 ppm per [facility] (0-6 non-smoker). Will see pt back in office in 2 weeks.

## 2023-08-23 ENCOUNTER — CLINICAL SUPPORT (OUTPATIENT)
Dept: REHABILITATION | Facility: HOSPITAL | Age: 48
End: 2023-08-23
Payer: COMMERCIAL

## 2023-08-23 DIAGNOSIS — R29.898 DECREASED STRENGTH OF UPPER EXTREMITY: Primary | ICD-10-CM

## 2023-08-23 PROCEDURE — 97110 THERAPEUTIC EXERCISES: CPT | Mod: PN

## 2023-08-23 PROCEDURE — 97530 THERAPEUTIC ACTIVITIES: CPT | Mod: PN

## 2023-08-23 PROCEDURE — 97140 MANUAL THERAPY 1/> REGIONS: CPT | Mod: PN

## 2023-08-23 NOTE — PROGRESS NOTES
[facility]   Physical Therapy Treatment Note      Name: Enio Earl  Clinic Number: [id number]    Therapy Diagnosis:   Encounter Diagnosis   Name Primary?    Decreased strength of upper extremity Yes       Physician: Sarah Zamorano PA-C    Visit Date: 8/23/2023  Physician Orders: PT Eval and Treat  Medical Diagnosis: Z98.890 (ICD-10-CM) - S/P rotator cuff repair  Evaluation Date: 5/23/2023  Authorization Period: 12/31/2023  Plan of Care Certification Period: 5/23/2023 to 08/25/2023  Visit # / Visits authorized: 12/20 (+eval)  FOTO: at dc PTA visits: 0/5     Time In: 1400  Time Out: 1450  Total Billable Time: 50 minutes (2 SIMBA, 1 TE, 1 MT)  Precautions: post-op RC repair.      OPERATIVE PROCEDURES:  1.  right shoulder arthroscopy with subacromial decompression.  2.  right shoulder arthroscopic rotator cuff repair using Opus suture anchor X 1    Subjective     Pt reports: no pain.  Eager to return to full work duty.  Doing well overall.      He was compliant with home exercise program.  Response to previous treatment: no increase in pain  Functional change: none voiced    Pain: 0/10  Location: right shoulder     Objective      Objective Measures updated at progress report unless specified.     Shoulder PRoM:   , ExR (scapular) 60, at 90= 90   Active FE check 170  Strength check: next    Treatment     Enio received the treatments listed below:      Enio received the following manual therapy techniques: total time 10 minutes  - passive range of motion 4D submax  - grade I/II right GhJ mobs.     Enio received therapeutic exercises to develop strength, endurance, and ROM for 20 minutes including:  UBE 4'  sL external rotation 3# 3x10  Standing scaption 3x10 2#  (not today)  Prone extension 2x15 35#, rows 2x15 6#, HAB 2x10 2#, flexion 0# 2x15  Seated 90/90 ExR (next)    Neuromuscular re-education: total time 0 minutes, including:  SAPD pink cord 2x15  Rows 2x15 orange  cord  Standing shoulder IntR/ExR black/green TB 2x15  forward punch pink Tb 2x15    Therapeutic activities: total time 25 minutes  -- static carry variations 30# 2x2 laps --> 40# 2 laps x 3 rounds  -- sorting at WC shelf 2-3# weights moving weights between two middle shelves 5 reps x 2 rounds  -- sorting at WC shelf 2-3# weights moving weights between top and bottom shelf 5 reps x 2 rounds         Patient Education and Home Exercises       Education provided:   - home exercise program: continue current.     Written Home Exercises Provided: Patient instructed to cont prior HEP. Exercises were reviewed and Enio was able to demonstrate them prior to the end of the session.  Enio demonstrated good  understanding of the education provided. See EMR under Patient Instructions for exercises provided during therapy sessions    Assessment   A: Enio is a 47 y.o. male referred to outpatient Physical Therapy with a medical diagnosis of Z98.890 (ICD-10-CM) - S/P rotator cuff repair including SAD.  Small size ?; 1 anchor.  Sx date: 05/09/2023.  Post-op week 15 ().  Full passive range of motion; FE active check good.  Progressed static carries; tolerated well.  Light strengthening performed to patient tolerance.  Resistance per RM > 15 reps for all exercises.  No increased pain during or after session. Introduced light work conditioning activities today.      Enio Is progressing well towards his goals.   Pt prognosis is Excellent.     Pt will continue to benefit from skilled outpatient physical therapy to address the deficits listed in the problem list box on initial evaluation, provide pt/family education and to maximize pt's level of independence in the home and community environment.   Pt's spiritual, cultural and educational needs considered and pt agreeable to plan of care and goals.     Anticipated barriers to physical therapy: co-morbidities, eagerness to RTW.     Goals:   Short Term Goals: 3 weeks   1.   Patient will demonstrate understanding of and compliance with home exercise program. Met   2.  Patient will demonstrate >140 degrees of right shoulder passive FE for improved overhead reaching range of motion. Met  3.  Patient will demonstrate at least 60 degrees of right shoulder external rotation in scapular plane from improved activities of daily living tolerance. Met     Long Term Goals: 12-16 weeks   1. Patient will demonstrate >150 degrees of active right shoulder FE without compensatory hiking for overhead reaching. Met  2. Patient will demonstrate >3+/5 right shoulder FE, external rotation, internal rotation strength for independent job performance. progressing towards; not met  3. Patient will demonstrate <21% FOTO score. progressing towards; not met  4. Patient will demonstrate >20 point improvement on DASH score.  progressing towards; not met      Plan   Phase III.  Min protective phase/ active range of motion phase   Light strengthening exercises.     Nixon Lowery, PT, DPT, OCS

## 2023-08-25 ENCOUNTER — CLINICAL SUPPORT (OUTPATIENT)
Dept: REHABILITATION | Facility: HOSPITAL | Age: 48
End: 2023-08-25
Payer: COMMERCIAL

## 2023-08-25 DIAGNOSIS — R29.898 DECREASED STRENGTH OF UPPER EXTREMITY: Primary | ICD-10-CM

## 2023-08-25 PROCEDURE — 97530 THERAPEUTIC ACTIVITIES: CPT | Mod: PN

## 2023-08-25 PROCEDURE — 97110 THERAPEUTIC EXERCISES: CPT | Mod: PN

## 2023-08-25 NOTE — PROGRESS NOTES
[facility]   Physical Therapy Treatment Note      Name: Enio Earl  Clinic Number: [id number]    Therapy Diagnosis:   Encounter Diagnosis   Name Primary?    Decreased strength of upper extremity Yes       Physician: Sarah Zamorano PA-C    Visit Date: 8/25/2023  Physician Orders: PT Eval and Treat  Medical Diagnosis: Z98.890 (ICD-10-CM) - S/P rotator cuff repair  Evaluation Date: 5/23/2023  Authorization Period: 12/31/2023  Plan of Care Certification Period: 5/23/2023 to 08/25/2023  Visit # / Visits authorized: 13/20 (+eval)  FOTO: at dc PTA visits: 0/5     Time In: 1400  Time Out: 1450  Total Billable Time: 50 minutes (2 SIMBA, 2 TE)  Precautions: post-op RC repair.      OPERATIVE PROCEDURES:  1.  right shoulder arthroscopy with subacromial decompression.  2.  right shoulder arthroscopic rotator cuff repair using Opus suture anchor X 1    Subjective     Pt reports: no pain.  Eager to return to full work duty.  Doing well overall.  'Feeling great'.     He was compliant with home exercise program.  Response to previous treatment: no increase in pain  Functional change: none voiced    Pain: 0/10  Location: right shoulder     Objective      Objective Measures updated at progress report unless specified.     Shoulder PRoM:   , ExR (scapular) 60, at 90= 90   Active FE check 170  Strength check: next    Treatment     Enio received the treatments listed below:      Enio received the following manual therapy techniques: total time 0 minutes  - passive range of motion 4D submax  - grade I/II right GhJ mobs.     Enio received therapeutic exercises to develop strength, endurance, and ROM for 25 minutes including:  UBE 4'  sL external rotation 3# 3x10  Standing scaption 3x10 2#    Prone extension 2x15 35#, rows 2x15 6#, HAB 2x10 2#, flexion 0# 2x15  Seated 90/90 ExR (next)    Neuromuscular re-education: total time 5 minutes, including:  SAPD pink cord 2x15 (not today)  Rows 2x15  orange cord (not today)  Standing shoulder IntR/ExR black/green TB 2x15 (not today)  forward punch pink Tb 2x15    Therapeutic activities: total time 25 minutes  -- static carry variations 30# 2x2 laps --> 40# 2 laps x 3 rounds --> (+) 45# 2 laps x 1 round  -- sorting at WC shelf 2-3# weights moving weights between two middle shelves 5 reps x 2 rounds  -- sorting at WC shelf 2-3# weights moving weights between top and bottom shelf 5 reps x 2 rounds         Patient Education and Home Exercises       Education provided:   - home exercise program: continue current.     Written Home Exercises Provided: Patient instructed to cont prior HEP. Exercises were reviewed and Enio was able to demonstrate them prior to the end of the session.  Enio demonstrated good  understanding of the education provided. See EMR under Patient Instructions for exercises provided during therapy sessions    Assessment   A: Enio is a 47 y.o. male referred to outpatient Physical Therapy with a medical diagnosis of Z98.890 (ICD-10-CM) - S/P rotator cuff repair including SAD.  Small size ?; 1 anchor.  Sx date: 05/09/2023.  Post-op week 15 ().  Full passive range of motion; FE active check good.  Progressed static carries; tolerated well.  Light strengthening performed to patient tolerance.  Resistance per RM > 15 reps for all exercises.  No increased pain during or after session. Introduced light work conditioning activities today.      Enio Is progressing well towards his goals.   Pt prognosis is Excellent.     Pt will continue to benefit from skilled outpatient physical therapy to address the deficits listed in the problem list box on initial evaluation, provide pt/family education and to maximize pt's level of independence in the home and community environment.   Pt's spiritual, cultural and educational needs considered and pt agreeable to plan of care and goals.     Anticipated barriers to physical therapy: co-morbidities,  eagerness to RTW.     Goals:   Short Term Goals: 3 weeks   1.  Patient will demonstrate understanding of and compliance with home exercise program. Met   2.  Patient will demonstrate >140 degrees of right shoulder passive FE for improved overhead reaching range of motion. Met  3.  Patient will demonstrate at least 60 degrees of right shoulder external rotation in scapular plane from improved activities of daily living tolerance. Met     Long Term Goals: 12-16 weeks   1. Patient will demonstrate >150 degrees of active right shoulder FE without compensatory hiking for overhead reaching. Met  2. Patient will demonstrate >3+/5 right shoulder FE, external rotation, internal rotation strength for independent job performance. progressing towards; not met  3. Patient will demonstrate <21% FOTO score. progressing towards; not met  4. Patient will demonstrate >20 point improvement on DASH score.  progressing towards; not met      Plan   Phase III.  Min protective phase/ active range of motion phase   Light strengthening exercises.     Nixon Lowery, PT, DPT, OCS

## 2023-08-28 ENCOUNTER — CLINICAL SUPPORT (OUTPATIENT)
Dept: REHABILITATION | Facility: HOSPITAL | Age: 48
End: 2023-08-28
Payer: COMMERCIAL

## 2023-08-28 DIAGNOSIS — R29.898 DECREASED STRENGTH OF UPPER EXTREMITY: Primary | ICD-10-CM

## 2023-08-28 PROCEDURE — 97110 THERAPEUTIC EXERCISES: CPT | Mod: PN

## 2023-08-28 PROCEDURE — 97112 NEUROMUSCULAR REEDUCATION: CPT | Mod: PN

## 2023-08-28 PROCEDURE — 97530 THERAPEUTIC ACTIVITIES: CPT | Mod: PN

## 2023-08-28 NOTE — PROGRESS NOTES
[facility]   Physical Therapy Treatment Note      Name: Enio Earl  Clinic Number: [id number]    Therapy Diagnosis:   Encounter Diagnosis   Name Primary?    Decreased strength of upper extremity Yes       Physician: Sarah Zamorano PA-C    Visit Date: 8/28/2023  Physician Orders: PT Eval and Treat  Medical Diagnosis: Z98.890 (ICD-10-CM) - S/P rotator cuff repair  Evaluation Date: 5/23/2023  Authorization Period: 12/31/2023  Plan of Care Certification Period: 5/23/2023 to 08/25/2023  Visit # / Visits authorized: 14/20 (+eval)  FOTO: at dc PTA visits: 0/5     Time In: 1500  Time Out: 1550  Total Billable Time: 50 minutes (1 NM, 1 SIMBA, 2 TE)  Precautions: post-op RC repair.      OPERATIVE PROCEDURES:  1.  right shoulder arthroscopy with subacromial decompression.  2.  right shoulder arthroscopic rotator cuff repair using Opus suture anchor X 1    Subjective     Pt reports: no pain.  Eager to return to full work duty.  Doing well overall.  'Feeling great'.  No complaints.     He was compliant with home exercise program.  Response to previous treatment: no increase in pain  Functional change: none voiced    Pain: 0/10  Location: right shoulder     Objective      Objective Measures updated at progress report unless specified.     Shoulder PRoM:   , ExR (scapular) 60, at 90= 90   Active FE check 170  Strength check: next    Treatment     Enio received the treatments listed below:      Enio received the following manual therapy techniques: total time 0 minutes  - passive range of motion 4D submax  - grade I/II right GhJ mobs.     Enio received therapeutic exercises to develop strength, endurance, and ROM for 25 minutes including:  UBE 4'  sL external rotation 3# 3x10  Standing scaption 2x15 2#    Prone extension 2x15 35#, rows 2x15 8#, HAB 2x10 2#, flexion 2# 2x15  Seated 90/90 ExR (next)    Neuromuscular re-education: total time 10 minutes, including:  Standing shoulder  IntR/ExR black/green TB 2x15   forward punch pink Tb 2x15    Therapeutic activities: total time 15 minutes  -- static carry variations 30# 2x2 laps --> 40# 2 laps x 3 rounds   -- sorting at WC shelf 2-3# weights moving weights between two middle shelves 5 reps x 2 rounds  -- sorting at WC shelf 2-3# weights moving weights between top and bottom shelf 5 reps x 2 rounds         Patient Education and Home Exercises       Education provided:   - home exercise program: continue current.     Written Home Exercises Provided: Patient instructed to cont prior HEP. Exercises were reviewed and Enio was able to demonstrate them prior to the end of the session.  Enio demonstrated good  understanding of the education provided. See EMR under Patient Instructions for exercises provided during therapy sessions    Assessment   A: Enio is a 47 y.o. male referred to outpatient Physical Therapy with a medical diagnosis of Z98.890 (ICD-10-CM) - S/P rotator cuff repair including SAD.  Small size ?; 1 anchor.  Sx date: 05/09/2023.  Post-op week 15 ().  Full passive range of motion; FE active check good.  Progressed static carries; tolerated well.  Light strengthening performed to patient tolerance.  Resistance per RM > 15 reps for all exercises.  No increased pain during or after session. Introduced light work conditioning activities today.      Enio Is progressing well towards his goals.   Pt prognosis is Excellent.     Pt will continue to benefit from skilled outpatient physical therapy to address the deficits listed in the problem list box on initial evaluation, provide pt/family education and to maximize pt's level of independence in the home and community environment.   Pt's spiritual, cultural and educational needs considered and pt agreeable to plan of care and goals.     Anticipated barriers to physical therapy: co-morbidities, eagerness to RTW.     Goals:   Short Term Goals: 3 weeks   1.  Patient will  demonstrate understanding of and compliance with home exercise program. Met   2.  Patient will demonstrate >140 degrees of right shoulder passive FE for improved overhead reaching range of motion. Met  3.  Patient will demonstrate at least 60 degrees of right shoulder external rotation in scapular plane from improved activities of daily living tolerance. Met     Long Term Goals: 12-16 weeks   1. Patient will demonstrate >150 degrees of active right shoulder FE without compensatory hiking for overhead reaching. Met  2. Patient will demonstrate >3+/5 right shoulder FE, external rotation, internal rotation strength for independent job performance. progressing towards; not met  3. Patient will demonstrate <21% FOTO score. progressing towards; not met  4. Patient will demonstrate >20 point improvement on DASH score.  progressing towards; not met      Plan   Phase III.  Min protective phase/ active range of motion phase   Light strengthening exercises.     Nixon Lowery, PT, DPT, OCS

## 2023-08-30 ENCOUNTER — CLINICAL SUPPORT (OUTPATIENT)
Dept: REHABILITATION | Facility: HOSPITAL | Age: 48
End: 2023-08-30
Payer: COMMERCIAL

## 2023-08-30 DIAGNOSIS — R29.898 DECREASED STRENGTH OF UPPER EXTREMITY: Primary | ICD-10-CM

## 2023-08-30 PROCEDURE — 97140 MANUAL THERAPY 1/> REGIONS: CPT | Mod: PN

## 2023-08-30 PROCEDURE — 97110 THERAPEUTIC EXERCISES: CPT | Mod: PN

## 2023-08-30 PROCEDURE — 97530 THERAPEUTIC ACTIVITIES: CPT | Mod: PN

## 2023-08-30 NOTE — PROGRESS NOTES
[facility]   Physical Therapy Treatment Note      Name: Enio Earl  Clinic Number: [id number]    Therapy Diagnosis:   Encounter Diagnosis   Name Primary?    Decreased strength of upper extremity Yes       Physician: Sarah Zamorano PA-C    Visit Date: 8/30/2023  Physician Orders: PT Eval and Treat  Medical Diagnosis: Z98.890 (ICD-10-CM) - S/P rotator cuff repair  Evaluation Date: 5/23/2023  Authorization Period: 12/31/2023  Plan of Care Certification Period: 5/23/2023 to 08/25/2023  Visit # / Visits authorized: 15/20 (+eval)  FOTO: at dc PTA visits: 0/5     Time In: 1400  Time Out: 1450  Total Billable Time: 50 minutes (1 NM, 1 SIMBA, 2 TE)  Precautions: post-op RC repair.      OPERATIVE PROCEDURES:  1.  right shoulder arthroscopy with subacromial decompression.  2.  right shoulder arthroscopic rotator cuff repair using Opus suture anchor X 1    Subjective     Pt reports: right sided neck pain.  Correlates not with exercise but with poor sleeping posture.     He was compliant with home exercise program.  Response to previous treatment: no increase in pain  Functional change: none voiced    Pain: 0/10  Location: right shoulder     Objective      Objective Measures updated at progress report unless specified.     Shoulder PRoM:   , ExR (scapular) 60, at 90= 90   Active FE check 170  Strength check: flexion 4/5, external rotation 4/5, internal rotation 5/5, biceps 4/5    Treatment     Enio received the treatments listed below:      Enio received the following manual therapy techniques: total time 0 minutes  - passive range of motion 4D submax  - grade I/II right GhJ mobs.     Enio received therapeutic exercises to develop strength, endurance, and ROM for 25 minutes including:  UBE 4'  sL external rotation 3# 3x10  Standing scaption 2x15 2#    Prone extension 2x15 35#, rows 2x15 8#, HAB 2x10 2#, flexion 2# 2x15  Seated 90/90 ExR (next)    Neuromuscular re-education: total  time 15 minutes, including:  Standing shoulder IntR/ExR black/green TB 2x15   forward punch orange cook cord 2x15  Following verbal/written consent, functional dry needling to right UT TrP.  Need not left in situ.     Therapeutic activities: total time 15 minutes  -- static carry variations 30# 2x2 laps --> 40# 2 laps x 3 rounds   -- sorting at WC shelf 2-3# weights moving weights between two middle shelves 5 reps x 2 rounds  -- sorting at WC shelf 2-3# weights moving weights between top and bottom shelf 5 reps x 2 rounds       Patient Education and Home Exercises       Education provided:   - home exercise program: continue current.     Written Home Exercises Provided: Patient instructed to cont prior HEP. Exercises were reviewed and Enio was able to demonstrate them prior to the end of the session.  Enio demonstrated good  understanding of the education provided. See EMR under Patient Instructions for exercises provided during therapy sessions    Assessment   A: Enio is a 47 y.o. male referred to outpatient Physical Therapy with a medical diagnosis of Z98.890 (ICD-10-CM) - S/P rotator cuff repair including SAD.  Small size ?; 1 anchor.  Sx date: 05/09/2023.  Post-op week 16 ().  Full passive range of motion; FE active check good.  Progressed static carries; tolerated well.  Light strengthening performed to patient tolerance.  Resistance per RM > 15 reps for all exercises.  No increased pain during or after session. Progression light work conditioning activities today; good tolerance.       Enio Is progressing well towards his goals.   Pt prognosis is Excellent.     Pt will continue to benefit from skilled outpatient physical therapy to address the deficits listed in the problem list box on initial evaluation, provide pt/family education and to maximize pt's level of independence in the home and community environment.   Pt's spiritual, cultural and educational needs considered and pt agreeable to  plan of care and goals.     Anticipated barriers to physical therapy: co-morbidities, eagerness to RTW.     Goals:   Short Term Goals: 3 weeks   1.  Patient will demonstrate understanding of and compliance with home exercise program. Met   2.  Patient will demonstrate >140 degrees of right shoulder passive FE for improved overhead reaching range of motion. Met  3.  Patient will demonstrate at least 60 degrees of right shoulder external rotation in scapular plane from improved activities of daily living tolerance. Met     Long Term Goals: 12-16 weeks   1. Patient will demonstrate >150 degrees of active right shoulder FE without compensatory hiking for overhead reaching. Met  2. Patient will demonstrate >3+/5 right shoulder FE, external rotation, internal rotation strength for independent job performance. progressing towards; not met  3. Patient will demonstrate <21% FOTO score. progressing towards; not met  4. Patient will demonstrate >20 point improvement on DASH score.  progressing towards; not met      Plan   Phase III.  Min protective phase/ active range of motion phase   Light strengthening exercises.     Nixon Lowery, PT, DPT, OCS

## 2023-08-30 NOTE — PLAN OF CARE
[facility]  Physical Therapy Plan of Care Note     Name: Enio Earl  Clinic Number: [id number]    Therapy Diagnosis:   Encounter Diagnosis   Name Primary?    Decreased strength of upper extremity Yes     Physician: Sarah Zamorano PA-C    Visit Date: 8/30/2023    Visit Date: 8/30/2023  Physician Orders: PT Eval and Treat  Medical Diagnosis: Z98.890 (ICD-10-CM) - S/P rotator cuff repair  Evaluation Date: 5/23/2023  Authorization Period: 12/31/2023  Plan of Care Certification Period: 5/23/2023 to 08/25/2023  Visit # / Visits authorized: 15/20 (+eval)  FOTO: at dc PTA visits: 0/5  Precautions: post-op RC repair.      OPERATIVE PROCEDURES:  1.  right shoulder arthroscopy with subacromial decompression.  2.  right shoulder arthroscopic rotator cuff repair using Opus suture anchor X 1    Functional Level Prior to Evaluation:  acute post-op     SUBJECTIVE     Update:   Pt reports: right sided neck pain.  Correlates not with exercise but with poor sleeping posture. No shoulder pain.  Shoulder feeling great.  Eager to return to work at full duty.      He was compliant with home exercise program.  Response to previous treatment: no increase in pain  Functional change: none voiced     Pain: 0/10  Location: right shoulder     OBJECTIVE     Update:   Shoulder PRoM:              , ExR (scapular) 60, at 90= 90              Active FE check 170  Strength check: flexion 4/5, external rotation 4/5, internal rotation 5/5, biceps 4/5    ASSESSMENT     Update:   Enio is a 47 y.o. male referred to outpatient Physical Therapy with a medical diagnosis of Z98.890 (ICD-10-CM) - S/P rotator cuff repair including SAD.  Small size ?; 1 anchor.  Sx date: 05/09/2023.  Post-op week 16 ().  Full passive range of motion; FE active check good.  Progressed static carries; tolerated well.  Light strengthening performed to patient tolerance.  Resistance per RM > 15 reps for all exercises.  No increased  pain during or after session. Progression light work conditioning activities today; good tolerance.        Enio Is progressing well towards his goals.   Pt prognosis is Excellent.     Previous Short Term Goals Status:     Short Term Goals: 3 weeks   1.  Patient will demonstrate understanding of and compliance with home exercise program. Met   2.  Patient will demonstrate >140 degrees of right shoulder passive FE for improved overhead reaching range of motion. Met  3.  Patient will demonstrate at least 60 degrees of right shoulder external rotation in scapular plane from improved activities of daily living tolerance. Met    New Short Term Goals Status:  none  Long Term Goal Status: continue per initial plan of care.  Long Term Goals: 12-16 weeks   1. Patient will demonstrate >150 degrees of active right shoulder FE without compensatory hiking for overhead reaching. Met  2. Patient will demonstrate >3+/5 right shoulder FE, external rotation, internal rotation strength for independent job performance. progressing towards; not met  3. Patient will demonstrate <21% FOTO score. progressing towards; not met  4. Patient will demonstrate >20 point improvement on DASH score.  progressing towards; not met    Reasons for Recertification of Therapy:   update POC      PLAN     Updated Certification Period: 08/25/2023 to 09/10/2023   Recommended Treatment Plan: 2 times per week for 3 weeks:  Manual Therapy, Moist Heat/ Ice, Neuromuscular Re-ed, Patient Education, Self Care, Therapeutic Activities, Therapeutic Exercise, and FDN  Other Recommendations: none    Nixon Lowery, PT, DPT, OCS

## 2023-09-05 ENCOUNTER — TELEPHONE (OUTPATIENT)
Dept: REHABILITATION | Facility: HOSPITAL | Age: 48
End: 2023-09-05
Payer: COMMERCIAL

## 2023-09-05 ENCOUNTER — CLINICAL SUPPORT (OUTPATIENT)
Dept: SMOKING CESSATION | Facility: CLINIC | Age: 48
End: 2023-09-05
Payer: COMMERCIAL

## 2023-09-05 DIAGNOSIS — F17.200 NICOTINE DEPENDENCE: Primary | ICD-10-CM

## 2023-09-05 PROCEDURE — 99402 PREV MED CNSL INDIV APPRX 30: CPT | Mod: S$GLB,,,

## 2023-09-05 PROCEDURE — 99999 PR PBB SHADOW E&M-EST. PATIENT-LVL I: CPT | Mod: PBBFAC,,,

## 2023-09-05 PROCEDURE — 99402 PR PREVENT COUNSEL,INDIV,30 MIN: ICD-10-PCS | Mod: S$GLB,,,

## 2023-09-05 PROCEDURE — 99999 PR PBB SHADOW E&M-EST. PATIENT-LVL I: ICD-10-PCS | Mod: PBBFAC,,,

## 2023-09-05 NOTE — PROGRESS NOTES
Individual Follow-Up Form #2    9/5/2023    Quit Date: TBD    Clinical Status of Patient: Outpatient    Length of Service: 30 minutes    Continuing Medication: yes  Chantix    Other Medications: none     Target Symptoms: Withdrawal and medication side effects. The following were rated moderate (3) to severe (4) on TCRS:  Moderate (3): none  Severe (4): none    Comments: Pt presents to smoking cessation clinic for follow up visit. Pt was smoking 10-15 cpd prior to starting program , he is now down to 6-10 cpd. Reports he smoked more on Saturday while watching [employer] game, but is back on track and cutting back. Pt is on 2nd week of Chantix, 1 mg BID with no negative side effects at this time. Discussed the 4 D's (delay, distract, drink water, and deep breathing) exercises when urges or cravings arise.) He is to continue to move the location of where he keeps his cigarettes, keep daily log, and eliminate the 1 cigarette after dinner. Exhaled carbon monoxide level was 19 ppm per [facility] (0-6 non-smoker). The patient denies any abnormal behavioral or mental changes at this time. The patient will continue with  therapy sessions and medication monitoring by CTTS. Prescribed medication management will be by physician.        Diagnosis: F17.200    Next Visit: 2 weeks

## 2023-09-05 NOTE — TELEPHONE ENCOUNTER
Called about adding additional appointment this week.  No answer. LVM and callback #.      Nixon Lowery DPT, OCS

## 2023-09-06 ENCOUNTER — CLINICAL SUPPORT (OUTPATIENT)
Dept: REHABILITATION | Facility: HOSPITAL | Age: 48
End: 2023-09-06
Payer: COMMERCIAL

## 2023-09-06 DIAGNOSIS — R29.898 DECREASED STRENGTH OF UPPER EXTREMITY: Primary | ICD-10-CM

## 2023-09-06 PROCEDURE — 97110 THERAPEUTIC EXERCISES: CPT | Mod: PN

## 2023-09-06 PROCEDURE — 97140 MANUAL THERAPY 1/> REGIONS: CPT | Mod: PN

## 2023-09-06 PROCEDURE — 97112 NEUROMUSCULAR REEDUCATION: CPT | Mod: PN

## 2023-09-07 ENCOUNTER — OFFICE VISIT (OUTPATIENT)
Dept: ORTHOPEDICS | Facility: CLINIC | Age: 48
End: 2023-09-07
Payer: COMMERCIAL

## 2023-09-07 VITALS — BODY MASS INDEX: 33.94 KG/M2 | HEIGHT: 69 IN

## 2023-09-07 DIAGNOSIS — M75.121 NONTRAUMATIC COMPLETE TEAR OF RIGHT ROTATOR CUFF: Primary | ICD-10-CM

## 2023-09-07 PROCEDURE — 4010F ACE/ARB THERAPY RXD/TAKEN: CPT | Mod: CPTII,S$GLB,, | Performed by: ORTHOPAEDIC SURGERY

## 2023-09-07 PROCEDURE — 99213 PR OFFICE/OUTPT VISIT, EST, LEVL III, 20-29 MIN: ICD-10-PCS | Mod: S$GLB,,, | Performed by: ORTHOPAEDIC SURGERY

## 2023-09-07 PROCEDURE — 1159F PR MEDICATION LIST DOCUMENTED IN MEDICAL RECORD: ICD-10-PCS | Mod: CPTII,S$GLB,, | Performed by: ORTHOPAEDIC SURGERY

## 2023-09-07 PROCEDURE — 3008F PR BODY MASS INDEX (BMI) DOCUMENTED: ICD-10-PCS | Mod: CPTII,S$GLB,, | Performed by: ORTHOPAEDIC SURGERY

## 2023-09-07 PROCEDURE — 99999 PR PBB SHADOW E&M-EST. PATIENT-LVL III: CPT | Mod: PBBFAC,,, | Performed by: ORTHOPAEDIC SURGERY

## 2023-09-07 PROCEDURE — 99213 OFFICE O/P EST LOW 20 MIN: CPT | Mod: S$GLB,,, | Performed by: ORTHOPAEDIC SURGERY

## 2023-09-07 PROCEDURE — 3008F BODY MASS INDEX DOCD: CPT | Mod: CPTII,S$GLB,, | Performed by: ORTHOPAEDIC SURGERY

## 2023-09-07 PROCEDURE — 3044F HG A1C LEVEL LT 7.0%: CPT | Mod: CPTII,S$GLB,, | Performed by: ORTHOPAEDIC SURGERY

## 2023-09-07 PROCEDURE — 99999 PR PBB SHADOW E&M-EST. PATIENT-LVL III: ICD-10-PCS | Mod: PBBFAC,,, | Performed by: ORTHOPAEDIC SURGERY

## 2023-09-07 PROCEDURE — 4010F PR ACE/ARB THEARPY RXD/TAKEN: ICD-10-PCS | Mod: CPTII,S$GLB,, | Performed by: ORTHOPAEDIC SURGERY

## 2023-09-07 PROCEDURE — 1159F MED LIST DOCD IN RCRD: CPT | Mod: CPTII,S$GLB,, | Performed by: ORTHOPAEDIC SURGERY

## 2023-09-07 PROCEDURE — 3044F PR MOST RECENT HEMOGLOBIN A1C LEVEL <7.0%: ICD-10-PCS | Mod: CPTII,S$GLB,, | Performed by: ORTHOPAEDIC SURGERY

## 2023-09-07 RX ORDER — TIZANIDINE 4 MG/1
4 TABLET ORAL NIGHTLY
Qty: 10 TABLET | Refills: 2 | Status: SHIPPED | OUTPATIENT
Start: 2023-09-07 | End: 2023-10-24

## 2023-09-07 NOTE — PROGRESS NOTES
[facility]   Physical Therapy Treatment Note      Name: Enio Earl  Clinic Number: [id number]    Therapy Diagnosis:   Encounter Diagnosis   Name Primary?    Decreased strength of upper extremity Yes       Physician: Sarah Zamorano PA-C    Visit Date: 9/6/2023  Physician Orders: PT Eval and Treat  Medical Diagnosis: Z98.890 (ICD-10-CM) - S/P rotator cuff repair  Evaluation Date: 5/23/2023  Authorization Period: 12/31/2023  Plan of Care Certification Period: 5/23/2023 to 08/25/2023  Visit # / Visits authorized: 16/20 (+eval)  FOTO: at dc PTA visits: 0/5     Time In: 1500  Time Out: 1555  Total Billable Time: 50 minutes (2 MT, 1 TE, 1 NM)  Precautions: post-op RC repair.      OPERATIVE PROCEDURES:  1.  right shoulder arthroscopy with subacromial decompression.  2.  right shoulder arthroscopic rotator cuff repair using Opus suture anchor X 1    Subjective     Pt reports: right sided neck pain. Increased over the last week.  Denies shoulder pain.     He was compliant with home exercise program.  Response to previous treatment: no increase in pain  Functional change: none voiced    Pain: 0/10  Location: right shoulder     Objective      Objective Measures updated at progress report unless specified.     Shoulder PRoM:   , ExR (scapular) 60, at 90= 90   Active FE check 170  Strength check: flexion 4/5, external rotation 3+ to 4/5, internal rotation 5/5, biceps 5/5    Treatment     Enio received the treatments listed below:      Enio received the following manual therapy techniques: total time 25 minutes  - passive range of motion 4D submax  - grade I/II right GhJ mobs.   - STM right UT.   - mid and lower cervical sideglides grade III    Enio received therapeutic exercises to develop strength, endurance, and ROM for 15 minutes including:  UBE 4'  sL external rotation 3# 3x10  SAPD black 2x15    Neuromuscular re-education: total time 15 minutes, including:  Standing  shoulder IntR/ExR black/green TB 2x15   forward punch orange cook cord 2x15       Patient Education and Home Exercises       Education provided:   - home exercise program: continue current.     Written Home Exercises Provided: Patient instructed to cont prior HEP. Exercises were reviewed and Enio was able to demonstrate them prior to the end of the session.  Enio demonstrated good  understanding of the education provided. See EMR under Patient Instructions for exercises provided during therapy sessions    Assessment   A: nEio is a 47 y.o. male referred to outpatient Physical Therapy with a medical diagnosis of Z98.890 (ICD-10-CM) - S/P rotator cuff repair including SAD.  Small size ?; 1 anchor.  Sx date: 05/09/2023.  Post-op 17 weeks. Full passive range of motion; FE active check good.  Resistance per RM > 15 reps for all exercises.  Strength assessment reveals mild external rotation weakness 3+ to 4/5 but expected.  Able to statically carry required work loads. Simulated work tasks patient tolerates well.  Primary complaint at this time is right-sided neck pain.  Seem muscular in nature. F/u with Ortho team this week.  Patient ready for [id number] in PT's opinion.     Enio Is progressing well towards his goals.   Pt prognosis is Excellent.     Pt will continue to benefit from skilled outpatient physical therapy to address the deficits listed in the problem list box on initial evaluation, provide pt/family education and to maximize pt's level of independence in the home and community environment.   Pt's spiritual, cultural and educational needs considered and pt agreeable to plan of care and goals.     Anticipated barriers to physical therapy: co-morbidities, eagerness to RTW.     Goals:   Short Term Goals: 3 weeks   1.  Patient will demonstrate understanding of and compliance with home exercise program. Met   2.  Patient will demonstrate >140 degrees of right shoulder passive FE for improved  overhead reaching range of motion. Met  3.  Patient will demonstrate at least 60 degrees of right shoulder external rotation in scapular plane from improved activities of daily living tolerance. Met     Long Term Goals: 12-16 weeks   1. Patient will demonstrate >150 degrees of active right shoulder FE without compensatory hiking for overhead reaching. Met  2. Patient will demonstrate >3+/5 right shoulder FE, external rotation, internal rotation strength for independent job performance. Met  3. Patient will demonstrate <21% FOTO score. Met  4. Patient will demonstrate >20 point improvement on DASH score.  Met      Plan   DC if cleared by Ortho team for RTW.     Nixon Lowery, PT, DPT, OCS

## 2023-09-07 NOTE — PROGRESS NOTES
Subjective:      Patient ID: Enio Earl is a 48 y.o. male.  Chief Complaint: Post-op Evaluation (Right shoulder post op)      HPI  Enio Earl is a  48 y.o. male presenting today for follow up of rotator cuff repair of the right shoulder.  He reports that he is now about 4 months postop   He has completed physical therapy he is doing well no pain in the shoulder he is having some neck pain however but overall the shoulders doing very well  He does feel ready to return to full duty work.    Review of patient's allergies indicates:  No Known Allergies      Current Outpatient Medications   Medication Sig Dispense Refill    atorvastatin (LIPITOR) 10 MG tablet Take 1 tablet (10 mg total) by mouth once daily. 90 tablet 1    losartan (COZAAR) 100 MG tablet Take 1 tablet (100 mg total) by mouth once daily. 90 tablet 1    nicotine (NICODERM CQ) 21 mg/24 hr Place 1 patch onto the skin once daily. 28 patch 0    sertraline (ZOLOFT) 25 MG tablet Take 1 tablet (25 mg total) by mouth once daily. 90 tablet 1    traMADoL (ULTRAM) 50 mg tablet Take 1 tablet (50 mg total) by mouth every 6 (six) hours as needed. 30 tablet 0    triamcinolone acetonide 0.1% (KENALOG) 0.1 % cream Apply topically 2 (two) times daily. 30 g 3    varenicline (CHANTIX STARTING MONTH BOX) 0.5 mg (11)- 1 mg (42) tablet Take one 0.5mg tab by mouth once daily X3 days,then increase to one 0.5mg tab twice daily X4 days,then increase to one 1mg tab twice daily 53 each 0    [START ON 9/8/2023] varenicline (CHANTIX) 1 mg Tab Take 1 tablet (1 mg total) by mouth 2 (two) times daily. 180 tablet 1    verapamiL (CALAN-SR) 120 MG CR tablet Take 1 tablet (120 mg total) by mouth once daily. 90 tablet 1    tiZANidine (ZANAFLEX) 4 MG tablet Take 1 tablet (4 mg total) by mouth every evening. 10 tablet 2     No current facility-administered medications for this visit.       Past Medical History:   Diagnosis Date    Anxiety     Hypertension     Supraventricular  "tachycardia        Past Surgical History:   Procedure Laterality Date    ARTHROSCOPIC REPAIR OF ROTATOR CUFF OF SHOULDER Right 5/9/2023    Procedure: REPAIR, ROTATOR CUFF, ARTHROSCOPIC;  Surgeon: Cristi Adam Jr., MD;  Location: [facility] OR;  Service: Orthopedics;  Laterality: Right;  need opsu system muna notified cc    ARTHROSCOPY OF SHOULDER WITH DECOMPRESSION OF SUBACROMIAL SPACE Right 5/9/2023    Procedure: ARTHROSCOPY, SHOULDER, WITH SUBACROMIAL SPACE DECOMPRESSION;  Surgeon: Cristi Adam Jr., MD;  Location: [facility] OR;  Service: Orthopedics;  Laterality: Right;    CARDIAC ELECTROPHYSIOLOGY MAPPING AND ABLATION  07/2020    for PSVT by Dr. Lim     LATERAL EPICONDYLE RELEASE Left 5/18/2021    Procedure: RELEASE, ELBOW, LATERAL EPICONDYLE;  Surgeon: Cristi Adam Jr., MD;  Location: [facility] OR;  Service: Orthopedics;  Laterality: Left;    NOSE SURGERY      sepltoplasty       OBJECTIVE:   PHYSICAL EXAM:  Height: 5' 9" (175.3 cm)    Vitals:    09/07/23 1444   Height: 5' 9" (1.753 m)   PainSc: 0-No pain   PainLoc: Shoulder     Ortho/SPM Exam  Examination right shoulder no tenderness no swelling  Full range of motion without pain good strength   Neck is little tender on the right side mild muscle spasm    RADIOGRAPHS:  None  Comments: I have personally reviewed the imaging and I agree with the above radiologist's report.    ASSESSMENT/PLAN:     IMPRESSION:  Status post rotator cuff repair right shoulder    PLAN:  Terms of the shoulder he can return to full duty work   Continue strengthening home exercise program   For the neck symptoms I recommended Naprosyn by mouth and Zanaflex at bedtime for few weeks   Maybe try heating pad as well       FOLLOW UP:  4-6 weeks or as needed    Disclaimer: This note has been generated using voice-recognition software. There may be typographical errors that have been missed during proof-reading.     "

## 2023-09-18 RX ORDER — TRAMADOL HYDROCHLORIDE 50 MG/1
50 TABLET ORAL EVERY 6 HOURS PRN
Qty: 30 TABLET | Refills: 0 | Status: SHIPPED | OUTPATIENT
Start: 2023-09-18 | End: 2023-10-16

## 2023-09-19 ENCOUNTER — TELEPHONE (OUTPATIENT)
Dept: SMOKING CESSATION | Facility: CLINIC | Age: 48
End: 2023-09-19
Payer: COMMERCIAL

## 2023-09-19 NOTE — TELEPHONE ENCOUNTER
Called to reschedule missed appointment for smoking cessation. Unable to reach, left name and number to call back. - Aleisha Gamez, CTTS [phone].

## 2023-09-21 ENCOUNTER — TELEPHONE (OUTPATIENT)
Dept: ENDOSCOPY | Facility: HOSPITAL | Age: 48
End: 2023-09-21
Payer: COMMERCIAL

## 2023-09-21 NOTE — TELEPHONE ENCOUNTER
1st attempt to contact the patient to schedule an endoscopy procedure(s). The patient did not answer the call and left a voice message with number [phone]) requesting a call back.

## 2023-09-28 ENCOUNTER — PATIENT MESSAGE (OUTPATIENT)
Dept: ORTHOPEDICS | Facility: CLINIC | Age: 48
End: 2023-09-28
Payer: COMMERCIAL

## 2023-10-09 ENCOUNTER — TELEPHONE (OUTPATIENT)
Dept: SMOKING CESSATION | Facility: CLINIC | Age: 48
End: 2023-10-09
Payer: COMMERCIAL

## 2023-10-09 NOTE — TELEPHONE ENCOUNTER
Called to reschedule missed appointment for smoking cessation. Unable to reach, left name and number to call back. - Aleisha Gamez, CTTS [phone].

## 2023-10-15 ENCOUNTER — PATIENT MESSAGE (OUTPATIENT)
Dept: ORTHOPEDICS | Facility: CLINIC | Age: 48
End: 2023-10-15
Payer: COMMERCIAL

## 2023-10-16 RX ORDER — TRAMADOL HYDROCHLORIDE 50 MG/1
50 TABLET ORAL EVERY 6 HOURS PRN
Qty: 30 TABLET | Refills: 0 | Status: SHIPPED | OUTPATIENT
Start: 2023-10-16 | End: 2023-11-21

## 2023-10-17 ENCOUNTER — TELEPHONE (OUTPATIENT)
Dept: ENDOSCOPY | Facility: HOSPITAL | Age: 48
End: 2023-10-17
Payer: COMMERCIAL

## 2023-10-17 DIAGNOSIS — Z98.890: Primary | ICD-10-CM

## 2023-10-17 NOTE — TELEPHONE ENCOUNTER
Spoke to patient to schedule procedure(s) Colonoscopy       Physician to perform procedure(s) Dr. MARJORIE Oswald  Date of Procedure (s) 1/23/24  Arrival Time 1:15 PM  Time of Procedure(s) 2:15 PM   Location of Procedure(s) [facility] 2nd Floor  Type of Rx Prep sent to patient: PEG  Instructions provided to patient via [facility]    Patient was informed on the following information and verbalized understanding. Screening questionnaire reviewed with patient and complete. If procedure requires anesthesia, a responsible adult needs to be present to accompany the patient home, patient cannot drive after receiving anesthesia. Appointment details are tentative, especially check-in time. Patient will receive a prep-op call 4 days prior to confirm check-in time for procedure. If applicable the patient should contact their pharmacy to verify Rx for procedure prep is ready for pick-up. Patient was advised to call the scheduling department at [phone] if pharmacy states no Rx is available. Patient was advised to call the endoscopy scheduling department if any questions or concerns arise.      [facility] Scheduling Department

## 2023-10-18 NOTE — H&P (VIEW-ONLY)
Subjective:      Patient ID: Enio Earl is a 48 y.o. male.  Chief Complaint: Pain of the Right Elbow      HPI  Enio Earl is a  48 y.o. male presenting today for follow up of right elbow pain.    The patient has been treated with conservative measures to address pain from medial epicondylitis/golfer's elbow over the past year.  Treatment measures have included bracing, at home exercises, activity modification, ice, and corticosteroid injection.  His pain has continued to worsen despite these measures.    He would like to proceed with medial epicondyle release for more definitive pain relief.       Review of patient's allergies indicates:  No Known Allergies      Current Outpatient Medications   Medication Sig Dispense Refill    atorvastatin (LIPITOR) 10 MG tablet Take 1 tablet (10 mg total) by mouth once daily. 90 tablet 1    losartan (COZAAR) 100 MG tablet Take 1 tablet (100 mg total) by mouth once daily. 90 tablet 1    nicotine (NICODERM CQ) 21 mg/24 hr Place 1 patch onto the skin once daily. 28 patch 0    sertraline (ZOLOFT) 25 MG tablet Take 1 tablet (25 mg total) by mouth once daily. 90 tablet 1    traMADoL (ULTRAM) 50 mg tablet TAKE 1 TABLET BY MOUTH EVERY 6 HOURS AS NEEDED 30 tablet 0    triamcinolone acetonide 0.1% (KENALOG) 0.1 % cream Apply topically 2 (two) times daily. 30 g 3    varenicline (CHANTIX STARTING MONTH BOX) 0.5 mg (11)- 1 mg (42) tablet Take one 0.5mg tab by mouth once daily X3 days,then increase to one 0.5mg tab twice daily X4 days,then increase to one 1mg tab twice daily 53 each 0    varenicline (CHANTIX) 1 mg Tab Take 1 tablet (1 mg total) by mouth 2 (two) times daily. 180 tablet 1    verapamiL (CALAN-SR) 120 MG CR tablet Take 1 tablet (120 mg total) by mouth once daily. 90 tablet 1     No current facility-administered medications for this visit.       Past Medical History:   Diagnosis Date    Anxiety     Hypertension     Supraventricular tachycardia        Past Surgical  "History:   Procedure Laterality Date    ARTHROSCOPIC REPAIR OF ROTATOR CUFF OF SHOULDER Right 5/9/2023    Procedure: REPAIR, ROTATOR CUFF, ARTHROSCOPIC;  Surgeon: Cristi Adam Jr., MD;  Location: [facility] OR;  Service: Orthopedics;  Laterality: Right;  need opsu system muna notified cc    ARTHROSCOPY OF SHOULDER WITH DECOMPRESSION OF SUBACROMIAL SPACE Right 5/9/2023    Procedure: ARTHROSCOPY, SHOULDER, WITH SUBACROMIAL SPACE DECOMPRESSION;  Surgeon: Cristi Adam Jr., MD;  Location: [facility] OR;  Service: Orthopedics;  Laterality: Right;    CARDIAC ELECTROPHYSIOLOGY MAPPING AND ABLATION  07/2020    for PSVT by Dr. Lim     LATERAL EPICONDYLE RELEASE Left 5/18/2021    Procedure: RELEASE, ELBOW, LATERAL EPICONDYLE;  Surgeon: Cristi Adam Jr., MD;  Location: [facility] OR;  Service: Orthopedics;  Laterality: Left;    NOSE SURGERY      sepltoplasty       OBJECTIVE:   PHYSICAL EXAM:  Height: 5' 9" (175.3 cm) Weight: 99.8 kg (220 lb)  Vitals:    10/24/23 0958   Weight: 99.8 kg (220 lb)   Height: 5' 9" (1.753 m)   PainSc:   9     Ortho/SPM Exam  Examination right elbow  Skin intact without overlying lesions, redness or visible deformity  (+) Tenderness medially with slight swelling  Full range of motion to elbow flexion /extension/supination/pronation without difficulty. No instability.   (+) Pain to resisted wrist flexion  Tinel sign negative   BCR to finger pads  Sensation grossly intact distally    RADIOGRAPHS:  No new imaging obtained today    MRI R Elbow 2-1-2023  Impression:     1. Complex olecranon bursitis with surrounding enhancing subcutaneous edema, possibly inflammatory or infectious in nature.  2. Radiocapitellar and ulnohumeral osteoarthritis with a small complex joint effusion and mild enhancing synovitis.  3. Severe common flexor tendinosis with high-grade partial-thickness interstitial tearing.  4. Mild distal biceps tendinosis.    ASSESSMENT/PLAN:     IMPRESSION:  Medial epicondylitis right " elbow    PLAN:    The patient has had persistent pain and difficulties with daily activities secondary to medial epicondylitis despite conservative treatment.    At this point he would benefit from medial epicondyle release in the OR suite for more definitive pain relief.    The surgical procedure, risks and benefits were discussed at the bedside.  Patient verbalized understanding and wishes to proceed with surgery.    Consent signed    OR: Plan to go to surgery on 11/14/2023 for right medial epicondylar release    FOLLOW UP: 2 weeks postop

## 2023-10-18 NOTE — PROGRESS NOTES
Subjective:      Patient ID: Enio Earl is a 48 y.o. male.  Chief Complaint: Pain of the Right Elbow      HPI  Enio Earl is a  48 y.o. male presenting today for follow up of right elbow pain.    The patient has been treated with conservative measures to address pain from medial epicondylitis/golfer's elbow over the past year.  Treatment measures have included bracing, at home exercises, activity modification, ice, and corticosteroid injection.  His pain has continued to worsen despite these measures.    He would like to proceed with medial epicondyle release for more definitive pain relief.       Review of patient's allergies indicates:  No Known Allergies      Current Outpatient Medications   Medication Sig Dispense Refill    atorvastatin (LIPITOR) 10 MG tablet Take 1 tablet (10 mg total) by mouth once daily. 90 tablet 1    losartan (COZAAR) 100 MG tablet Take 1 tablet (100 mg total) by mouth once daily. 90 tablet 1    nicotine (NICODERM CQ) 21 mg/24 hr Place 1 patch onto the skin once daily. 28 patch 0    sertraline (ZOLOFT) 25 MG tablet Take 1 tablet (25 mg total) by mouth once daily. 90 tablet 1    traMADoL (ULTRAM) 50 mg tablet TAKE 1 TABLET BY MOUTH EVERY 6 HOURS AS NEEDED 30 tablet 0    triamcinolone acetonide 0.1% (KENALOG) 0.1 % cream Apply topically 2 (two) times daily. 30 g 3    varenicline (CHANTIX STARTING MONTH BOX) 0.5 mg (11)- 1 mg (42) tablet Take one 0.5mg tab by mouth once daily X3 days,then increase to one 0.5mg tab twice daily X4 days,then increase to one 1mg tab twice daily 53 each 0    varenicline (CHANTIX) 1 mg Tab Take 1 tablet (1 mg total) by mouth 2 (two) times daily. 180 tablet 1    verapamiL (CALAN-SR) 120 MG CR tablet Take 1 tablet (120 mg total) by mouth once daily. 90 tablet 1     No current facility-administered medications for this visit.       Past Medical History:   Diagnosis Date    Anxiety     Hypertension     Supraventricular tachycardia        Past Surgical  "History:   Procedure Laterality Date    ARTHROSCOPIC REPAIR OF ROTATOR CUFF OF SHOULDER Right 5/9/2023    Procedure: REPAIR, ROTATOR CUFF, ARTHROSCOPIC;  Surgeon: Cristi Adam Jr., MD;  Location: [facility] OR;  Service: Orthopedics;  Laterality: Right;  need opsu system muna notified cc    ARTHROSCOPY OF SHOULDER WITH DECOMPRESSION OF SUBACROMIAL SPACE Right 5/9/2023    Procedure: ARTHROSCOPY, SHOULDER, WITH SUBACROMIAL SPACE DECOMPRESSION;  Surgeon: Cristi Adam Jr., MD;  Location: [facility] OR;  Service: Orthopedics;  Laterality: Right;    CARDIAC ELECTROPHYSIOLOGY MAPPING AND ABLATION  07/2020    for PSVT by Dr. Lim     LATERAL EPICONDYLE RELEASE Left 5/18/2021    Procedure: RELEASE, ELBOW, LATERAL EPICONDYLE;  Surgeon: Cristi Adam Jr., MD;  Location: [facility] OR;  Service: Orthopedics;  Laterality: Left;    NOSE SURGERY      sepltoplasty       OBJECTIVE:   PHYSICAL EXAM:  Height: 5' 9" (175.3 cm) Weight: 99.8 kg (220 lb)  Vitals:    10/24/23 0958   Weight: 99.8 kg (220 lb)   Height: 5' 9" (1.753 m)   PainSc:   9     Ortho/SPM Exam  Examination right elbow  Skin intact without overlying lesions, redness or visible deformity  (+) Tenderness medially with slight swelling  Full range of motion to elbow flexion /extension/supination/pronation without difficulty. No instability.   (+) Pain to resisted wrist flexion  Tinel sign negative   BCR to finger pads  Sensation grossly intact distally    RADIOGRAPHS:  No new imaging obtained today    MRI R Elbow 2-1-2023  Impression:     1. Complex olecranon bursitis with surrounding enhancing subcutaneous edema, possibly inflammatory or infectious in nature.  2. Radiocapitellar and ulnohumeral osteoarthritis with a small complex joint effusion and mild enhancing synovitis.  3. Severe common flexor tendinosis with high-grade partial-thickness interstitial tearing.  4. Mild distal biceps tendinosis.    ASSESSMENT/PLAN:     IMPRESSION:  Medial epicondylitis right " elbow    PLAN:    The patient has had persistent pain and difficulties with daily activities secondary to medial epicondylitis despite conservative treatment.    At this point he would benefit from medial epicondyle release in the OR suite for more definitive pain relief.    The surgical procedure, risks and benefits were discussed at the bedside.  Patient verbalized understanding and wishes to proceed with surgery.    Consent signed    OR: Plan to go to surgery on 11/14/2023 for right medial epicondylar release    FOLLOW UP: 2 weeks postop

## 2023-10-24 ENCOUNTER — OFFICE VISIT (OUTPATIENT)
Dept: ORTHOPEDICS | Facility: CLINIC | Age: 48
End: 2023-10-24
Payer: COMMERCIAL

## 2023-10-24 VITALS — BODY MASS INDEX: 32.58 KG/M2 | WEIGHT: 220 LBS | HEIGHT: 69 IN

## 2023-10-24 DIAGNOSIS — M77.01 MEDIAL EPICONDYLITIS OF RIGHT ELBOW: Primary | ICD-10-CM

## 2023-10-24 PROCEDURE — 99999 PR PBB SHADOW E&M-EST. PATIENT-LVL IV: ICD-10-PCS | Mod: PBBFAC,,, | Performed by: PHYSICIAN ASSISTANT

## 2023-10-24 PROCEDURE — 99213 PR OFFICE/OUTPT VISIT, EST, LEVL III, 20-29 MIN: ICD-10-PCS | Mod: S$GLB,,, | Performed by: PHYSICIAN ASSISTANT

## 2023-10-24 PROCEDURE — 99999 PR PBB SHADOW E&M-EST. PATIENT-LVL IV: CPT | Mod: PBBFAC,,, | Performed by: PHYSICIAN ASSISTANT

## 2023-10-24 PROCEDURE — 99213 OFFICE O/P EST LOW 20 MIN: CPT | Mod: S$GLB,,, | Performed by: PHYSICIAN ASSISTANT

## 2023-10-24 PROCEDURE — 1160F PR REVIEW ALL MEDS BY PRESCRIBER/CLIN PHARMACIST DOCUMENTED: ICD-10-PCS | Mod: CPTII,S$GLB,, | Performed by: PHYSICIAN ASSISTANT

## 2023-10-24 PROCEDURE — 4010F PR ACE/ARB THEARPY RXD/TAKEN: ICD-10-PCS | Mod: CPTII,S$GLB,, | Performed by: PHYSICIAN ASSISTANT

## 2023-10-24 PROCEDURE — 1159F PR MEDICATION LIST DOCUMENTED IN MEDICAL RECORD: ICD-10-PCS | Mod: CPTII,S$GLB,, | Performed by: PHYSICIAN ASSISTANT

## 2023-10-24 PROCEDURE — 3008F BODY MASS INDEX DOCD: CPT | Mod: CPTII,S$GLB,, | Performed by: PHYSICIAN ASSISTANT

## 2023-10-24 PROCEDURE — 3044F HG A1C LEVEL LT 7.0%: CPT | Mod: CPTII,S$GLB,, | Performed by: PHYSICIAN ASSISTANT

## 2023-10-24 PROCEDURE — 3044F PR MOST RECENT HEMOGLOBIN A1C LEVEL <7.0%: ICD-10-PCS | Mod: CPTII,S$GLB,, | Performed by: PHYSICIAN ASSISTANT

## 2023-10-24 PROCEDURE — 3008F PR BODY MASS INDEX (BMI) DOCUMENTED: ICD-10-PCS | Mod: CPTII,S$GLB,, | Performed by: PHYSICIAN ASSISTANT

## 2023-10-24 PROCEDURE — 1159F MED LIST DOCD IN RCRD: CPT | Mod: CPTII,S$GLB,, | Performed by: PHYSICIAN ASSISTANT

## 2023-10-24 PROCEDURE — 1160F RVW MEDS BY RX/DR IN RCRD: CPT | Mod: CPTII,S$GLB,, | Performed by: PHYSICIAN ASSISTANT

## 2023-10-24 PROCEDURE — 4010F ACE/ARB THERAPY RXD/TAKEN: CPT | Mod: CPTII,S$GLB,, | Performed by: PHYSICIAN ASSISTANT

## 2023-10-24 RX ORDER — CEFAZOLIN SODIUM 2 G/50ML
2 SOLUTION INTRAVENOUS
Status: CANCELLED | OUTPATIENT
Start: 2023-10-24

## 2023-10-24 RX ORDER — MUPIROCIN 20 MG/G
OINTMENT TOPICAL
Status: CANCELLED | OUTPATIENT
Start: 2023-10-24

## 2023-10-24 RX ORDER — TIZANIDINE 4 MG/1
4 TABLET ORAL NIGHTLY
Qty: 10 TABLET | Refills: 2 | Status: SHIPPED | OUTPATIENT
Start: 2023-10-24 | End: 2024-01-08

## 2023-10-24 RX ORDER — SODIUM CHLORIDE 9 MG/ML
INJECTION, SOLUTION INTRAVENOUS CONTINUOUS
Status: CANCELLED | OUTPATIENT
Start: 2023-10-24

## 2023-10-25 ENCOUNTER — TELEPHONE (OUTPATIENT)
Dept: PREADMISSION TESTING | Facility: HOSPITAL | Age: 48
End: 2023-10-25
Payer: COMMERCIAL

## 2023-10-25 DIAGNOSIS — Z01.818 PRE-OP EVALUATION: Primary | ICD-10-CM

## 2023-10-25 DIAGNOSIS — I10 HYPERTENSION, UNSPECIFIED TYPE: ICD-10-CM

## 2023-11-01 ENCOUNTER — PATIENT MESSAGE (OUTPATIENT)
Dept: ORTHOPEDICS | Facility: CLINIC | Age: 48
End: 2023-11-01
Payer: COMMERCIAL

## 2023-11-02 ENCOUNTER — LAB VISIT (OUTPATIENT)
Dept: LAB | Facility: HOSPITAL | Age: 48
End: 2023-11-02
Payer: COMMERCIAL

## 2023-11-02 ENCOUNTER — CLINICAL SUPPORT (OUTPATIENT)
Dept: LAB | Facility: HOSPITAL | Age: 48
End: 2023-11-02
Payer: COMMERCIAL

## 2023-11-02 DIAGNOSIS — I10 HYPERTENSION, UNSPECIFIED TYPE: ICD-10-CM

## 2023-11-02 DIAGNOSIS — Z01.818 PRE-OP EVALUATION: ICD-10-CM

## 2023-11-02 LAB
ANION GAP SERPL CALC-SCNC: 12 MMOL/L (ref 8–16)
BUN SERPL-MCNC: 13 MG/DL (ref 6–20)
CALCIUM SERPL-MCNC: 9.5 MG/DL (ref 8.7–10.5)
CHLORIDE SERPL-SCNC: 105 MMOL/L (ref 95–110)
CO2 SERPL-SCNC: 23 MMOL/L (ref 23–29)
CREAT SERPL-MCNC: 1 MG/DL (ref 0.5–1.4)
EST. GFR  (NO RACE VARIABLE): >60 ML/MIN/1.73 M^2
GLUCOSE SERPL-MCNC: 97 MG/DL (ref 70–110)
POTASSIUM SERPL-SCNC: 3.8 MMOL/L (ref 3.5–5.1)
SODIUM SERPL-SCNC: 140 MMOL/L (ref 136–145)

## 2023-11-02 PROCEDURE — 80048 BASIC METABOLIC PNL TOTAL CA: CPT | Performed by: NURSE PRACTITIONER

## 2023-11-02 PROCEDURE — 93005 ELECTROCARDIOGRAM TRACING: CPT

## 2023-11-02 PROCEDURE — 36415 COLL VENOUS BLD VENIPUNCTURE: CPT | Performed by: NURSE PRACTITIONER

## 2023-11-02 PROCEDURE — 93010 EKG 12-LEAD: ICD-10-PCS | Mod: ,,, | Performed by: INTERNAL MEDICINE

## 2023-11-02 PROCEDURE — 93010 ELECTROCARDIOGRAM REPORT: CPT | Mod: ,,, | Performed by: INTERNAL MEDICINE

## 2023-11-08 ENCOUNTER — CLINICAL SUPPORT (OUTPATIENT)
Dept: SMOKING CESSATION | Facility: CLINIC | Age: 48
End: 2023-11-08
Payer: COMMERCIAL

## 2023-11-08 DIAGNOSIS — F17.200 NICOTINE DEPENDENCE: Primary | ICD-10-CM

## 2023-11-08 PROCEDURE — 99999 PR PBB SHADOW E&M-EST. PATIENT-LVL I: ICD-10-PCS | Mod: PBBFAC,,,

## 2023-11-08 PROCEDURE — 99407 BEHAV CHNG SMOKING > 10 MIN: CPT | Mod: S$GLB,,,

## 2023-11-08 PROCEDURE — 99407 PR TOBACCO USE CESSATION INTENSIVE >10 MINUTES: ICD-10-PCS | Mod: S$GLB,,,

## 2023-11-08 PROCEDURE — 99999 PR PBB SHADOW E&M-EST. PATIENT-LVL I: CPT | Mod: PBBFAC,,,

## 2023-11-08 NOTE — PROGRESS NOTES
Called pt to f/u on his 3 month smoking cessation quit status. Pt stated he is quit and not quit. Reports he is able to go Monday- Friday without smoking, but on the weekends he is smoking during football season when he is watching [employer] game. He buys a pack and its lasting him 2 days. Pt did use Chantix and even ordered more cinnamon toothpicks on [employer].Informed him he has benefits available and is able to rejoin. Pt not ready to make appointment. He will call back if he was to go back to smoking regularly. Informed him of benefit period, phone follow ups, and contact information. Will complete smart form and will continue to follow up on quit #4 episode.

## 2023-11-14 ENCOUNTER — HOSPITAL ENCOUNTER (OUTPATIENT)
Facility: HOSPITAL | Age: 48
Discharge: HOME | End: 2023-11-14
Attending: ORTHOPAEDIC SURGERY | Admitting: ORTHOPAEDIC SURGERY
Payer: COMMERCIAL

## 2023-11-14 ENCOUNTER — ANESTHESIA EVENT (OUTPATIENT)
Dept: SURGERY | Facility: HOSPITAL | Age: 48
End: 2023-11-14
Payer: COMMERCIAL

## 2023-11-14 ENCOUNTER — ANESTHESIA (OUTPATIENT)
Dept: SURGERY | Facility: HOSPITAL | Age: 48
End: 2023-11-14
Payer: COMMERCIAL

## 2023-11-14 VITALS
WEIGHT: 220 LBS | OXYGEN SATURATION: 94 % | BODY MASS INDEX: 32.58 KG/M2 | SYSTOLIC BLOOD PRESSURE: 129 MMHG | HEART RATE: 78 BPM | RESPIRATION RATE: 17 BRPM | TEMPERATURE: 98 F | DIASTOLIC BLOOD PRESSURE: 84 MMHG | HEIGHT: 69 IN

## 2023-11-14 DIAGNOSIS — M77.01 MEDIAL EPICONDYLITIS OF RIGHT ELBOW: ICD-10-CM

## 2023-11-14 PROCEDURE — 36000709 HC OR TIME LEV III EA ADD 15 MIN: Performed by: ORTHOPAEDIC SURGERY

## 2023-11-14 PROCEDURE — 64415 NJX AA&/STRD BRCH PLXS IMG: CPT | Performed by: UROLOGY

## 2023-11-14 PROCEDURE — 37000008 HC ANESTHESIA 1ST 15 MINUTES: Performed by: ORTHOPAEDIC SURGERY

## 2023-11-14 PROCEDURE — C9290 INJ, BUPIVACAINE LIPOSOME: HCPCS | Performed by: UROLOGY

## 2023-11-14 PROCEDURE — 37000009 HC ANESTHESIA EA ADD 15 MINS: Performed by: ORTHOPAEDIC SURGERY

## 2023-11-14 PROCEDURE — 71000015 HC POSTOP RECOV 1ST HR: Performed by: ORTHOPAEDIC SURGERY

## 2023-11-14 PROCEDURE — 24359 PR TENOTOMY ELBOW LATERAL/MEDIAL DEBRIDE REPAIR: ICD-10-PCS | Mod: RT,,, | Performed by: ORTHOPAEDIC SURGERY

## 2023-11-14 PROCEDURE — 25000003 PHARM REV CODE 250: Performed by: ORTHOPAEDIC SURGERY

## 2023-11-14 PROCEDURE — 25000003 PHARM REV CODE 250: Performed by: UROLOGY

## 2023-11-14 PROCEDURE — 63600175 PHARM REV CODE 636 W HCPCS: Performed by: NURSE ANESTHETIST, CERTIFIED REGISTERED

## 2023-11-14 PROCEDURE — 63600175 PHARM REV CODE 636 W HCPCS: Performed by: UROLOGY

## 2023-11-14 PROCEDURE — 24359 REPAIR ELBOW DEB/ATTCH OPEN: CPT | Mod: RT,,, | Performed by: ORTHOPAEDIC SURGERY

## 2023-11-14 PROCEDURE — 25000003 PHARM REV CODE 250: Performed by: NURSE ANESTHETIST, CERTIFIED REGISTERED

## 2023-11-14 PROCEDURE — D9220A PRA ANESTHESIA: ICD-10-PCS | Mod: ANES,,, | Performed by: UROLOGY

## 2023-11-14 PROCEDURE — D9220A PRA ANESTHESIA: Mod: CRNA,,, | Performed by: NURSE ANESTHETIST, CERTIFIED REGISTERED

## 2023-11-14 PROCEDURE — 71000016 HC POSTOP RECOV ADDL HR: Performed by: ORTHOPAEDIC SURGERY

## 2023-11-14 PROCEDURE — D9220A PRA ANESTHESIA: Mod: ANES,,, | Performed by: UROLOGY

## 2023-11-14 PROCEDURE — D9220A PRA ANESTHESIA: ICD-10-PCS | Mod: CRNA,,, | Performed by: NURSE ANESTHETIST, CERTIFIED REGISTERED

## 2023-11-14 PROCEDURE — 36000708 HC OR TIME LEV III 1ST 15 MIN: Performed by: ORTHOPAEDIC SURGERY

## 2023-11-14 RX ORDER — OXYCODONE AND ACETAMINOPHEN 5; 325 MG/1; MG/1
1 TABLET ORAL
Status: CANCELLED | OUTPATIENT
Start: 2023-11-14

## 2023-11-14 RX ORDER — OXYCODONE HYDROCHLORIDE 5 MG/1
10 TABLET ORAL EVERY 4 HOURS PRN
Status: DISCONTINUED | OUTPATIENT
Start: 2023-11-14 | End: 2023-11-14 | Stop reason: HOSPADM

## 2023-11-14 RX ORDER — KETOROLAC TROMETHAMINE 30 MG/ML
30 INJECTION, SOLUTION INTRAMUSCULAR; INTRAVENOUS ONCE
Status: DISCONTINUED | OUTPATIENT
Start: 2023-11-14 | End: 2023-11-14 | Stop reason: HOSPADM

## 2023-11-14 RX ORDER — BUPIVACAINE HYDROCHLORIDE 5 MG/ML
INJECTION, SOLUTION EPIDURAL; INTRACAUDAL
Status: COMPLETED | OUTPATIENT
Start: 2023-11-14 | End: 2023-11-14

## 2023-11-14 RX ORDER — ONDANSETRON 2 MG/ML
4 INJECTION INTRAMUSCULAR; INTRAVENOUS DAILY PRN
Status: CANCELLED | OUTPATIENT
Start: 2023-11-14

## 2023-11-14 RX ORDER — LIDOCAINE HYDROCHLORIDE 20 MG/ML
INJECTION INTRAVENOUS
Status: DISCONTINUED | OUTPATIENT
Start: 2023-11-14 | End: 2023-11-14

## 2023-11-14 RX ORDER — PROCHLORPERAZINE EDISYLATE 5 MG/ML
5 INJECTION INTRAMUSCULAR; INTRAVENOUS EVERY 30 MIN PRN
Status: CANCELLED | OUTPATIENT
Start: 2023-11-14

## 2023-11-14 RX ORDER — MIDAZOLAM HYDROCHLORIDE 1 MG/ML
0.5 INJECTION INTRAMUSCULAR; INTRAVENOUS
Status: DISCONTINUED | OUTPATIENT
Start: 2023-11-14 | End: 2023-11-14 | Stop reason: HOSPADM

## 2023-11-14 RX ORDER — CEFAZOLIN SODIUM 1 G/3ML
INJECTION, POWDER, FOR SOLUTION INTRAMUSCULAR; INTRAVENOUS
Status: DISCONTINUED | OUTPATIENT
Start: 2023-11-14 | End: 2023-11-14

## 2023-11-14 RX ORDER — PROPOFOL 10 MG/ML
VIAL (ML) INTRAVENOUS
Status: DISCONTINUED | OUTPATIENT
Start: 2023-11-14 | End: 2023-11-14

## 2023-11-14 RX ORDER — PROPOFOL 10 MG/ML
VIAL (ML) INTRAVENOUS CONTINUOUS PRN
Status: DISCONTINUED | OUTPATIENT
Start: 2023-11-14 | End: 2023-11-14

## 2023-11-14 RX ORDER — FENTANYL CITRATE 50 UG/ML
INJECTION, SOLUTION INTRAMUSCULAR; INTRAVENOUS
Status: DISCONTINUED | OUTPATIENT
Start: 2023-11-14 | End: 2023-11-14

## 2023-11-14 RX ORDER — CEFAZOLIN SODIUM 2 G/50ML
2 SOLUTION INTRAVENOUS
Status: DISCONTINUED | OUTPATIENT
Start: 2023-11-14 | End: 2023-11-14 | Stop reason: HOSPADM

## 2023-11-14 RX ORDER — SODIUM CHLORIDE 9 MG/ML
INJECTION, SOLUTION INTRAVENOUS CONTINUOUS
Status: DISCONTINUED | OUTPATIENT
Start: 2023-11-14 | End: 2023-11-14 | Stop reason: HOSPADM

## 2023-11-14 RX ORDER — FENTANYL CITRATE 50 UG/ML
25 INJECTION, SOLUTION INTRAMUSCULAR; INTRAVENOUS EVERY 5 MIN PRN
Status: CANCELLED | OUTPATIENT
Start: 2023-11-14

## 2023-11-14 RX ORDER — ONDANSETRON 8 MG/1
8 TABLET, ORALLY DISINTEGRATING ORAL EVERY 8 HOURS PRN
Status: DISCONTINUED | OUTPATIENT
Start: 2023-11-14 | End: 2023-11-14 | Stop reason: HOSPADM

## 2023-11-14 RX ORDER — LIDOCAINE HYDROCHLORIDE 10 MG/ML
INJECTION, SOLUTION EPIDURAL; INFILTRATION; INTRACAUDAL; PERINEURAL
Status: COMPLETED | OUTPATIENT
Start: 2023-11-14 | End: 2023-11-14

## 2023-11-14 RX ORDER — HYDROCODONE BITARTRATE AND ACETAMINOPHEN 7.5; 325 MG/1; MG/1
1 TABLET ORAL EVERY 4 HOURS PRN
Qty: 20 TABLET | Refills: 0 | Status: SHIPPED | OUTPATIENT
Start: 2023-11-14

## 2023-11-14 RX ORDER — ACETAMINOPHEN 325 MG/1
650 TABLET ORAL EVERY 4 HOURS PRN
Status: DISCONTINUED | OUTPATIENT
Start: 2023-11-14 | End: 2023-11-14 | Stop reason: HOSPADM

## 2023-11-14 RX ADMIN — SODIUM CHLORIDE, SODIUM LACTATE, POTASSIUM CHLORIDE, AND CALCIUM CHLORIDE: .6; .31; .03; .02 INJECTION, SOLUTION INTRAVENOUS at 11:11

## 2023-11-14 RX ADMIN — CEFAZOLIN 2 G: 330 INJECTION, POWDER, FOR SOLUTION INTRAMUSCULAR; INTRAVENOUS at 11:11

## 2023-11-14 RX ADMIN — FENTANYL CITRATE 25 MCG: 50 INJECTION, SOLUTION INTRAMUSCULAR; INTRAVENOUS at 12:11

## 2023-11-14 RX ADMIN — BUPIVACAINE HYDROCHLORIDE 20 ML: 5 INJECTION, SOLUTION EPIDURAL; INTRACAUDAL; PERINEURAL at 11:11

## 2023-11-14 RX ADMIN — LIDOCAINE HYDROCHLORIDE 40 MG: 10 INJECTION, SOLUTION EPIDURAL; INFILTRATION; INTRACAUDAL; PERINEURAL at 11:11

## 2023-11-14 RX ADMIN — PROPOFOL 20 MG: 10 INJECTION, EMULSION INTRAVENOUS at 11:11

## 2023-11-14 RX ADMIN — PROPOFOL 40 MG: 10 INJECTION, EMULSION INTRAVENOUS at 11:11

## 2023-11-14 RX ADMIN — SODIUM CHLORIDE, SODIUM LACTATE, POTASSIUM CHLORIDE, AND CALCIUM CHLORIDE: .6; .31; .03; .02 INJECTION, SOLUTION INTRAVENOUS at 12:11

## 2023-11-14 RX ADMIN — MIDAZOLAM HYDROCHLORIDE 4 MG: 1 INJECTION INTRAMUSCULAR; INTRAVENOUS at 11:11

## 2023-11-14 RX ADMIN — OXYCODONE HYDROCHLORIDE 10 MG: 5 TABLET ORAL at 01:11

## 2023-11-14 RX ADMIN — LIDOCAINE HYDROCHLORIDE 80 MG: 20 INJECTION, SOLUTION INTRAVENOUS at 11:11

## 2023-11-14 RX ADMIN — BUPIVACAINE 10 ML: 13.3 INJECTION, SUSPENSION, LIPOSOMAL INFILTRATION at 11:11

## 2023-11-14 RX ADMIN — PROPOFOL 100 MCG/KG/MIN: 10 INJECTION, EMULSION INTRAVENOUS at 11:11

## 2023-11-14 RX ADMIN — FENTANYL CITRATE 25 MCG: 50 INJECTION, SOLUTION INTRAMUSCULAR; INTRAVENOUS at 11:11

## 2023-11-14 NOTE — ANESTHESIA PROCEDURE NOTES
Right Supraclavicular Single Shot    Patient location during procedure: pre-op   Block not for primary anesthetic.  Reason for block: at surgeon's request and post-op pain management   Post-op Pain Location: Right Elbow Pain   Start time: 11/14/2023 11:03 AM  Timeout: 11/14/2023 11:03 AM   End time: 11/14/2023 11:10 AM    Staffing  Authorizing Provider: Riley Rivero MD  Performing Provider: Riley Rivero MD    Staffing  Performed by: Riley Rivero MD  Authorized by: Riley Rivero MD    Preanesthetic Checklist  Completed: patient identified, IV checked, site marked, risks and benefits discussed, surgical consent, monitors and equipment checked, pre-op evaluation and timeout performed  Peripheral Block  Patient position: supine  Prep: ChloraPrep  Patient monitoring: heart rate, cardiac monitor, continuous pulse ox, continuous capnometry and frequent blood pressure checks  Block type: supraclavicular  Laterality: right  Injection technique: single shot  Needle  Needle type: Stimuplex   Needle gauge: 22 G  Needle length: 2 in  Needle localization: anatomical landmarks and ultrasound guidance   -ultrasound image captured on disc.  Assessment  Injection assessment: negative aspiration, negative parasthesia and local visualized surrounding nerve  Paresthesia pain: none  Heart rate change: no  Slow fractionated injection: yes    Medications:    Medications: bupivacaine (pf) (MARCAINE) injection 0.5% - Perineural   20 mL - 11/14/2023 11:10:00 AM  lidocaine (PF) injection 1% - Other   40 mg - 11/14/2023 11:03:00 AM    Additional Notes  VSS.  [facility] RN monitoring vitals throughout procedure.  Patient tolerated procedure well.

## 2023-11-14 NOTE — ANESTHESIA PREPROCEDURE EVALUATION
11/14/2023  Enio Earl is a 48 y.o., male for right elbow tendon /nerve release under regional anesthesia    Hx of SVT s/p ablation.   No recent episodes of arrhythmia   Hx of anesthetics in past without complications  NPO>8 hours  No food or drug allergies  Amenable to proceed with scheduled procedure    Patient Active Problem List   Diagnosis    PSVT (paroxysmal supraventricular tachycardia)    Essential hypertension    SVT (supraventricular tachycardia)    Lateral epicondylitis of left elbow    Other hyperlipidemia    Situational anxiety    SCOTTIE (obstructive sleep apnea)    Medial epicondylitis of right elbow    Tobacco dependence    Complete tear of right rotator cuff    Decreased strength of upper extremity       Past Medical History:   Diagnosis Date    Anxiety     Hypertension     Supraventricular tachycardia        ECHO: Results for orders placed during the hospital encounter of 03/03/20    Echo Color Flow Doppler? Yes    Interpretation Summary  · Normal left ventricular systolic function. The estimated ejection fraction is 55-60%.  · Normal LV diastolic function.  · The estimated PA systolic pressure is 19 mmHg.  · No wall motion abnormalities.  · Normal right ventricular systolic function.  · Normal central venous pressure (3 mmHg).      There is no height or weight on file to calculate BMI.    Tobacco Use: High Risk (10/24/2023)    Patient History     Smoking Tobacco Use: Every Day     Smokeless Tobacco Use: Never     Passive Exposure: Not on file       Social History     Substance and Sexual Activity   Drug Use Never        Alcohol Use: Heavy Drinker (8/11/2023)    AUDIT-C     Frequency of Alcohol Consumption: 2-3 times a week     Average Number of Drinks: 3 or 4     Frequency of Binge Drinking: Monthly       Review of patient's allergies indicates:  No Known Allergies      Airway:  No value  filed.         Pre-op Assessment    I have reviewed the Patient Summary Reports.     I have reviewed the Nursing Notes. I have reviewed the NPO Status.   I have reviewed the Medications.     Review of Systems  Anesthesia Hx:  No problems with previous Anesthesia   History of prior surgery of interest to airway management or planning:          Denies Family Hx of Anesthesia complications.    Denies Personal Hx of Anesthesia complications.                           Anesthesia Plan  Type of Anesthesia, risks & benefits discussed:    Anesthesia Type: Regional  Intra-op Monitoring Plan: Standard ASA Monitors  Post Op Pain Control Plan: multimodal analgesia and IV/PO Opioids PRN  Induction:  IV  Informed Consent: Informed consent signed with the Patient and all parties understand the risks and agree with anesthesia plan.  All questions answered. Patient consented to blood products? Yes  ASA Score: 3    Ready For Surgery From Anesthesia Perspective.     .

## 2023-11-14 NOTE — TRANSFER OF CARE
"Anesthesia Transfer of Care Note    Patient: Enio Earl    Procedure(s) Performed: Procedure(s) (LRB):  TENOTOMY,ELBOW,WITH DEBRIDEMENT AND TENDON REPAIR (Right)    Patient location: [facility]    Anesthesia Type: regional    Transport from OR: Transported from OR on room air with adequate spontaneous ventilation    Post pain: adequate analgesia    Post assessment: no apparent anesthetic complications    Post vital signs: stable    Level of consciousness: awake, alert and oriented    Nausea/Vomiting: no nausea/vomiting    Complications: none    Transfer of care protocol was followed      Last vitals: Visit Vitals  /77 (BP Location: Left arm, Patient Position: Lying)   Pulse 79   Temp 37.2 °C (99 °F) (Skin)   Resp 18   Ht 5' 9" (1.753 m)   Wt 99.8 kg (220 lb)   SpO2 (!) 94%   BMI 32.49 kg/m²     "

## 2023-11-14 NOTE — PLAN OF CARE
Discharge criteria met vital signs stable iv removed with catheter in tact. Discharge instructions given patient and family verbalized understanding. Patient discharged via wheelchair accompanied by staff to personal vehicle

## 2023-11-15 NOTE — ANESTHESIA POSTPROCEDURE EVALUATION
Anesthesia Post Evaluation    Patient: Enio Earl    Procedure(s) Performed: Procedure(s) (LRB):  TENOTOMY,ELBOW,WITH DEBRIDEMENT AND TENDON REPAIR (Right)    Final Anesthesia Type: regional      Patient location during evaluation: PACU  Patient participation: Yes- Able to Participate  Level of consciousness: awake and alert and oriented  Post-procedure vital signs: reviewed and stable  Pain management: adequate  Airway patency: patent    PONV status at discharge: No PONV  Anesthetic complications: no      Cardiovascular status: hemodynamically stable  Respiratory status: unassisted  Hydration status: euvolemic  Follow-up not needed.          Vitals Value Taken Time   /84 11/14/23 1354   Temp 36.7 °C (98 °F) 11/14/23 1354   Pulse 78 11/14/23 1354   Resp 17 11/14/23 1354   SpO2 94 % 11/14/23 1354         No case tracking events are documented in the log.      Pain/Nacho Score: Pain Rating Prior to Med Admin: 5 (11/14/2023  1:24 PM)  Nacho Score: 10 (11/14/2023  1:53 PM)

## 2023-11-17 ENCOUNTER — PATIENT MESSAGE (OUTPATIENT)
Dept: ORTHOPEDICS | Facility: CLINIC | Age: 48
End: 2023-11-17
Payer: COMMERCIAL

## 2023-11-21 RX ORDER — TRAMADOL HYDROCHLORIDE 50 MG/1
50 TABLET ORAL EVERY 6 HOURS PRN
Qty: 30 TABLET | Refills: 0 | Status: SHIPPED | OUTPATIENT
Start: 2023-11-21 | End: 2023-12-14

## 2023-11-27 NOTE — PROGRESS NOTES
Subjective:      Patient ID: Enio Earl is a 48 y.o. male.    Chief Complaint: Post-op Evaluation of the Right Elbow      HPI: Enio Earl is here for a postop visit. He is 2 wks s/p  Medial epicondylar release and repair right elbow. He is doing well. Pt reports pain has been controlled.  Post surgical complaints include: none.     Past Medical History:   Diagnosis Date    Anxiety     Hypertension     Supraventricular tachycardia        Current Outpatient Medications:     atorvastatin (LIPITOR) 10 MG tablet, Take 1 tablet (10 mg total) by mouth once daily., Disp: 90 tablet, Rfl: 1    HYDROcodone-acetaminophen (NORCO) 7.5-325 mg per tablet, Take 1 tablet by mouth every 4 (four) hours as needed for Pain., Disp: 20 tablet, Rfl: 0    losartan (COZAAR) 100 MG tablet, Take 1 tablet (100 mg total) by mouth once daily., Disp: 90 tablet, Rfl: 1    sertraline (ZOLOFT) 25 MG tablet, Take 1 tablet (25 mg total) by mouth once daily., Disp: 90 tablet, Rfl: 1    tiZANidine (ZANAFLEX) 4 MG tablet, TAKE 1 TABLET BY MOUTH EVERY EVENING., Disp: 10 tablet, Rfl: 2    traMADoL (ULTRAM) 50 mg tablet, TAKE 1 TABLET BY MOUTH EVERY 6 HOURS AS NEEDED, Disp: 30 tablet, Rfl: 0    verapamiL (CALAN-SR) 120 MG CR tablet, Take 1 tablet (120 mg total) by mouth once daily., Disp: 90 tablet, Rfl: 1    nicotine (NICODERM CQ) 21 mg/24 hr, Place 1 patch onto the skin once daily., Disp: 28 patch, Rfl: 0    triamcinolone acetonide 0.1% (KENALOG) 0.1 % cream, Apply topically 2 (two) times daily., Disp: 30 g, Rfl: 3    varenicline (CHANTIX STARTING MONTH BOX) 0.5 mg (11)- 1 mg (42) tablet, Take one 0.5mg tab by mouth once daily X3 days,then increase to one 0.5mg tab twice daily X4 days,then increase to one 1mg tab twice daily, Disp: 53 each, Rfl: 0    varenicline (CHANTIX) 1 mg Tab, Take 1 tablet (1 mg total) by mouth 2 (two) times daily., Disp: 180 tablet, Rfl: 1  Review of patient's allergies indicates:  No Known Allergies    Wt 99.8 kg (220  lb)   BMI 32.49 kg/m²     Review of Systems   Constitutional:  Negative for chills and fever.   Respiratory:  Negative for shortness of breath.    Cardiovascular:  Negative for chest pain and leg swelling.   Gastrointestinal:  Negative for nausea and vomiting.   Genitourinary:  Negative for dysuria.   Musculoskeletal:  Negative for falls.   Skin:  Negative for rash.   Neurological:  Negative for dizziness and sensory change.         Objective:    Ortho Exam   RIGHT ELBOW:  Surgical wound margins are well approximated and healing nicely. No redness, warmth, drainage, or other signs of infection. moderate elbow swelling. ROM elbow wrist and fingers intact without pain or difficulty.  Strength not tested today.  Sensation intact. Pulses present.    GEN: Well developed, well nourished male. AAOX3. No acute distress.   Breathing unlabored.  Mood and affect appropriate.       Imaging:  None    Assessment:         1. Medial epicondylitis of right elbow          Plan:     2wks s/p R elbow medial epicondylar release and repair  Activity: Gentle ROM of elbow encouraged. No lifting, gripping, pushing or pulling x 6wks PO  Regular wound care explained with soap and water.    Follow Up: 4wks

## 2023-11-28 ENCOUNTER — OFFICE VISIT (OUTPATIENT)
Dept: ORTHOPEDICS | Facility: CLINIC | Age: 48
End: 2023-11-28
Payer: COMMERCIAL

## 2023-11-28 VITALS — BODY MASS INDEX: 32.49 KG/M2 | WEIGHT: 220 LBS

## 2023-11-28 DIAGNOSIS — M77.01 MEDIAL EPICONDYLITIS OF RIGHT ELBOW: Primary | ICD-10-CM

## 2023-11-28 PROCEDURE — 1159F PR MEDICATION LIST DOCUMENTED IN MEDICAL RECORD: ICD-10-PCS | Mod: CPTII,S$GLB,, | Performed by: PHYSICIAN ASSISTANT

## 2023-11-28 PROCEDURE — 99999 PR PBB SHADOW E&M-EST. PATIENT-LVL III: ICD-10-PCS | Mod: PBBFAC,,, | Performed by: PHYSICIAN ASSISTANT

## 2023-11-28 PROCEDURE — 3044F HG A1C LEVEL LT 7.0%: CPT | Mod: CPTII,S$GLB,, | Performed by: PHYSICIAN ASSISTANT

## 2023-11-28 PROCEDURE — 1160F RVW MEDS BY RX/DR IN RCRD: CPT | Mod: CPTII,S$GLB,, | Performed by: PHYSICIAN ASSISTANT

## 2023-11-28 PROCEDURE — 4010F ACE/ARB THERAPY RXD/TAKEN: CPT | Mod: CPTII,S$GLB,, | Performed by: PHYSICIAN ASSISTANT

## 2023-11-28 PROCEDURE — 1160F PR REVIEW ALL MEDS BY PRESCRIBER/CLIN PHARMACIST DOCUMENTED: ICD-10-PCS | Mod: CPTII,S$GLB,, | Performed by: PHYSICIAN ASSISTANT

## 2023-11-28 PROCEDURE — 4010F PR ACE/ARB THEARPY RXD/TAKEN: ICD-10-PCS | Mod: CPTII,S$GLB,, | Performed by: PHYSICIAN ASSISTANT

## 2023-11-28 PROCEDURE — 99999 PR PBB SHADOW E&M-EST. PATIENT-LVL III: CPT | Mod: PBBFAC,,, | Performed by: PHYSICIAN ASSISTANT

## 2023-11-28 PROCEDURE — 99024 POSTOP FOLLOW-UP VISIT: CPT | Mod: S$GLB,,, | Performed by: PHYSICIAN ASSISTANT

## 2023-11-28 PROCEDURE — 1159F MED LIST DOCD IN RCRD: CPT | Mod: CPTII,S$GLB,, | Performed by: PHYSICIAN ASSISTANT

## 2023-11-28 PROCEDURE — 3044F PR MOST RECENT HEMOGLOBIN A1C LEVEL <7.0%: ICD-10-PCS | Mod: CPTII,S$GLB,, | Performed by: PHYSICIAN ASSISTANT

## 2023-11-28 PROCEDURE — 99024 PR POST-OP FOLLOW-UP VISIT: ICD-10-PCS | Mod: S$GLB,,, | Performed by: PHYSICIAN ASSISTANT

## 2023-12-14 RX ORDER — TRAMADOL HYDROCHLORIDE 50 MG/1
50 TABLET ORAL EVERY 6 HOURS PRN
Qty: 30 TABLET | Refills: 0 | Status: SHIPPED | OUTPATIENT
Start: 2023-12-14 | End: 2024-01-08

## 2024-01-02 ENCOUNTER — OFFICE VISIT (OUTPATIENT)
Dept: ORTHOPEDICS | Facility: CLINIC | Age: 49
End: 2024-01-02
Payer: COMMERCIAL

## 2024-01-02 VITALS — BODY MASS INDEX: 32.58 KG/M2 | WEIGHT: 220 LBS | HEIGHT: 69 IN

## 2024-01-02 DIAGNOSIS — M77.01 MEDIAL EPICONDYLITIS OF RIGHT ELBOW: Primary | ICD-10-CM

## 2024-01-02 PROCEDURE — 1159F MED LIST DOCD IN RCRD: CPT | Mod: CPTII,S$GLB,, | Performed by: PHYSICIAN ASSISTANT

## 2024-01-02 PROCEDURE — 99999 PR PBB SHADOW E&M-EST. PATIENT-LVL III: CPT | Mod: PBBFAC,,, | Performed by: PHYSICIAN ASSISTANT

## 2024-01-02 PROCEDURE — 1160F RVW MEDS BY RX/DR IN RCRD: CPT | Mod: CPTII,S$GLB,, | Performed by: PHYSICIAN ASSISTANT

## 2024-01-02 PROCEDURE — 99024 POSTOP FOLLOW-UP VISIT: CPT | Mod: S$GLB,,, | Performed by: PHYSICIAN ASSISTANT

## 2024-01-02 NOTE — PROGRESS NOTES
SUBJECTIVE:     Chief Complaint & History of Present Illness:  Enio Earl is a Established patient 48 y.o. male returns for postop follow-up.    Post-Op: 7 wks s/p RIGHT medial epicondylar release    He notes that he is doing very well.  Patient has full range of motion to wrist and elbow without pain or difficulty.  He has slowly advanced to full activity with the elbow, and is not having any difficulties with lifting.  He would like to be released to full duty at work.    Review of patient's allergies indicates:  No Known Allergies      Current Outpatient Medications   Medication Sig Dispense Refill    atorvastatin (LIPITOR) 10 MG tablet Take 1 tablet (10 mg total) by mouth once daily. 90 tablet 1    HYDROcodone-acetaminophen (NORCO) 7.5-325 mg per tablet Take 1 tablet by mouth every 4 (four) hours as needed for Pain. 20 tablet 0    losartan (COZAAR) 100 MG tablet Take 1 tablet (100 mg total) by mouth once daily. 90 tablet 1    nicotine (NICODERM CQ) 21 mg/24 hr Place 1 patch onto the skin once daily. 28 patch 0    sertraline (ZOLOFT) 25 MG tablet Take 1 tablet (25 mg total) by mouth once daily. 90 tablet 1    tiZANidine (ZANAFLEX) 4 MG tablet TAKE 1 TABLET BY MOUTH EVERY EVENING. 10 tablet 2    traMADoL (ULTRAM) 50 mg tablet TAKE 1 TABLET BY MOUTH EVERY 6 HOURS AS NEEDED 30 tablet 0    triamcinolone acetonide 0.1% (KENALOG) 0.1 % cream Apply topically 2 (two) times daily. 30 g 3    varenicline (CHANTIX STARTING MONTH BOX) 0.5 mg (11)- 1 mg (42) tablet Take one 0.5mg tab by mouth once daily X3 days,then increase to one 0.5mg tab twice daily X4 days,then increase to one 1mg tab twice daily 53 each 0    varenicline (CHANTIX) 1 mg Tab Take 1 tablet (1 mg total) by mouth 2 (two) times daily. 180 tablet 1    verapamiL (CALAN-SR) 120 MG CR tablet Take 1 tablet (120 mg total) by mouth once daily. 90 tablet 1     No current facility-administered medications for this visit.       Past Medical History:   Diagnosis  "Date    Anxiety     Hypertension     Supraventricular tachycardia        Past Surgical History:   Procedure Laterality Date    ARTHROSCOPIC REPAIR OF ROTATOR CUFF OF SHOULDER Right 5/9/2023    Procedure: REPAIR, ROTATOR CUFF, ARTHROSCOPIC;  Surgeon: Cristi Adam Jr., MD;  Location: [facility] OR;  Service: Orthopedics;  Laterality: Right;  need opsu system muna notified cc    ARTHROSCOPY OF SHOULDER WITH DECOMPRESSION OF SUBACROMIAL SPACE Right 5/9/2023    Procedure: ARTHROSCOPY, SHOULDER, WITH SUBACROMIAL SPACE DECOMPRESSION;  Surgeon: Cristi Adam Jr., MD;  Location: [facility] OR;  Service: Orthopedics;  Laterality: Right;    CARDIAC ELECTROPHYSIOLOGY MAPPING AND ABLATION  07/2020    for PSVT by Dr. Lim     LATERAL EPICONDYLE RELEASE Left 5/18/2021    Procedure: RELEASE, ELBOW, LATERAL EPICONDYLE;  Surgeon: Cristi Adam Jr., MD;  Location: [facility] OR;  Service: Orthopedics;  Laterality: Left;    NOSE SURGERY      sepltoplasty    TENOTOMY, ELBOW, WITH DEBRIDEMENT AND TENDON REPAIR Right 11/14/2023    Procedure: TENOTOMY,ELBOW,WITH DEBRIDEMENT AND TENDON REPAIR;  Surgeon: Cristi Adam Jr., MD;  Location: [facility] OR;  Service: Orthopedics;  Laterality: Right;       Vital Signs (Most Recent)  There were no vitals filed for this visit.    Review of Systems:  Review of Systems   Constitutional:  Negative for chills and fever.   Respiratory:  Negative for shortness of breath.    Cardiovascular:  Negative for chest pain and leg swelling.   Gastrointestinal:  Negative for nausea and vomiting.   Genitourinary:  Negative for dysuria.   Musculoskeletal:  Negative for falls and joint pain.   Skin:  Negative for rash.   Neurological:  Negative for dizziness and sensory change.          OBJECTIVE:     PHYSICAL EXAM:  Height: 5' 9" (175.3 cm) Weight: 99.8 kg (220 lb)  Ortho/SPM Exam  RIGHT ARM  Surgical incision is well healed with appropriate scar formation.  Scar is mobile and nonadherent.    Patient is nontender to soft " tissues surrounding medial epicondyle.    Full range of motion to the elbow and wrist without pain or difficulty.    Sensation grossly intact throughout   Radial pulse palpable   Resisted wrist flexion and extension is nontender.    RADIOGRAPHS:  No new imaging obtained today    ASSESSMENT/PLAN:     1. Medial epicondylitis of right elbow           Overall, the patient is doing very well in his postoperative recovery 7 weeks status post medial epicondylar release.    We reviewed advancing his activities as tolerated allowing pain to be his guide during the process.      RTW:  Full duty release    Anti-inflammatories for intermittent pain relief    Continue scar massage.  Okay to apply topical vitamin-E oil or scar topicals.    Follow-up: 6 weeks to assess progress following returned to work

## 2024-01-08 RX ORDER — TIZANIDINE 4 MG/1
4 TABLET ORAL NIGHTLY
Qty: 10 TABLET | Refills: 2 | Status: SHIPPED | OUTPATIENT
Start: 2024-01-08 | End: 2024-02-06 | Stop reason: ALTCHOICE

## 2024-01-08 RX ORDER — TRAMADOL HYDROCHLORIDE 50 MG/1
50 TABLET ORAL EVERY 6 HOURS PRN
Qty: 30 TABLET | Refills: 0 | Status: SHIPPED | OUTPATIENT
Start: 2024-01-08 | End: 2024-02-06

## 2024-01-12 ENCOUNTER — PATIENT MESSAGE (OUTPATIENT)
Dept: FAMILY MEDICINE | Facility: CLINIC | Age: 49
End: 2024-01-12
Payer: COMMERCIAL

## 2024-01-12 RX ORDER — CLOBETASOL PROPIONATE 0.5 MG/G
CREAM TOPICAL 2 TIMES DAILY
Qty: 45 G | Refills: 1 | OUTPATIENT
Start: 2024-01-12

## 2024-01-12 NOTE — TELEPHONE ENCOUNTER
Refill Decision Note   Enio Mady  is requesting a refill authorization.  Brief Assessment and Rationale for Refill:  Quick Discontinue     Medication Therapy Plan: Pharmacy is requesting new scripts for the following medications without required information, (sig/ frequency/qty/etc)     Medication Reconciliation Completed: No   Comments:     No Care Gaps recommended.     Note composed:9:50 AM 01/12/2024

## 2024-01-12 NOTE — TELEPHONE ENCOUNTER
No care due was identified.  [facility] Embedded Care Due Messages. Reference number: [id number].   1/12/2024 9:06:18 AM CST

## 2024-01-16 ENCOUNTER — TELEPHONE (OUTPATIENT)
Dept: ENDOSCOPY | Facility: HOSPITAL | Age: 49
End: 2024-01-16
Payer: COMMERCIAL

## 2024-01-16 NOTE — TELEPHONE ENCOUNTER
Attempted to contact patient for pre-call.  Call not answered and unable to leave voicemail. Sent portal message for patient to call Endoscopy Scheduling to review instructions and confirm appointment for Colonoscopy on 1/23/24.

## 2024-01-18 ENCOUNTER — TELEPHONE (OUTPATIENT)
Dept: ENDOSCOPY | Facility: HOSPITAL | Age: 49
End: 2024-01-18
Payer: COMMERCIAL

## 2024-01-18 NOTE — TELEPHONE ENCOUNTER
Left 2nd voicemail for patient to call Endoscopy Scheduling to review instructions and confirm appointment for Colonoscopy on 1/23/24.

## 2024-01-19 ENCOUNTER — ANESTHESIA EVENT (OUTPATIENT)
Dept: ENDOSCOPY | Facility: HOSPITAL | Age: 49
End: 2024-01-19
Payer: COMMERCIAL

## 2024-01-19 NOTE — ANESTHESIA PREPROCEDURE EVALUATION
Patient Active Problem List   Diagnosis    PSVT (paroxysmal supraventricular tachycardia)    Essential hypertension    SVT (supraventricular tachycardia)    Lateral epicondylitis of left elbow    Other hyperlipidemia    Situational anxiety    SCOTTIE (obstructive sleep apnea)    Medial epicondylitis of right elbow    Tobacco dependence    Complete tear of right rotator cuff    Decreased strength of upper extremity       Past Medical History:   Diagnosis Date    Anxiety     Hypertension     Supraventricular tachycardia        ECHO: Results for orders placed during the hospital encounter of 03/03/20    Echo Color Flow Doppler? Yes    Interpretation Summary  · Normal left ventricular systolic function. The estimated ejection fraction is 55-60%.  · Normal LV diastolic function.  · The estimated PA systolic pressure is 19 mmHg.  · No wall motion abnormalities.  · Normal right ventricular systolic function.  · Normal central venous pressure (3 mmHg).      There is no height or weight on file to calculate BMI.    Tobacco Use: High Risk (1/2/2024)    Patient History     Smoking Tobacco Use: Every Day     Smokeless Tobacco Use: Never     Passive Exposure: Not on file       Social History     Substance and Sexual Activity   Drug Use Never        Alcohol Use: Heavy Drinker (8/11/2023)    AUDIT-C     Frequency of Alcohol Consumption: 2-3 times a week     Average Number of Drinks: 3 or 4     Frequency of Binge Drinking: Monthly       Review of patient's allergies indicates:  No Known Allergies      Airway:  No value filed.                                                                                                              01/19/2024  Enio Earl is a 48 y.o., male.      Pre-op Assessment    I have reviewed the Patient Summary Reports.     I have reviewed the Nursing Notes.    I have reviewed the Medications.     Review of Systems  Anesthesia Hx:  No problems with previous Anesthesia             Denies Family Hx of  Anesthesia complications.    Denies Personal Hx of Anesthesia complications.                    Social:  Non-Smoker, Social Alcohol Use       Hematology/Oncology:  Hematology Normal   Oncology Normal                                   EENT/Dental:  EENT/Dental Normal           Cardiovascular:  Exercise tolerance: good   Hypertension                                  Hypertension         Pulmonary:        Sleep Apnea     Obstructive Sleep Apnea (SCOTTIE).           Renal/:  Renal/ Normal                 Hepatic/GI:  Hepatic/GI Normal                 Musculoskeletal:  Musculoskeletal Normal                Neurological:  Neurology Normal                                      Endocrine:  Endocrine Normal            Dermatological:  Skin Normal    Psych:  Psychiatric Normal                    Physical Exam  General: Well nourished    Airway:  Mallampati: II / II  Mouth Opening: Normal  TM Distance: Normal  Tongue: Normal  Neck ROM: Normal ROM    Dental:  Intact        Anesthesia Plan  Type of Anesthesia, risks & benefits discussed:    Anesthesia Type: Gen Natural Airway  Intra-op Monitoring Plan: Standard ASA Monitors  Post Op Pain Control Plan: multimodal analgesia  Induction:  IV  Airway Plan: Direct, Post-Induction  Informed Consent: Informed consent signed with the Patient and all parties understand the risks and agree with anesthesia plan.  All questions answered. Patient consented to blood products? No  ASA Score: 2  Day of Surgery Review of History & Physical: H&P Update referred to the surgeon/provider.    Ready For Surgery From Anesthesia Perspective.     .

## 2024-01-22 ENCOUNTER — TELEPHONE (OUTPATIENT)
Dept: ENDOSCOPY | Facility: HOSPITAL | Age: 49
End: 2024-01-22
Payer: COMMERCIAL

## 2024-01-23 ENCOUNTER — HOSPITAL ENCOUNTER (OUTPATIENT)
Facility: HOSPITAL | Age: 49
Discharge: HOME | End: 2024-01-23
Attending: INTERNAL MEDICINE | Admitting: INTERNAL MEDICINE
Payer: COMMERCIAL

## 2024-01-23 ENCOUNTER — ANESTHESIA (OUTPATIENT)
Dept: ENDOSCOPY | Facility: HOSPITAL | Age: 49
End: 2024-01-23
Payer: COMMERCIAL

## 2024-01-23 VITALS
TEMPERATURE: 98 F | HEART RATE: 78 BPM | RESPIRATION RATE: 18 BRPM | WEIGHT: 220 LBS | OXYGEN SATURATION: 96 % | HEIGHT: 68 IN | BODY MASS INDEX: 33.34 KG/M2 | DIASTOLIC BLOOD PRESSURE: 87 MMHG | SYSTOLIC BLOOD PRESSURE: 137 MMHG

## 2024-01-23 DIAGNOSIS — Z12.11 SCREEN FOR COLON CANCER: ICD-10-CM

## 2024-01-23 DIAGNOSIS — Z12.11 COLON CANCER SCREENING: Primary | ICD-10-CM

## 2024-01-23 PROCEDURE — D9220A PRA ANESTHESIA: Mod: 33,,, | Performed by: NURSE ANESTHETIST, CERTIFIED REGISTERED

## 2024-01-23 PROCEDURE — 27201089 HC SNARE, DISP (ANY): Performed by: INTERNAL MEDICINE

## 2024-01-23 PROCEDURE — 94761 N-INVAS EAR/PLS OXIMETRY MLT: CPT

## 2024-01-23 PROCEDURE — 88305 TISSUE EXAM BY PATHOLOGIST: CPT | Mod: 26,,, | Performed by: PATHOLOGY

## 2024-01-23 PROCEDURE — 45385 COLONOSCOPY W/LESION REMOVAL: CPT | Mod: PT | Performed by: INTERNAL MEDICINE

## 2024-01-23 PROCEDURE — 37000009 HC ANESTHESIA EA ADD 15 MINS: Performed by: INTERNAL MEDICINE

## 2024-01-23 PROCEDURE — 99900035 HC TECH TIME PER 15 MIN (STAT)

## 2024-01-23 PROCEDURE — 25000003 PHARM REV CODE 250: Performed by: NURSE ANESTHETIST, CERTIFIED REGISTERED

## 2024-01-23 PROCEDURE — 63600175 PHARM REV CODE 636 W HCPCS: Performed by: NURSE ANESTHETIST, CERTIFIED REGISTERED

## 2024-01-23 PROCEDURE — 88305 TISSUE EXAM BY PATHOLOGIST: CPT | Performed by: PATHOLOGY

## 2024-01-23 PROCEDURE — 37000008 HC ANESTHESIA 1ST 15 MINUTES: Performed by: INTERNAL MEDICINE

## 2024-01-23 PROCEDURE — 45385 COLONOSCOPY W/LESION REMOVAL: CPT | Mod: 33,,, | Performed by: INTERNAL MEDICINE

## 2024-01-23 RX ORDER — SODIUM CHLORIDE 9 MG/ML
INJECTION, SOLUTION INTRAVENOUS CONTINUOUS
Status: DISCONTINUED | OUTPATIENT
Start: 2024-01-23 | End: 2024-01-23 | Stop reason: HOSPADM

## 2024-01-23 RX ORDER — PROPOFOL 10 MG/ML
VIAL (ML) INTRAVENOUS
Status: DISCONTINUED | OUTPATIENT
Start: 2024-01-23 | End: 2024-01-25

## 2024-01-23 RX ORDER — LIDOCAINE HYDROCHLORIDE 20 MG/ML
INJECTION INTRAVENOUS
Status: DISCONTINUED | OUTPATIENT
Start: 2024-01-23 | End: 2024-01-25

## 2024-01-23 RX ADMIN — PROPOFOL 50 MG: 10 INJECTION, EMULSION INTRAVENOUS at 01:01

## 2024-01-23 RX ADMIN — PROPOFOL 200 MG: 10 INJECTION, EMULSION INTRAVENOUS at 01:01

## 2024-01-23 RX ADMIN — LIDOCAINE HYDROCHLORIDE 50 MG: 20 INJECTION INTRAVENOUS at 01:01

## 2024-01-23 NOTE — PLAN OF CARE
Patient discharge instructions reviewed, patient verbalized understanding.  IV removed, cath tip intact.  Escorted to family via wheelchair.  No concerns voiced.

## 2024-01-23 NOTE — [employer]ATION PATIENT INSTRUCTIONS
Discharge Summary/Instructions after an Endoscopic Procedure  Patient Name: Enio Earl  Patient MRN: [id number]  Patient YOB: 1975 Tuesday, January 23, 2024  Alexander Oswald MD  Dear patient,  As a result of recent federal legislation (The Federal Cures Act), you may   receive lab or pathology results from your procedure in your [facility]   account before your physician is able to contact you. Your physician or   their representative will relay the results to you with their   recommendations at their soonest availability.  Thank you,  RESTRICTIONS:  During your procedure today, you received medications for sedation.  These   medications may affect your judgment, balance and coordination.  Therefore,   for 24 hours, you have the following restrictions:   - DO NOT drive a car, operate machinery, make legal/financial decisions,   sign important papers or drink alcohol.    ACTIVITY:  Today: no heavy lifting, straining or running due to procedural   sedation/anesthesia.  The following day: return to full activity including work.  DIET:  Eat and drink normally unless instructed otherwise.     TREATMENT FOR COMMON SIDE EFFECTS:  - Mild abdominal pain, nausea, belching, bloating or excessive gas:  rest,   eat lightly and use a heating pad.  - Sore Throat: treat with throat lozenges and/or gargle with warm salt   water.  - Because air was used during the procedure, expelling large amounts of air   from your rectum or belching is normal.  - If a bowel prep was taken, you may not have a bowel movement for 1-3 days.    This is normal.  SYMPTOMS TO WATCH FOR AND REPORT TO YOUR PHYSICIAN:  1. Abdominal pain or bloating, other than gas cramps.  2. Chest pain.  3. Back pain.  4. Signs of infection such as: chills or fever occurring within 24 hours   after the procedure.  5. Rectal bleeding, which would show as bright red, maroon, or black stools.   (A tablespoon of blood from the rectum is not serious, especially  if   hemorrhoids are present.)  6. Vomiting.  7. Weakness or dizziness.  GO DIRECTLY TO THE NEAREST EMERGENCY ROOM IF YOU HAVE ANY OF THE FOLLOWING:      Difficulty breathing              Chills and/or fever over 101 F   Persistent vomiting and/or vomiting blood   Severe abdominal pain   Severe chest pain   Black, tarry stools   Bleeding- more than one tablespoon   Any other symptom or condition that you feel may need urgent attention  Your doctor recommends these additional instructions:  If any biopsies were taken, your doctors clinic will contact you in 1 to 2   weeks with any results.  - Patient has a contact number available for emergencies.  The signs and   symptoms of potential delayed complications were discussed with the   patient.  Return to normal activities tomorrow.  Written discharge   instructions were provided to the patient.   - Discharge patient to home.   - Resume previous diet.   - Continue present medications.   - Await pathology results.   - Repeat colonoscopy in 5 years for surveillance.   For questions, problems or results please call your physician - Alexander Oswald MD at Work:  [phone].  [facility] [place], EMERGENCY ROOM PHONE NUMBER: [phone]  IF A COMPLICATION OR EMERGENCY SITUATION ARISES AND YOU ARE UNABLE TO REACH   YOUR PHYSICIAN - GO DIRECTLY TO THE EMERGENCY ROOM.  Alexander Oswald MD  1/23/2024 2:15:24 PM  This report has been verified and signed electronically.  Dear patient,  As a result of recent federal legislation (The Federal Cures Act), you may   receive lab or pathology results from your procedure in your [facility]   account before your physician is able to contact you. Your physician or   their representative will relay the results to you with their   recommendations at their soonest availability.  Thank you,  PROVATION

## 2024-01-23 NOTE — TRANSFER OF CARE
"Anesthesia Transfer of Care Note    Patient: Enio Earl    Procedure(s) Performed: Procedure(s) (LRB):  COLONOSCOPY (N/A)    Patient location: PACU    Anesthesia Type: MAC    Transport from OR: Transported from OR on room air with adequate spontaneous ventilation    Post pain: adequate analgesia    Post assessment: no apparent anesthetic complications    Post vital signs: stable    Level of consciousness: sedated    Nausea/Vomiting: no nausea/vomiting    Complications: none    Transfer of care protocol was followed      Last vitals: Visit Vitals  /66 (BP Location: Left arm, Patient Position: Lying)   Pulse 90   Temp 36.6 °C (97.9 °F) (Temporal)   Resp 20   Ht 5' 8" (1.727 m)   Wt 99.8 kg (220 lb)   SpO2 98%   BMI 33.45 kg/m²     "
 used

## 2024-01-23 NOTE — H&P
Short Stay Endoscopy History and Physical    PCP - Bhupinder Bowen DO    Procedure - Colonoscopy  Sedation: GA  ASA - per anesthesia  Mallampati - per anesthesia  History of Anesthesia problems - no  Family history Anesthesia problems -  no     HPI:  This is a 48 y.o. male here for evaluation of : Screening for CRC    Reflux - no  Dysphagia - no  Abdominal pain - no  Diarrhea - no    ROS:  Constitutional: No fevers, chills, No weight loss  ENT: No allergies  CV: No chest pain  Pulm: No cough, No shortness of breath  Ophtho: No vision changes  GI: see HPI  Medical History:  has a past medical history of Anxiety, Hypertension, and Supraventricular tachycardia.    Surgical History:  has a past surgical history that includes Nose surgery; Cardiac electrophysiology mapping and ablation (07/2020); Lateral epicondyle release (Left, 5/18/2021); Arthroscopic repair of rotator cuff of shoulder (Right, 5/9/2023); Arthroscopy of shoulder with decompression of subacromial space (Right, 5/9/2023); and tenotomy, elbow, with debridement and tendon repair (Right, 11/14/2023).    Family History: family history includes Emphysema in his father; No Known Problems in his brother and mother; Stroke in his maternal grandmother.. Otherwise no colon cancer, inflammatory bowel disease, or GI malignancies.    Social History:  reports that he has been smoking cigarettes. He started smoking about 18 years ago. He has a 18.3 pack-year smoking history. He has never used smokeless tobacco. He reports current alcohol use of about 16.0 standard drinks of alcohol per week. He reports that he does not use drugs.    Review of patient's allergies indicates:  No Known Allergies    Medications:   Medications Prior to Admission   Medication Sig Dispense Refill Last Dose    atorvastatin (LIPITOR) 10 MG tablet Take 1 tablet (10 mg total) by mouth once daily. 90 tablet 1     HYDROcodone-acetaminophen (NORCO) 7.5-325 mg per tablet Take 1 tablet by mouth  every 4 (four) hours as needed for Pain. 20 tablet 0     losartan (COZAAR) 100 MG tablet Take 1 tablet (100 mg total) by mouth once daily. 90 tablet 1     nicotine (NICODERM CQ) 21 mg/24 hr Place 1 patch onto the skin once daily. 28 patch 0     sertraline (ZOLOFT) 25 MG tablet Take 1 tablet (25 mg total) by mouth once daily. 90 tablet 1     tiZANidine (ZANAFLEX) 4 MG tablet TAKE 1 TABLET BY MOUTH EVERY DAY IN THE EVENING 10 tablet 2     traMADoL (ULTRAM) 50 mg tablet TAKE 1 TABLET BY MOUTH EVERY 6 HOURS AS NEEDED 30 tablet 0     triamcinolone acetonide 0.1% (KENALOG) 0.1 % cream Apply topically 2 (two) times daily. 30 g 3     varenicline (CHANTIX STARTING MONTH BOX) 0.5 mg (11)- 1 mg (42) tablet Take one 0.5mg tab by mouth once daily X3 days,then increase to one 0.5mg tab twice daily X4 days,then increase to one 1mg tab twice daily 53 each 0     varenicline (CHANTIX) 1 mg Tab Take 1 tablet (1 mg total) by mouth 2 (two) times daily. 180 tablet 1     verapamiL (CALAN-SR) 120 MG CR tablet Take 1 tablet (120 mg total) by mouth once daily. 90 tablet 1        Objective Findings:    Vital Signs: Per nursing notes.    Physical Exam:  General Appearance: Well appearing in no acute distress  Head:   Normocephalic, without obvious abnormality  Eyes:    No scleral icterus  Airway: Open  Neck: No restriction in mobility  Lungs: CTA bilaterally in anterior and posterior fields, no wheezes, no crackles.  Heart:  Regular rate and rhythm, S1, S2 normal, no murmurs heard  Abdomen: Soft, non tender, non distended      Labs:  Lab Results   Component Value Date    WBC 15.13 (H) 04/24/2023    HGB 14.4 04/24/2023    HCT 40.8 04/24/2023     04/24/2023    CHOL 172 02/01/2023    TRIG 333 (H) 02/01/2023    HDL 50 02/01/2023    ALT 57 (H) 04/24/2023    AST 83 (H) 04/24/2023     11/02/2023    K 3.8 11/02/2023     11/02/2023    CREATININE 1.0 11/02/2023    BUN 13 11/02/2023    CO2 23 11/02/2023    TSH 0.877 02/01/2023    INR  0.9 06/08/2020    HGBA1C 5.2 02/01/2023         I have explained the risks and benefits of endoscopy procedures to the patient including but not limited to bleeding, perforation, infection, and death.    Thank you so much for allowing me to participate in the care of Enio Oswald MD

## 2024-01-25 NOTE — ANESTHESIA POSTPROCEDURE EVALUATION
Anesthesia Post Evaluation    Patient: Enio Earl    Procedure(s) Performed: Procedure(s) (LRB):  COLONOSCOPY (N/A)    Final Anesthesia Type: general      Patient location during evaluation: GI PACU  Patient participation: Yes- Able to Participate  Level of consciousness: awake and alert  Post-procedure vital signs: reviewed and stable  Pain management: adequate  Airway patency: patent    PONV status at discharge: No PONV  Anesthetic complications: no      Cardiovascular status: blood pressure returned to baseline and hemodynamically stable  Respiratory status: unassisted  Hydration status: euvolemic  Follow-up not needed.              Vitals Value Taken Time   /87 01/23/24 1440     01/25/24 1240   Pulse 78 01/23/24 1440   Resp 18 01/23/24 1440   SpO2 96 % 01/23/24 1440         Event Time   Out of Recovery 01/23/2024 14:29:00         Pain/Nacho Score: No data recorded

## 2024-01-29 LAB
FINAL PATHOLOGIC DIAGNOSIS: NORMAL
GROSS: NORMAL
Lab: NORMAL

## 2024-01-30 ENCOUNTER — TELEPHONE (OUTPATIENT)
Dept: GASTROENTEROLOGY | Facility: CLINIC | Age: 49
End: 2024-01-30
Payer: COMMERCIAL

## 2024-01-30 NOTE — TELEPHONE ENCOUNTER
----- Message from Alexander Oswald MD sent at 1/29/2024 12:08 PM CST -----  Please call and notify patient, the colon polyps were benign.

## 2024-02-05 DIAGNOSIS — I47.10 SVT (SUPRAVENTRICULAR TACHYCARDIA): ICD-10-CM

## 2024-02-05 DIAGNOSIS — I47.10 PSVT (PAROXYSMAL SUPRAVENTRICULAR TACHYCARDIA): ICD-10-CM

## 2024-02-05 DIAGNOSIS — L85.3 DRY SKIN DERMATITIS: ICD-10-CM

## 2024-02-05 DIAGNOSIS — L29.9 ITCHING: ICD-10-CM

## 2024-02-05 DIAGNOSIS — I10 ESSENTIAL HYPERTENSION: ICD-10-CM

## 2024-02-05 RX ORDER — LOSARTAN POTASSIUM 100 MG/1
TABLET ORAL
Qty: 90 TABLET | Refills: 1 | Status: SHIPPED | OUTPATIENT
Start: 2024-02-05

## 2024-02-05 RX ORDER — TRIAMCINOLONE ACETONIDE 1 MG/G
CREAM TOPICAL 2 TIMES DAILY
Qty: 30 G | Refills: 3 | OUTPATIENT
Start: 2024-02-05

## 2024-02-06 RX ORDER — TRAMADOL HYDROCHLORIDE 50 MG/1
50 TABLET ORAL EVERY 6 HOURS PRN
Qty: 30 TABLET | Refills: 0 | Status: SHIPPED | OUTPATIENT
Start: 2024-02-06 | End: 2024-03-11

## 2024-02-06 RX ORDER — VERAPAMIL HYDROCHLORIDE 120 MG/1
TABLET, FILM COATED, EXTENDED RELEASE ORAL
Qty: 90 TABLET | Refills: 1 | Status: SHIPPED | OUTPATIENT
Start: 2024-02-06

## 2024-02-06 NOTE — TELEPHONE ENCOUNTER
Care Due:                  Date            Visit Type   Department     Provider  --------------------------------------------------------------------------------                                EP -                              PRIMARY      [facility] FAMILY  Last Visit: 08-      CARE ([facility])   [facility]       Bhupinder Bowen                              EP -                              PRIMARY      [facility]  Next Visit: 02-      CARE ([facility])   [facility]       Bhupinder  Andrés                                                            Last  Test          Frequency    Reason                     Performed    Due Date  --------------------------------------------------------------------------------    CMP.........  12 months..  atorvastatin.............  04- 04-    [facility] Embedded Care Due Messages. Reference number: [id number].   2/05/2024 7:59:25 PM CST

## 2024-02-06 NOTE — TELEPHONE ENCOUNTER
Refill Routing Note   Medication(s) are not appropriate for processing by [facility] for the following reason(s):        Drug-drug interaction    [facility] action(s):  Approve  Quick Discontinue  Defer     Requires labs : Yes      Medication Therapy Plan: ZANZFLEX; The original prescription was discontinued on 8/11/2023 by Bhupinder Bowen DO      Appointments  past 12m or future 3m with PCP    Date Provider   Last Visit   8/11/2023 Bhupinder Bowen DO   Next Visit   2/19/2024 Bhupinder Bowen DO   ED visits in past 90 days: 0        Note composed:8:51 PM 02/05/2024

## 2024-02-14 ENCOUNTER — TELEPHONE (OUTPATIENT)
Dept: SMOKING CESSATION | Facility: CLINIC | Age: 49
End: 2024-02-14
Payer: COMMERCIAL

## 2024-02-19 NOTE — ADDENDUM NOTE
Addendum  created 02/19/24 1711 by Gutierrez Vega Jr., MD    Intraprocedure Event edited, Intraprocedure Staff edited

## 2024-02-20 ENCOUNTER — OFFICE VISIT (OUTPATIENT)
Dept: ORTHOPEDICS | Facility: CLINIC | Age: 49
End: 2024-02-20
Payer: COMMERCIAL

## 2024-02-20 VITALS — HEIGHT: 68 IN | BODY MASS INDEX: 33.34 KG/M2 | WEIGHT: 220 LBS

## 2024-02-20 DIAGNOSIS — M77.01 MEDIAL EPICONDYLITIS OF RIGHT ELBOW: Primary | ICD-10-CM

## 2024-02-20 PROCEDURE — 1159F MED LIST DOCD IN RCRD: CPT | Mod: CPTII,S$GLB,, | Performed by: PHYSICIAN ASSISTANT

## 2024-02-20 PROCEDURE — 4010F ACE/ARB THERAPY RXD/TAKEN: CPT | Mod: CPTII,S$GLB,, | Performed by: PHYSICIAN ASSISTANT

## 2024-02-20 PROCEDURE — 99024 POSTOP FOLLOW-UP VISIT: CPT | Mod: S$GLB,,, | Performed by: PHYSICIAN ASSISTANT

## 2024-02-20 PROCEDURE — 1160F RVW MEDS BY RX/DR IN RCRD: CPT | Mod: CPTII,S$GLB,, | Performed by: PHYSICIAN ASSISTANT

## 2024-02-20 PROCEDURE — 99999 PR PBB SHADOW E&M-EST. PATIENT-LVL III: CPT | Mod: PBBFAC,,, | Performed by: PHYSICIAN ASSISTANT

## 2024-02-20 NOTE — PROGRESS NOTES
SUBJECTIVE:     Chief Complaint & History of Present Illness:  Enio Earl is a Established patient 48 y.o. male returns for postop follow-up.    Post-Op: 3 mos s/p RIGHT medial epicondylar release    The patient has returned to work at full duty.  He has not having any difficulties with range of motion or performing daily tasks.  Does note some elbow and shoulder soreness following long bouts of activity.  Otherwise, he has no complaints.    Review of patient's allergies indicates:  No Known Allergies      Current Outpatient Medications   Medication Sig Dispense Refill    atorvastatin (LIPITOR) 10 MG tablet Take 1 tablet (10 mg total) by mouth once daily. 90 tablet 1    HYDROcodone-acetaminophen (NORCO) 7.5-325 mg per tablet Take 1 tablet by mouth every 4 (four) hours as needed for Pain. 20 tablet 0    losartan (COZAAR) 100 MG tablet Take 1 tablet (100 mg total) by mouth once daily. 90 tablet 1    sertraline (ZOLOFT) 25 MG tablet Take 1 tablet (25 mg total) by mouth once daily. 90 tablet 1    traMADoL (ULTRAM) 50 mg tablet TAKE 1 TABLET BY MOUTH EVERY 6 HOURS AS NEEDED 30 tablet 0    verapamiL (CALAN-SR) 120 MG CR tablet Take 1 tablet (120 mg total) by mouth once daily. 90 tablet 1     No current facility-administered medications for this visit.       Past Medical History:   Diagnosis Date    Anxiety     Hypertension     Supraventricular tachycardia        Past Surgical History:   Procedure Laterality Date    ARTHROSCOPIC REPAIR OF ROTATOR CUFF OF SHOULDER Right 5/9/2023    Procedure: REPAIR, ROTATOR CUFF, ARTHROSCOPIC;  Surgeon: Cristi Adam Jr., MD;  Location: [facility] OR;  Service: Orthopedics;  Laterality: Right;  need opsu system [employer] notified cc    ARTHROSCOPY OF SHOULDER WITH DECOMPRESSION OF SUBACROMIAL SPACE Right 5/9/2023    Procedure: ARTHROSCOPY, SHOULDER, WITH SUBACROMIAL SPACE DECOMPRESSION;  Surgeon: Cristi Adam Jr., MD;  Location: [facility] OR;  Service: Orthopedics;  Laterality: Right;     CARDIAC ELECTROPHYSIOLOGY MAPPING AND ABLATION  07/2020    for PSVT by Dr. Lim     COLONOSCOPY N/A 1/23/2024    Procedure: COLONOSCOPY;  Surgeon: Alexander Oswald MD;  Location: [facility] ENDOSCOPY;  Service: Endoscopy;  Laterality: N/A;  inst. to pt portal. Peg prep. Tbou  referral: Dr. Bowen  1/16- portal msg sent for pc. DBM  1/18- left 2nd vm for pc- pt returned call, pc complete. DBM    LATERAL EPICONDYLE RELEASE Left 5/18/2021    Procedure: RELEASE, ELBOW, LATERAL EPICONDYLE;  Surgeon: Cristi Adam Jr., MD;  Location: [facility] OR;  Service: Orthopedics;  Laterality: Left;    NOSE SURGERY      sepltoplasty    TENOTOMY, ELBOW, WITH DEBRIDEMENT AND TENDON REPAIR Right 11/14/2023    Procedure: TENOTOMY,ELBOW,WITH DEBRIDEMENT AND TENDON REPAIR;  Surgeon: Cristi Adam Jr., MD;  Location: [facility] OR;  Service: Orthopedics;  Laterality: Right;     Vital Signs (Most Recent)  There were no vitals filed for this visit.    Review of Systems:  Review of Systems   Constitutional:  Negative for chills and fever.   Respiratory:  Negative for shortness of breath.    Cardiovascular:  Negative for chest pain and leg swelling.   Gastrointestinal:  Negative for nausea and vomiting.   Genitourinary:  Negative for dysuria.   Musculoskeletal:  Negative for falls and joint pain.   Skin:  Negative for rash.   Neurological:  Negative for dizziness and sensory change.          OBJECTIVE:     PHYSICAL EXAM:       Ortho/SPM Exam  RIGHT ARM  Surgical incision is well healed with appropriate scar formation.  Scar is mobile and nonadherent.    Patient is nontender to soft tissues surrounding medial epicondyle.    Full range of motion to the elbow and wrist without pain or difficulty.    Sensation grossly intact throughout   Radial pulse palpable   Resisted wrist flexion and extension is nontender.  Strength intact throughout    RADIOGRAPHS:  No new imaging obtained today    ASSESSMENT/PLAN:     1. Medial epicondylitis of right elbow         The patient has done very well in his recovery process, and has returned to all normal activities without difficulty.    RTW:  Full duty release    Anti-inflammatories for intermittent pain relief    Follow-up:  P.rKhanhn.

## 2024-03-10 DIAGNOSIS — E78.49 OTHER HYPERLIPIDEMIA: ICD-10-CM

## 2024-03-10 NOTE — TELEPHONE ENCOUNTER
No care due was identified.  [facility] Embedded Care Due Messages. Reference number: [id number].   3/10/2024 7:05:48 AM CDT

## 2024-03-11 RX ORDER — TRAMADOL HYDROCHLORIDE 50 MG/1
50 TABLET ORAL EVERY 6 HOURS PRN
Qty: 30 TABLET | Refills: 0 | Status: SHIPPED | OUTPATIENT
Start: 2024-03-11 | End: 2024-04-19

## 2024-03-11 RX ORDER — ATORVASTATIN CALCIUM 10 MG/1
10 TABLET, FILM COATED ORAL
Qty: 90 TABLET | Refills: 0 | Status: SHIPPED | OUTPATIENT
Start: 2024-03-11 | End: 2024-04-08

## 2024-03-11 NOTE — TELEPHONE ENCOUNTER
Refill Decision Note   Enio Earl  is requesting a refill authorization.  Brief Assessment and Rationale for Refill:  Approve     Medication Therapy Plan:         Comments:     Note composed:10:38 AM 03/11/2024

## 2024-04-07 DIAGNOSIS — F41.8 SITUATIONAL ANXIETY: ICD-10-CM

## 2024-04-07 DIAGNOSIS — E78.49 OTHER HYPERLIPIDEMIA: ICD-10-CM

## 2024-04-07 NOTE — TELEPHONE ENCOUNTER
Care Due:                  Date            Visit Type   Department     Provider  --------------------------------------------------------------------------------                                EP -                              PRIMARY      [facility] FAMILY  Last Visit: 08-      CARE ([facility])   [facility]       Bhupinder Bowen                              EP -                              PRIMARY      [facility]  Next Visit: 04-      CARE ([facility])   [facility]       Bhupinder  Andrés                                                            Last  Test          Frequency    Reason                     Performed    Due Date  --------------------------------------------------------------------------------    CMP.........  12 months..  atorvastatin.............  04- 04-    [facility] Embedded Care Due Messages. Reference number: [id number].   4/07/2024 5:13:55 PM CDT

## 2024-04-08 RX ORDER — ATORVASTATIN CALCIUM 10 MG/1
10 TABLET, FILM COATED ORAL
Qty: 90 TABLET | Refills: 0 | Status: SHIPPED | OUTPATIENT
Start: 2024-04-08

## 2024-04-08 RX ORDER — TIZANIDINE 4 MG/1
4 TABLET ORAL NIGHTLY
Qty: 10 TABLET | Refills: 2 | Status: SHIPPED | OUTPATIENT
Start: 2024-04-08

## 2024-04-08 RX ORDER — SERTRALINE HYDROCHLORIDE 25 MG/1
25 TABLET, FILM COATED ORAL
Qty: 90 TABLET | Refills: 1 | Status: SHIPPED | OUTPATIENT
Start: 2024-04-08

## 2024-04-08 NOTE — TELEPHONE ENCOUNTER
Provider Staff:  Action required for this patient    Requires labs      Please see care gap opportunities below in Care Due Message.    Thanks!  [facility]     Appointments      Date Provider   Last Visit   8/11/2023 Bhupinder Bowen DO   Next Visit   4/29/2024 Bhupinder Bowen DO     Refill Decision Note   Enio Earl  is requesting a refill authorization.  Brief Assessment and Rationale for Refill:  Approve     Medication Therapy Plan:         Comments:     Note composed:1:20 PM 04/08/2024

## 2024-04-19 RX ORDER — TRAMADOL HYDROCHLORIDE 50 MG/1
50 TABLET ORAL EVERY 6 HOURS PRN
Qty: 30 TABLET | Refills: 0 | Status: SHIPPED | OUTPATIENT
Start: 2024-04-19

## 2024-06-26 DIAGNOSIS — J34.89 NASAL VESTIBULITIS: ICD-10-CM

## 2024-06-26 RX ORDER — TRAMADOL HYDROCHLORIDE 50 MG/1
50 TABLET ORAL EVERY 6 HOURS PRN
Qty: 30 TABLET | Refills: 0 | Status: SHIPPED | OUTPATIENT
Start: 2024-06-26

## 2024-06-27 RX ORDER — MUPIROCIN 20 MG/G
OINTMENT TOPICAL 2 TIMES DAILY
Qty: 22 G | Refills: 3 | Status: SHIPPED | OUTPATIENT
Start: 2024-06-27 | End: 2024-07-07

## 2024-06-27 RX ORDER — TIZANIDINE 4 MG/1
4 TABLET ORAL NIGHTLY
Qty: 10 TABLET | Refills: 2 | Status: SHIPPED | OUTPATIENT
Start: 2024-06-27

## 2024-06-28 ENCOUNTER — OFFICE VISIT (OUTPATIENT)
Dept: URGENT CARE | Facility: CLINIC | Age: 49
End: 2024-06-28
Payer: COMMERCIAL

## 2024-06-28 VITALS
WEIGHT: 220 LBS | HEART RATE: 85 BPM | BODY MASS INDEX: 33.34 KG/M2 | TEMPERATURE: 98 F | SYSTOLIC BLOOD PRESSURE: 168 MMHG | RESPIRATION RATE: 17 BRPM | OXYGEN SATURATION: 97 % | DIASTOLIC BLOOD PRESSURE: 109 MMHG | HEIGHT: 68 IN

## 2024-06-28 DIAGNOSIS — H60.502 ACUTE OTITIS EXTERNA OF LEFT EAR, UNSPECIFIED TYPE: Primary | ICD-10-CM

## 2024-06-28 DIAGNOSIS — H61.22 HEARING LOSS DUE TO CERUMEN IMPACTION, LEFT: ICD-10-CM

## 2024-06-28 RX ORDER — CIPROFLOXACIN AND DEXAMETHASONE 3; 1 MG/ML; MG/ML
4 SUSPENSION/ DROPS AURICULAR (OTIC) 2 TIMES DAILY
Qty: 7.5 ML | Refills: 0 | Status: SHIPPED | OUTPATIENT
Start: 2024-06-28 | End: 2024-07-03

## 2024-06-28 RX ORDER — CIPROFLOXACIN AND DEXAMETHASONE 3; 1 MG/ML; MG/ML
4 SUSPENSION/ DROPS AURICULAR (OTIC) 2 TIMES DAILY
Qty: 7.5 ML | Refills: 0 | Status: SHIPPED | OUTPATIENT
Start: 2024-06-28 | End: 2024-06-28

## 2024-06-28 NOTE — PATIENT INSTRUCTIONS
Ear Infection: Ear drops twice daily for 5 days   Monitor for fever, chills, new or worsening symptoms  Please return here or go to the Emergency Department for any concerns or worsening of condition.  If you were prescribed antibiotics, please take them to completion.  If you were prescribed a narcotic medication, do not drive or operate heavy equipment or machinery while taking these medications.  Please follow up with your primary care doctor or specialist as needed.    If you  smoke, please stop smoking.

## 2024-06-28 NOTE — PROGRESS NOTES
"Subjective:      Patient ID: Enio Earl is a 48 y.o. male.    Vitals:  height is 5' 8" (1.727 m) and weight is 99.8 kg (220 lb 0.3 oz). His oral temperature is 97.8 °F (36.6 °C). His blood pressure is 168/109 (abnormal) and his pulse is 85. His respiration is 17 and oxygen saturation is 97%.     Chief Complaint: Ear Problem (Swimmers ear? - Entered by patient)    Pt is a 47 yo male with PMHx of SVT, HLD, SCOTTIE, HTN. He is presenting with L otalgia/fullness. Onset of symptoms was 2 days ago. Pt having hearing loss since yesterday. Denies any fever, nasal congestion, sore throat, discharge, tenderness, swelling. He went swimming and thinks this triggered symptoms. Pt reports using OTC ear drops without relief.      Otalgia   There is pain in the left ear. This is a new problem. The current episode started in the past 7 days. The problem occurs constantly. The problem has been unchanged. There has been no fever. The pain is at a severity of 3/10. The pain is mild. Associated symptoms include hearing loss. Pertinent negatives include no abdominal pain, coughing, diarrhea, ear discharge, headaches, neck pain, rash, rhinorrhea, sore throat or vomiting. He has tried ear drops for the symptoms. The treatment provided no relief. There is no history of a chronic ear infection, hearing loss or a tympanostomy tube.     Constitution: Negative for activity change, appetite change, chills, fatigue and fever.   HENT:  Positive for ear pain and hearing loss. Negative for ear discharge, congestion, postnasal drip, sinus pain, sinus pressure and sore throat.    Neck: Negative for neck pain and neck swelling.   Cardiovascular:  Negative for chest pain and sob on exertion.   Eyes:  Negative for eye trauma, eye discharge and eye redness.   Respiratory:  Negative for cough, shortness of breath and wheezing.    Gastrointestinal:  Negative for abdominal pain, nausea, vomiting, constipation and diarrhea.   Genitourinary:  Negative for " dysuria, frequency, urgency and urine decreased.   Musculoskeletal:  Negative for pain, joint pain, joint swelling, abnormal ROM of joint and muscle ache.   Skin:  Negative for color change, rash, laceration and erythema.   Neurological:  Negative for dizziness, light-headedness, headaches, altered mental status and numbness.   Psychiatric/Behavioral:  Negative for altered mental status and confusion.       Objective:     Physical Exam   Constitutional: He is oriented to person, place, and time. He appears well-developed. He is cooperative.  Non-toxic appearance. He does not appear ill. No distress.      Comments:Pt sitting erect on examination table. No acute respiratory distress, no use of accessory muscles, no notice of nasal flaring.        HENT:   Head: Normocephalic and atraumatic.   Ears:   Right Ear: Hearing, tympanic membrane, external ear and ear canal normal. No tenderness. No middle ear effusion. no impacted cerumen  Left Ear: Hearing, tympanic membrane, external ear and ear canal normal. No tenderness. impacted cerumen  Nose: Nose normal. No mucosal edema, rhinorrhea, nasal deformity or congestion. No epistaxis. Right sinus exhibits no maxillary sinus tenderness and no frontal sinus tenderness. Left sinus exhibits no maxillary sinus tenderness and no frontal sinus tenderness.   Mouth/Throat: Uvula is midline, oropharynx is clear and moist and mucous membranes are normal. Mucous membranes are moist. No trismus in the jaw. Normal dentition. No uvula swelling. No oropharyngeal exudate, posterior oropharyngeal edema or posterior oropharyngeal erythema. Oropharynx is clear.   Eyes: Conjunctivae and lids are normal. Pupils are equal, round, and reactive to light. No scleral icterus. Extraocular movement intact   Neck: Trachea normal and phonation normal. Neck supple. No edema present. No erythema present. No neck rigidity present.   Cardiovascular: Normal rate, regular rhythm, normal heart sounds and normal  pulses.   Pulmonary/Chest: Effort normal and breath sounds normal. No accessory muscle usage. No apnea, no tachypnea and no bradypnea. No respiratory distress. He has no decreased breath sounds. He has no rhonchi.   Abdominal: Normal appearance.   Musculoskeletal: Normal range of motion.         General: No deformity. Normal range of motion.      Left hand: He exhibits laceration.   Neurological: He is alert and oriented to person, place, and time. He exhibits normal muscle tone. Coordination normal.   Skin: Skin is warm, dry, intact, not diaphoretic and not pale. Lacerations - upper ext.:  left handNo erythema   Psychiatric: His speech is normal and behavior is normal. Judgment and thought content normal.   Nursing note and vitals reviewed.      Assessment:     1. Acute otitis externa of left ear, unspecified type    2. Hearing loss due to cerumen impaction, left        Plan:   I have reviewed the patient chart and pertinent past imaging/labs.  Pt responded well to cerumen irrigation. All cerumen was removed. Small amount of discharge noted on ear canal.    Acute otitis externa of left ear, unspecified type  -     Discontinue: ciprofloxacin-dexAMETHasone 0.3-0.1% (CIPRODEX) 0.3-0.1 % DrpS; Place 4 drops into both ears 2 (two) times daily. for 7 days  Dispense: 7.5 mL; Refill: 0  -     ciprofloxacin-dexAMETHasone 0.3-0.1% (CIPRODEX) 0.3-0.1 % DrpS; Place 4 drops into both ears 2 (two) times daily. for 5 days  Dispense: 7.5 mL; Refill: 0    Hearing loss due to cerumen impaction, left  -     Ear wax removal  -     Ear Cerumen Removal

## 2024-06-28 NOTE — PROCEDURES
"Ear Cerumen Removal    Date/Time: 6/28/2024 10:00 AM    Performed by: Zeyad Dey PA-C  Authorized by: Zeyad Dey PA-C    Time out: Immediately prior to procedure a "time out" was called to verify the correct patient, procedure, equipment, support staff and site/side marked as required.    Consent Done?:  Yes (Verbal)  Medication Used:  Debrox  Location details:  Left ear  Procedure type: irrigation    Cerumen  Removal Results:  Cerumen completely removed  Patient tolerance:  Patient tolerated the procedure well with no immediate complications    "

## 2024-07-10 ENCOUNTER — LAB VISIT (OUTPATIENT)
Dept: LAB | Facility: HOSPITAL | Age: 49
End: 2024-07-10
Attending: FAMILY MEDICINE
Payer: COMMERCIAL

## 2024-07-10 ENCOUNTER — OFFICE VISIT (OUTPATIENT)
Dept: FAMILY MEDICINE | Facility: CLINIC | Age: 49
End: 2024-07-10
Payer: COMMERCIAL

## 2024-07-10 VITALS
SYSTOLIC BLOOD PRESSURE: 130 MMHG | WEIGHT: 221.81 LBS | HEIGHT: 68 IN | BODY MASS INDEX: 33.62 KG/M2 | TEMPERATURE: 98 F | OXYGEN SATURATION: 96 % | HEART RATE: 90 BPM | DIASTOLIC BLOOD PRESSURE: 85 MMHG

## 2024-07-10 DIAGNOSIS — Z00.00 VISIT FOR WELL MAN HEALTH CHECK: Primary | ICD-10-CM

## 2024-07-10 DIAGNOSIS — F17.200 TOBACCO DEPENDENCE: ICD-10-CM

## 2024-07-10 DIAGNOSIS — R21 RASH: ICD-10-CM

## 2024-07-10 DIAGNOSIS — I10 ESSENTIAL HYPERTENSION: ICD-10-CM

## 2024-07-10 DIAGNOSIS — H60.331 ACUTE SWIMMER'S EAR OF RIGHT SIDE: ICD-10-CM

## 2024-07-10 DIAGNOSIS — E78.49 OTHER HYPERLIPIDEMIA: ICD-10-CM

## 2024-07-10 DIAGNOSIS — F41.8 SITUATIONAL ANXIETY: ICD-10-CM

## 2024-07-10 DIAGNOSIS — I47.10 PSVT (PAROXYSMAL SUPRAVENTRICULAR TACHYCARDIA): ICD-10-CM

## 2024-07-10 DIAGNOSIS — I47.10 SVT (SUPRAVENTRICULAR TACHYCARDIA): ICD-10-CM

## 2024-07-10 DIAGNOSIS — Z00.00 VISIT FOR WELL MAN HEALTH CHECK: ICD-10-CM

## 2024-07-10 PROCEDURE — 1159F MED LIST DOCD IN RCRD: CPT | Mod: CPTII,S$GLB,, | Performed by: FAMILY MEDICINE

## 2024-07-10 PROCEDURE — 3079F DIAST BP 80-89 MM HG: CPT | Mod: CPTII,S$GLB,, | Performed by: FAMILY MEDICINE

## 2024-07-10 PROCEDURE — 80061 LIPID PANEL: CPT | Performed by: FAMILY MEDICINE

## 2024-07-10 PROCEDURE — 83036 HEMOGLOBIN GLYCOSYLATED A1C: CPT | Performed by: FAMILY MEDICINE

## 2024-07-10 PROCEDURE — 99396 PREV VISIT EST AGE 40-64: CPT | Mod: S$GLB,,, | Performed by: FAMILY MEDICINE

## 2024-07-10 PROCEDURE — 36415 COLL VENOUS BLD VENIPUNCTURE: CPT | Mod: PO | Performed by: FAMILY MEDICINE

## 2024-07-10 PROCEDURE — 1160F RVW MEDS BY RX/DR IN RCRD: CPT | Mod: CPTII,S$GLB,, | Performed by: FAMILY MEDICINE

## 2024-07-10 PROCEDURE — 3008F BODY MASS INDEX DOCD: CPT | Mod: CPTII,S$GLB,, | Performed by: FAMILY MEDICINE

## 2024-07-10 PROCEDURE — 4010F ACE/ARB THERAPY RXD/TAKEN: CPT | Mod: CPTII,S$GLB,, | Performed by: FAMILY MEDICINE

## 2024-07-10 PROCEDURE — 3075F SYST BP GE 130 - 139MM HG: CPT | Mod: CPTII,S$GLB,, | Performed by: FAMILY MEDICINE

## 2024-07-10 PROCEDURE — 99999 PR PBB SHADOW E&M-EST. PATIENT-LVL IV: CPT | Mod: PBBFAC,,, | Performed by: FAMILY MEDICINE

## 2024-07-10 PROCEDURE — 85025 COMPLETE CBC W/AUTO DIFF WBC: CPT | Performed by: FAMILY MEDICINE

## 2024-07-10 PROCEDURE — 84443 ASSAY THYROID STIM HORMONE: CPT | Performed by: FAMILY MEDICINE

## 2024-07-10 PROCEDURE — 80053 COMPREHEN METABOLIC PANEL: CPT | Performed by: FAMILY MEDICINE

## 2024-07-10 RX ORDER — VARENICLINE TARTRATE 0.5 (11)-1
KIT ORAL
Qty: 53 TABLET | Refills: 0 | Status: SHIPPED | OUTPATIENT
Start: 2024-07-10

## 2024-07-10 RX ORDER — SERTRALINE HYDROCHLORIDE 25 MG/1
25 TABLET, FILM COATED ORAL DAILY
Qty: 90 TABLET | Refills: 1 | Status: SHIPPED | OUTPATIENT
Start: 2024-07-10

## 2024-07-10 RX ORDER — TRIAMCINOLONE ACETONIDE 1 MG/G
CREAM TOPICAL 2 TIMES DAILY
Qty: 45 G | Refills: 3 | Status: SHIPPED | OUTPATIENT
Start: 2024-07-10

## 2024-07-10 RX ORDER — VARENICLINE TARTRATE 1 MG/1
1 TABLET, FILM COATED ORAL 2 TIMES DAILY
Qty: 180 TABLET | Refills: 1 | Status: SHIPPED | OUTPATIENT
Start: 2024-07-10 | End: 2025-01-06

## 2024-07-10 RX ORDER — VERAPAMIL HYDROCHLORIDE 120 MG/1
TABLET, FILM COATED, EXTENDED RELEASE ORAL
Qty: 90 TABLET | Refills: 1 | Status: SHIPPED | OUTPATIENT
Start: 2024-07-10

## 2024-07-10 RX ORDER — HYDROCORTISONE AND ACETIC ACID 20.75; 10.375 MG/ML; MG/ML
4 SOLUTION AURICULAR (OTIC) 2 TIMES DAILY
Qty: 10 ML | Refills: 1 | Status: SHIPPED | OUTPATIENT
Start: 2024-07-10 | End: 2024-07-20

## 2024-07-10 RX ORDER — LOSARTAN POTASSIUM 100 MG/1
TABLET ORAL
Qty: 90 TABLET | Refills: 1 | Status: SHIPPED | OUTPATIENT
Start: 2024-07-10

## 2024-07-10 RX ORDER — ATORVASTATIN CALCIUM 10 MG/1
10 TABLET, FILM COATED ORAL NIGHTLY
Qty: 90 TABLET | Refills: 3 | Status: SHIPPED | OUTPATIENT
Start: 2024-07-10

## 2024-07-10 NOTE — PROGRESS NOTES
Subjective:       Patient ID: Enio Earl is a 48 y.o. male.    Chief Complaint: Annual Exam    48 year old male here for an annual exam. Last seen about a year ago    Was active at work from February to June. But less active now. Has gained weight since then. No fast food.     Labs: prelabs not done.      C-scope: Last Colonoscopy completed on 1/23/2024. Repeat in 5 years.      SVT/HTN:  EP recommended verapamil 120 mg 24 hour tabs qam for BP and SVT. I added losartan 100 mg. Will avoid amlodipine and metoprolol for now.   Anxiety: on zoloft. Mood has improved.   DLD: on atorvastatin  Rash: no symptoms currently.     Had otitis externa. Didn't take ear drops due to cost. Now having symptoms again after he went swimming again.     Elbow/shoulder surgery: seeing Dr. Adam. He is prescribing zanaflex and tramadol.      PMHx: reviewed in EMR and updated  Meds: reviewed in EMR and updated  Shx: reviewed in EMR and updated  FMHx: reviewed in EMR and updated  Social: he lives with his wife and two kids (14 and 5). Daughter and son. 3 dogs at home. He started a new job on Monday, [employer]. He sells windows and shutters. No other smokers at home.         Review of Systems   Constitutional:  Negative for activity change and unexpected weight change.   HENT:  Positive for ear pain. Negative for rhinorrhea and trouble swallowing.    Eyes:  Negative for discharge and visual disturbance.   Respiratory:  Negative for chest tightness and wheezing.    Cardiovascular:  Negative for chest pain and palpitations.   Gastrointestinal:  Negative for blood in stool, constipation, diarrhea and vomiting.   Endocrine: Negative for polydipsia and polyuria.   Genitourinary:  Negative for difficulty urinating, hematuria and urgency.   Musculoskeletal:  Negative for arthralgias, joint swelling and neck pain.   Neurological:  Negative for weakness and headaches.   Psychiatric/Behavioral:  Negative for confusion and dysphoric mood.       "      Objective:     Vitals:    07/10/24 1305 07/10/24 1319   BP: (!) 148/88 130/85   BP Location: Left arm    Patient Position: Sitting    BP Method: Large (Manual)    Pulse: 90    Temp: 98.1 °F (36.7 °C)    SpO2: 96%    Weight: 100.6 kg (221 lb 12.5 oz)    Height: 5' 8" (1.727 m)         Physical Exam  Constitutional:       General: He is not in acute distress.     Appearance: He is not ill-appearing, toxic-appearing or diaphoretic.   HENT:      Right Ear: Tympanic membrane is not erythematous.      Left Ear: Tympanic membrane is not erythematous.      Ears:      Comments: Red and irritated left ear canal  Right canal normal  No tragus tenderness  No nasal congestion    Mild pharyngeal cobblestoning noted  Cardiovascular:      Rate and Rhythm: Normal rate and regular rhythm.   Pulmonary:      Effort: Pulmonary effort is normal.      Breath sounds: Normal breath sounds.   Abdominal:      Palpations: Abdomen is soft.      Tenderness: There is no abdominal tenderness.   Musculoskeletal:         General: No swelling or tenderness.   Skin:     Findings: No rash.      Comments: No rash noted on BL hands  No elbow swelling noted   Neurological:      General: No focal deficit present.      Mental Status: He is alert.   Psychiatric:         Mood and Affect: Mood normal.         Behavior: Behavior normal.         Thought Content: Thought content normal.         Judgment: Judgment normal.         Assessment:       1. Visit for well man health check    2. Essential hypertension    3. Other hyperlipidemia    4. SVT (supraventricular tachycardia)    5. PSVT (paroxysmal supraventricular tachycardia)    6. Rash    7. Situational anxiety    8. Tobacco dependence    9. Acute swimmer's ear of right side        Plan:       Continue atorvastatin    Continue losartan  Continue verapamil  Goal: 130/80  Monitor at home    Continue zoloft     Quit smoking! Restart chantix. Can cause vivid dreams.     Otitis externa: no swimming until this " resolves. Ciprodex was 135$. Will try another ear drop. If not covered, can try oral cipro?     F/u 6 months, BP and anxiety.       Visit for well man health check  -     CBC Auto Differential; Future; Expected date: 07/10/2024  -     Comprehensive Metabolic Panel; Future; Expected date: 07/10/2024  -     Hemoglobin A1C; Future; Expected date: 07/10/2024  -     Lipid Panel; Future; Expected date: 07/10/2024  -     TSH; Future; Expected date: 07/10/2024    Essential hypertension  -     verapamiL (CALAN-SR) 120 MG CR tablet; Take 1 tablet (120 mg total) by mouth once daily.  Dispense: 90 tablet; Refill: 1  -     losartan (COZAAR) 100 MG tablet; Take 1 tablet (100 mg total) by mouth once daily.  Dispense: 90 tablet; Refill: 1    Other hyperlipidemia  -     atorvastatin (LIPITOR) 10 MG tablet; Take 1 tablet (10 mg total) by mouth every evening.  Dispense: 90 tablet; Refill: 3    SVT (supraventricular tachycardia)  -     verapamiL (CALAN-SR) 120 MG CR tablet; Take 1 tablet (120 mg total) by mouth once daily.  Dispense: 90 tablet; Refill: 1    PSVT (paroxysmal supraventricular tachycardia)  -     verapamiL (CALAN-SR) 120 MG CR tablet; Take 1 tablet (120 mg total) by mouth once daily.  Dispense: 90 tablet; Refill: 1    Rash  -     triamcinolone acetonide 0.1% (KENALOG) 0.1 % cream; Apply topically 2 (two) times daily. For use on the hands as needed. Not for use on the face. Take periods of time off.  Dispense: 45 g; Refill: 3    Situational anxiety  -     sertraline (ZOLOFT) 25 MG tablet; Take 1 tablet (25 mg total) by mouth once daily.  Dispense: 90 tablet; Refill: 1    Tobacco dependence  -     Ambulatory referral/consult to Smoking Cessation Program; Future; Expected date: 07/17/2024  -     varenicline (CHANTIX STARTING MONTH BOX) 0.5 mg (11)- 1 mg (42) tablet; Take one 0.5mg tab by mouth once daily X3 days,then increase to one 0.5mg tab twice daily X4 days,then increase to one 1mg tab twice daily  Dispense: 53 tablet;  Refill: 0  -     varenicline (CHANTIX) 1 mg Tab; Take 1 tablet (1 mg total) by mouth 2 (two) times daily.  Dispense: 180 tablet; Refill: 1    Acute swimmer's ear of right side  -     acetic acid-hydrocortisone (VOSOL-HC) otic solution; Place 4 drops into the left ear 2 (two) times daily. for 10 days  Dispense: 10 mL; Refill: 1

## 2024-07-10 NOTE — PATIENT INSTRUCTIONS
Continue atorvastatin    Continue losartan  Continue verapamil  Goal: 130/80  Monitor at home    Continue zoloft     Quit smoking! Restart chantix. Can cause vivid dreams.     Otitis externa: no swimming until this resolves. Ciprodex was 135$. Will try another ear drop. If not covered, can try oral cipro?     F/u 6 months, BP and anxiety.

## 2024-07-11 ENCOUNTER — PATIENT MESSAGE (OUTPATIENT)
Dept: INTERNAL MEDICINE | Facility: CLINIC | Age: 49
End: 2024-07-11
Payer: COMMERCIAL

## 2024-07-11 ENCOUNTER — TELEPHONE (OUTPATIENT)
Dept: FAMILY MEDICINE | Facility: CLINIC | Age: 49
End: 2024-07-11
Payer: COMMERCIAL

## 2024-07-11 DIAGNOSIS — D72.829 LEUKOCYTOSIS, UNSPECIFIED TYPE: Primary | ICD-10-CM

## 2024-07-11 DIAGNOSIS — D75.89 MACROCYTOSIS: ICD-10-CM

## 2024-07-11 LAB
ALBUMIN SERPL BCP-MCNC: 4.2 G/DL (ref 3.5–5.2)
ALP SERPL-CCNC: 47 U/L (ref 55–135)
ALT SERPL W/O P-5'-P-CCNC: 44 U/L (ref 10–44)
ANION GAP SERPL CALC-SCNC: 12 MMOL/L (ref 8–16)
AST SERPL-CCNC: 37 U/L (ref 10–40)
BASOPHILS # BLD AUTO: 0.07 K/UL (ref 0–0.2)
BASOPHILS NFR BLD: 0.5 % (ref 0–1.9)
BILIRUB SERPL-MCNC: 0.7 MG/DL (ref 0.1–1)
BUN SERPL-MCNC: 9 MG/DL (ref 6–20)
CALCIUM SERPL-MCNC: 10.2 MG/DL (ref 8.7–10.5)
CHLORIDE SERPL-SCNC: 106 MMOL/L (ref 95–110)
CHOLEST SERPL-MCNC: 166 MG/DL (ref 120–199)
CHOLEST/HDLC SERPL: 2.8 {RATIO} (ref 2–5)
CO2 SERPL-SCNC: 23 MMOL/L (ref 23–29)
CREAT SERPL-MCNC: 0.9 MG/DL (ref 0.5–1.4)
DIFFERENTIAL METHOD BLD: ABNORMAL
EOSINOPHIL # BLD AUTO: 0.3 K/UL (ref 0–0.5)
EOSINOPHIL NFR BLD: 2.1 % (ref 0–8)
ERYTHROCYTE [DISTWIDTH] IN BLOOD BY AUTOMATED COUNT: 12 % (ref 11.5–14.5)
EST. GFR  (NO RACE VARIABLE): >60 ML/MIN/1.73 M^2
ESTIMATED AVG GLUCOSE: 105 MG/DL (ref 68–131)
GLUCOSE SERPL-MCNC: 113 MG/DL (ref 70–110)
HBA1C MFR BLD: 5.3 % (ref 4–5.6)
HCT VFR BLD AUTO: 45.6 % (ref 40–54)
HDLC SERPL-MCNC: 60 MG/DL (ref 40–75)
HDLC SERPL: 36.1 % (ref 20–50)
HGB BLD-MCNC: 15.1 G/DL (ref 14–18)
IMM GRANULOCYTES # BLD AUTO: 0.04 K/UL (ref 0–0.04)
IMM GRANULOCYTES NFR BLD AUTO: 0.3 % (ref 0–0.5)
LDLC SERPL CALC-MCNC: 86.6 MG/DL (ref 63–159)
LYMPHOCYTES # BLD AUTO: 2.8 K/UL (ref 1–4.8)
LYMPHOCYTES NFR BLD: 18.7 % (ref 18–48)
MCH RBC QN AUTO: 35.4 PG (ref 27–31)
MCHC RBC AUTO-ENTMCNC: 33.1 G/DL (ref 32–36)
MCV RBC AUTO: 107 FL (ref 82–98)
MONOCYTES # BLD AUTO: 0.8 K/UL (ref 0.3–1)
MONOCYTES NFR BLD: 5.6 % (ref 4–15)
NEUTROPHILS # BLD AUTO: 10.9 K/UL (ref 1.8–7.7)
NEUTROPHILS NFR BLD: 72.8 % (ref 38–73)
NONHDLC SERPL-MCNC: 106 MG/DL
NRBC BLD-RTO: 0 /100 WBC
PLATELET # BLD AUTO: 229 K/UL (ref 150–450)
PMV BLD AUTO: 12.2 FL (ref 9.2–12.9)
POTASSIUM SERPL-SCNC: 4.3 MMOL/L (ref 3.5–5.1)
PROT SERPL-MCNC: 7.5 G/DL (ref 6–8.4)
RBC # BLD AUTO: 4.27 M/UL (ref 4.6–6.2)
SODIUM SERPL-SCNC: 141 MMOL/L (ref 136–145)
TRIGL SERPL-MCNC: 97 MG/DL (ref 30–150)
TSH SERPL DL<=0.005 MIU/L-ACNC: 1.09 UIU/ML (ref 0.4–4)
WBC # BLD AUTO: 14.89 K/UL (ref 3.9–12.7)

## 2024-09-08 ENCOUNTER — PATIENT MESSAGE (OUTPATIENT)
Dept: FAMILY MEDICINE | Facility: CLINIC | Age: 49
End: 2024-09-08
Payer: COMMERCIAL

## 2024-12-03 ENCOUNTER — TELEPHONE (OUTPATIENT)
Dept: PHARMACY | Facility: CLINIC | Age: 49
End: 2024-12-03
Payer: COMMERCIAL

## 2024-12-03 NOTE — TELEPHONE ENCOUNTER
[facility]/Population Health Chart Review & Patient Outreach Details For Medication Adherence Project    Reason for Outreach Encounter: 3rd Party payor non-compliance report ([employer], BCBS, C, etc)  2.  Patient Outreach Method: Reviewed patient chart   3.   Medication in question:   Hyperlipidemia Medications               atorvastatin (LIPITOR) 10 MG tablet Take 1 tablet (10 mg total) by mouth every evening.                  atorvastatin  last filled  10/13/24 for 90 day supply    4.  Reviewed and or Updates Made To: Patient Chart  5. Outreach Outcomes and/or actions taken: Patient filled medication and is on track to be adherent  Additional Notes:

## 2025-01-13 ENCOUNTER — TELEPHONE (OUTPATIENT)
Dept: FAMILY MEDICINE | Facility: CLINIC | Age: 50
End: 2025-01-13
Payer: COMMERCIAL

## 2025-01-13 RX ORDER — TIZANIDINE 4 MG/1
4 TABLET ORAL NIGHTLY
Qty: 10 TABLET | Refills: 2 | Status: SHIPPED | OUTPATIENT
Start: 2025-01-13

## 2025-02-08 DIAGNOSIS — I47.10 PSVT (PAROXYSMAL SUPRAVENTRICULAR TACHYCARDIA): ICD-10-CM

## 2025-02-08 DIAGNOSIS — I47.10 SVT (SUPRAVENTRICULAR TACHYCARDIA): ICD-10-CM

## 2025-02-08 DIAGNOSIS — I10 ESSENTIAL HYPERTENSION: ICD-10-CM

## 2025-02-08 NOTE — TELEPHONE ENCOUNTER
Care Due:                  Date            Visit Type   Department     Provider  --------------------------------------------------------------------------------                                MYCHART                              FOLLOWUP/OF  [facility]  Last Visit: 07-      FICE VISIT   [facility]       Bhupinder Bowen  Next Visit: None Scheduled  None         None Found                                                            Last  Test          Frequency    Reason                     Performed    Due Date  --------------------------------------------------------------------------------    Office Visit  6 months...  varenicline..............  07-   01-    [facility] Embedded Care Due Messages. Reference number: [id number].   2/08/2025 4:25:24 PM CST

## 2025-02-09 RX ORDER — HYDROCHLOROTHIAZIDE 25 MG/1
TABLET ORAL
Qty: 90 TABLET | Refills: 1 | Status: SHIPPED | OUTPATIENT
Start: 2025-02-09

## 2025-02-10 NOTE — TELEPHONE ENCOUNTER
Provider Staff:  Action required for this patient     Please see care gap opportunities below in Care Due Message.    Thanks!  [facility]     Appointments      Date Provider   Last Visit   7/10/2024 Bhupinder Bowen DO   Next Visit   Visit date not found Bhupinder Bowen DO      Refill Decision Note   Enio Earl  is requesting a refill authorization.  Brief Assessment and Rationale for Refill:  Approve     Medication Therapy Plan:         Comments:     Note composed:7:58 PM 02/09/2025

## 2025-03-27 ENCOUNTER — TELEPHONE (OUTPATIENT)
Dept: PHARMACY | Facility: CLINIC | Age: 50
End: 2025-03-27
Payer: COMMERCIAL

## 2025-03-27 NOTE — TELEPHONE ENCOUNTER
[facility]Population Health Chart Review & Patient Outreach Details For Medication Adherence Project    Reason for Outreach Encounter: 3rd Party payor non-compliance report ([employer], BCBS, UHC, etc)  2.  Patient Outreach Method: Reviewed patient chart   3.   Medication in question:    Hyperlipidemia Medications              atorvastatin (LIPITOR) 10 MG tablet Take 1 tablet (10 mg total) by mouth every evening.                  LF 90 ds 2/5/25    4.  Reviewed and or Updates Made To: Patient Chart  5. Outreach Outcomes and/or actions taken: Patient filled medication and is on track to be adherent  Additional Notes:

## 2025-05-06 NOTE — TELEPHONE ENCOUNTER
Called pt to follow up on smoking cessation progress as well as medication usage. Left name and number to call back. -Aleisha Gamez, CTTS [phone].   GASTROENTEROLOGY FOLLOW UP    ASSESSMENT:  57 year old female presents with  fall and inability to ambulate.     [employer] Cirrhosis.  Diagnosed in 2022.  Complicated by ascites and hepatic hydrothorax.  Has been getting paracentesis and thoracentesis since 2022, the last paracentesis 3/20/2025.  Has not had SBP in the past.  Compliant with diuretic therapy.  Ascites.  On Lasix 80 mg a.m./40 mg p.m. and spironolactone 100 mg as outpatient. 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  Esophageal variceal screening.  Last EGD 1/17/2024 with grade 2 varices banded x 2 and portal hypertensive gastropathy.  Has not had EGD since.  HCC screening.  Last ultrasound 1/7/2025 without lesions.  AFP normal  Hepatic encephalopathy.  None noted currently.  Ammonia level 23.  Acute hypoxic respiratory failure.  Large right pleural effusion.  Thoracentesis ordered  Fall.  Wheelchair-bound at baseline.  CPK normal.  No signs of rhabdo.  Right lower extremity cellulitis.  Procalcitonin elevated.  Blood cultures in process.  No lactic acidosis or leukocytosis.  On antibiotics  IDDM        PLAN:    Continue diuretics.  Monitor kidney function.  2 g sodium diet  OP EGD   Okay for discharge from GI standpoint.  Patient should follow-up with hepatology.     Jennifer Meneses NP      Physician Note   My findings and recommendations include:  Patient feeling better after paracentesis    Okay to discharge from GI perspective  Outpatient surveillance EGD for varices  Follow-up with hepatology        I saw and examined the patient. The patients symptoms, physical findings, and studies, are as documented above by the nurse practitioner/physician assistant;  I discussed the patients treatment plans with the patient. Data Reviewed by me included I personally reviewed all pertinent labs and images done over the past 24 hours. Portions of the History, Physical Examination, and MDM Medical Decision Making were performed by the nurse  practitioner/physician assistant and portions of the History, Physical Examination, and MDM Medical Decision Making and complete MDM Medical Decision Making were done by me. My assessment and plan are as documented above by the nurse practitioner/physician assistant, and I have reviewed and edited the note as needed.    Ben Tellez MD  05/06/25  11:10 AM           MEDICATIONS:  Current Facility-Administered Medications   Medication Dose Route Frequency Provider Last Rate Last Admin    magnesium oxide (MAG-OX) tablet 400 mg  400 mg Oral Once Julio Wong MD        magnesium oxide (MAG-OX) tablet 400 mg  400 mg Oral Once Julio Wong MD        Potassium Standard Replacement Protocol (Levels 3.5 and lower)   Does not apply See Admin Instructions Will Hassan MD        Magnesium Standard Replacement Protocol   Does not apply See Admin Instructions Will Hassan MD        Phosphorus Standard Replacement Protocol   Does not apply See Admin Instructions Will Hassan MD        albumin human (SPA) 25 % injection 12.5 g  12.5 g Intravenous Once Will Hassan MD        cefepime (MAXIPIME) 1,000 mg in sodium chloride 0.9 % 100 mL IVPB  1,000 mg Intravenous 3 times per day Will Hassan  mL/hr at 05/06/25 0600 1,000 mg at 05/06/25 0600    VANCOMYCIN - PHARMACIST MONITORED Misc   Does not apply See Admin Instructions Will Hassan MD        vancomycin (VANCOCIN) 1,000 mg in sodium chloride 0.9 % 250 mL IVPB  1,000 mg Intravenous 2 times per day Julio Wong .7 mL/hr at 05/05/25 2109 1,000 mg at 05/05/25 2109    atorvastatin (LIPITOR) tablet 40 mg  40 mg Oral Daily Julio Wong MD   40 mg at 05/05/25 1410    fluticasone furoate (ARNUITY ELLIPTA) 100 MCG/ACT inhaler 1 puff  1 puff Inhalation Nightly Julio Wong MD   1 puff at 05/05/25 2034    buPROPion (WELLBUTRIN SR) SR tablet 100 mg  100 mg Oral QAM Julio Wong MD   100 mg at 05/05/25 1409    carvedilol (COREG)  tablet 6.25 mg  6.25 mg Oral BID WC Julio Wong MD   6.25 mg at 05/05/25 1818    cetirizine (ZyrTEC) tablet 10 mg  10 mg Oral Daily Julio Wong MD   10 mg at 05/05/25 1409    cyanocobalamin (Vitamin B-12) tablet 500 mcg  500 mcg Oral Daily Julio Wong MD   500 mcg at 05/05/25 1410    desvenlafaxine (PRISTIQ) ER tablet 50 mg  50 mg Oral Daily Julio Wong MD        folic acid (FOLATE) tablet 1 mg  1 mg Oral Daily Julio Wong MD   1 mg at 05/05/25 1407    montelukast (SINGULAIR) tablet 10 mg  10 mg Oral QAM AC Julio Wong MD   10 mg at 05/06/25 0600    spironolactone (ALDACTONE) tablet 100 mg  100 mg Oral Daily Julio Wong MD   100 mg at 05/05/25 1408    insulin regular human (CONCENTRATED) (HumuLIN R U-500 KWIKPEN) injection 125 Units  125 Units Subcutaneous QAM AC Julio Wong MD   100 Units at 05/05/25 1019    And    insulin regular human (CONCENTRATED) (HumuLIN R U-500 KWIKPEN) injection 100 Units  100 Units Subcutaneous Daily before lunch Julio Wong MD        And    insulin regular human (CONCENTRATED) (HumuLIN R U-500 KWIKPEN) injection 40 Units  40 Units Subcutaneous Daily before dinner Julio Wong MD   40 Units at 05/05/25 1814    DULoxetine (CYMBALTA) capsule 20 mg  20 mg Oral Daily Julio Wong MD   20 mg at 05/05/25 1407    [Held by provider] furosemide (LASIX INJECT) injection 40 mg  40 mg Intravenous BID Julio Wong MD        sodium chloride 0.9 % injection 2 mL  2 mL Intracatheter 2 times per day Napoleon Vines PA-C   2 mL at 05/05/25 2109    enoxaparin (LOVENOX) injection 90 mg  0.5 mg/kg Subcutaneous Q12H Julio Wong MD   90 mg at 05/06/25 0600    gabapentin (NEURONTIN) capsule 300 mg  300 mg Oral Daily Julio Wong MD        mupirocin (BACTROBAN) 2 % ointment   Topical BID Melyssa Tellez MD   Given at 05/05/25 2109    miconazole 2 % topical powder   Topical BID Melyssa Tellez MD   Given at 05/05/25 2109     fluconazole (DIFLUCAN) tablet 150 mg  150 mg Oral Daily Julio Wong MD   150 mg at 25 1652       Dietary Orders (From admission, onward)       Start     Ordered    25 1328  Daily - PM Snack; Ensure Max Protein/Max Protein Low Calorie Supplement, Chocolate Oral Nutrition Supplement  As Directed        Question Answer Comment   Frequency Daily - PM Snack    Oral Supplement Ensure Max Protein/Max Protein Low Calorie Supplement, Chocolate        25 1329    25 1052  Consistent Carb Moderate (45-75 gm/meal), Cardiac, Sodium 2 gm (Low Sodium); Yes, Medical Nutrition Management by RD (Registered Dietitian); Fluid Restrict 1800 mL (1020 from Dietary) Diet  DIET EFFECTIVE NOW        References:    [facility] Nutrition Services Medical Nutrition Therapy   Question Answer Comment   Diet Modifiers Consistent Carb Moderate (45-75 gm/meal)    Diet Modifiers Cardiac    Diet Modifiers Sodium 2 gm (Low Sodium)    Medical Nutrition Management by RD (Registered Dietitian) Yes, Medical Nutrition Management by RD (Registered Dietitian)    Fluid Restriction Modifier Fluid Restrict 1800 mL (1020 from Dietary)        25 1051                          Subjective: Feeling well.  Denies any nausea, vomiting or abdominal pain.      Objective:  Blood pressure 108/67, pulse 80, temperature 97.5 °F (36.4 °C), temperature source Oral, resp. rate 18, height 5' 7\" (1.702 m), weight (!) 178.5 kg (393 lb 8.3 oz), SpO2 94%, not currently breastfeeding.  Temp (24hrs), Av °F (36.7 °C), Min:97.5 °F (36.4 °C), Max:98.4 °F (36.9 °C)      General:  No acute distress.  Abdomen: Soft, obese, bowel sounds active.  No tenderness to palpation.    Labs:  Recent Labs   Lab 25  0415 25  0611 25  2148 25  2141   WBC 4.8 8.5  --  8.0   RBC 3.28* 3.58*  --  4.06   HCT 28.3* 30.3*  --  34.5*   HGB 9.1* 9.8*  --  11.1*   PLT 67* 86*  --  83*   MCV 86.3 84.6  --  85.0   SODIUM 138 139  --  133*    POTASSIUM 3.5 3.7  --  4.0   CHLORIDE 105 102  --  98   CO2 26 25  --  26   BUN 37* 33*  --  32*   CREATININE 1.41* 1.32* 1.50* 1.28*   BILIRUBIN  --  1.9*  --  2.3*   AST  --  22  --  27   GPT  --  31  --  31   ALBUMIN  --  2.5*  --  3.0*   ALKPT  --  356*  --  428*   INR  --   --   --  1.2

## 2025-08-04 ENCOUNTER — TELEPHONE (OUTPATIENT)
Dept: PHARMACY | Facility: CLINIC | Age: 50
End: 2025-08-04
Payer: COMMERCIAL

## 2025-08-04 ENCOUNTER — PATIENT MESSAGE (OUTPATIENT)
Dept: ADMINISTRATIVE | Facility: HOSPITAL | Age: 50
End: 2025-08-04
Payer: COMMERCIAL

## 2025-08-04 NOTE — TELEPHONE ENCOUNTER
[facility] Chart Review & Patient Outreach Details For Medication Adherence Project    Reason for Outreach Encounter: 3rd Party payor non-compliance report ([employer], BCBS, UHC, etc)  2.  Patient Outreach Method: Reviewed patient chart   3.   Medication in question:    Hyperlipidemia Medications              atorvastatin (LIPITOR) 10 MG tablet Take 1 tablet (10 mg total) by mouth every evening.                  LF 90 ds 5/23/25    4.  Reviewed and or Updates Made To: Patient Chart  5. Outreach Outcomes and/or actions taken: Patient filled medication and is on track to be adherent  Additional Notes:

## 2025-08-12 DIAGNOSIS — E78.49 OTHER HYPERLIPIDEMIA: ICD-10-CM

## 2025-08-12 RX ORDER — ATORVASTATIN CALCIUM 10 MG/1
10 TABLET, FILM COATED ORAL NIGHTLY
Qty: 90 TABLET | Refills: 3 | Status: SHIPPED | OUTPATIENT
Start: 2025-08-12

## (undated) DEVICE — SOL IRR NACL .9% 3000ML

## (undated) DEVICE — MANIFOLD 4 PORT

## (undated) DEVICE — ALCOHOL 70% ISOP W/GREEN 16OZ

## (undated) DEVICE — BANDAGE MATRIX HK LOOP 4IN 5YD

## (undated) DEVICE — SUT VICRYL 4-0 27 SH

## (undated) DEVICE — TOWEL OR DISP STRL BLUE 4/PK

## (undated) DEVICE — GOWN POLY REINF BRTH SLV LG

## (undated) DEVICE — SUT VICRYL 3-0 27 SH

## (undated) DEVICE — SUT ETHILON 3/0 18IN PS-1

## (undated) DEVICE — DRAPE THYROID SOFT STERILE

## (undated) DEVICE — CATH BLAZER II HTD STD CRV 5MM

## (undated) DEVICE — CLOSURE SKIN STERI STRIP 1/2X4

## (undated) DEVICE — BANDAGE ELASTIC 3IN ACE NS

## (undated) DEVICE — SEE MEDLINE ITEM 152622

## (undated) DEVICE — BANDAGE ESMARK ELASTIC ST 4X9

## (undated) DEVICE — DRESSING TRANS 4X4 TEGADERM

## (undated) DEVICE — INTRO 8.5FR 63CM SRO

## (undated) DEVICE — SEE L#120831

## (undated) DEVICE — SEE MEDLINE ITEM 156955

## (undated) DEVICE — BLADE SCALP OPHTL BEVEL STR

## (undated) DEVICE — GAUZE SPONGE 4X4 12PLY

## (undated) DEVICE — BANDAGE ELASTIC 3X5 VELCRO ST

## (undated) DEVICE — SLING ARM FASHION NAVY X-LG

## (undated) DEVICE — ADHESIVE MASTISOL VIAL 48/BX

## (undated) DEVICE — CATH CS OCTAPOLAR DX 7FRX110CM

## (undated) DEVICE — SUPPORT SLING SHOT II MEDIUM

## (undated) DEVICE — BNDG COFLEX FOAM LF2 ST 4X5YD

## (undated) DEVICE — SLING ARM COMFT NAVY BLU LG

## (undated) DEVICE — PAD CAST SPECIALIST STRL 4

## (undated) DEVICE — APPLICATOR CHLORAPREP ORN 26ML

## (undated) DEVICE — SUT SMARTSTITCH PERFECTPASS

## (undated) DEVICE — ELECTRODE REM PLYHSV RETURN 9

## (undated) DEVICE — TAPE ADH MEDIPORE 4 X 10YDS

## (undated) DEVICE — INTRODUCER HEMOSTASIS 7.5F

## (undated) DEVICE — DRAPE U SPLIT SHEET 54X76IN

## (undated) DEVICE — BLADE SHAVER 4.5 6/BX

## (undated) DEVICE — SPLINT PLASTER FS 5IN X 30IN

## (undated) DEVICE — STOCKINET 4INX48

## (undated) DEVICE — SHEATH HEMOSTASIS 8.5FR

## (undated) DEVICE — BLADE SURG #15 CARBON STEEL

## (undated) DEVICE — PACK BASIC

## (undated) DEVICE — PAD CAST SPECIALIST STRL 3

## (undated) DEVICE — BANDAGE ELASTIC ACE 2IN 10/CA

## (undated) DEVICE — GLOVE SURG BIOGEL LATEX SZ 7.5

## (undated) DEVICE — STRIP MEDI WND CLSR 1/2X4IN

## (undated) DEVICE — SEE MEDLINE ITEM 146308

## (undated) DEVICE — PADDING 4X4YD SPECIALIST100

## (undated) DEVICE — SEE MEDLINE ITEM 157116

## (undated) DEVICE — DRESSING XEROFORM NONADH 1X8IN

## (undated) DEVICE — MAT SUCTION PUDDLEVAC ORANGE

## (undated) DEVICE — NDL SPINAL 18GX3.5 SPINOCAN

## (undated) DEVICE — PACK SURGERY START

## (undated) DEVICE — PADDING CAST SPECIALIST 3X4YD

## (undated) DEVICE — GOWN POLY REINF BRTH SLV XL

## (undated) DEVICE — COVER PROXIMA MAYO STAND

## (undated) DEVICE — TUBING SUC UNIV W/CONN 12FT

## (undated) DEVICE — CATH HIS OCTAPOLAR 7FRX115CM

## (undated) DEVICE — PATCH ENSITE PRECISION NAVX SE

## (undated) DEVICE — Device

## (undated) DEVICE — PAD ABDOMINAL 5X9 STERILE

## (undated) DEVICE — TOURNIQUET SB QC DP 18X4IN

## (undated) DEVICE — SEE MEDLINE ITEM 157117

## (undated) DEVICE — SUT ETHILON 4-0 BLK MONO

## (undated) DEVICE — CONN SMARTSTITCH PERFECTPASSER

## (undated) DEVICE — COVER OVERHEAD SURG LT BLUE

## (undated) DEVICE — DRAPE THREE-QTR REINF 53X77IN

## (undated) DEVICE — SUT VICRYL CTD 2-0 GI 27 SH

## (undated) DEVICE — SEE MEDLINE ITEM 157173

## (undated) DEVICE — KIT PROBE COVER WITH GEL

## (undated) DEVICE — ELECTRODE POLYHESIVEPRE-ATTACH

## (undated) DEVICE — SEE MEDLINE ITEM 152522

## (undated) DEVICE — SPONGE GAUZE 4X4 12 PLY STRL

## (undated) DEVICE — TUBE SET INFLOW/OUTFLOW

## (undated) DEVICE — BANDAGE MATRIX HK LOOP 3IN 5YD

## (undated) DEVICE — CATH RESPONSE QPLR JSN 6F 120

## (undated) DEVICE — INSTRAMOD SHOULDER TRACTION

## (undated) DEVICE — CONNECTOR TUBING STR 5 IN 1

## (undated) DEVICE — PACK EP DRAPE

## (undated) DEVICE — WAND COBLATION TURBOVAC 90

## (undated) DEVICE — PAD DEFIB CADENCE ADULT R2